# Patient Record
Sex: MALE | Race: WHITE | Employment: OTHER | URBAN - METROPOLITAN AREA
[De-identification: names, ages, dates, MRNs, and addresses within clinical notes are randomized per-mention and may not be internally consistent; named-entity substitution may affect disease eponyms.]

---

## 2017-01-03 ENCOUNTER — OFFICE VISIT (OUTPATIENT)
Dept: PSYCHOLOGY | Age: 67
End: 2017-01-03

## 2017-01-03 DIAGNOSIS — F17.200 NICOTINE USE DISORDER: Primary | ICD-10-CM

## 2017-01-03 PROCEDURE — 96152 PR HEAL & BEHAV INTERVENT,EA 15 MIN,INDIV: CPT | Performed by: PSYCHOLOGIST

## 2017-01-03 ASSESSMENT — PATIENT HEALTH QUESTIONNAIRE - PHQ9
6. FEELING BAD ABOUT YOURSELF - OR THAT YOU ARE A FAILURE OR HAVE LET YOURSELF OR YOUR FAMILY DOWN: 0
SUM OF ALL RESPONSES TO PHQ QUESTIONS 1-9: 4
4. FEELING TIRED OR HAVING LITTLE ENERGY: 1
7. TROUBLE CONCENTRATING ON THINGS, SUCH AS READING THE NEWSPAPER OR WATCHING TELEVISION: 0
3. TROUBLE FALLING OR STAYING ASLEEP: 0
SUM OF ALL RESPONSES TO PHQ9 QUESTIONS 1 & 2: 2
5. POOR APPETITE OR OVEREATING: 1
2. FEELING DOWN, DEPRESSED OR HOPELESS: 1
1. LITTLE INTEREST OR PLEASURE IN DOING THINGS: 1
8. MOVING OR SPEAKING SO SLOWLY THAT OTHER PEOPLE COULD HAVE NOTICED. OR THE OPPOSITE, BEING SO FIGETY OR RESTLESS THAT YOU HAVE BEEN MOVING AROUND A LOT MORE THAN USUAL: 0
9. THOUGHTS THAT YOU WOULD BE BETTER OFF DEAD, OR OF HURTING YOURSELF: 0
10. IF YOU CHECKED OFF ANY PROBLEMS, HOW DIFFICULT HAVE THESE PROBLEMS MADE IT FOR YOU TO DO YOUR WORK, TAKE CARE OF THINGS AT HOME, OR GET ALONG WITH OTHER PEOPLE: 0

## 2017-01-06 DIAGNOSIS — R73.9 HYPERGLYCEMIA: ICD-10-CM

## 2017-01-06 DIAGNOSIS — E78.2 MIXED HYPERLIPIDEMIA: ICD-10-CM

## 2017-01-06 LAB
A/G RATIO: 1.9 (ref 1.1–2.2)
ALBUMIN SERPL-MCNC: 4.6 G/DL (ref 3.4–5)
ALP BLD-CCNC: 58 U/L (ref 40–129)
ALT SERPL-CCNC: 20 U/L (ref 10–40)
ANION GAP SERPL CALCULATED.3IONS-SCNC: 15 MMOL/L (ref 3–16)
AST SERPL-CCNC: 17 U/L (ref 15–37)
BILIRUB SERPL-MCNC: 0.9 MG/DL (ref 0–1)
BUN BLDV-MCNC: 17 MG/DL (ref 7–20)
CALCIUM SERPL-MCNC: 9.4 MG/DL (ref 8.3–10.6)
CHLORIDE BLD-SCNC: 103 MMOL/L (ref 99–110)
CHOLESTEROL, TOTAL: 183 MG/DL (ref 0–199)
CO2: 24 MMOL/L (ref 21–32)
CREAT SERPL-MCNC: 0.9 MG/DL (ref 0.8–1.3)
GFR AFRICAN AMERICAN: >60
GFR NON-AFRICAN AMERICAN: >60
GLOBULIN: 2.4 G/DL
GLUCOSE BLD-MCNC: 110 MG/DL (ref 70–99)
HDLC SERPL-MCNC: 40 MG/DL (ref 40–60)
LDL CHOLESTEROL CALCULATED: 120 MG/DL
POTASSIUM SERPL-SCNC: 4.6 MMOL/L (ref 3.5–5.1)
SODIUM BLD-SCNC: 142 MMOL/L (ref 136–145)
T4 FREE: 1.3 NG/DL (ref 0.9–1.8)
TOTAL PROTEIN: 7 G/DL (ref 6.4–8.2)
TRIGL SERPL-MCNC: 116 MG/DL (ref 0–150)
TSH SERPL DL<=0.05 MIU/L-ACNC: 1.53 UIU/ML (ref 0.27–4.2)
VLDLC SERPL CALC-MCNC: 23 MG/DL

## 2017-01-07 LAB
ESTIMATED AVERAGE GLUCOSE: 114 MG/DL
HBA1C MFR BLD: 5.6 %

## 2017-01-17 ENCOUNTER — OFFICE VISIT (OUTPATIENT)
Dept: FAMILY MEDICINE CLINIC | Age: 67
End: 2017-01-17

## 2017-01-17 ENCOUNTER — OFFICE VISIT (OUTPATIENT)
Dept: PSYCHOLOGY | Age: 67
End: 2017-01-17

## 2017-01-17 ENCOUNTER — HOSPITAL ENCOUNTER (OUTPATIENT)
Dept: OTHER | Age: 67
Discharge: HOME | End: 2017-01-17
Attending: NURSE PRACTITIONER | Admitting: NURSE PRACTITIONER

## 2017-01-17 VITALS
WEIGHT: 191.2 LBS | HEIGHT: 67 IN | SYSTOLIC BLOOD PRESSURE: 120 MMHG | TEMPERATURE: 97.8 F | BODY MASS INDEX: 30.01 KG/M2 | HEART RATE: 89 BPM | DIASTOLIC BLOOD PRESSURE: 78 MMHG | OXYGEN SATURATION: 96 %

## 2017-01-17 DIAGNOSIS — L57.0 ACTINIC KERATOSIS: ICD-10-CM

## 2017-01-17 DIAGNOSIS — J44.9 COPD, MILD (HCC): ICD-10-CM

## 2017-01-17 DIAGNOSIS — Z11.59 NEED FOR HEPATITIS C SCREENING TEST: ICD-10-CM

## 2017-01-17 DIAGNOSIS — R05.9 COUGH: Primary | ICD-10-CM

## 2017-01-17 DIAGNOSIS — E78.2 MIXED HYPERLIPIDEMIA: ICD-10-CM

## 2017-01-17 DIAGNOSIS — R20.8 OTHER DISTURBANCES OF SKIN SENSATION: ICD-10-CM

## 2017-01-17 DIAGNOSIS — F17.200 NICOTINE USE DISORDER: Primary | ICD-10-CM

## 2017-01-17 DIAGNOSIS — Z23 NEED FOR 23-POLYVALENT PNEUMOCOCCAL POLYSACCHARIDE VACCINE: ICD-10-CM

## 2017-01-17 DIAGNOSIS — R05.9 COUGH: ICD-10-CM

## 2017-01-17 PROCEDURE — 96152 PR HEAL & BEHAV INTERVENT,EA 15 MIN,INDIV: CPT | Performed by: PSYCHOLOGIST

## 2017-01-17 PROCEDURE — 1123F ACP DISCUSS/DSCN MKR DOCD: CPT | Performed by: NURSE PRACTITIONER

## 2017-01-17 PROCEDURE — 94150 VITAL CAPACITY TEST: CPT | Performed by: NURSE PRACTITIONER

## 2017-01-17 PROCEDURE — G8420 CALC BMI NORM PARAMETERS: HCPCS | Performed by: NURSE PRACTITIONER

## 2017-01-17 PROCEDURE — 4040F PNEUMOC VAC/ADMIN/RCVD: CPT | Performed by: NURSE PRACTITIONER

## 2017-01-17 PROCEDURE — 3017F COLORECTAL CA SCREEN DOC REV: CPT | Performed by: NURSE PRACTITIONER

## 2017-01-17 PROCEDURE — 99214 OFFICE O/P EST MOD 30 MIN: CPT | Performed by: NURSE PRACTITIONER

## 2017-01-17 PROCEDURE — 3023F SPIROM DOC REV: CPT | Performed by: NURSE PRACTITIONER

## 2017-01-17 PROCEDURE — 90732 PPSV23 VACC 2 YRS+ SUBQ/IM: CPT | Performed by: NURSE PRACTITIONER

## 2017-01-17 PROCEDURE — G8926 SPIRO NO PERF OR DOC: HCPCS | Performed by: NURSE PRACTITIONER

## 2017-01-17 PROCEDURE — G8484 FLU IMMUNIZE NO ADMIN: HCPCS | Performed by: NURSE PRACTITIONER

## 2017-01-17 PROCEDURE — 17110 DESTRUCTION B9 LES UP TO 14: CPT | Performed by: NURSE PRACTITIONER

## 2017-01-17 PROCEDURE — 4004F PT TOBACCO SCREEN RCVD TLK: CPT | Performed by: NURSE PRACTITIONER

## 2017-01-17 PROCEDURE — 94640 AIRWAY INHALATION TREATMENT: CPT | Performed by: NURSE PRACTITIONER

## 2017-01-17 PROCEDURE — G8427 DOCREV CUR MEDS BY ELIG CLIN: HCPCS | Performed by: NURSE PRACTITIONER

## 2017-01-17 PROCEDURE — 4004F PT TOBACCO SCREEN RCVD TLK: CPT | Performed by: PSYCHOLOGIST

## 2017-01-17 PROCEDURE — G0009 ADMIN PNEUMOCOCCAL VACCINE: HCPCS | Performed by: NURSE PRACTITIONER

## 2017-01-17 RX ORDER — MONTELUKAST SODIUM 10 MG/1
10 TABLET ORAL DAILY
Qty: 30 TABLET | Refills: 3 | Status: SHIPPED | OUTPATIENT
Start: 2017-01-17 | End: 2017-05-21 | Stop reason: SDUPTHER

## 2017-01-17 RX ORDER — METHYLPREDNISOLONE 4 MG/1
TABLET ORAL
Qty: 1 KIT | Refills: 0 | Status: SHIPPED | OUTPATIENT
Start: 2017-01-17 | End: 2017-01-23

## 2017-01-17 RX ORDER — ALBUTEROL SULFATE 2.5 MG/3ML
2.5 SOLUTION RESPIRATORY (INHALATION) ONCE
Status: COMPLETED | OUTPATIENT
Start: 2017-01-17 | End: 2017-01-17

## 2017-01-17 RX ADMIN — ALBUTEROL SULFATE 2.5 MG: 2.5 SOLUTION RESPIRATORY (INHALATION) at 15:19

## 2017-01-17 ASSESSMENT — PATIENT HEALTH QUESTIONNAIRE - PHQ9
4. FEELING TIRED OR HAVING LITTLE ENERGY: 1
2. FEELING DOWN, DEPRESSED OR HOPELESS: 0
9. THOUGHTS THAT YOU WOULD BE BETTER OFF DEAD, OR OF HURTING YOURSELF: 0
7. TROUBLE CONCENTRATING ON THINGS, SUCH AS READING THE NEWSPAPER OR WATCHING TELEVISION: 0
1. LITTLE INTEREST OR PLEASURE IN DOING THINGS: 0
6. FEELING BAD ABOUT YOURSELF - OR THAT YOU ARE A FAILURE OR HAVE LET YOURSELF OR YOUR FAMILY DOWN: 0
5. POOR APPETITE OR OVEREATING: 0
SUM OF ALL RESPONSES TO PHQ9 QUESTIONS 1 & 2: 0
3. TROUBLE FALLING OR STAYING ASLEEP: 1
8. MOVING OR SPEAKING SO SLOWLY THAT OTHER PEOPLE COULD HAVE NOTICED. OR THE OPPOSITE, BEING SO FIGETY OR RESTLESS THAT YOU HAVE BEEN MOVING AROUND A LOT MORE THAN USUAL: 0
SUM OF ALL RESPONSES TO PHQ QUESTIONS 1-9: 2

## 2017-01-17 ASSESSMENT — ENCOUNTER SYMPTOMS
GASTROINTESTINAL NEGATIVE: 1
SORE THROAT: 0
HEARTBURN: 0
HEMOPTYSIS: 0
SHORTNESS OF BREATH: 1
EYES NEGATIVE: 1
ALLERGIC/IMMUNOLOGIC NEGATIVE: 1
RHINORRHEA: 0
COUGH: 1
WHEEZING: 1

## 2017-02-03 ENCOUNTER — OFFICE VISIT (OUTPATIENT)
Dept: PSYCHOLOGY | Age: 67
End: 2017-02-03

## 2017-02-03 ENCOUNTER — TELEPHONE (OUTPATIENT)
Dept: PSYCHOLOGY | Age: 67
End: 2017-02-03

## 2017-02-03 DIAGNOSIS — F17.200 NICOTINE USE DISORDER: Primary | ICD-10-CM

## 2017-02-03 DIAGNOSIS — F34.1 DYSTHYMIA: Primary | ICD-10-CM

## 2017-02-03 RX ORDER — BUPROPION HYDROCHLORIDE 300 MG/1
300 TABLET ORAL EVERY MORNING
Qty: 90 TABLET | Refills: 1 | Status: SHIPPED | OUTPATIENT
Start: 2017-02-03 | End: 2017-10-05 | Stop reason: SDUPTHER

## 2017-02-03 RX ORDER — BUPROPION HYDROCHLORIDE 300 MG/1
300 TABLET ORAL EVERY MORNING
Qty: 90 TABLET | Refills: 1 | Status: SHIPPED | OUTPATIENT
Start: 2017-02-03 | End: 2017-02-03 | Stop reason: SDUPTHER

## 2017-02-03 ASSESSMENT — PATIENT HEALTH QUESTIONNAIRE - PHQ9
5. POOR APPETITE OR OVEREATING: 0
9. THOUGHTS THAT YOU WOULD BE BETTER OFF DEAD, OR OF HURTING YOURSELF: 0
7. TROUBLE CONCENTRATING ON THINGS, SUCH AS READING THE NEWSPAPER OR WATCHING TELEVISION: 0
2. FEELING DOWN, DEPRESSED OR HOPELESS: 1
3. TROUBLE FALLING OR STAYING ASLEEP: 1
SUM OF ALL RESPONSES TO PHQ QUESTIONS 1-9: 2
1. LITTLE INTEREST OR PLEASURE IN DOING THINGS: 0
SUM OF ALL RESPONSES TO PHQ9 QUESTIONS 1 & 2: 1
6. FEELING BAD ABOUT YOURSELF - OR THAT YOU ARE A FAILURE OR HAVE LET YOURSELF OR YOUR FAMILY DOWN: 0
8. MOVING OR SPEAKING SO SLOWLY THAT OTHER PEOPLE COULD HAVE NOTICED. OR THE OPPOSITE, BEING SO FIGETY OR RESTLESS THAT YOU HAVE BEEN MOVING AROUND A LOT MORE THAN USUAL: 0
4. FEELING TIRED OR HAVING LITTLE ENERGY: 0

## 2017-04-18 ENCOUNTER — OFFICE VISIT (OUTPATIENT)
Dept: FAMILY MEDICINE CLINIC | Age: 67
End: 2017-04-18

## 2017-04-18 VITALS
TEMPERATURE: 98 F | DIASTOLIC BLOOD PRESSURE: 72 MMHG | BODY MASS INDEX: 29.79 KG/M2 | WEIGHT: 190.2 LBS | SYSTOLIC BLOOD PRESSURE: 114 MMHG

## 2017-04-18 DIAGNOSIS — E78.2 MIXED HYPERLIPIDEMIA: ICD-10-CM

## 2017-04-18 DIAGNOSIS — R73.9 HYPERGLYCEMIA: ICD-10-CM

## 2017-04-18 DIAGNOSIS — J44.9 COPD, MILD (HCC): Primary | ICD-10-CM

## 2017-04-18 DIAGNOSIS — F17.200 SMOKER'S RESPIRATORY SYNDROME: ICD-10-CM

## 2017-04-18 PROCEDURE — 4040F PNEUMOC VAC/ADMIN/RCVD: CPT | Performed by: FAMILY MEDICINE

## 2017-04-18 PROCEDURE — G8420 CALC BMI NORM PARAMETERS: HCPCS | Performed by: FAMILY MEDICINE

## 2017-04-18 PROCEDURE — 4004F PT TOBACCO SCREEN RCVD TLK: CPT | Performed by: FAMILY MEDICINE

## 2017-04-18 PROCEDURE — G8926 SPIRO NO PERF OR DOC: HCPCS | Performed by: FAMILY MEDICINE

## 2017-04-18 PROCEDURE — 3023F SPIROM DOC REV: CPT | Performed by: FAMILY MEDICINE

## 2017-04-18 PROCEDURE — 99214 OFFICE O/P EST MOD 30 MIN: CPT | Performed by: FAMILY MEDICINE

## 2017-04-18 PROCEDURE — G8427 DOCREV CUR MEDS BY ELIG CLIN: HCPCS | Performed by: FAMILY MEDICINE

## 2017-04-18 PROCEDURE — 3017F COLORECTAL CA SCREEN DOC REV: CPT | Performed by: FAMILY MEDICINE

## 2017-04-18 PROCEDURE — 1123F ACP DISCUSS/DSCN MKR DOCD: CPT | Performed by: FAMILY MEDICINE

## 2017-04-18 ASSESSMENT — ENCOUNTER SYMPTOMS
EYES NEGATIVE: 1
RESPIRATORY NEGATIVE: 1
GASTROINTESTINAL NEGATIVE: 1

## 2017-04-19 DIAGNOSIS — E78.2 MIXED HYPERLIPIDEMIA: ICD-10-CM

## 2017-04-19 DIAGNOSIS — Z11.59 NEED FOR HEPATITIS C SCREENING TEST: ICD-10-CM

## 2017-04-19 LAB
ALBUMIN SERPL-MCNC: 4.2 G/DL (ref 3.4–5)
ANION GAP SERPL CALCULATED.3IONS-SCNC: 16 MMOL/L (ref 3–16)
BUN BLDV-MCNC: 15 MG/DL (ref 7–20)
CALCIUM SERPL-MCNC: 8.9 MG/DL (ref 8.3–10.6)
CHLORIDE BLD-SCNC: 105 MMOL/L (ref 99–110)
CHOLESTEROL, TOTAL: 186 MG/DL (ref 0–199)
CO2: 23 MMOL/L (ref 21–32)
CREAT SERPL-MCNC: 0.9 MG/DL (ref 0.8–1.3)
GFR AFRICAN AMERICAN: >60
GFR NON-AFRICAN AMERICAN: >60
GLUCOSE BLD-MCNC: 98 MG/DL (ref 70–99)
HDLC SERPL-MCNC: 32 MG/DL (ref 40–60)
HEPATITIS C ANTIBODY INTERPRETATION: NORMAL
LDL CHOLESTEROL CALCULATED: 125 MG/DL
PHOSPHORUS: 3.1 MG/DL (ref 2.5–4.9)
POTASSIUM SERPL-SCNC: 4.5 MMOL/L (ref 3.5–5.1)
SODIUM BLD-SCNC: 144 MMOL/L (ref 136–145)
TRIGL SERPL-MCNC: 145 MG/DL (ref 0–150)
VLDLC SERPL CALC-MCNC: 29 MG/DL

## 2017-05-21 DIAGNOSIS — J44.9 COPD, MILD (HCC): ICD-10-CM

## 2017-05-21 DIAGNOSIS — R05.9 COUGH: ICD-10-CM

## 2017-05-21 RX ORDER — MONTELUKAST SODIUM 10 MG/1
10 TABLET ORAL NIGHTLY
Qty: 90 TABLET | Refills: 1 | Status: SHIPPED | OUTPATIENT
Start: 2017-05-21 | End: 2017-06-17 | Stop reason: SDUPTHER

## 2017-06-17 DIAGNOSIS — J44.9 COPD, MILD (HCC): ICD-10-CM

## 2017-06-17 DIAGNOSIS — R05.9 COUGH: ICD-10-CM

## 2017-06-17 RX ORDER — MONTELUKAST SODIUM 10 MG/1
10 TABLET ORAL NIGHTLY
Qty: 90 TABLET | Refills: 1 | Status: SHIPPED | OUTPATIENT
Start: 2017-06-17 | End: 2018-02-02

## 2017-08-20 DIAGNOSIS — J44.9 COPD, MILD (HCC): ICD-10-CM

## 2017-08-21 RX ORDER — ALBUTEROL SULFATE 90 UG/1
2 AEROSOL, METERED RESPIRATORY (INHALATION) EVERY 6 HOURS PRN
Qty: 3 INHALER | Refills: 1 | Status: ON HOLD | OUTPATIENT
Start: 2017-08-21 | End: 2022-01-09 | Stop reason: HOSPADM

## 2017-10-05 DIAGNOSIS — F34.1 DYSTHYMIA: ICD-10-CM

## 2017-10-05 RX ORDER — BUPROPION HYDROCHLORIDE 300 MG/1
300 TABLET ORAL EVERY MORNING
Qty: 90 TABLET | Refills: 1 | Status: SHIPPED | OUTPATIENT
Start: 2017-10-05 | End: 2018-05-08 | Stop reason: SDUPTHER

## 2017-12-12 ENCOUNTER — OFFICE VISIT (OUTPATIENT)
Dept: FAMILY MEDICINE CLINIC | Age: 67
End: 2017-12-12

## 2017-12-12 VITALS
TEMPERATURE: 97.8 F | BODY MASS INDEX: 30.17 KG/M2 | SYSTOLIC BLOOD PRESSURE: 126 MMHG | DIASTOLIC BLOOD PRESSURE: 82 MMHG | WEIGHT: 192.6 LBS

## 2017-12-12 DIAGNOSIS — J44.9 COPD, MILD (HCC): ICD-10-CM

## 2017-12-12 DIAGNOSIS — E78.2 MIXED HYPERLIPIDEMIA: ICD-10-CM

## 2017-12-12 DIAGNOSIS — R20.8 BURNING SENSATION: ICD-10-CM

## 2017-12-12 DIAGNOSIS — S46.911A SHOULDER STRAIN, RIGHT, INITIAL ENCOUNTER: ICD-10-CM

## 2017-12-12 DIAGNOSIS — L57.0 ACTINIC KERATOSIS: Primary | ICD-10-CM

## 2017-12-12 PROCEDURE — 17000 DESTRUCT PREMALG LESION: CPT | Performed by: FAMILY MEDICINE

## 2017-12-12 PROCEDURE — G8484 FLU IMMUNIZE NO ADMIN: HCPCS | Performed by: FAMILY MEDICINE

## 2017-12-12 PROCEDURE — G8427 DOCREV CUR MEDS BY ELIG CLIN: HCPCS | Performed by: FAMILY MEDICINE

## 2017-12-12 PROCEDURE — G8417 CALC BMI ABV UP PARAM F/U: HCPCS | Performed by: FAMILY MEDICINE

## 2017-12-12 PROCEDURE — 4040F PNEUMOC VAC/ADMIN/RCVD: CPT | Performed by: FAMILY MEDICINE

## 2017-12-12 PROCEDURE — 99214 OFFICE O/P EST MOD 30 MIN: CPT | Performed by: FAMILY MEDICINE

## 2017-12-12 PROCEDURE — 3023F SPIROM DOC REV: CPT | Performed by: FAMILY MEDICINE

## 2017-12-12 PROCEDURE — G8926 SPIRO NO PERF OR DOC: HCPCS | Performed by: FAMILY MEDICINE

## 2017-12-12 PROCEDURE — 1123F ACP DISCUSS/DSCN MKR DOCD: CPT | Performed by: FAMILY MEDICINE

## 2017-12-12 PROCEDURE — 17003 DESTRUCT PREMALG LES 2-14: CPT | Performed by: FAMILY MEDICINE

## 2017-12-12 PROCEDURE — 3017F COLORECTAL CA SCREEN DOC REV: CPT | Performed by: FAMILY MEDICINE

## 2017-12-12 PROCEDURE — 4004F PT TOBACCO SCREEN RCVD TLK: CPT | Performed by: FAMILY MEDICINE

## 2017-12-12 RX ORDER — FLUOROURACIL 50 MG/G
CREAM TOPICAL
Qty: 40 G | Refills: 0 | Status: SHIPPED | OUTPATIENT
Start: 2017-12-12 | End: 2017-12-19

## 2017-12-12 ASSESSMENT — ENCOUNTER SYMPTOMS
GASTROINTESTINAL NEGATIVE: 1
RESPIRATORY NEGATIVE: 1
EYES NEGATIVE: 1

## 2017-12-13 NOTE — PROGRESS NOTES
Administered    Pneumococcal Polysaccharide ([id number]) 01/17/2017    Tdap (Boostrix, Adacel) 08/03/2012    Zoster 01/28/2015       No Known Allergies  Outpatient Prescriptions Marked as Taking for the 12/12/17 encounter (Office Visit) with nEzo Hill MD   Medication Sig Dispense Refill    fluorouracil (EFUDEX) 5 % cream Apply topically 2 times daily to right side of forehead. 40 g 0    buPROPion (WELLBUTRIN XL) 300 MG extended release tablet Take 1 tablet by mouth every morning 90 tablet 1    albuterol sulfate HFA (VENTOLIN HFA) 108 (90 Base) MCG/ACT inhaler Inhale 2 puffs into the lungs every 6 hours as needed for Wheezing or Shortness of Breath 3 Inhaler 1    montelukast (SINGULAIR) 10 MG tablet Take 1 tablet by mouth nightly 90 tablet 1    fluticasone-salmeterol (ADVAIR) 250-50 MCG/DOSE AEPB Inhale 1 puff into the lungs every 12 hours 60 each 3    Flaxseed, Linseed, (FLAX SEED OIL) 1000 MG CAPS Take 2,000 mg by mouth 2 times daily.  Cinnamon 500 MG CAPS Take 1 capsule by mouth daily.  vitamin E 400 UNIT capsule Take 400 Units by mouth daily.  aspirin 325 MG tablet Take 325 mg by mouth daily.  Cyanocobalamin (VITAMIN B 12) 500 MCG LOZG Take 1 tablet by mouth.  fish oil-omega-3 fatty acids 1000 MG capsule Take 1 capsule by mouth daily.  Ascorbic Acid (VITAMIN C) 500 MG tablet Take 500 mg by mouth daily.  vitamin D (CHOLECALCIFEROL) 400 UNIT TABS tablet Take 400 Units by mouth daily.          Past Medical History:   Diagnosis Date    BPH with obstruction/lower urinary tract symptoms 8/3/2012    Chicken pox     Colon polyp     Hyperglycemia 12/15/2010    Hyperlipemia 12/15/2010    Post herpetic neuralgia     Shingles     Squamous cell carcinoma of skin of upper limb, including shoulder 9/8/2015     Past Surgical History:   Procedure Laterality Date    HEMICOLECTOMY      right due to mass benign     Family History   Problem Relation Age of Onset   

## 2017-12-14 DIAGNOSIS — E78.2 MIXED HYPERLIPIDEMIA: ICD-10-CM

## 2017-12-14 LAB
A/G RATIO: 2 (ref 1.1–2.2)
ALBUMIN SERPL-MCNC: 4.3 G/DL (ref 3.4–5)
ALP BLD-CCNC: 53 U/L (ref 40–129)
ALT SERPL-CCNC: 24 U/L (ref 10–40)
ANION GAP SERPL CALCULATED.3IONS-SCNC: 14 MMOL/L (ref 3–16)
AST SERPL-CCNC: 16 U/L (ref 15–37)
BILIRUB SERPL-MCNC: 0.7 MG/DL (ref 0–1)
BUN BLDV-MCNC: 22 MG/DL (ref 7–20)
CALCIUM SERPL-MCNC: 9 MG/DL (ref 8.3–10.6)
CHLORIDE BLD-SCNC: 102 MMOL/L (ref 99–110)
CHOLESTEROL, TOTAL: 189 MG/DL (ref 0–199)
CO2: 24 MMOL/L (ref 21–32)
CREAT SERPL-MCNC: 1 MG/DL (ref 0.8–1.3)
GFR AFRICAN AMERICAN: >60
GFR NON-AFRICAN AMERICAN: >60
GLOBULIN: 2.1 G/DL
GLUCOSE BLD-MCNC: 105 MG/DL (ref 70–99)
HDLC SERPL-MCNC: 35 MG/DL (ref 40–60)
LDL CHOLESTEROL CALCULATED: 107 MG/DL
POTASSIUM SERPL-SCNC: 4.4 MMOL/L (ref 3.5–5.1)
SODIUM BLD-SCNC: 140 MMOL/L (ref 136–145)
TOTAL PROTEIN: 6.4 G/DL (ref 6.4–8.2)
TRIGL SERPL-MCNC: 234 MG/DL (ref 0–150)
VLDLC SERPL CALC-MCNC: 47 MG/DL

## 2017-12-19 ENCOUNTER — TELEPHONE (OUTPATIENT)
Dept: FAMILY MEDICINE CLINIC | Age: 67
End: 2017-12-19

## 2017-12-19 DIAGNOSIS — L57.0 ACTINIC KERATOSIS: Primary | ICD-10-CM

## 2017-12-19 RX ORDER — FLUOROURACIL 50 MG/ML
SOLUTION TOPICAL
Qty: 1 BOTTLE | Refills: 2 | Status: SHIPPED | OUTPATIENT
Start: 2017-12-19 | End: 2018-04-04

## 2017-12-19 NOTE — TELEPHONE ENCOUNTER
Fluorouracil 5% cream is too expensive  Please switch to the fluorouracil 5% solution as it is cheaper

## 2018-02-01 ENCOUNTER — OFFICE VISIT (OUTPATIENT)
Dept: FAMILY MEDICINE CLINIC | Age: 68
End: 2018-02-01

## 2018-02-01 VITALS
TEMPERATURE: 98.1 F | DIASTOLIC BLOOD PRESSURE: 80 MMHG | WEIGHT: 190 LBS | BODY MASS INDEX: 29.76 KG/M2 | SYSTOLIC BLOOD PRESSURE: 125 MMHG

## 2018-02-01 DIAGNOSIS — J44.9 COPD, MILD (HCC): Primary | ICD-10-CM

## 2018-02-01 DIAGNOSIS — F17.219 NICOTINE DEPENDENCE, CIGARETTES, WITH UNSPECIFIED NICOTINE-INDUCED DISORDERS: ICD-10-CM

## 2018-02-01 DIAGNOSIS — L57.0 ACTINIC KERATOSIS: ICD-10-CM

## 2018-02-01 PROCEDURE — G8926 SPIRO NO PERF OR DOC: HCPCS | Performed by: FAMILY MEDICINE

## 2018-02-01 PROCEDURE — G8484 FLU IMMUNIZE NO ADMIN: HCPCS | Performed by: FAMILY MEDICINE

## 2018-02-01 PROCEDURE — 1123F ACP DISCUSS/DSCN MKR DOCD: CPT | Performed by: FAMILY MEDICINE

## 2018-02-01 PROCEDURE — 99213 OFFICE O/P EST LOW 20 MIN: CPT | Performed by: FAMILY MEDICINE

## 2018-02-01 PROCEDURE — 3023F SPIROM DOC REV: CPT | Performed by: FAMILY MEDICINE

## 2018-02-01 PROCEDURE — 4040F PNEUMOC VAC/ADMIN/RCVD: CPT | Performed by: FAMILY MEDICINE

## 2018-02-01 PROCEDURE — G8417 CALC BMI ABV UP PARAM F/U: HCPCS | Performed by: FAMILY MEDICINE

## 2018-02-01 PROCEDURE — 4004F PT TOBACCO SCREEN RCVD TLK: CPT | Performed by: FAMILY MEDICINE

## 2018-02-01 PROCEDURE — G8428 CUR MEDS NOT DOCUMENT: HCPCS | Performed by: FAMILY MEDICINE

## 2018-02-01 PROCEDURE — 3017F COLORECTAL CA SCREEN DOC REV: CPT | Performed by: FAMILY MEDICINE

## 2018-02-01 PROCEDURE — G0296 VISIT TO DETERM LDCT ELIG: HCPCS | Performed by: FAMILY MEDICINE

## 2018-02-01 ASSESSMENT — ENCOUNTER SYMPTOMS
RESPIRATORY NEGATIVE: 1
EYES NEGATIVE: 1
GASTROINTESTINAL NEGATIVE: 1

## 2018-02-01 NOTE — PROGRESS NOTES
2/1/18    H [place] Post  1950      Chief Complaint   Patient presents with    Abrasion     COPD: Patient complains of dyspnea and cough. Symptoms began several months ago. Symptoms acute dyspnea and chronic dyspnea does worsen with exertion. Sputum is clear  in small amounts. Fever has been hot and cold spells. Patient uses 1 pillows at night. Patient can walk 500 feet before resting. Patient currently is not on home oxygen therapy. Naty Rayo  Respiratory history: COPD  He continued to smoke     Pt here to follow up on effudex therapy but did not get to start it as medicaion on back order  Vitals:    02/01/18 0831   BP: 125/80   Temp: 98.1 °F (36.7 °C)         Immunization History   Administered Date(s) Administered    Pneumococcal Polysaccharide ([id number]) 01/17/2017    Tdap (Boostrix, Adacel) 08/03/2012    Zoster 01/28/2015       No Known Allergies  Outpatient Prescriptions Marked as Taking for the 2/1/18 encounter (Office Visit) with Janice Collins MD   Medication Sig Dispense Refill    umeclidinium-vilanterol (ANORO ELLIPTA) 62.5-25 MCG/INH AEPB inhaler Inhale 1 puff into the lungs daily 1 each 3       Past Medical History:   Diagnosis Date    BPH with obstruction/lower urinary tract symptoms 8/3/2012    Chicken pox     Colon polyp     Hyperglycemia 12/15/2010    Hyperlipemia 12/15/2010    Post herpetic neuralgia     Shingles     Squamous cell carcinoma of skin of upper limb, including shoulder 9/8/2015     Past Surgical History:   Procedure Laterality Date    HEMICOLECTOMY      right due to mass benign     Family History   Problem Relation Age of Onset    Depression Mother     Arthritis Mother     High Blood Pressure Mother     High Cholesterol Mother     Heart Disease Mother     Arthritis Father     Heart Disease Father     High Blood Pressure Father     High Cholesterol Father     Stroke Father     Cancer Sister      ovarian    High Cholesterol Sister     High Blood Pressure Sister     Arthritis Sister      Social History     Social History    Marital status:      Spouse name: N/A    Number of children: N/A    Years of education: N/A     Occupational History    Not on file. Social History Main Topics    Smoking status: Current Every Day Smoker     Packs/day: 1.00     Types: Cigarettes    Smokeless tobacco: Never Used      Comment: smokes less than 1 pack a day     Alcohol use 3.6 oz/week     2 Glasses of wine, 2 Cans of beer, 2 Shots of liquor per week      Comment: drinks 1-2 a day    Drug use: No    Sexual activity: Not on file     Other Topics Concern    Not on file     Social History Narrative    No narrative on file         Any recent diagnostic tests, hospital reports, office notes or consultation letters were reviewed prior to and during the visit. Review of Systems   Constitutional: Negative. HENT: Negative. Eyes: Negative. Respiratory: Negative. Cardiovascular: Negative. Gastrointestinal: Negative. Genitourinary: Negative. Musculoskeletal: Negative. Psychiatric/Behavioral: Negative. Physical Exam   Constitutional: He appears well-developed and well-nourished. No distress. Neck: Normal range of motion. Neck supple. Normal carotid pulses, no hepatojugular reflux and no JVD present. Carotid bruit is not present. No tracheal deviation present. No thyromegaly present. Cardiovascular: Normal rate, regular rhythm, S2 normal, normal heart sounds and intact distal pulses. PMI is not displaced. Exam reveals no gallop and no friction rub. No murmur heard. Pulses:       Carotid pulses are 2+ on the right side, and 2+ on the left side. Dorsalis pedis pulses are 2+ on the right side, and 2+ on the left side. Posterior tibial pulses are 2+ on the right side, and 2+ on the left side. Pulmonary/Chest: Effort normal and breath sounds normal. No stridor. No respiratory distress. He has no wheezes.  He has no rales. He exhibits no tenderness. Abdominal: Soft. Bowel sounds are normal. He exhibits no distension and no mass. There is no tenderness. There is no rebound and no guarding. Musculoskeletal:        Right ankle: He exhibits no swelling. Left ankle: He exhibits no swelling. Lymphadenopathy:     He has no cervical adenopathy. Skin: He is not diaphoretic. Psychiatric: He has a normal mood and affect. His behavior is normal. Judgment and thought content normal.         1. COPD, mild ([address]    The condition is deteriorating, will change treatment, investigate cause and make further recommendations when data back. Need to recheck PFT and change therapy  PFT-lab    umeclidinium-vilanterol (ANORO ELLIPTA) 62.5-25 MCG/INH AEPB inhaler    CT Lung Screening   2. Actinic keratosis  The condition is deteriorating, will change treatment, investigate cause and make further recommendations when data back. Will look into getting medication    3. Nicotine dependence, cigarettes, with unspecified nicotine-induced disorders   The condition is deteriorating, will change treatment, investigate cause and make further recommendations when data back. Need toscreen he needs to quit reaching done.  SC VISIT TO DISCUSS LUNG CA SCREEN W LDCT    CT Lung Screening    CANCELED: CT Lung Screening             Mumtaz Silva MD

## 2018-02-02 DIAGNOSIS — J44.9 COPD, MILD (HCC): ICD-10-CM

## 2018-02-02 DIAGNOSIS — R05.9 COUGH: ICD-10-CM

## 2018-02-02 RX ORDER — MONTELUKAST SODIUM 10 MG/1
10 TABLET ORAL NIGHTLY
Qty: 90 TABLET | Refills: 1 | Status: ON HOLD | OUTPATIENT
Start: 2018-02-02 | End: 2022-01-09 | Stop reason: HOSPADM

## 2018-02-06 ENCOUNTER — HOSPITAL ENCOUNTER (OUTPATIENT)
Dept: CT IMAGING | Age: 68
Discharge: HOME | End: 2018-02-06
Attending: FAMILY MEDICINE | Admitting: FAMILY MEDICINE

## 2018-02-06 DIAGNOSIS — J44.9 COPD, MILD (HCC): ICD-10-CM

## 2018-02-06 DIAGNOSIS — F17.219 NICOTINE DEPENDENCE, CIGARETTES, WITH UNSPECIFIED NICOTINE-INDUCED DISORDERS: ICD-10-CM

## 2018-02-06 DIAGNOSIS — J44.9 CHRONIC OBSTRUCTIVE PULMONARY DISEASE (HCC): ICD-10-CM

## 2018-02-15 ENCOUNTER — OFFICE VISIT (OUTPATIENT)
Dept: FAMILY MEDICINE CLINIC | Age: 68
End: 2018-02-15

## 2018-02-15 VITALS
BODY MASS INDEX: 30.32 KG/M2 | SYSTOLIC BLOOD PRESSURE: 132 MMHG | DIASTOLIC BLOOD PRESSURE: 68 MMHG | WEIGHT: 193.6 LBS | TEMPERATURE: 98.4 F

## 2018-02-15 DIAGNOSIS — L57.0 ACTINIC KERATOSIS: Primary | ICD-10-CM

## 2018-02-15 PROCEDURE — 99213 OFFICE O/P EST LOW 20 MIN: CPT | Performed by: FAMILY MEDICINE

## 2018-02-15 PROCEDURE — G8427 DOCREV CUR MEDS BY ELIG CLIN: HCPCS | Performed by: FAMILY MEDICINE

## 2018-02-15 PROCEDURE — 4004F PT TOBACCO SCREEN RCVD TLK: CPT | Performed by: FAMILY MEDICINE

## 2018-02-15 PROCEDURE — 1123F ACP DISCUSS/DSCN MKR DOCD: CPT | Performed by: FAMILY MEDICINE

## 2018-02-15 PROCEDURE — G8484 FLU IMMUNIZE NO ADMIN: HCPCS | Performed by: FAMILY MEDICINE

## 2018-02-15 PROCEDURE — 3017F COLORECTAL CA SCREEN DOC REV: CPT | Performed by: FAMILY MEDICINE

## 2018-02-15 PROCEDURE — G8417 CALC BMI ABV UP PARAM F/U: HCPCS | Performed by: FAMILY MEDICINE

## 2018-02-15 PROCEDURE — 4040F PNEUMOC VAC/ADMIN/RCVD: CPT | Performed by: FAMILY MEDICINE

## 2018-02-18 ASSESSMENT — ENCOUNTER SYMPTOMS
EYES NEGATIVE: 1
RESPIRATORY NEGATIVE: 1
GASTROINTESTINAL NEGATIVE: 1

## 2018-02-18 NOTE — PROGRESS NOTES
2/18/18    Marianna Toney  1950      Chief Complaint   Patient presents with    Rash     scabbing on face and hand, getting worse, med very expensive   pt here to follow up. He has been using the lotion and notes the skin is red. He is frustrated with the cost of the meds and response     /68   Temp 98.4 °F (36.9 °C) (Oral)   Wt 193 lb 9.6 oz (87.8 kg)   BMI 30.32 kg/m²       Immunization History   Administered Date(s) Administered    Pneumococcal Polysaccharide ([id number]) 01/17/2017    Tdap (Boostrix, Adacel) 08/03/2012    Zoster 01/28/2015       No Known Allergies  Outpatient Prescriptions Marked as Taking for the 2/15/18 encounter (Office Visit) with Gustabo Flaherty MD   Medication Sig Dispense Refill    montelukast (SINGULAIR) 10 MG tablet Take 1 tablet by mouth nightly 90 tablet 1    umeclidinium-vilanterol (ANORO ELLIPTA) 62.5-25 MCG/INH AEPB inhaler Inhale 1 puff into the lungs daily 1 each 3    fluorouracil (EFUDEX) 5 % SOLN external solution Apply topically 1 Bottle 2    buPROPion (WELLBUTRIN XL) 300 MG extended release tablet Take 1 tablet by mouth every morning 90 tablet 1    albuterol sulfate HFA (VENTOLIN HFA) 108 (90 Base) MCG/ACT inhaler Inhale 2 puffs into the lungs every 6 hours as needed for Wheezing or Shortness of Breath 3 Inhaler 1    fluticasone-salmeterol (ADVAIR) 250-50 MCG/DOSE AEPB Inhale 1 puff into the lungs every 12 hours 60 each 3    ketoconazole (NIZORAL) 2 % cream Apply topically daily. 30 g 1    hydrocortisone 2.5 % cream Apply topically 2 times daily. 60 g 5    Flaxseed, Linseed, (FLAX SEED OIL) 1000 MG CAPS Take 2,000 mg by mouth 2 times daily.  Cinnamon 500 MG CAPS Take 1 capsule by mouth daily.  vitamin E 400 UNIT capsule Take 400 Units by mouth daily.  aspirin 325 MG tablet Take 325 mg by mouth daily.  Cyanocobalamin (VITAMIN B 12) 500 MCG LOZG Take 1 tablet by mouth.       fish oil-omega-3 fatty acids 1000 MG capsule Take Cardiovascular: Negative. Gastrointestinal: Negative. Genitourinary: Negative. Musculoskeletal: Negative. Psychiatric/Behavioral: Negative. Physical Exam   HENT:   Head:             1. Actinic keratosis   The condition is deteriorating, will change treatment, investigate cause and make further recommendations when data back. Need ot see derm note written         . francia Ellis MD

## 2018-03-20 ENCOUNTER — OFFICE VISIT (OUTPATIENT)
Dept: FAMILY MEDICINE CLINIC | Age: 68
End: 2018-03-20

## 2018-03-20 VITALS
SYSTOLIC BLOOD PRESSURE: 116 MMHG | DIASTOLIC BLOOD PRESSURE: 78 MMHG | TEMPERATURE: 98.7 F | BODY MASS INDEX: 29.44 KG/M2 | WEIGHT: 188 LBS

## 2018-03-20 DIAGNOSIS — E78.2 MIXED HYPERLIPIDEMIA: ICD-10-CM

## 2018-03-20 DIAGNOSIS — R73.9 HYPERGLYCEMIA: ICD-10-CM

## 2018-03-20 DIAGNOSIS — L20.89 OTHER ATOPIC DERMATITIS: Primary | ICD-10-CM

## 2018-03-20 DIAGNOSIS — Z23 NEED FOR PNEUMOCOCCAL VACCINATION: ICD-10-CM

## 2018-03-20 PROCEDURE — 99214 OFFICE O/P EST MOD 30 MIN: CPT | Performed by: FAMILY MEDICINE

## 2018-03-20 PROCEDURE — 90670 PCV13 VACCINE IM: CPT | Performed by: FAMILY MEDICINE

## 2018-03-20 PROCEDURE — 4040F PNEUMOC VAC/ADMIN/RCVD: CPT | Performed by: FAMILY MEDICINE

## 2018-03-20 PROCEDURE — G8427 DOCREV CUR MEDS BY ELIG CLIN: HCPCS | Performed by: FAMILY MEDICINE

## 2018-03-20 PROCEDURE — G0009 ADMIN PNEUMOCOCCAL VACCINE: HCPCS | Performed by: FAMILY MEDICINE

## 2018-03-20 PROCEDURE — 4004F PT TOBACCO SCREEN RCVD TLK: CPT | Performed by: FAMILY MEDICINE

## 2018-03-20 PROCEDURE — G8484 FLU IMMUNIZE NO ADMIN: HCPCS | Performed by: FAMILY MEDICINE

## 2018-03-20 PROCEDURE — 1123F ACP DISCUSS/DSCN MKR DOCD: CPT | Performed by: FAMILY MEDICINE

## 2018-03-20 PROCEDURE — 3017F COLORECTAL CA SCREEN DOC REV: CPT | Performed by: FAMILY MEDICINE

## 2018-03-20 PROCEDURE — G8417 CALC BMI ABV UP PARAM F/U: HCPCS | Performed by: FAMILY MEDICINE

## 2018-03-21 NOTE — PROGRESS NOTES
Mother     High Cholesterol Mother     Heart Disease Mother     Arthritis Father     Heart Disease Father     High Blood Pressure Father     High Cholesterol Father     Stroke Father     Cancer Sister      ovarian    High Cholesterol Sister     High Blood Pressure Sister     Arthritis Sister      Social History     Social History    Marital status:      Spouse name: N/A    Number of children: N/A    Years of education: N/A     Occupational History    Not on file. Social History Main Topics    Smoking status: Current Every Day Smoker     Packs/day: 1.00     Types: Cigarettes    Smokeless tobacco: Never Used      Comment: smokes less than 1 pack a day     Alcohol use 3.6 oz/week     2 Glasses of wine, 2 Cans of beer, 2 Shots of liquor per week      Comment: drinks 1-2 a day    Drug use: No    Sexual activity: Not on file     Other Topics Concern    Not on file     Social History Narrative    No narrative on file         Any recent diagnostic tests, hospital reports, office notes or consultation letters were reviewed prior to and during the visit. Review of Systems      Physical Exam   Constitutional: He appears well-developed and well-nourished. No distress. HENT:   Head:       Neck: Normal range of motion. Neck supple. Normal carotid pulses, no hepatojugular reflux and no JVD present. Carotid bruit is not present. No tracheal deviation present. No thyromegaly present. Cardiovascular: Normal rate, regular rhythm, S2 normal, normal heart sounds and intact distal pulses. PMI is not displaced. Exam reveals no gallop and no friction rub. No murmur heard. Pulses:       Carotid pulses are 2+ on the right side, and 2+ on the left side. Dorsalis pedis pulses are 2+ on the right side, and 2+ on the left side. Posterior tibial pulses are 2+ on the right side, and 2+ on the left side. Pulmonary/Chest: Effort normal and breath sounds normal. No stridor.  No respiratory

## 2018-03-26 ENCOUNTER — TELEPHONE (OUTPATIENT)
Dept: DERMATOLOGY | Age: 68
End: 2018-03-26

## 2018-03-26 DIAGNOSIS — R73.9 HYPERGLYCEMIA: ICD-10-CM

## 2018-03-26 DIAGNOSIS — E78.2 MIXED HYPERLIPIDEMIA: ICD-10-CM

## 2018-03-26 LAB
A/G RATIO: 2.2 (ref 1.1–2.2)
ALBUMIN SERPL-MCNC: 4.4 G/DL (ref 3.4–5)
ALP BLD-CCNC: 62 U/L (ref 40–129)
ALT SERPL-CCNC: 15 U/L (ref 10–40)
ANION GAP SERPL CALCULATED.3IONS-SCNC: 11 MMOL/L (ref 3–16)
AST SERPL-CCNC: 12 U/L (ref 15–37)
BILIRUB SERPL-MCNC: 0.4 MG/DL (ref 0–1)
BUN BLDV-MCNC: 16 MG/DL (ref 7–20)
CALCIUM SERPL-MCNC: 8.9 MG/DL (ref 8.3–10.6)
CHLORIDE BLD-SCNC: 102 MMOL/L (ref 99–110)
CHOLESTEROL, TOTAL: 173 MG/DL (ref 0–199)
CO2: 26 MMOL/L (ref 21–32)
CREAT SERPL-MCNC: 0.9 MG/DL (ref 0.8–1.3)
GFR AFRICAN AMERICAN: >60
GFR NON-AFRICAN AMERICAN: >60
GLOBULIN: 2 G/DL
GLUCOSE BLD-MCNC: 100 MG/DL (ref 70–99)
HDLC SERPL-MCNC: 33 MG/DL (ref 40–60)
LDL CHOLESTEROL CALCULATED: 100 MG/DL
POTASSIUM SERPL-SCNC: 4.9 MMOL/L (ref 3.5–5.1)
SODIUM BLD-SCNC: 139 MMOL/L (ref 136–145)
TOTAL PROTEIN: 6.4 G/DL (ref 6.4–8.2)
TRIGL SERPL-MCNC: 199 MG/DL (ref 0–150)
VLDLC SERPL CALC-MCNC: 40 MG/DL

## 2018-03-29 ENCOUNTER — TELEPHONE (OUTPATIENT)
Dept: CASE MANAGEMENT | Age: 68
End: 2018-03-29

## 2018-04-04 ENCOUNTER — OFFICE VISIT (OUTPATIENT)
Dept: DERMATOLOGY | Age: 68
End: 2018-04-04

## 2018-04-04 DIAGNOSIS — L57.0 AK (ACTINIC KERATOSIS): Primary | ICD-10-CM

## 2018-04-04 DIAGNOSIS — D48.5 NEOPLASM OF UNCERTAIN BEHAVIOR OF SKIN: ICD-10-CM

## 2018-04-04 DIAGNOSIS — L21.9 SEBORRHEIC DERMATITIS: ICD-10-CM

## 2018-04-04 PROCEDURE — G8417 CALC BMI ABV UP PARAM F/U: HCPCS | Performed by: DERMATOLOGY

## 2018-04-04 PROCEDURE — G8427 DOCREV CUR MEDS BY ELIG CLIN: HCPCS | Performed by: DERMATOLOGY

## 2018-04-04 PROCEDURE — 4040F PNEUMOC VAC/ADMIN/RCVD: CPT | Performed by: DERMATOLOGY

## 2018-04-04 PROCEDURE — 11100 PR BIOPSY OF SKIN LESION: CPT | Performed by: DERMATOLOGY

## 2018-04-04 PROCEDURE — 99213 OFFICE O/P EST LOW 20 MIN: CPT | Performed by: DERMATOLOGY

## 2018-04-04 PROCEDURE — 1123F ACP DISCUSS/DSCN MKR DOCD: CPT | Performed by: DERMATOLOGY

## 2018-04-04 PROCEDURE — 3017F COLORECTAL CA SCREEN DOC REV: CPT | Performed by: DERMATOLOGY

## 2018-04-04 PROCEDURE — 11101 PR BIOPSY, EACH ADDED LESION: CPT | Performed by: DERMATOLOGY

## 2018-04-04 PROCEDURE — 4004F PT TOBACCO SCREEN RCVD TLK: CPT | Performed by: DERMATOLOGY

## 2018-04-04 RX ORDER — FLUOROURACIL 50 MG/G
CREAM TOPICAL
Qty: 40 G | Refills: 0 | Status: ON HOLD | OUTPATIENT
Start: 2018-04-04 | End: 2022-01-09 | Stop reason: HOSPADM

## 2018-04-09 ENCOUNTER — TELEPHONE (OUTPATIENT)
Dept: DERMATOLOGY | Age: 68
End: 2018-04-09

## 2018-04-09 DIAGNOSIS — C44.311 BASAL CELL CARCINOMA OF NOSE: ICD-10-CM

## 2018-04-09 DIAGNOSIS — C44.629 SQUAMOUS CELL CARCINOMA OF DORSUM OF LEFT HAND: Primary | ICD-10-CM

## 2018-04-24 ENCOUNTER — PROCEDURE VISIT (OUTPATIENT)
Dept: SURGERY | Age: 68
End: 2018-04-24

## 2018-04-24 VITALS
BODY MASS INDEX: 29.7 KG/M2 | OXYGEN SATURATION: 95 % | DIASTOLIC BLOOD PRESSURE: 81 MMHG | TEMPERATURE: 98.1 F | WEIGHT: 189.6 LBS | SYSTOLIC BLOOD PRESSURE: 145 MMHG | HEART RATE: 81 BPM

## 2018-04-24 DIAGNOSIS — C44.629 SCC (SQUAMOUS CELL CARCINOMA), HAND, LEFT: Primary | ICD-10-CM

## 2018-04-24 PROCEDURE — 17311 MOHS 1 STAGE H/N/HF/G: CPT | Performed by: DERMATOLOGY

## 2018-04-24 PROCEDURE — 12042 INTMD RPR N-HF/GENIT2.6-7.5: CPT | Performed by: DERMATOLOGY

## 2018-04-25 ENCOUNTER — TELEPHONE (OUTPATIENT)
Dept: SURGERY | Age: 68
End: 2018-04-25

## 2018-04-27 ENCOUNTER — TELEPHONE (OUTPATIENT)
Dept: SURGERY | Age: 68
End: 2018-04-27

## 2018-04-27 ENCOUNTER — PROCEDURE VISIT (OUTPATIENT)
Dept: DERMATOLOGY | Age: 68
End: 2018-04-27

## 2018-04-27 VITALS — SYSTOLIC BLOOD PRESSURE: 116 MMHG | DIASTOLIC BLOOD PRESSURE: 69 MMHG | HEART RATE: 79 BPM

## 2018-04-27 DIAGNOSIS — C44.619 BCC (BASAL CELL CARCINOMA), ARM, LEFT: Primary | ICD-10-CM

## 2018-04-27 PROCEDURE — 17261 DSTRJ MAL LES T/A/L .6-1.0CM: CPT | Performed by: DERMATOLOGY

## 2018-04-27 PROCEDURE — 12032 INTMD RPR S/A/T/EXT 2.6-7.5: CPT | Performed by: DERMATOLOGY

## 2018-04-27 PROCEDURE — 11602 EXC TR-EXT MAL+MARG 1.1-2 CM: CPT | Performed by: DERMATOLOGY

## 2018-05-02 ENCOUNTER — TELEPHONE (OUTPATIENT)
Dept: DERMATOLOGY | Age: 68
End: 2018-05-02

## 2018-05-08 ENCOUNTER — PROCEDURE VISIT (OUTPATIENT)
Dept: SURGERY | Age: 68
End: 2018-05-08

## 2018-05-08 VITALS
SYSTOLIC BLOOD PRESSURE: 109 MMHG | OXYGEN SATURATION: 95 % | DIASTOLIC BLOOD PRESSURE: 73 MMHG | BODY MASS INDEX: 29.6 KG/M2 | WEIGHT: 189 LBS | TEMPERATURE: 98.1 F | HEART RATE: 72 BPM

## 2018-05-08 DIAGNOSIS — F34.1 DYSTHYMIA: ICD-10-CM

## 2018-05-08 DIAGNOSIS — Z48.02 VISIT FOR SUTURE REMOVAL: ICD-10-CM

## 2018-05-08 DIAGNOSIS — C44.311 BASAL CELL CARCINOMA OF RIGHT SIDE OF NOSE: Primary | ICD-10-CM

## 2018-05-08 PROCEDURE — 17311 MOHS 1 STAGE H/N/HF/G: CPT | Performed by: DERMATOLOGY

## 2018-05-08 PROCEDURE — 17312 MOHS ADDL STAGE: CPT | Performed by: DERMATOLOGY

## 2018-05-08 PROCEDURE — 14060 TIS TRNFR E/N/E/L 10 SQ CM/<: CPT | Performed by: DERMATOLOGY

## 2018-05-08 RX ORDER — BUPROPION HYDROCHLORIDE 300 MG/1
300 TABLET ORAL EVERY MORNING
Qty: 90 TABLET | Refills: 1 | Status: ON HOLD | OUTPATIENT
Start: 2018-05-08 | End: 2022-02-07 | Stop reason: HOSPADM

## 2018-05-09 ENCOUNTER — TELEPHONE (OUTPATIENT)
Dept: SURGERY | Age: 68
End: 2018-05-09

## 2018-05-11 ENCOUNTER — PROCEDURE VISIT (OUTPATIENT)
Dept: DERMATOLOGY | Age: 68
End: 2018-05-11

## 2018-05-11 DIAGNOSIS — C44.519 BCC (BASAL CELL CARCINOMA), TRUNK: Primary | ICD-10-CM

## 2018-05-11 PROCEDURE — 11603 EXC TR-EXT MAL+MARG 2.1-3 CM: CPT | Performed by: DERMATOLOGY

## 2018-05-11 PROCEDURE — 12032 INTMD RPR S/A/T/EXT 2.6-7.5: CPT | Performed by: DERMATOLOGY

## 2018-05-15 ENCOUNTER — OFFICE VISIT (OUTPATIENT)
Dept: SURGERY | Age: 68
End: 2018-05-15

## 2018-05-15 DIAGNOSIS — Z48.02 VISIT FOR SUTURE REMOVAL: Primary | ICD-10-CM

## 2018-05-15 PROCEDURE — 99024 POSTOP FOLLOW-UP VISIT: CPT | Performed by: DERMATOLOGY

## 2018-05-16 ENCOUNTER — TELEPHONE (OUTPATIENT)
Dept: DERMATOLOGY | Age: 68
End: 2018-05-16

## 2018-05-31 ENCOUNTER — OFFICE VISIT (OUTPATIENT)
Dept: FAMILY MEDICINE CLINIC | Age: 68
End: 2018-05-31

## 2018-05-31 VITALS
BODY MASS INDEX: 28.66 KG/M2 | TEMPERATURE: 98.5 F | SYSTOLIC BLOOD PRESSURE: 110 MMHG | HEART RATE: 78 BPM | WEIGHT: 183 LBS | DIASTOLIC BLOOD PRESSURE: 66 MMHG | OXYGEN SATURATION: 96 %

## 2018-05-31 DIAGNOSIS — J44.9 COPD, MILD (HCC): Primary | ICD-10-CM

## 2018-05-31 DIAGNOSIS — E78.2 MIXED HYPERLIPIDEMIA: ICD-10-CM

## 2018-05-31 PROCEDURE — 99213 OFFICE O/P EST LOW 20 MIN: CPT | Performed by: FAMILY MEDICINE

## 2018-05-31 PROCEDURE — G8417 CALC BMI ABV UP PARAM F/U: HCPCS | Performed by: FAMILY MEDICINE

## 2018-05-31 PROCEDURE — G8926 SPIRO NO PERF OR DOC: HCPCS | Performed by: FAMILY MEDICINE

## 2018-05-31 PROCEDURE — 4004F PT TOBACCO SCREEN RCVD TLK: CPT | Performed by: FAMILY MEDICINE

## 2018-05-31 PROCEDURE — G8427 DOCREV CUR MEDS BY ELIG CLIN: HCPCS | Performed by: FAMILY MEDICINE

## 2018-05-31 PROCEDURE — 3017F COLORECTAL CA SCREEN DOC REV: CPT | Performed by: FAMILY MEDICINE

## 2018-05-31 PROCEDURE — 3023F SPIROM DOC REV: CPT | Performed by: FAMILY MEDICINE

## 2018-05-31 PROCEDURE — 1123F ACP DISCUSS/DSCN MKR DOCD: CPT | Performed by: FAMILY MEDICINE

## 2018-05-31 PROCEDURE — 4040F PNEUMOC VAC/ADMIN/RCVD: CPT | Performed by: FAMILY MEDICINE

## 2018-06-03 ASSESSMENT — PATIENT HEALTH QUESTIONNAIRE - PHQ9
1. LITTLE INTEREST OR PLEASURE IN DOING THINGS: 0
SUM OF ALL RESPONSES TO PHQ QUESTIONS 1-9: 0
2. FEELING DOWN, DEPRESSED OR HOPELESS: 0
SUM OF ALL RESPONSES TO PHQ9 QUESTIONS 1 & 2: 0

## 2018-06-03 ASSESSMENT — ENCOUNTER SYMPTOMS
EYES NEGATIVE: 1
RESPIRATORY NEGATIVE: 1
GASTROINTESTINAL NEGATIVE: 1

## 2018-06-06 ENCOUNTER — OFFICE VISIT (OUTPATIENT)
Dept: SURGERY | Age: 68
End: 2018-06-06

## 2018-06-06 DIAGNOSIS — Z51.89 VISIT FOR WOUND CHECK: Primary | ICD-10-CM

## 2018-06-06 DIAGNOSIS — L02.91 PURULENT ABSCESS: ICD-10-CM

## 2018-06-06 PROCEDURE — G8417 CALC BMI ABV UP PARAM F/U: HCPCS | Performed by: DERMATOLOGY

## 2018-06-06 PROCEDURE — 4004F PT TOBACCO SCREEN RCVD TLK: CPT | Performed by: DERMATOLOGY

## 2018-06-06 PROCEDURE — 1123F ACP DISCUSS/DSCN MKR DOCD: CPT | Performed by: DERMATOLOGY

## 2018-06-06 PROCEDURE — 4040F PNEUMOC VAC/ADMIN/RCVD: CPT | Performed by: DERMATOLOGY

## 2018-06-06 PROCEDURE — 99212 OFFICE O/P EST SF 10 MIN: CPT | Performed by: DERMATOLOGY

## 2018-06-06 PROCEDURE — 3017F COLORECTAL CA SCREEN DOC REV: CPT | Performed by: DERMATOLOGY

## 2018-06-06 PROCEDURE — G8427 DOCREV CUR MEDS BY ELIG CLIN: HCPCS | Performed by: DERMATOLOGY

## 2018-06-09 LAB
GRAM STAIN RESULT: ABNORMAL
ORGANISM: ABNORMAL
WOUND/ABSCESS: ABNORMAL
WOUND/ABSCESS: ABNORMAL

## 2018-06-11 ENCOUNTER — TELEPHONE (OUTPATIENT)
Dept: SURGERY | Age: 68
End: 2018-06-11

## 2018-06-20 ENCOUNTER — OFFICE VISIT (OUTPATIENT)
Dept: SURGERY | Age: 68
End: 2018-06-20

## 2018-06-20 DIAGNOSIS — Z51.89 VISIT FOR WOUND CHECK: Primary | ICD-10-CM

## 2018-06-20 PROCEDURE — 99024 POSTOP FOLLOW-UP VISIT: CPT | Performed by: DERMATOLOGY

## 2018-06-20 PROCEDURE — G8428 CUR MEDS NOT DOCUMENT: HCPCS | Performed by: DERMATOLOGY

## 2018-06-20 PROCEDURE — 1123F ACP DISCUSS/DSCN MKR DOCD: CPT | Performed by: DERMATOLOGY

## 2018-06-20 PROCEDURE — G8417 CALC BMI ABV UP PARAM F/U: HCPCS | Performed by: DERMATOLOGY

## 2018-06-20 PROCEDURE — 4004F PT TOBACCO SCREEN RCVD TLK: CPT | Performed by: DERMATOLOGY

## 2018-06-20 PROCEDURE — 4040F PNEUMOC VAC/ADMIN/RCVD: CPT | Performed by: DERMATOLOGY

## 2018-06-20 PROCEDURE — 3017F COLORECTAL CA SCREEN DOC REV: CPT | Performed by: DERMATOLOGY

## 2018-07-05 ENCOUNTER — OFFICE VISIT (OUTPATIENT)
Dept: DERMATOLOGY | Age: 68
End: 2018-07-05

## 2018-07-05 DIAGNOSIS — L21.9 SEBORRHEIC DERMATITIS: ICD-10-CM

## 2018-07-05 DIAGNOSIS — Z85.828 HISTORY OF BASAL CELL CARCINOMA: ICD-10-CM

## 2018-07-05 DIAGNOSIS — D48.5 NEOPLASM OF UNCERTAIN BEHAVIOR OF SKIN: ICD-10-CM

## 2018-07-05 DIAGNOSIS — L57.0 AK (ACTINIC KERATOSIS): Primary | ICD-10-CM

## 2018-07-05 PROCEDURE — 17003 DESTRUCT PREMALG LES 2-14: CPT | Performed by: DERMATOLOGY

## 2018-07-05 PROCEDURE — 17000 DESTRUCT PREMALG LESION: CPT | Performed by: DERMATOLOGY

## 2018-07-05 PROCEDURE — 11100 PR BIOPSY OF SKIN LESION: CPT | Performed by: DERMATOLOGY

## 2018-07-05 PROCEDURE — G8417 CALC BMI ABV UP PARAM F/U: HCPCS | Performed by: DERMATOLOGY

## 2018-07-05 PROCEDURE — G8427 DOCREV CUR MEDS BY ELIG CLIN: HCPCS | Performed by: DERMATOLOGY

## 2018-07-05 PROCEDURE — 3017F COLORECTAL CA SCREEN DOC REV: CPT | Performed by: DERMATOLOGY

## 2018-07-05 PROCEDURE — 99213 OFFICE O/P EST LOW 20 MIN: CPT | Performed by: DERMATOLOGY

## 2018-07-05 PROCEDURE — 1123F ACP DISCUSS/DSCN MKR DOCD: CPT | Performed by: DERMATOLOGY

## 2018-07-05 PROCEDURE — 4004F PT TOBACCO SCREEN RCVD TLK: CPT | Performed by: DERMATOLOGY

## 2018-07-05 PROCEDURE — 4040F PNEUMOC VAC/ADMIN/RCVD: CPT | Performed by: DERMATOLOGY

## 2018-07-05 NOTE — PROGRESS NOTES
2. 5 MCG/ACT AERS inhaler Inhale 2 puffs into the lungs daily 1 Inhaler 3    buPROPion (WELLBUTRIN XL) 300 MG extended release tablet Take 1 tablet by mouth every morning 90 tablet 1    albuterol sulfate HFA (VENTOLIN HFA) 108 (90 Base) MCG/ACT inhaler Inhale 2 puffs into the lungs every 6 hours as needed for Wheezing or Shortness of Breath 3 Inhaler 1    fluticasone-salmeterol (ADVAIR) 250-50 MCG/DOSE AEPB Inhale 1 puff into the lungs every 12 hours 60 each 3    Flaxseed, Linseed, (FLAX SEED OIL) 1000 MG CAPS Take 2,000 mg by mouth 2 times daily.  Cinnamon 500 MG CAPS Take 1 capsule by mouth daily.  vitamin E 400 UNIT capsule Take 400 Units by mouth daily.  aspirin 325 MG tablet Take 325 mg by mouth daily.  Cyanocobalamin (VITAMIN B 12) 500 MCG LOZG Take 1 tablet by mouth.  fish oil-omega-3 fatty acids 1000 MG capsule Take 1 capsule by mouth daily.  Ascorbic Acid (VITAMIN C) 500 MG tablet Take 500 mg by mouth daily.  vitamin D (CHOLECALCIFEROL) 400 UNIT TABS tablet Take 400 Units by mouth daily. Physical Examination       The following were examined and determined to be normal: Psych/Neuro, Scalp/hair, Conjunctivae/eyelids, Gums/teeth/lips, Neck, Breast/axilla/chest, Abdomen, RUE and LUE. The following were examined and determined to be abnormal: Head/face and Back. Well-appearing. 1.  Upper aspect of the forehead with 10 keratotic erythematous macules and patches. 2.  Left upper back with a 1.8 cm pink patch. 3.  Focally on the nasolabial folds and also on the inferior aspect of the chin are moderately well-defined scaly erythematous patches. 4.  Left side of the abdomen, left forearm, left hand, R nasal ala with surgical scars. Assessment and Plan     1. AK (actinic keratosis)     2 cycles of liquid nitrogen applied to 10 AKs on the forehead.  Patient was educated regarding the potential risks of blister formation, discomfort, hypopigmentation, and scar. Wound care was discussed. 2. Neoplasm of uncertain behavior of skin, left upper back - ? BCC vs SCC in situ    Discussed possible diagnosis; patient agreeable to biopsy (verbal consent obtained). The area(s) to be biopsied were marked with a surgical pen. Alcohol was used to cleanse the site. Local anesthesia was acheived with 1% lidocaine with epinephrine. Shave biopsy was performed using a razor blade. Hemostasis was achieved with aluminum chloride. The wound(s) were dressed with petrolatum and covered with a bandage. Wound care instructions were reviewed. 1 Specimen (s) sent to pathology. The specimen bottles were appropriately labeled. We also reviewed the risks of bleeding, scar, and infection. 3. Seborrheic dermatitis of the face - improved    Continue intermittent use of hydrocortisone 2.5% cream.      4. History of basal cell carcinomas - clear    Sun protective behaviors and self skin examinations were encouraged. Call for any new or concerning lesions. Return in about 6 months (around 1/5/2019).

## 2018-07-09 ENCOUNTER — TELEPHONE (OUTPATIENT)
Dept: DERMATOLOGY | Age: 68
End: 2018-07-09

## 2018-07-12 ENCOUNTER — OFFICE VISIT (OUTPATIENT)
Dept: FAMILY MEDICINE CLINIC | Age: 68
End: 2018-07-12

## 2018-07-12 VITALS
SYSTOLIC BLOOD PRESSURE: 104 MMHG | DIASTOLIC BLOOD PRESSURE: 76 MMHG | BODY MASS INDEX: 28.04 KG/M2 | WEIGHT: 179 LBS | TEMPERATURE: 97.7 F

## 2018-07-12 DIAGNOSIS — L30.9 DERMATITIS: Primary | ICD-10-CM

## 2018-07-12 PROCEDURE — 3017F COLORECTAL CA SCREEN DOC REV: CPT | Performed by: FAMILY MEDICINE

## 2018-07-12 PROCEDURE — G8427 DOCREV CUR MEDS BY ELIG CLIN: HCPCS | Performed by: FAMILY MEDICINE

## 2018-07-12 PROCEDURE — 4040F PNEUMOC VAC/ADMIN/RCVD: CPT | Performed by: FAMILY MEDICINE

## 2018-07-12 PROCEDURE — 4004F PT TOBACCO SCREEN RCVD TLK: CPT | Performed by: FAMILY MEDICINE

## 2018-07-12 PROCEDURE — 1101F PT FALLS ASSESS-DOCD LE1/YR: CPT | Performed by: FAMILY MEDICINE

## 2018-07-12 PROCEDURE — 1123F ACP DISCUSS/DSCN MKR DOCD: CPT | Performed by: FAMILY MEDICINE

## 2018-07-12 PROCEDURE — G8417 CALC BMI ABV UP PARAM F/U: HCPCS | Performed by: FAMILY MEDICINE

## 2018-07-12 PROCEDURE — 99213 OFFICE O/P EST LOW 20 MIN: CPT | Performed by: FAMILY MEDICINE

## 2018-07-15 ASSESSMENT — ENCOUNTER SYMPTOMS
GASTROINTESTINAL NEGATIVE: 1
RESPIRATORY NEGATIVE: 1
EYES NEGATIVE: 1

## 2018-07-15 NOTE — PROGRESS NOTES
AEPB Inhale 1 puff into the lungs every 12 hours 60 each 3    ketoconazole (NIZORAL) 2 % cream Apply topically daily. 30 g 1    Flaxseed, Linseed, (FLAX SEED OIL) 1000 MG CAPS Take 2,000 mg by mouth 2 times daily.  Cinnamon 500 MG CAPS Take 1 capsule by mouth daily.  vitamin E 400 UNIT capsule Take 400 Units by mouth daily.  aspirin 325 MG tablet Take 325 mg by mouth daily.  Cyanocobalamin (VITAMIN B 12) 500 MCG LOZG Take 1 tablet by mouth.  fish oil-omega-3 fatty acids 1000 MG capsule Take 1 capsule by mouth daily.  Ascorbic Acid (VITAMIN C) 500 MG tablet Take 500 mg by mouth daily.  vitamin D (CHOLECALCIFEROL) 400 UNIT TABS tablet Take 400 Units by mouth daily. Past Medical History:   Diagnosis Date    BPH with obstruction/lower urinary tract symptoms 8/3/2012    Chicken pox     Colon polyp     Hyperglycemia 12/15/2010    Hyperlipemia 12/15/2010    Post herpetic neuralgia     Shingles     Squamous cell carcinoma of skin of upper limb, including shoulder 9/8/2015     Past Surgical History:   Procedure Laterality Date    HEMICOLECTOMY      right due to mass benign     Family History   Problem Relation Age of Onset    Depression Mother     Arthritis Mother     High Blood Pressure Mother     High Cholesterol Mother     Heart Disease Mother     Arthritis Father     Heart Disease Father     High Blood Pressure Father     High Cholesterol Father     Stroke Father     Cancer Sister         ovarian    High Cholesterol Sister     High Blood Pressure Sister     Arthritis Sister      Social History     Social History    Marital status:      Spouse name: N/A    Number of children: N/A    Years of education: N/A     Occupational History    Not on file.      Social History Main Topics    Smoking status: Current Every Day Smoker     Packs/day: 1.00     Types: Cigarettes    Smokeless tobacco: Never Used      Comment: encouraged patient on smoking

## 2018-07-20 ENCOUNTER — OFFICE VISIT (OUTPATIENT)
Dept: DERMATOLOGY | Age: 68
End: 2018-07-20

## 2018-07-20 DIAGNOSIS — C44.519 BCC (BASAL CELL CARCINOMA), TRUNK: Primary | ICD-10-CM

## 2018-07-20 PROCEDURE — 17262 DSTRJ MAL LES T/A/L 1.1-2.0: CPT | Performed by: DERMATOLOGY

## 2018-07-20 NOTE — PROGRESS NOTES
[facility]  Brandon Sterling Enter  1950    76 y.o. male     Date of Visit: 7/20/2018    Chief Complaint: [address]    History of Present Illness:    He returns today for treatment of a superficial BCC on the left upper back. Review of Systems:  None. Past Medical History, Family History, Surgical History, Medications and Allergies reviewed. Past Medical History:   Diagnosis Date    BPH with obstruction/lower urinary tract symptoms 8/3/2012    Chicken pox     Colon polyp     Hyperglycemia 12/15/2010    Hyperlipemia 12/15/2010    Post herpetic neuralgia     Shingles     Squamous cell carcinoma of skin of upper limb, including shoulder 9/8/2015     Past Surgical History:   Procedure Laterality Date    HEMICOLECTOMY      right due to mass benign       No Known Allergies  Outpatient Prescriptions Marked as Taking for the 7/20/18 encounter (Office Visit) with Alaina Avalos MD   Medication Sig Dispense Refill    mupirocin (BACTROBAN) 2 % ointment Apply to affected area once daily 22 g 1    tiotropium (SPIRIVA RESPIMAT) 2.5 MCG/ACT AERS inhaler Inhale 2 puffs into the lungs daily 1 Inhaler 3    zoster recombinant adjuvanted vaccine (SHINGRIX) 50 MCG SUSR injection Give vaccine as directed 1 each 0    buPROPion (WELLBUTRIN XL) 300 MG extended release tablet Take 1 tablet by mouth every morning 90 tablet 1    hydrocortisone 2.5 % cream Apply to red scaly areas on the chin twice daily for several days or until improved.  30 g 1    zoster recombinant adjuvanted vaccine (SHINGRIX) 50 MCG SUSR injection Give vaccine as directed 1 each 0    montelukast (SINGULAIR) 10 MG tablet Take 1 tablet by mouth nightly 90 tablet 1    umeclidinium-vilanterol (ANORO ELLIPTA) 62.5-25 MCG/INH AEPB inhaler Inhale 1 puff into the lungs daily 1 each 3    albuterol sulfate HFA (VENTOLIN HFA) 108 (90 Base) MCG/ACT inhaler Inhale 2 puffs into the lungs every 6 hours

## 2018-09-21 ENCOUNTER — TELEPHONE (OUTPATIENT)
Dept: CASE MANAGEMENT | Age: 68
End: 2018-09-21

## 2019-02-06 ENCOUNTER — TELEPHONE (OUTPATIENT)
Dept: CASE MANAGEMENT | Age: 69
End: 2019-02-06

## 2021-12-24 ENCOUNTER — APPOINTMENT (OUTPATIENT)
Dept: GENERAL RADIOLOGY | Age: 71
DRG: 233 | End: 2021-12-24
Payer: MEDICARE

## 2021-12-24 ENCOUNTER — HOSPITAL ENCOUNTER (INPATIENT)
Age: 71
LOS: 17 days | Discharge: NURSING, REHAB OR LONG-TERM CARE | DRG: 233 | End: 2022-01-10
Attending: EMERGENCY MEDICINE | Admitting: INTERNAL MEDICINE
Payer: MEDICARE

## 2021-12-24 DIAGNOSIS — D64.9 ANEMIA, UNSPECIFIED TYPE: Primary | ICD-10-CM

## 2021-12-24 DIAGNOSIS — I21.4 NSTEMI (NON-ST ELEVATED MYOCARDIAL INFARCTION) (HCC): ICD-10-CM

## 2021-12-24 DIAGNOSIS — R55 SYNCOPE, UNSPECIFIED SYNCOPE TYPE: ICD-10-CM

## 2021-12-24 LAB
ABO/RH: NORMAL
ALBUMIN SERPL-MCNC: 3.6 G/DL (ref 3.4–5)
ALP BLD-CCNC: 67 U/L (ref 40–129)
ALT SERPL-CCNC: 20 U/L (ref 10–40)
ANION GAP SERPL CALCULATED.3IONS-SCNC: 15 MMOL/L (ref 3–16)
ANTIBODY SCREEN: NORMAL
APTT: 24.3 SEC (ref 26.2–38.6)
AST SERPL-CCNC: 24 U/L (ref 15–37)
BACTERIA: ABNORMAL /HPF
BASE EXCESS VENOUS: -4.1 MMOL/L (ref -2–3)
BASOPHILS ABSOLUTE: 0 K/UL (ref 0–0.2)
BASOPHILS ABSOLUTE: 0 K/UL (ref 0–0.2)
BASOPHILS RELATIVE PERCENT: 0.6 %
BASOPHILS RELATIVE PERCENT: 0.6 %
BILIRUB SERPL-MCNC: 0.5 MG/DL (ref 0–1)
BILIRUBIN DIRECT: <0.2 MG/DL (ref 0–0.3)
BILIRUBIN URINE: NEGATIVE
BILIRUBIN, INDIRECT: ABNORMAL MG/DL (ref 0–1)
BLOOD BANK DISPENSE STATUS: NORMAL
BLOOD BANK DISPENSE STATUS: NORMAL
BLOOD BANK PRODUCT CODE: NORMAL
BLOOD BANK PRODUCT CODE: NORMAL
BLOOD, URINE: ABNORMAL
BPU ID: NORMAL
BPU ID: NORMAL
BUN BLDV-MCNC: 23 MG/DL (ref 7–20)
CALCIUM SERPL-MCNC: 8.1 MG/DL (ref 8.3–10.6)
CARBOXYHEMOGLOBIN: 3.2 % (ref 0–1.5)
CHLORIDE BLD-SCNC: 103 MMOL/L (ref 99–110)
CLARITY: ABNORMAL
CO2: 18 MMOL/L (ref 21–32)
COLOR: YELLOW
CREAT SERPL-MCNC: 0.9 MG/DL (ref 0.8–1.3)
DESCRIPTION BLOOD BANK: NORMAL
DESCRIPTION BLOOD BANK: NORMAL
EOSINOPHILS ABSOLUTE: 0.1 K/UL (ref 0–0.6)
EOSINOPHILS ABSOLUTE: 0.1 K/UL (ref 0–0.6)
EOSINOPHILS RELATIVE PERCENT: 0.7 %
EOSINOPHILS RELATIVE PERCENT: 1.2 %
EPITHELIAL CELLS, UA: ABNORMAL /HPF (ref 0–5)
FERRITIN: 7.7 NG/ML (ref 30–400)
GFR AFRICAN AMERICAN: >60
GFR NON-AFRICAN AMERICAN: >60
GLUCOSE BLD-MCNC: 158 MG/DL (ref 70–99)
GLUCOSE URINE: NEGATIVE MG/DL
HCO3 VENOUS: 21.1 MMOL/L (ref 24–28)
HCT VFR BLD CALC: 17.3 % (ref 40.5–52.5)
HCT VFR BLD CALC: 17.7 % (ref 40.5–52.5)
HEMOGLOBIN, VEN, REDUCED: 36.3 %
HEMOGLOBIN: 5.4 G/DL (ref 13.5–17.5)
HEMOGLOBIN: 5.6 G/DL (ref 13.5–17.5)
INR BLD: 1.29 (ref 0.88–1.12)
KETONES, URINE: NEGATIVE MG/DL
LACTATE DEHYDROGENASE: 214 U/L (ref 100–190)
LEUKOCYTE ESTERASE, URINE: ABNORMAL
LYMPHOCYTES ABSOLUTE: 1 K/UL (ref 1–5.1)
LYMPHOCYTES ABSOLUTE: 1.3 K/UL (ref 1–5.1)
LYMPHOCYTES RELATIVE PERCENT: 12.9 %
LYMPHOCYTES RELATIVE PERCENT: 20.6 %
MAGNESIUM: 2 MG/DL (ref 1.8–2.4)
MCH RBC QN AUTO: 25.1 PG (ref 26–34)
MCH RBC QN AUTO: 25.1 PG (ref 26–34)
MCHC RBC AUTO-ENTMCNC: 31.3 G/DL (ref 31–36)
MCHC RBC AUTO-ENTMCNC: 31.6 G/DL (ref 31–36)
MCV RBC AUTO: 79.6 FL (ref 80–100)
MCV RBC AUTO: 80 FL (ref 80–100)
METHEMOGLOBIN VENOUS: 1.1 % (ref 0–1.5)
MICROSCOPIC EXAMINATION: YES
MONOCYTES ABSOLUTE: 0.6 K/UL (ref 0–1.3)
MONOCYTES ABSOLUTE: 0.6 K/UL (ref 0–1.3)
MONOCYTES RELATIVE PERCENT: 7.9 %
MONOCYTES RELATIVE PERCENT: 8.7 %
NEUTROPHILS ABSOLUTE: 4.5 K/UL (ref 1.7–7.7)
NEUTROPHILS ABSOLUTE: 5.8 K/UL (ref 1.7–7.7)
NEUTROPHILS RELATIVE PERCENT: 68.9 %
NEUTROPHILS RELATIVE PERCENT: 77.9 %
NITRITE, URINE: POSITIVE
O2 SAT, VEN: 62 %
PCO2, VEN: 37.5 MMHG (ref 41–51)
PDW BLD-RTO: 17.9 % (ref 12.4–15.4)
PDW BLD-RTO: 18.1 % (ref 12.4–15.4)
PH UA: 6.5 (ref 5–8)
PH VENOUS: 7.36 (ref 7.35–7.45)
PLATELET # BLD: 273 K/UL (ref 135–450)
PLATELET # BLD: 288 K/UL (ref 135–450)
PMV BLD AUTO: 8 FL (ref 5–10.5)
PMV BLD AUTO: 8.2 FL (ref 5–10.5)
PO2, VEN: 35.7 MMHG (ref 25–40)
POTASSIUM REFLEX MAGNESIUM: 3.5 MMOL/L (ref 3.5–5.1)
PRO-BNP: 2890 PG/ML (ref 0–124)
PROTEIN UA: 100 MG/DL
PROTHROMBIN TIME: 14.7 SEC (ref 9.9–12.7)
RBC # BLD: 2.17 M/UL (ref 4.2–5.9)
RBC # BLD: 2.22 M/UL (ref 4.2–5.9)
RBC UA: ABNORMAL /HPF (ref 0–4)
SODIUM BLD-SCNC: 136 MMOL/L (ref 136–145)
SPECIFIC GRAVITY UA: 1.02 (ref 1–1.03)
TCO2 CALC VENOUS: 22 MMOL/L
TOTAL PROTEIN: 6.2 G/DL (ref 6.4–8.2)
TROPONIN: 0.25 NG/ML
TROPONIN: 0.34 NG/ML
URINE REFLEX TO CULTURE: YES
URINE TYPE: ABNORMAL
UROBILINOGEN, URINE: 0.2 E.U./DL
WBC # BLD: 6.5 K/UL (ref 4–11)
WBC # BLD: 7.4 K/UL (ref 4–11)
WBC UA: >100 /HPF (ref 0–5)

## 2021-12-24 PROCEDURE — 86900 BLOOD TYPING SEROLOGIC ABO: CPT

## 2021-12-24 PROCEDURE — 86923 COMPATIBILITY TEST ELECTRIC: CPT

## 2021-12-24 PROCEDURE — 83880 ASSAY OF NATRIURETIC PEPTIDE: CPT

## 2021-12-24 PROCEDURE — 83540 ASSAY OF IRON: CPT

## 2021-12-24 PROCEDURE — 71045 X-RAY EXAM CHEST 1 VIEW: CPT

## 2021-12-24 PROCEDURE — 80048 BASIC METABOLIC PNL TOTAL CA: CPT

## 2021-12-24 PROCEDURE — 93005 ELECTROCARDIOGRAM TRACING: CPT | Performed by: EMERGENCY MEDICINE

## 2021-12-24 PROCEDURE — 85610 PROTHROMBIN TIME: CPT

## 2021-12-24 PROCEDURE — C9113 INJ PANTOPRAZOLE SODIUM, VIA: HCPCS | Performed by: INTERNAL MEDICINE

## 2021-12-24 PROCEDURE — 87077 CULTURE AEROBIC IDENTIFY: CPT

## 2021-12-24 PROCEDURE — 36415 COLL VENOUS BLD VENIPUNCTURE: CPT

## 2021-12-24 PROCEDURE — 81001 URINALYSIS AUTO W/SCOPE: CPT

## 2021-12-24 PROCEDURE — C9113 INJ PANTOPRAZOLE SODIUM, VIA: HCPCS | Performed by: EMERGENCY MEDICINE

## 2021-12-24 PROCEDURE — 82803 BLOOD GASES ANY COMBINATION: CPT

## 2021-12-24 PROCEDURE — 86850 RBC ANTIBODY SCREEN: CPT

## 2021-12-24 PROCEDURE — 6360000002 HC RX W HCPCS: Performed by: EMERGENCY MEDICINE

## 2021-12-24 PROCEDURE — 83735 ASSAY OF MAGNESIUM: CPT

## 2021-12-24 PROCEDURE — 87086 URINE CULTURE/COLONY COUNT: CPT

## 2021-12-24 PROCEDURE — 85025 COMPLETE CBC W/AUTO DIFF WBC: CPT

## 2021-12-24 PROCEDURE — 80076 HEPATIC FUNCTION PANEL: CPT

## 2021-12-24 PROCEDURE — 6360000002 HC RX W HCPCS: Performed by: INTERNAL MEDICINE

## 2021-12-24 PROCEDURE — 87186 SC STD MICRODIL/AGAR DIL: CPT

## 2021-12-24 PROCEDURE — 86901 BLOOD TYPING SEROLOGIC RH(D): CPT

## 2021-12-24 PROCEDURE — 1200000000 HC SEMI PRIVATE

## 2021-12-24 PROCEDURE — 36430 TRANSFUSION BLD/BLD COMPNT: CPT

## 2021-12-24 PROCEDURE — 84484 ASSAY OF TROPONIN QUANT: CPT

## 2021-12-24 PROCEDURE — 83010 ASSAY OF HAPTOGLOBIN QUANT: CPT

## 2021-12-24 PROCEDURE — 83550 IRON BINDING TEST: CPT

## 2021-12-24 PROCEDURE — 99282 EMERGENCY DEPT VISIT SF MDM: CPT

## 2021-12-24 PROCEDURE — 2580000003 HC RX 258: Performed by: INTERNAL MEDICINE

## 2021-12-24 PROCEDURE — 96374 THER/PROPH/DIAG INJ IV PUSH: CPT

## 2021-12-24 PROCEDURE — 83615 LACTATE (LD) (LDH) ENZYME: CPT

## 2021-12-24 PROCEDURE — P9016 RBC LEUKOCYTES REDUCED: HCPCS

## 2021-12-24 PROCEDURE — 85730 THROMBOPLASTIN TIME PARTIAL: CPT

## 2021-12-24 PROCEDURE — 82728 ASSAY OF FERRITIN: CPT

## 2021-12-24 RX ORDER — ACETAMINOPHEN 325 MG/1
650 TABLET ORAL EVERY 6 HOURS PRN
Status: DISCONTINUED | OUTPATIENT
Start: 2021-12-24 | End: 2022-01-05

## 2021-12-24 RX ORDER — OMEGA-3/DHA/EPA/FISH OIL 300-1000MG
1 CAPSULE ORAL DAILY
Status: DISCONTINUED | OUTPATIENT
Start: 2021-12-25 | End: 2021-12-24 | Stop reason: RX

## 2021-12-24 RX ORDER — ASPIRIN 325 MG
325 TABLET ORAL DAILY
Status: DISCONTINUED | OUTPATIENT
Start: 2021-12-25 | End: 2021-12-25

## 2021-12-24 RX ORDER — VITAMIN E 268 MG
400 CAPSULE ORAL DAILY
Status: DISCONTINUED | OUTPATIENT
Start: 2021-12-25 | End: 2021-12-28

## 2021-12-24 RX ORDER — ONDANSETRON 4 MG/1
4 TABLET, ORALLY DISINTEGRATING ORAL EVERY 8 HOURS PRN
Status: DISCONTINUED | OUTPATIENT
Start: 2021-12-24 | End: 2022-01-05

## 2021-12-24 RX ORDER — ARFORMOTEROL TARTRATE 15 UG/2ML
15 SOLUTION RESPIRATORY (INHALATION) 2 TIMES DAILY
Status: DISCONTINUED | OUTPATIENT
Start: 2021-12-24 | End: 2022-01-10 | Stop reason: HOSPADM

## 2021-12-24 RX ORDER — DIMENHYDRINATE 50 MG
2000 TABLET ORAL 2 TIMES DAILY
Status: DISCONTINUED | OUTPATIENT
Start: 2021-12-24 | End: 2021-12-24 | Stop reason: RX

## 2021-12-24 RX ORDER — FENTANYL CITRATE 50 UG/ML
50 INJECTION, SOLUTION INTRAMUSCULAR; INTRAVENOUS ONCE
Status: DISCONTINUED | OUTPATIENT
Start: 2021-12-24 | End: 2022-01-06

## 2021-12-24 RX ORDER — ASPIRIN 81 MG/1
324 TABLET, CHEWABLE ORAL ONCE
Status: DISCONTINUED | OUTPATIENT
Start: 2021-12-24 | End: 2021-12-25

## 2021-12-24 RX ORDER — BUPROPION HYDROCHLORIDE 150 MG/1
300 TABLET ORAL EVERY MORNING
Status: DISCONTINUED | OUTPATIENT
Start: 2021-12-25 | End: 2021-12-24

## 2021-12-24 RX ORDER — MONTELUKAST SODIUM 10 MG/1
10 TABLET ORAL NIGHTLY
Status: DISCONTINUED | OUTPATIENT
Start: 2021-12-24 | End: 2021-12-24

## 2021-12-24 RX ORDER — PANTOPRAZOLE SODIUM 40 MG/10ML
80 INJECTION, POWDER, LYOPHILIZED, FOR SOLUTION INTRAVENOUS ONCE
Status: COMPLETED | OUTPATIENT
Start: 2021-12-24 | End: 2021-12-24

## 2021-12-24 RX ORDER — BUDESONIDE AND FORMOTEROL FUMARATE DIHYDRATE 160; 4.5 UG/1; UG/1
2 AEROSOL RESPIRATORY (INHALATION) 2 TIMES DAILY
Status: DISCONTINUED | OUTPATIENT
Start: 2021-12-24 | End: 2021-12-24 | Stop reason: SDUPTHER

## 2021-12-24 RX ORDER — POLYETHYLENE GLYCOL 3350 17 G/17G
17 POWDER, FOR SOLUTION ORAL DAILY PRN
Status: DISCONTINUED | OUTPATIENT
Start: 2021-12-24 | End: 2022-01-10 | Stop reason: HOSPADM

## 2021-12-24 RX ORDER — ONDANSETRON 2 MG/ML
4 INJECTION INTRAMUSCULAR; INTRAVENOUS EVERY 6 HOURS PRN
Status: DISCONTINUED | OUTPATIENT
Start: 2021-12-24 | End: 2022-01-03

## 2021-12-24 RX ORDER — SODIUM CHLORIDE 9 MG/ML
25 INJECTION, SOLUTION INTRAVENOUS PRN
Status: DISCONTINUED | OUTPATIENT
Start: 2021-12-24 | End: 2021-12-27

## 2021-12-24 RX ORDER — SODIUM CHLORIDE 9 MG/ML
INJECTION, SOLUTION INTRAVENOUS PRN
Status: DISCONTINUED | OUTPATIENT
Start: 2021-12-24 | End: 2021-12-25

## 2021-12-24 RX ORDER — AMPICILLIN TRIHYDRATE 250 MG
1 CAPSULE ORAL DAILY
Status: DISCONTINUED | OUTPATIENT
Start: 2021-12-25 | End: 2021-12-24 | Stop reason: RX

## 2021-12-24 RX ORDER — PANTOPRAZOLE SODIUM 40 MG/10ML
40 INJECTION, POWDER, LYOPHILIZED, FOR SOLUTION INTRAVENOUS 2 TIMES DAILY
Status: DISCONTINUED | OUTPATIENT
Start: 2021-12-24 | End: 2021-12-25

## 2021-12-24 RX ORDER — SODIUM CHLORIDE 0.9 % (FLUSH) 0.9 %
5-40 SYRINGE (ML) INJECTION EVERY 12 HOURS SCHEDULED
Status: DISCONTINUED | OUTPATIENT
Start: 2021-12-24 | End: 2021-12-30 | Stop reason: SDUPTHER

## 2021-12-24 RX ORDER — OMEGA-3S/DHA/EPA/FISH OIL/D3 300MG-1000
400 CAPSULE ORAL DAILY
Status: DISCONTINUED | OUTPATIENT
Start: 2021-12-25 | End: 2021-12-28

## 2021-12-24 RX ORDER — ALBUTEROL SULFATE 2.5 MG/3ML
2.5 SOLUTION RESPIRATORY (INHALATION) EVERY 6 HOURS PRN
Status: DISCONTINUED | OUTPATIENT
Start: 2021-12-24 | End: 2022-01-10 | Stop reason: HOSPADM

## 2021-12-24 RX ORDER — SODIUM CHLORIDE 0.9 % (FLUSH) 0.9 %
5-40 SYRINGE (ML) INJECTION PRN
Status: DISCONTINUED | OUTPATIENT
Start: 2021-12-24 | End: 2021-12-25

## 2021-12-24 RX ORDER — ASCORBIC ACID 500 MG
500 TABLET ORAL DAILY
Status: DISCONTINUED | OUTPATIENT
Start: 2021-12-25 | End: 2021-12-26

## 2021-12-24 RX ORDER — BUDESONIDE 0.5 MG/2ML
0.5 INHALANT ORAL 2 TIMES DAILY
Status: DISCONTINUED | OUTPATIENT
Start: 2021-12-24 | End: 2022-01-07

## 2021-12-24 RX ORDER — ACETAMINOPHEN 325 MG/1
650 TABLET ORAL DAILY
Status: ON HOLD | COMMUNITY
End: 2022-01-09 | Stop reason: HOSPADM

## 2021-12-24 RX ORDER — ACETAMINOPHEN 650 MG/1
650 SUPPOSITORY RECTAL EVERY 6 HOURS PRN
Status: DISCONTINUED | OUTPATIENT
Start: 2021-12-24 | End: 2022-01-05

## 2021-12-24 RX ADMIN — PANTOPRAZOLE SODIUM 40 MG: 40 INJECTION, POWDER, FOR SOLUTION INTRAVENOUS at 22:34

## 2021-12-24 RX ADMIN — SODIUM CHLORIDE, PRESERVATIVE FREE 10 ML: 5 INJECTION INTRAVENOUS at 23:04

## 2021-12-24 RX ADMIN — PANTOPRAZOLE SODIUM 80 MG: 40 INJECTION, POWDER, FOR SOLUTION INTRAVENOUS at 17:33

## 2021-12-24 NOTE — ED PROVIDER NOTES
[address] [facility]          ATTENDING PHYSICIAN NOTE       Date of evaluation: 12/24/2021    Chief Complaint     Syncope    History of Present Illness     Yahir Vazquez is a 70 y.o. male with history of squamous cell carcinoma, hyperlipidemia, COPD presenting to the emergency department today for syncope and chest pressure. Patient states that he was doing work around the house today including fumigation for bedbugs and went to get more supplies. While ambulating into the store, he became lightheaded and had a syncopal episode. EMS was called and he was transported to the ED for further evaluation. He notes some ongoing pressure in his chest, no difficulty breathing. No recent fevers, cough, or other infectious symptoms. Symptoms did not start during or shortly after fumigating. He denies contacts with similar symptoms. Review of Systems     Pertinent positive and negative findings as documented in the HPI. Otherwise all other systems were reviewed and were negative. Physical Exam     INITIAL VITALS: BP: 113/74,  , Pulse: 106, Resp: 21, SpO2: 99 %     Nursing note and vitals reviewed. General:  Adult male, alert and appropriately interactive. In no distress. Appears pale and mildly diaphoretic. HENT: Normocephalic and atraumatic. External ears normal. Nose appears normal externally. Eyes: Conjunctivae normal. No scleral icterus. PERRL. Neck: Neck supple. No tracheal deviation present. CV: Tachycardic rate. Regular rhythm. S1/S2 auscultated. No murmurs, gallops or rubs. Pulm: Effort normal. Breath sounds clear to auscultation bilaterally. No wheezes. No rales or rhonchi. GI: Soft. No distension. No tenderness. No rebound or guarding. No masses. No peritoneal signs. Rectal: Normal externally, dark brown stool, no gross blood on digital exam.  Musculoskeletal: No edema. No gross deformities. Neurological: Alert and appropriately interactive.  Face symmetric, speech without dysarthria or obvious aphasia. Moving all extremities spontaneously. Skin: Multiple excoriations/bites consistent with bedbugs. Small ulceration to left superior buttock without bleeding, erythema, or induration. Warm, mildly diaphoretic. No rash. No diaphoresis or erythema. Psychiatric: Calm and cooperative with appropriate mood and affect. Procedures   Procedures    MEDICAL DECISION MAKING     MDM: Dony Delgado is a 70 y.o. male with history as above presenting to the emergency department today with syncope and chest pressure. On arrival, patient is tachycardic with otherwise reassuring vitals. He is in no distress but does appear quite pale and slightly diaphoretic. His exam is without other acute concerning findings. His EKG is notable for significant lateral ST depressions without reciprocal changes and does not meet criteria for STEMI. His hemoglobin is noted to be 5.6 with no history of anemia and he denies any sources of bleeding. His rectal exam has dark brown stool. He is not on blood thinning medications. He was given Protonix and transfused 2 units PRBCs. His troponin is elevated, likely in the setting of demand ischemia from his anemia. Repeat EKG is somewhat improved with less significant depressions. Hemolysis labs are pending. Repeat troponin is pending. Repeat CBC is pending. He has remained hemodynamically stable in the ED with reassuring blood pressure and heart rate. His mental status remains excellent. Transfusion is in process. I suspect his symptoms are all likely due to his anemia, which based on the microcytic nature and lack of discernible acute symptoms, may suggest that this is a chronic ailment that has reached a tipping point of being symptomatic today. Discussed with the hospitalist who accepted for further care and management.     Critical Care:  Due to the immediate potential for life-threatening deterioration due to severe (VITAMIN B 12) 500 MCG LOZG    Take 1 tablet by mouth. FISH OIL-OMEGA-3 FATTY ACIDS 1000 MG CAPSULE    Take 1 capsule by mouth daily. FLAXSEED, LINSEED, (FLAX SEED OIL) 1000 MG CAPS    Take 2,000 mg by mouth 2 times daily. FLUOROURACIL (EFUDEX) 5 % CREAM    Apply twice daily to the upper forehead for 14 days. FLUTICASONE-SALMETEROL (ADVAIR) 250-50 MCG/DOSE AEPB    Inhale 1 puff into the lungs every 12 hours    HYDROCORTISONE 2.5 % CREAM    Apply to red scaly areas on the chin twice daily for several days or until improved. KETOCONAZOLE (NIZORAL) 2 % CREAM    Apply topically daily. MONTELUKAST (SINGULAIR) 10 MG TABLET    Take 1 tablet by mouth nightly    MUPIROCIN (BACTROBAN) 2 % OINTMENT    Apply to affected area once daily    TIOTROPIUM (SPIRIVA RESPIMAT) 2.5 MCG/ACT AERS INHALER    Inhale 2 puffs into the lungs daily    UMECLIDINIUM-VILANTEROL (ANORO ELLIPTA) 62.5-25 MCG/INH AEPB INHALER    Inhale 1 puff into the lungs daily    VITAMIN D (CHOLECALCIFEROL) 400 UNIT TABS TABLET    Take 400 Units by mouth daily. VITAMIN E 400 UNIT CAPSULE    Take 400 Units by mouth daily. ZOSTER RECOMBINANT ADJUVANTED VACCINE (SHINGRIX) 50 MCG SUSR INJECTION    Give vaccine as directed    ZOSTER RECOMBINANT ADJUVANTED VACCINE (SHINGRIX) 50 MCG SUSR INJECTION    Give vaccine as directed       Allergies     He has No Known Allergies. ED Course     Nursing Notes, Past Medical Hx, Past Surgical Hx, Social Hx,Allergies, and Family Hx were reviewed. Patient was given the following medications:  Orders Placed This Encounter   Medications    aspirin chewable tablet 324 mg    fentaNYL (SUBLIMAZE) injection 50 mcg    0.9 % sodium chloride infusion    pantoprazole (PROTONIX) injection 80 mg       Diagnostic Results     EKG   #1: Sinus tachycardia, ventricular rate 108. Intervals normal, axis normal.  Significant lateral ST depressions in V4-V6 without reciprocal changes.   Slight ST depression in leads I, lead II, and aVL. Compared to previous from 2013, these ST changes are new    #2: Normal sinus rhythm, ventricular rate 98. Intervals normal, axis normal.  Lateral ST depressions are improved compared to previous. There is slight downsloping ST depressions in lead I, lead aVL. Overall improved compared to previous. RECENT VITALS:  BP: 123/71, , Pulse: 87, Resp: 16, SpO2: 99 %     RADIOLOGY:  XR CHEST PORTABLE   Final Result      Mild bibasilar atelectasis versus airspace disease.              LABS:   Results for orders placed or performed during the hospital encounter of 12/24/21   CBC Auto Differential   Result Value Ref Range    WBC 6.5 4.0 - 11.0 K/uL    RBC 2.22 (L) 4.20 - 5.90 M/uL    Hemoglobin 5.6 (LL) 13.5 - 17.5 g/dL    Hematocrit 17.7 (LL) 40.5 - 52.5 %    MCV 79.6 (L) 80.0 - 100.0 fL    MCH 25.1 (L) 26.0 - 34.0 pg    MCHC 31.6 31.0 - 36.0 g/dL    RDW 17.9 (H) 12.4 - 15.4 %    Platelets 313 240 - 396 K/uL    MPV 8.0 5.0 - 10.5 fL    Neutrophils % 68.9 %    Lymphocytes % 20.6 %    Monocytes % 8.7 %    Eosinophils % 1.2 %    Basophils % 0.6 %    Neutrophils Absolute 4.5 1.7 - 7.7 K/uL    Lymphocytes Absolute 1.3 1.0 - 5.1 K/uL    Monocytes Absolute 0.6 0.0 - 1.3 K/uL    Eosinophils Absolute 0.1 0.0 - 0.6 K/uL    Basophils Absolute 0.0 0.0 - 0.2 K/uL   Basic Metabolic Panel w/ Reflex to MG   Result Value Ref Range    Sodium 136 136 - 145 mmol/L    Potassium reflex Magnesium 3.5 3.5 - 5.1 mmol/L    Chloride 103 99 - 110 mmol/L    CO2 18 (L) 21 - 32 mmol/L    Anion Gap 15 3 - 16    Glucose 158 (H) 70 - 99 mg/dL    BUN 23 (H) 7 - 20 mg/dL    CREATININE 0.9 0.8 - 1.3 mg/dL    GFR Non-African American >60 >60    GFR African American >60 >60    Calcium 8.1 (L) 8.3 - 10.6 mg/dL   Troponin   Result Value Ref Range    Troponin 0.34 (H) <0.01 ng/mL   Brain Natriuretic Peptide   Result Value Ref Range    Pro-BNP 2,890 (H) 0 - 124 pg/mL   Protime-INR   Result Value Ref Range    Protime 14.7 (H) 9.9 - 12.7 sec INR 1.29 (H) 0.88 - 1.12   APTT   Result Value Ref Range    aPTT 24.3 (L) 26.2 - 38.6 sec   Hepatic Function Panel   Result Value Ref Range    Total Protein 6.2 (L) 6.4 - 8.2 g/dL    Albumin 3.6 3.4 - 5.0 g/dL    Alkaline Phosphatase 67 40 - 129 U/L    ALT 20 10 - 40 U/L    AST 24 15 - 37 U/L    Total Bilirubin 0.5 0.0 - 1.0 mg/dL    Bilirubin, Direct <0.2 0.0 - 0.3 mg/dL    Bilirubin, Indirect see below 0.0 - 1.0 mg/dL   Blood Gas, Venous   Result Value Ref Range    pH, Kee 7.358 7.350 - 7.450    pCO2, Kee 37.5 (L) 41.0 - 51.0 mmHg    pO2, Kee 35.7 25.0 - 40.0 mmHg    HCO3, Venous 21.1 (L) 24.0 - 28.0 mmol/L    Base Excess, Kee -4.1 (L) -2.0 - 3.0 mmol/L    O2 Sat, Kee 62 Not established %    Carboxyhemoglobin 3.2 (H) 0.0 - 1.5 %    MetHgb, Kee 1.1 0.0 - 1.5 %    TC02 (Calc), Kee 22 mmol/L    Hemoglobin, Kee, Reduced 36.30 %   Lactate dehydrogenase   Result Value Ref Range     (H) 100 - 190 U/L   Magnesium   Result Value Ref Range    Magnesium 2.00 1.80 - 2.40 mg/dL   CBC Auto Differential   Result Value Ref Range    WBC 7.4 4.0 - 11.0 K/uL    RBC 2.17 (L) 4.20 - 5.90 M/uL    Hemoglobin 5.4 (LL) 13.5 - 17.5 g/dL    Hematocrit 17.3 (LL) 40.5 - 52.5 %    MCV 80.0 80.0 - 100.0 fL    MCH 25.1 (L) 26.0 - 34.0 pg    MCHC 31.3 31.0 - 36.0 g/dL    RDW 18.1 (H) 12.4 - 15.4 %    Platelets 001 653 - 197 K/uL    MPV 8.2 5.0 - 10.5 fL    Neutrophils % 77.9 %    Lymphocytes % 12.9 %    Monocytes % 7.9 %    Eosinophils % 0.7 %    Basophils % 0.6 %    Neutrophils Absolute 5.8 1.7 - 7.7 K/uL    Lymphocytes Absolute 1.0 1.0 - 5.1 K/uL    Monocytes Absolute 0.6 0.0 - 1.3 K/uL    Eosinophils Absolute 0.1 0.0 - 0.6 K/uL    Basophils Absolute 0.0 0.0 - 0.2 K/uL   TYPE AND SCREEN   Result Value Ref Range    ABO/Rh O POS     Antibody Screen NEG    PREPARE RBC (CROSSMATCH), 2 Units   Result Value Ref Range    Product Code Blood Bank [id number]     Description Blood Bank Red Blood Cells, Leuko-reduced     Unit Number [id number] Dispense Status Blood Bank issued        CONSULTS:  IP CONSULT TO HOSPITALIST  IP CONSULT TO CARDIOLOGY  IP CONSULT TO GI  IP CONSULT TO GI    PATIENT REFERRED TO:  No follow-up provider specified.     DISCHARGE MEDICATIONS:  New Prescriptions    No medications on file          Katherine Fraga MD  12/24/21 [id number]

## 2021-12-25 LAB
ANION GAP SERPL CALCULATED.3IONS-SCNC: 11 MMOL/L (ref 3–16)
BASE EXCESS ARTERIAL: -1.5 MMOL/L (ref -3–3)
BASOPHILS ABSOLUTE: 0 K/UL (ref 0–0.2)
BASOPHILS RELATIVE PERCENT: 0.4 %
BUN BLDV-MCNC: 23 MG/DL (ref 7–20)
CALCIUM SERPL-MCNC: 8.1 MG/DL (ref 8.3–10.6)
CARBOXYHEMOGLOBIN ARTERIAL: 1.4 % (ref 0–1.5)
CHLORIDE BLD-SCNC: 108 MMOL/L (ref 99–110)
CO2: 21 MMOL/L (ref 21–32)
CREAT SERPL-MCNC: 0.8 MG/DL (ref 0.8–1.3)
EKG ATRIAL RATE: 98 BPM
EKG DIAGNOSIS: NORMAL
EKG P AXIS: 30 DEGREES
EKG P-R INTERVAL: 106 MS
EKG Q-T INTERVAL: 354 MS
EKG QRS DURATION: 96 MS
EKG QTC CALCULATION (BAZETT): 451 MS
EKG R AXIS: 29 DEGREES
EKG T AXIS: 107 DEGREES
EKG VENTRICULAR RATE: 98 BPM
EOSINOPHILS ABSOLUTE: 0 K/UL (ref 0–0.6)
EOSINOPHILS RELATIVE PERCENT: 0.6 %
GFR AFRICAN AMERICAN: >60
GFR NON-AFRICAN AMERICAN: >60
GLUCOSE BLD-MCNC: 109 MG/DL (ref 70–99)
HCO3 ARTERIAL: 22 MMOL/L (ref 21–29)
HCT VFR BLD CALC: 22.1 % (ref 40.5–52.5)
HCT VFR BLD CALC: 22.3 % (ref 40.5–52.5)
HCT VFR BLD CALC: 22.5 % (ref 40.5–52.5)
HCT VFR BLD CALC: 22.7 % (ref 40.5–52.5)
HEMOGLOBIN, ART, EXTENDED: 7.2 G/DL
HEMOGLOBIN: 7.3 G/DL (ref 13.5–17.5)
HEMOGLOBIN: 7.4 G/DL (ref 13.5–17.5)
HEMOGLOBIN: 7.4 G/DL (ref 13.5–17.5)
HEMOGLOBIN: 7.5 G/DL (ref 13.5–17.5)
IRON SATURATION: 1 % (ref 20–50)
IRON: <5 UG/DL (ref 59–158)
LYMPHOCYTES ABSOLUTE: 1.2 K/UL (ref 1–5.1)
LYMPHOCYTES RELATIVE PERCENT: 17.4 %
MCH RBC QN AUTO: 26.2 PG (ref 26–34)
MCHC RBC AUTO-ENTMCNC: 32.5 G/DL (ref 31–36)
MCV RBC AUTO: 80.7 FL (ref 80–100)
METHEMOGLOBIN ARTERIAL: 0.5 % (ref 0–1.4)
MONOCYTES ABSOLUTE: 0.8 K/UL (ref 0–1.3)
MONOCYTES RELATIVE PERCENT: 12.1 %
NEUTROPHILS ABSOLUTE: 4.8 K/UL (ref 1.7–7.7)
NEUTROPHILS RELATIVE PERCENT: 69.5 %
O2 SAT, ARTERIAL: 97 % (ref 93–100)
OCCULT BLOOD DIAGNOSTIC: NORMAL
PCO2 ARTERIAL: 31.5 MMHG (ref 35–45)
PDW BLD-RTO: 18.2 % (ref 12.4–15.4)
PH ARTERIAL: 7.46 (ref 7.35–7.45)
PLATELET # BLD: 215 K/UL (ref 135–450)
PMV BLD AUTO: 8.1 FL (ref 5–10.5)
PO2 ARTERIAL: 78.7 MMHG (ref 75–108)
POTASSIUM REFLEX MAGNESIUM: 4.2 MMOL/L (ref 3.5–5.1)
RBC # BLD: 2.77 M/UL (ref 4.2–5.9)
SODIUM BLD-SCNC: 140 MMOL/L (ref 136–145)
TCO2 ARTERIAL: 23 MMOL/L
TOTAL IRON BINDING CAPACITY: 354 UG/DL (ref 260–445)
TROPONIN: 0.59 NG/ML
TROPONIN: 0.63 NG/ML
WBC # BLD: 6.9 K/UL (ref 4–11)

## 2021-12-25 PROCEDURE — C9113 INJ PANTOPRAZOLE SODIUM, VIA: HCPCS | Performed by: INTERNAL MEDICINE

## 2021-12-25 PROCEDURE — 99223 1ST HOSP IP/OBS HIGH 75: CPT | Performed by: INTERNAL MEDICINE

## 2021-12-25 PROCEDURE — 1200000000 HC SEMI PRIVATE

## 2021-12-25 PROCEDURE — 82270 OCCULT BLOOD FECES: CPT

## 2021-12-25 PROCEDURE — 85025 COMPLETE CBC W/AUTO DIFF WBC: CPT

## 2021-12-25 PROCEDURE — 94664 DEMO&/EVAL PT USE INHALER: CPT

## 2021-12-25 PROCEDURE — 6370000000 HC RX 637 (ALT 250 FOR IP): Performed by: INTERNAL MEDICINE

## 2021-12-25 PROCEDURE — 6360000002 HC RX W HCPCS: Performed by: INTERNAL MEDICINE

## 2021-12-25 PROCEDURE — 83036 HEMOGLOBIN GLYCOSYLATED A1C: CPT

## 2021-12-25 PROCEDURE — 85014 HEMATOCRIT: CPT

## 2021-12-25 PROCEDURE — 84484 ASSAY OF TROPONIN QUANT: CPT

## 2021-12-25 PROCEDURE — 36415 COLL VENOUS BLD VENIPUNCTURE: CPT

## 2021-12-25 PROCEDURE — 80048 BASIC METABOLIC PNL TOTAL CA: CPT

## 2021-12-25 PROCEDURE — 2580000003 HC RX 258: Performed by: INTERNAL MEDICINE

## 2021-12-25 PROCEDURE — 80061 LIPID PANEL: CPT

## 2021-12-25 PROCEDURE — 94640 AIRWAY INHALATION TREATMENT: CPT

## 2021-12-25 PROCEDURE — 82803 BLOOD GASES ANY COMBINATION: CPT

## 2021-12-25 PROCEDURE — 93005 ELECTROCARDIOGRAM TRACING: CPT | Performed by: INTERNAL MEDICINE

## 2021-12-25 PROCEDURE — 85018 HEMOGLOBIN: CPT

## 2021-12-25 RX ORDER — ATORVASTATIN CALCIUM 80 MG/1
80 TABLET, FILM COATED ORAL NIGHTLY
Status: DISCONTINUED | OUTPATIENT
Start: 2021-12-25 | End: 2022-01-03

## 2021-12-25 RX ORDER — SODIUM CHLORIDE 9 MG/ML
INJECTION, SOLUTION INTRAVENOUS CONTINUOUS
Status: ACTIVE | OUTPATIENT
Start: 2021-12-25 | End: 2021-12-26

## 2021-12-25 RX ADMIN — ARFORMOTEROL TARTRATE 15 MCG: 15 SOLUTION RESPIRATORY (INHALATION) at 09:14

## 2021-12-25 RX ADMIN — BUDESONIDE 500 MCG: 0.5 SUSPENSION RESPIRATORY (INHALATION) at 09:14

## 2021-12-25 RX ADMIN — SODIUM CHLORIDE, PRESERVATIVE FREE 10 ML: 5 INJECTION INTRAVENOUS at 08:37

## 2021-12-25 RX ADMIN — ATORVASTATIN CALCIUM 80 MG: 80 TABLET, FILM COATED ORAL at 20:43

## 2021-12-25 RX ADMIN — SODIUM CHLORIDE 8 MG/HR: 9 INJECTION, SOLUTION INTRAVENOUS at 15:07

## 2021-12-25 RX ADMIN — Medication 400 UNITS: at 08:45

## 2021-12-25 RX ADMIN — DEXTROSE MONOHYDRATE 1000 MG: 5 INJECTION INTRAVENOUS at 06:37

## 2021-12-25 RX ADMIN — OXYCODONE HYDROCHLORIDE AND ACETAMINOPHEN 500 MG: 500 TABLET ORAL at 08:37

## 2021-12-25 RX ADMIN — ASPIRIN 325 MG: 325 TABLET, COATED ORAL at 16:25

## 2021-12-25 RX ADMIN — TIOTROPIUM BROMIDE INHALATION SPRAY 2 PUFF: 3.12 SPRAY, METERED RESPIRATORY (INHALATION) at 09:15

## 2021-12-25 RX ADMIN — SODIUM CHLORIDE, PRESERVATIVE FREE 5 ML: 5 INJECTION INTRAVENOUS at 22:13

## 2021-12-25 RX ADMIN — PANTOPRAZOLE SODIUM 40 MG: 40 INJECTION, POWDER, FOR SOLUTION INTRAVENOUS at 08:37

## 2021-12-25 RX ADMIN — SODIUM CHLORIDE: 9 INJECTION, SOLUTION INTRAVENOUS at 16:36

## 2021-12-25 RX ADMIN — SODIUM CHLORIDE 25 ML: 9 INJECTION, SOLUTION INTRAVENOUS at 06:31

## 2021-12-25 NOTE — CONSULTS
[address] [place] [address]  GI Consultation                                                                 Patient: Yahir Vazquez  : 1950       Date:  2021    Subjective:       History of Present Illness  Patient is a 70 y.o.  male admitted with Anemia [D64.9]  NSTEM[facility] (non-ST elevated myocardial infarction) [facility] [I21.4]  Syncope, unspecified syncope type [R55]  Anemia, unspecified type [D64.9] who is seen in consult for severe anemia. He has past medical history of COPD, hyperlipidemia presented to the hospital with episode of syncope and chest pressure. Patient says that he was doing work around the house including fumigation for bedbugs and went to get more supplies. He said that while ambulating to the store he became lightheaded had a syncopal episode. Patient was brought to the hospital.  He denied any shortness of breath but did have some chest pressure. Denies any fevers, chills or any other symptoms recently. On arrival to the hospital he was noted to be hemodynamically stable. Lab work showed hemoglobin of 5.6. Patient had a chest x-ray performed which was unremarkable. Due to concern for symptomatic anemia patient was admitted to the hospital for the work-up and management    His last Hb was in 2016 and was 13.5. He has not seen a doctor since then. No hematemesis, melena or hematochezia. Past Medical History:   Diagnosis Date    BPH with obstruction/lower urinary tract symptoms 8/3/2012    Chicken pox     Colon polyp     Hyperglycemia 12/15/2010    Hyperlipemia 12/15/2010    Post herpetic neuralgia     Shingles     Squamous cell carcinoma of skin of upper limb, including shoulder 2015      Past Surgical History:   Procedure Laterality Date    HEMICOLECTOMY      right due to mass benign      Past Endoscopic History Colonoscopy with polyp 8 years ago.  Never ahd an EGD    Admission Meds  No current facility-administered medications on file prior to encounter. Current Outpatient Medications on File Prior to Encounter   Medication Sig Dispense Refill    acetaminophen (TYLENOL) 325 MG tablet Take 650 mg by mouth daily Pt states he takes to \"clean his sinuses\"      Flaxseed, Linseed, (FLAX SEED OIL) 1000 MG CAPS Take 2,000 mg by mouth 2 times daily.  Cyanocobalamin (VITAMIN B 12) 500 MCG LOZG Take 1 tablet by mouth.  fish oil-omega-3 fatty acids 1000 MG capsule Take 1 capsule by mouth daily.  mupirocin (BACTROBAN) 2 % ointment Apply to affected area once daily 22 g 1    tiotropium (SPIRIVA RESPIMAT) 2.5 MCG/ACT AERS inhaler Inhale 2 puffs into the lungs daily 1 Inhaler 3    zoster recombinant adjuvanted vaccine (SHINGRIX) 50 MCG SUSR injection Give vaccine as directed 1 each 0    buPROPion (WELLBUTRIN XL) 300 MG extended release tablet Take 1 tablet by mouth every morning 90 tablet 1    fluorouracil (EFUDEX) 5 % cream Apply twice daily to the upper forehead for 14 days. 40 g 0    hydrocortisone 2.5 % cream Apply to red scaly areas on the chin twice daily for several days or until improved. 30 g 1    zoster recombinant adjuvanted vaccine (SHINGRIX) 50 MCG SUSR injection Give vaccine as directed 1 each 0    montelukast (SINGULAIR) 10 MG tablet Take 1 tablet by mouth nightly 90 tablet 1    umeclidinium-vilanterol (ANORO ELLIPTA) 62.5-25 MCG/INH AEPB inhaler Inhale 1 puff into the lungs daily 1 each 3    albuterol sulfate HFA (VENTOLIN HFA) 108 (90 Base) MCG/ACT inhaler Inhale 2 puffs into the lungs every 6 hours as needed for Wheezing or Shortness of Breath 3 Inhaler 1    fluticasone-salmeterol (ADVAIR) 250-50 MCG/DOSE AEPB Inhale 1 puff into the lungs every 12 hours 60 each 3    ketoconazole (NIZORAL) 2 % cream Apply topically daily. 30 g 1    Cinnamon 500 MG CAPS Take 1 capsule by mouth daily.  vitamin E 400 UNIT capsule Take 400 Units by mouth daily.  aspirin 325 MG tablet Take 325 mg by mouth daily.       Ascorbic Acid (VITAMIN C) 500 MG tablet Take 500 mg by mouth daily.  vitamin D (CHOLECALCIFEROL) 400 UNIT TABS tablet Take 400 Units by mouth daily. Patient denies ASA, NSAID use. Allergies  No Known Allergies   Social   Social History     Tobacco Use    Smoking status: Current Every Day Smoker     Packs/day: 1.00     Types: Cigarettes    Smokeless tobacco: Never Used    Tobacco comment: encouraged patient on smoking cessation   Substance Use Topics    Alcohol use: Yes     Alcohol/week: 6.0 standard drinks     Types: 2 Glasses of wine, 2 Cans of beer, 2 Shots of liquor per week     Comment: drinks 1-2 a day        Family History   Problem Relation Age of Onset    Depression Mother     Arthritis Mother     High Blood Pressure Mother     High Cholesterol Mother     Heart Disease Mother     Arthritis Father     Heart Disease Father     High Blood Pressure Father     High Cholesterol Father     Stroke Father     Cancer Sister         ovarian    High Cholesterol Sister     High Blood Pressure Sister     Arthritis Sister       No family history of colon cancer, Crohn's disease, or ulcerative colitis. Review of Systems  Pertinent items are noted in HPI.        Physical Exam    /74   Pulse 85   Temp 97.1 °F (36.2 °C) (Oral)   Resp 16   Ht 5' 7\" (1.702 m)   Wt 168 lb 10.4 oz (76.5 kg)   SpO2 97%   BMI 26.41 kg/m²   General appearance: alert, cooperative, no distress, appears stated age  Anicteric, No Jaundice  Head: Normocephalic, without obvious abnormality  Lungs: clear to auscultation bilaterally, Normal Effort  Heart: regular rate and rhythm, normal S1 and S2, no murmurs or rubs  Abdomen: soft, non-tender; bowel sounds normal; no masses,  no organomegaly  Extremities: atraumatic, no cyanosis or edema  Skin: warm and dry  Neuro: intact  AAOX3      Data Review:    Recent Labs     12/24/21  1644 12/24/21  1644 12/24/21  1720 12/25/21  0255 12/25/21  0646   WBC 6.5  --  7.4  --  6.9 HGB 5.6*   < > 5.4* 7.4* 7.3*   HCT 17.7*   < > 17.3* 22.7* 22.3*   MCV 79.6*  --  80.0  --  80.7     --  273  --  215    < > = values in this interval not displayed. Recent Labs     12/24/21  1644 12/25/21  0646    140   K 3.5 4.2    108   CO2 18* 21   BUN 23* 23*   CREATININE 0.9 0.8     Recent Labs     12/24/21  1715   AST 24   ALT 20   BILIDIR <0.2   BILITOT 0.5   ALKPHOS 67     No results for input(s): LIPASE, AMYLASE in the last 72 hours. Recent Labs     12/24/21  1715   PROTIME 14.7*   INR 1.29*     No results for input(s): PTT in the last 72 hours. No results for input(s): OCCULTBLD in the last 72 hours. Imaging Studies:                            Ultrasound:  n/a              CT-scan of abdomen and pelvis: n/a                 Assessment:     Active Problems:    Anemia  Resolved Problems:    * No resolved hospital problems. *      Severe anemia, no recent GI evaluation. Recommendations:     Needs GI work up. He wishes to have the evaluation in January. This is reasonable and I will arrange EGD/Colon as outpatient. From GI standpoint, he can be discharged on iron and empiric PPI. Thank you for the opportunity to participate in [facility].     Mary Sanchez MD

## 2021-12-25 NOTE — PROGRESS NOTES
Admission: Patient received to room 6301 from ED. Patient admitted with Dx of Anemia. Patient A&Ox4 upon arrival. Patient denies c/o upon arrival. Admission assessment as charted. VSS. Patient oriented to room, staff, and call system. Educated on fall protocol and hourly rounding. Patient informed to utilize call light with any needs. Pt verbalized understanding, very weak at this time and on bedrest. Placed in Environmental isolation for sighted bed bugs in ED. Will continue to monitor.

## 2021-12-25 NOTE — RT PROTOCOL NOTE
RT Nebulizer Bronchodilator Protocol Note    There is a bronchodilator order in the chart from a provider indicating to follow the RT Bronchodilator Protocol and there is an Initiate RT Bronchodilator Protocol order as well (see protocol at bottom of note). CXR Findings:  XR CHEST PORTABLE    Result Date: 12/24/2021  Mild bibasilar atelectasis versus airspace disease. The findings from the last RT Protocol Assessment were as follows:  Smoking: Smoker 15 pack years or more  Respiratory Pattern: Regular pattern and RR 12-20 bpm  Breath Sounds: Slightly diminished and/or crackles  Cough: Strong, spontaneous, non-productive  Indication for Bronchodilator Therapy: Decreased or absent breath sounds  Bronchodilator Assessment Score: 3    Aerosolized bronchodilator medication orders have been revised according to the RT Nebulizer Bronchodilator Protocol below. Respiratory Therapist to perform RT Therapy Protocol Assessment initially then follow the protocol. Repeat RT Therapy Protocol Assessment PRN for score 0-3 or on second treatment, BID, and PRN for scores above 3. No Indications - adjust the frequency to every 6 hours PRN wheezing or bronchospasm, if no treatments needed after 48 hours then discontinue using Per Protocol order mode. If indication present, adjust the RT bronchodilator orders based on the Bronchodilator Assessment Score as indicated below. If a patient is on this medication at home then do not decrease Frequency below that used at home. 0-3 - enter or revise RT bronchodilator order(s) to equivalent RT Bronchodilator order with Frequency of every 4 hours PRN for wheezing or increased work of breathing using Per Protocol order mode. 4-6 - enter or revise RT Bronchodilator order(s) to two equivalent RT bronchodilator orders with one order with BID Frequency and one order with Frequency of every 4 hours PRN wheezing or increased work of breathing using Per Protocol order mode. 7-10 - enter or revise RT Bronchodilator order(s) to two equivalent RT bronchodilator orders with one order with TID Frequency and one order with Frequency of every 4 hours PRN wheezing or increased work of breathing using Per Protocol order mode. 11-13 - enter or revise RT Bronchodilator order(s) to one equivalent RT bronchodilator order with QID Frequency and an Albuterol order with Frequency of every 4 hours PRN wheezing or increased work of breathing using Per Protocol order mode. Greater than 13 - enter or revise RT Bronchodilator order(s) to one equivalent RT bronchodilator order with every 4 hours Frequency and an Albuterol order with Frequency of every 2 hours PRN wheezing or increased work of breathing using Per Protocol order mode. RT to enter RT Home Evaluation for COPD & MDI Assessment order using Per Protocol order mode.     Electronically signed by Wali Cisneros RCP on 12/25/2021 at 12:33 AM

## 2021-12-25 NOTE — PLAN OF CARE
Problem: Falls - Risk of:  Goal: Will remain free from falls  Description: Will remain free from falls  Outcome: Ongoing  Note: Discussed with pt importance to keep bed low and locked with alarm activated, nonskid socks on when out of bed, call light and belongings within reach- will monitor. Problem: Falls - Risk of:  Goal: Absence of physical injury  Description: Absence of physical injury  Outcome: Ongoing  Note: No physical injury noted.

## 2021-12-25 NOTE — H&P
Hospital Medicine History & Physical      PCP: No primary care provider on file. Date of Admission: 12/24/2021    Date of Service: Pt seen/examined on 12/24/2021    Chief Complaint:    No chief complaint on file. History Of Present Illness:     42-year-old male with past medical history of COPD, hyperlipidemia presented to the hospital with episode of syncope and chest pressure. Patient says that he was doing work around the house including fumigation for bedbugs and went to get more supplies. He said that while ambulating to the store he became lightheaded had a syncopal episode. Patient was brought to the hospital.  He denied any shortness of breath but did have some chest pressure. Denies any fevers, chills or any other symptoms recently. On arrival to the hospital he was noted to be hemodynamically stable. Lab work showed hemoglobin of 5.6. Patient had a chest x-ray performed which was unremarkable. Due to concern for symptomatic anemia patient was admitted to the hospital for the work-up and management    Past Medical History:        Diagnosis Date    BPH with obstruction/lower urinary tract symptoms 8/3/2012    Chicken pox     Colon polyp     Hyperglycemia 12/15/2010    Hyperlipemia 12/15/2010    Post herpetic neuralgia     Shingles     Squamous cell carcinoma of skin of upper limb, including shoulder 9/8/2015       Past Surgical History:        Procedure Laterality Date    HEMICOLECTOMY      right due to mass benign       Medications Prior to Admission:    Prior to Admission medications    Medication Sig Start Date End Date Taking?  Authorizing Provider   mupirocin [facility]) 2 % ointment Apply to affected area once daily 6/6/18   Gatha Sever, MD   tiotropium [facility] RESPIMAT) 2.5 MCG/ACT AERS inhaler Inhale 2 puffs into the lungs daily 5/31/18   Padmaja Vuong MD   zoster recombinant adjuvanted vaccine [facility]) 50 MCG SUSR injection Give vaccine as directed 5/31/18 Loretta Sarabia MD   buPROPion (WELLBUTRIN XL) 300 MG extended release tablet Take 1 tablet by mouth every morning 5/8/18   Loretta Sarabia MD   fluorouracil (EFUDEX) 5 % cream Apply twice daily to the upper forehead for 14 days. 4/4/18   Bandar Chin MD   hydrocortisone 2.5 % cream Apply to red scaly areas on the chin twice daily for several days or until improved. 4/4/18   Bandar Chin MD   zoster recombinant adjuvanted vaccine [facility]) 50 MCG SUSR injection Give vaccine as directed 3/20/18   Loretta Sarabia MD   montelukast (SINGULAIR) 10 MG tablet Take 1 tablet by mouth nightly 2/2/18   Loretta Sarabia MD   umeclidinium-vilanterol [facility] ELLIP) 62.5-25 MCG/INH AEPB inhaler Inhale 1 puff into the lungs daily 2/1/18   Loretta Sarabia MD   albuterol sulfate HFA (VENTOLIN HFA) 108 (90 Base) MCG/ACT inhaler Inhale 2 puffs into the lungs every 6 hours as needed for Wheezing or Shortness of Breath 8/21/17   Loretta Sarabia MD   fluticasone-salmeterol (ADVAIR) 250-50 MCG/DOSE AEPB Inhale 1 puff into the lungs every 12 hours 1/17/17   CAITLYN Qiu - CNP   ketoconazole (NIZORAL) 2 % cream Apply topically daily. 11/4/16   Loretta Sarabia MD   Flaxseed, Linseed, (FLAX SEED OIL) 1000 MG CAPS Take 2,000 mg by mouth 2 times daily. Historical Provider, MD   Cinnamon 500 MG CAPS Take 1 capsule by mouth daily. Historical Provider, MD   vitamin E 400 UNIT capsule Take 400 Units by mouth daily. Historical Provider, MD   aspirin 325 MG tablet Take 325 mg by mouth daily. Historical Provider, MD   Cyanocobalamin (VITAMIN B 12) 500 MCG LOZG Take 1 tablet by mouth. Historical Provider, MD   fish oil-omega-3 fatty acids 1000 MG capsule Take 1 capsule by mouth daily. Historical Provider, MD   Ascorbic Acid (VITAMIN C) 500 MG tablet Take 500 mg by mouth daily. Historical Provider, MD   vitamin D (CHOLECALCIFEROL) 400 UNIT TABS tablet Take 400 Units by mouth daily.     Historical Provider, MD       Allergies: Patient has no known allergies. Social History:       reports that he has been smoking cigarettes. He has been smoking about 1.00 pack per day. He has never used smokeless tobacco. He reports current alcohol use of about 6.0 standard drinks of alcohol per week. He reports that he does not use drugs. Family History:  Reviewed in detail and positive as follows        Problem Relation Age of Onset    Depression Mother     Arthritis Mother     High Blood Pressure Mother     High Cholesterol Mother     Heart Disease Mother     Arthritis Father     Heart Disease Father     High Blood Pressure Father     High Cholesterol Father     Stroke Father     Cancer Sister         ovarian    High Cholesterol Sister     High Blood Pressure Sister     Arthritis Sister        REVIEW OF SYSTEMS:   Positive review  noted in the HPI. All other systems reviewed and negative.     PHYSICAL EXAM:    /71   Pulse 87   Resp 16   SpO2 99%   General Appearance: alert and oriented to person, place and time, well developed and well- nourished, in no acute distress  Skin: warm and dry, no rash or erythema  Head: normocephalic and atraumatic  Eyes: pupils equal, round, and reactive to light, extraocular eye movements intact, conjunctivae normal  ENT: tympanic membrane, external ear and ear canal normal bilaterally, nose without deformity, nasal mucosa and turbinates normal without polyps  Neck: supple and non-tender without mass, no thyromegaly or thyroid nodules, no cervical lymphadenopathy  Pulmonary/Chest: clear to auscultation bilaterally- no wheezes, rales or rhonchi, normal air movement, no respiratory distress  Cardiovascular: normal rate, regular rhythm, normal S1 and S2, no murmurs, rubs, clicks, or gallops, Peripheral pulses good, Cap refill <3 sec, no carotid bruits  Abdomen: soft, non-tender, non-distended, normal bowel sounds, no masses or organomegaly  Extremities: no cyanosis, clubbing or edema  Musculoskeletal: normal range of motion, no joint swelling, deformity or tenderness  Neurologic: reflexes normal and symmetric, no cranial nerve deficit, gait, coordination and speech normal      LABS:        CBC   Recent Labs     12/24/21  1644   WBC 6.5   HGB 5.6*   HCT 17.7*         RENAL  Recent Labs     12/24/21  1644      K 3.5      CO2 18*   BUN 23*   CREATININE 0.9     LFT'S  Recent Labs     12/24/21  1715   AST 24   ALT 20   BILIDIR <0.2   BILITOT 0.5   ALKPHOS 67     COAG  Recent Labs     12/24/21  1715   INR 1.29*     CARDIAC ENZYMES  Recent Labs     12/24/21  1644   TROPONINI 0.34*       U/A:    Lab Results   Component Value Date    NITRITE neg 03/10/2016    COLORU yellow 03/10/2016    CLARITYU clear 03/10/2016    SPECGRAV 1.010 03/10/2016    LEUKOCYTESUR neg 03/10/2016    BLOODU small 03/10/2016    GLUCOSEU 250 03/10/2016       ABG  No results found for: GSV2NDX, BEART, L1AJDDXT, PHART, THGBART, KQH3ITH, PO2ART, FQJ7ZUL    UA:No results for input(s): NITRITE, COLORU, PHUR, LABCAST, WBCUA, RBCUA, MUCUS, TRICHOMONAS, YEAST, BACTERIA, CLARITYU, SPECGRAV, LEUKOCYTESUR, UROBILINOGEN, BILIRUBINUR, BLOODU, GLUCOSEU, KETUA, AMORPHOUS in the last 72 hours. Microbiology:  No results for input(s): LABGRAM, LABANAE, ORG, CXSURG in the last 72 hours. Nasal Culture: No results for input(s): ORG, MRSAPCR in the last 72 hours. Blood Culture: No results for input(s): BC, BLOODCULT2, ORG in the last 72 hours. Fungal Culture:   No results for input(s): FUNGSM in the last 72 hours. No results for input(s): FUNCXBLD in the last 72 hours. CSF Culture:  No results for input(s): COLORCSF, APPEARCSF, CFTUBE, CLOTCSF, WBCCSF, RBCCSF, NEUTCSF, NUMCELLSCSF, LYMPHSCSF, MONOCSF, GLUCCSF, VOLCSF in the last 72 hours. Respiratory Culture:  No results for input(s): Liz Papa in the last 72 hours. AFB:No results for input(s): AFBSMEAR in the last 72 hours.   Urine Culture  No results for input(s): LABURIN in the last 72 hours. RADIOLOGY:    XR CHEST PORTABLE   Final Result      Mild bibasilar atelectasis versus airspace disease. Previous medical records personally reviewed and analyzed         PHYSICIAN CERTIFICATION    I certify that Tere Ayoub is expected to be hospitalized for >2 midnights based on the following assessment and plan:    ASSESSMENT/PLAN:  Active Hospital Problems    Diagnosis Date Noted    Anemia [D64.9] 12/24/2021     Syncopal episode likely due to symptomatic anemia  GI bleed  -Hemoglobin 5.6 on admission  -GI has been consulted  -N.p.o. after midnight  -Continue Protonix 40 mg IV twice daily  -Check iron panel  -Trend Hb/HCT  -Transfuse to keep hb>7. 2 units PRBC ordered by ED    Elevated troponin  -likely demand ischemia  -Cardiology consulted    COPD  -Continue home inhalers      DVT Prophylaxis: SCD  Diet: CLD, NPO at midnight  Code Status: full  PT/OT Eval Status:consulted    Dispo - pending GI eval       Zuleima Cisneros MD  The note was completed using EMR. Every effort was made to ensure accuracy; however, inadvertent computerized transcription errors may be present. Thank you No primary care provider on file. for the opportunity to be involved in this patient's care. If you have any questions or concerns please feel free to contact me at [phone].

## 2021-12-25 NOTE — PROGRESS NOTES
Hospitalist Progress Note      Date of Admission: 12/24/2021    Subjective:   Pt reports mild chest pain still but reports that it comes and goes. After seeing the pt I received a message from the nurse that pt had a large black bm. Pt reports weakness. No dizziness or lightheadedness right now but is lying flat in bed. No n/v.     Medications:  Reviewed    Infusion Medications    sodium chloride      sodium chloride 25 mL (12/25/21 0631)     Scheduled Medications    cefTRIAXone (ROCEPHIN) IV  1,000 mg IntraVENous Q24H    aspirin  324 mg Oral Once    fentanNYL  50 mcg IntraVENous Once    vitamin C  500 mg Oral Daily    vitamin D3  400 Units Oral Daily    vitamin E  400 Units Oral Daily    tiotropium  2 puff Inhalation Daily    [Held by provider] aspirin  325 mg Oral Daily    sodium chloride flush  5-40 mL IntraVENous 2 times per day    pantoprazole  40 mg IntraVENous BID    budesonide  0.5 mg Nebulization BID    And    Arformoterol Tartrate  15 mcg Nebulization BID     PRN Meds: sodium chloride, albuterol, sodium chloride flush, sodium chloride, ondansetron **OR** ondansetron, polyethylene glycol, acetaminophen **OR** acetaminophen      Intake/Output Summary (Last 24 hours) at 12/25/2021 1202  Last data filed at 12/25/2021 0825  Gross per 24 hour   Intake 700 ml   Output 700 ml   Net 0 ml       Physical Exam Performed:    /74   Pulse 85   Temp 97.1 °F (36.2 °C) (Oral)   Resp 16   Ht 5' 7\" (1.702 m)   Wt 168 lb 10.4 oz (76.5 kg)   SpO2 97%   BMI 26.41 kg/m²     General appearance:ill appearing/ pale  HEENT: Conjunctivae/corneas clear. Neck: Supple, with full range of motion. Respiratory:  Normal respiratory effort. Clear to auscultation, bilaterally without Rales/Wheezes/Rhonchi. Cardiovascular: Regular rate and rhythm   Abdomen: Soft, non-tender  Musculoskeletal: No clubbing, cyanosis or edema bilaterally.    Skin: palor  Neurologic:  Neurovascularly intact without any focal sensory/motor deficits. Cranial nerves: II-XII intact, grossly non-focal.  Psychiatric: Alert and oriented, thought content appropriate, normal insight    Labs:   Recent Labs     12/24/21  1644 12/24/21  1644 12/24/21  1720 12/25/21  0255 12/25/21  0646   WBC 6.5  --  7.4  --  6.9   HGB 5.6*   < > 5.4* 7.4* 7.3*   HCT 17.7*   < > 17.3* 22.7* 22.3*     --  273  --  215    < > = values in this interval not displayed. Recent Labs     12/24/21  1644 12/25/21  0646    140   K 3.5 4.2    108   CO2 18* 21   BUN 23* 23*   CREATININE 0.9 0.8   CALCIUM 8.1* 8.1*     Recent Labs     12/24/21  1715   AST 24   ALT 20   BILIDIR <0.2   BILITOT 0.5   ALKPHOS 67     Recent Labs     12/24/21  1715   INR 1.29*     Recent Labs     12/24/21  1644 12/24/21  1715   TROPONINI 0.34* 0.25*       Urinalysis:      Lab Results   Component Value Date    NITRU POSITIVE 12/24/2021    WBCUA >100 12/24/2021    BACTERIA 3+ 12/24/2021    RBCUA 11-20 12/24/2021    BLOODU SMALL 12/24/2021    SPECGRAV 1.025 12/24/2021    GLUCOSEU Negative 12/24/2021       Radiology:  XR CHEST PORTABLE   Final Result      Mild bibasilar atelectasis versus airspace disease. Assessment/Plan:    Acute nstemi:  Presented w/ chest pain and trop positive. Cardiology consulted and feel Landry Burkitt 2/2 demand from profound anemia- cannot exclude cad/ ischemia. Trend trops. Hold off on heparin and asa given acute gi bleed. Obtain lipids and a1c. Start high dose statin/ order echo. Ischemia w/u once stable from gi/ anemia standpoint. Acute blood loss anemia:  Likely 2/2 ugib- melena. Ongoing- had food today will change to npo for now in the event he needs egd urgently. 2 units prbs transfused- follow H/H- transfuse if hb< 7 or pt still orthostatic- check orthostats.     Acute GI bleed:  Given protonix 8 mg iv on admission- change from bid to gtt at this time given ongoing melena/ follow H/H/ make npo/ gi consulted and following. Syncope:  Likely 2/2 above- check orthostats/ follow H/H.    COPD  Stable w/o exacerbation- cont pta meds.       DVT Prophylaxis: On hold for gi bleed  Diet: ADULT DIET;  Regular  Code Status: Full Code    PT/OT Eval Status: Once clinicaally stable    Vitor Mccoy MD

## 2021-12-26 ENCOUNTER — APPOINTMENT (OUTPATIENT)
Dept: CT IMAGING | Age: 71
DRG: 233 | End: 2021-12-26
Payer: MEDICARE

## 2021-12-26 LAB
ANION GAP SERPL CALCULATED.3IONS-SCNC: 8 MMOL/L (ref 3–16)
BUN BLDV-MCNC: 24 MG/DL (ref 7–20)
CALCIUM SERPL-MCNC: 8 MG/DL (ref 8.3–10.6)
CHLORIDE BLD-SCNC: 107 MMOL/L (ref 99–110)
CHOLESTEROL, TOTAL: 97 MG/DL (ref 0–199)
CO2: 21 MMOL/L (ref 21–32)
CREAT SERPL-MCNC: 0.8 MG/DL (ref 0.8–1.3)
EKG ATRIAL RATE: 92 BPM
EKG DIAGNOSIS: NORMAL
EKG P AXIS: 25 DEGREES
EKG P-R INTERVAL: 114 MS
EKG Q-T INTERVAL: 354 MS
EKG QRS DURATION: 86 MS
EKG QTC CALCULATION (BAZETT): 437 MS
EKG R AXIS: 22 DEGREES
EKG T AXIS: 149 DEGREES
EKG VENTRICULAR RATE: 92 BPM
ESTIMATED AVERAGE GLUCOSE: 111.2 MG/DL
GFR AFRICAN AMERICAN: >60
GFR NON-AFRICAN AMERICAN: >60
GLUCOSE BLD-MCNC: 114 MG/DL (ref 70–99)
HAPTOGLOBIN: 176 MG/DL (ref 30–200)
HBA1C MFR BLD: 5.5 %
HCT VFR BLD CALC: 23.9 % (ref 40.5–52.5)
HDLC SERPL-MCNC: 28 MG/DL (ref 40–60)
HEMOGLOBIN: 7.8 G/DL (ref 13.5–17.5)
LDL CHOLESTEROL CALCULATED: 54 MG/DL
MCH RBC QN AUTO: 26.6 PG (ref 26–34)
MCHC RBC AUTO-ENTMCNC: 32.8 G/DL (ref 31–36)
MCV RBC AUTO: 81 FL (ref 80–100)
PDW BLD-RTO: 18.6 % (ref 12.4–15.4)
PLATELET # BLD: 206 K/UL (ref 135–450)
PMV BLD AUTO: 8.2 FL (ref 5–10.5)
POTASSIUM SERPL-SCNC: 4 MMOL/L (ref 3.5–5.1)
RBC # BLD: 2.95 M/UL (ref 4.2–5.9)
SARS-COV-2, NAAT: NOT DETECTED
SODIUM BLD-SCNC: 136 MMOL/L (ref 136–145)
TRIGL SERPL-MCNC: 76 MG/DL (ref 0–150)
TSH REFLEX: 1.43 UIU/ML (ref 0.27–4.2)
VLDLC SERPL CALC-MCNC: 15 MG/DL
WBC # BLD: 8.3 K/UL (ref 4–11)

## 2021-12-26 PROCEDURE — 36415 COLL VENOUS BLD VENIPUNCTURE: CPT

## 2021-12-26 PROCEDURE — 2580000003 HC RX 258: Performed by: INTERNAL MEDICINE

## 2021-12-26 PROCEDURE — 97166 OT EVAL MOD COMPLEX 45 MIN: CPT

## 2021-12-26 PROCEDURE — C9113 INJ PANTOPRAZOLE SODIUM, VIA: HCPCS | Performed by: INTERNAL MEDICINE

## 2021-12-26 PROCEDURE — 99233 SBSQ HOSP IP/OBS HIGH 50: CPT | Performed by: INTERNAL MEDICINE

## 2021-12-26 PROCEDURE — 85027 COMPLETE CBC AUTOMATED: CPT

## 2021-12-26 PROCEDURE — 6360000002 HC RX W HCPCS: Performed by: INTERNAL MEDICINE

## 2021-12-26 PROCEDURE — 70450 CT HEAD/BRAIN W/O DYE: CPT

## 2021-12-26 PROCEDURE — 87635 SARS-COV-2 COVID-19 AMP PRB: CPT

## 2021-12-26 PROCEDURE — 93010 ELECTROCARDIOGRAM REPORT: CPT | Performed by: INTERNAL MEDICINE

## 2021-12-26 PROCEDURE — 97116 GAIT TRAINING THERAPY: CPT

## 2021-12-26 PROCEDURE — 97530 THERAPEUTIC ACTIVITIES: CPT

## 2021-12-26 PROCEDURE — 6370000000 HC RX 637 (ALT 250 FOR IP): Performed by: INTERNAL MEDICINE

## 2021-12-26 PROCEDURE — 97162 PT EVAL MOD COMPLEX 30 MIN: CPT

## 2021-12-26 PROCEDURE — 80048 BASIC METABOLIC PNL TOTAL CA: CPT

## 2021-12-26 PROCEDURE — 1200000000 HC SEMI PRIVATE

## 2021-12-26 PROCEDURE — 84443 ASSAY THYROID STIM HORMONE: CPT

## 2021-12-26 PROCEDURE — 94640 AIRWAY INHALATION TREATMENT: CPT

## 2021-12-26 RX ADMIN — DEXTROSE MONOHYDRATE 1000 MG: 5 INJECTION INTRAVENOUS at 05:44

## 2021-12-26 RX ADMIN — Medication 400 UNITS: at 09:05

## 2021-12-26 RX ADMIN — SODIUM CHLORIDE, PRESERVATIVE FREE 10 ML: 5 INJECTION INTRAVENOUS at 09:05

## 2021-12-26 RX ADMIN — VANCOMYCIN HYDROCHLORIDE 750 MG: 10 INJECTION, POWDER, LYOPHILIZED, FOR SOLUTION INTRAVENOUS at 22:10

## 2021-12-26 RX ADMIN — SODIUM CHLORIDE, PRESERVATIVE FREE 5 ML: 5 INJECTION INTRAVENOUS at 22:11

## 2021-12-26 RX ADMIN — OXYCODONE HYDROCHLORIDE AND ACETAMINOPHEN 500 MG: 500 TABLET ORAL at 09:04

## 2021-12-26 RX ADMIN — IRON SUCROSE 200 MG: 20 INJECTION, SOLUTION INTRAVENOUS at 14:08

## 2021-12-26 RX ADMIN — CHOLECALCIFEROL (VITAMIN D3) 10 MCG (400 UNIT) TABLET 400 UNITS: at 09:04

## 2021-12-26 RX ADMIN — SODIUM CHLORIDE 8 MG/HR: 9 INJECTION, SOLUTION INTRAVENOUS at 01:12

## 2021-12-26 RX ADMIN — ATORVASTATIN CALCIUM 80 MG: 80 TABLET, FILM COATED ORAL at 22:11

## 2021-12-26 RX ADMIN — TIOTROPIUM BROMIDE INHALATION SPRAY 2 PUFF: 3.12 SPRAY, METERED RESPIRATORY (INHALATION) at 11:51

## 2021-12-26 RX ADMIN — SODIUM CHLORIDE 8 MG/HR: 9 INJECTION, SOLUTION INTRAVENOUS at 14:20

## 2021-12-26 ASSESSMENT — PAIN SCALES - GENERAL
PAINLEVEL_OUTOF10: 0

## 2021-12-26 NOTE — PROGRESS NOTES
NUTRITION NOTE   Admission Date: 12/24/2021     Type and Reason for Visit: Initial,Positive Nutrition Screen    NUTRITION RECOMMENDATIONS:   1. PO Diet: Continue NPO; RD to monitor for diet advancement/tolreance  2. ONS: Once diet advanced, Start Jitendra BID     NUTRITION ASSESSMENT:  Pt with positive nutrition screen for wound. Pt with stage 2 sacrum wound. Pt currently NPO. RD to monitor for diet advancement and will order Jitendra BID to promote wound healing. Patient admitted d/t No chief complaint on file. PMH significant for: COPD, hyperlipidemia     MALNUTRITION ASSESSMENT  Context of Malnutrition: Acute Illness   Malnutrition Status: At risk for malnutrition (Comment)    NUTRITION DIAGNOSIS   · Increased nutrient needs related to increase demand for energy/nutrients as evidenced by wounds    [address] and/or Nutrient Delivery:  Continue NPO  Nutrition Education/Counseling:  No recommendation at this time      The patient will still be monitored per nutrition standards of care. Consult dietitian if nutrition interventions essential to patient care is needed.      Sherri Manrique [address], [address] [place] [address]  [place]:  [phone]  Office:  [phone]

## 2021-12-26 NOTE — PROGRESS NOTES
Hospitalist Progress Note      Date of Admission: 12/24/2021    Subjective:     Patient is in no acute distress. He met with cardiology and GI. He is in agreement with EGD and cardiac catheterization. BM was dark but FOBT negative. Sisters who was at bedside, plan of care was discussed with her. Sister confirms that patient did not have any medical follow-ups in few years ever since his PCP retired(he is to see Dr. Farrha Aguilar). She is interested in  checking on him tomorrow. Medications:  Reviewed    Infusion Medications    pantoprozole (PROTONIX) infusion 8 mg/hr (12/26/21 1420)    sodium chloride 25 mL (12/25/21 0631)     Scheduled Medications    iron sucrose  200 mg IntraVENous Q24H    cefTRIAXone (ROCEPHIN) IV  1,000 mg IntraVENous Q24H    atorvastatin  80 mg Oral Nightly    influenza virus vaccine  0.5 mL IntraMUSCular Prior to discharge    fentanNYL  50 mcg IntraVENous Once    vitamin D3  400 Units Oral Daily    vitamin E  400 Units Oral Daily    tiotropium  2 puff Inhalation Daily    sodium chloride flush  5-40 mL IntraVENous 2 times per day    budesonide  0.5 mg Nebulization BID    And    Arformoterol Tartrate  15 mcg Nebulization BID     PRN Meds: perflutren lipid microspheres, albuterol, sodium chloride, ondansetron **OR** ondansetron, polyethylene glycol, acetaminophen **OR** acetaminophen      Intake/Output Summary (Last 24 hours) at 12/26/2021 1524  Last data filed at 12/26/2021 1503  Gross per 24 hour   Intake 781.32 ml   Output 775 ml   Net 6.32 ml       Physical Exam Performed:    /66   Pulse 81   Temp 97.8 °F (36.6 °C) (Oral)   Resp 18   Ht 5' 7\" (1.702 m)   Wt 168 lb 10.4 oz (76.5 kg)   SpO2 100%   BMI 26.41 kg/m²     General appearance:ill appearing/ pale  HEENT: Conjunctivae/corneas clear. Neck: Supple, with full range of motion. Respiratory:  Normal respiratory effort.  Clear to auscultation, bilaterally without Rales/Wheezes/Rhonchi. Cardiovascular: Regular rate and rhythm   Abdomen: Soft, non-tender  Musculoskeletal: No clubbing, cyanosis or edema bilaterally. Skin: palor  Neurologic:  Neurovascularly intact without any focal sensory/motor deficits. Cranial nerves: II-XII intact, grossly non-focal.  Psychiatric: Alert and oriented, thought content appropriate, normal insight    Labs:   Recent Labs     12/24/21  1720 12/25/21  0255 12/25/21  0646 12/25/21  0646 12/25/21  1453 12/25/21  2111 12/26/21  0906   WBC 7.4  --  6.9  --   --   --  8.3   HGB 5.4*   < > 7.3*   < > 7.5* 7.4* 7.8*   HCT 17.3*   < > 22.3*   < > 22.5* 22.1* 23.9*     --  215  --   --   --  206    < > = values in this interval not displayed. Recent Labs     12/24/21  1644 12/25/21  0646 12/26/21  0906    140 136   K 3.5 4.2 4.0    108 107   CO2 18* 21 21   BUN 23* 23* 24*   CREATININE 0.9 0.8 0.8   CALCIUM 8.1* 8.1* 8.0*     Recent Labs     12/24/21  1715   AST 24   ALT 20   BILIDIR <0.2   BILITOT 0.5   ALKPHOS 67     Recent Labs     12/24/21  1715   INR 1.29*     Recent Labs     12/24/21  1715 12/25/21  1453 12/25/21 2111   TROPONINI 0.25* 0.59* 0.63*       Urinalysis:      Lab Results   Component Value Date    NITRU POSITIVE 12/24/2021    WBCUA >100 12/24/2021    BACTERIA 3+ 12/24/2021    RBCUA 11-20 12/24/2021    BLOODU SMALL 12/24/2021    SPECGRAV 1.025 12/24/2021    GLUCOSEU Negative 12/24/2021       Radiology:  CT HEAD WO CONTRAST   Final Result      No evidence of acute intracranial abnormality. XR CHEST PORTABLE   Final Result      Mild bibasilar atelectasis versus airspace disease. Assessment/Plan:    Acute nstemi:  Presented w/ chest pain and trop positive. Cardiology consulted and feel Luiza Juan Jose 2/2 demand from profound anemia- cannot exclude cad/ ischemia. Troponins are uptrending  Hold off on heparin and asa given concern for acute gi bleed. Obtain lipids and a1c.   Start high dose statin/ order echo.  Ischemia w/u once stable from gi/ anemia standpoint. Cardiology following-plan for angiogram after EGD established safety of anticoagulation    Acute blood loss anemia:  Likely 2/2 ugib  Status post 2 units PRBC  Iron deficient-we will start Venofer  Also check folate, B12, TSH    Acute GI bleed:  Continue Protonix drip  GI following, plan for EGD tomorrow    Syncope:  Likely 2/2 above- check orthostats/ follow H/H. Head CT nonacute    COPD  Stable w/o exacerbation- cont pta meds. UTI:  Continue ceftriaxone and follow-up on culture results       DVT Prophylaxis: On hold for gi bleed  Diet: Diet NPO  ADULT DIET;  Full Liquid  Code Status: Full Code    PT/OT Eval Status: Once clinicaally stable    Supa Rizo MD

## 2021-12-26 NOTE — PROGRESS NOTES
[facility]  Cardiology Daily Progress Note  12/26/2021,12:28 PM      oCreen Fisher MD ,[facility]  No primary care provider on file. Primary cardiologist:    Admit Date:  12/24/2021  Hospital Day: Hospital Day: 3  Subjective:  Mr. Ijeoma Diaz is awake and alert. Had some chest discomfort last night. Difficult to tell how much of this is cardiac ischemia or due to his GI issues. Apparently had dark black stools last night and GI is planning for an EGD on Monday. I think he has a non-STEMI and may be he has stress-induced ischemia but he needs a heart cath. I was planning to do heart cath tomorrow morning however I think with his severe anemia and apparent GI bleed we need to see what is GI anatomy looks like first and will plan to do a heart cath that will potentially allow us to determine how much intervention is needed. We will do heart cath probably Tuesday after GI does the EGD tomorrow. Objective:   /66   Pulse 81   Temp 97.8 °F (36.6 °C) (Oral)   Resp 18   Ht 5' 7\" (1.702 m)   Wt 168 lb 10.4 oz (76.5 kg)   SpO2 100%   BMI 26.41 kg/m²     Intake/Output Summary (Last 24 hours) at 12/26/2021 1228  Last data filed at 12/26/2021 0544  Gross per 24 hour   Intake 781.32 ml   Output 750 ml   Net 31.32 ml       TELEMETRY: Sinus 75  Physical Examination:    Vitals:    12/25/21 2228 12/26/21 0344 12/26/21 0910 12/26/21 1158   BP: 127/68 116/69 129/75 121/66   Pulse: 88 81 82 81   Resp: 18 18 16 18   Temp: 98.6 °F (37 °C) 97.5 °F (36.4 °C) 97.9 °F (36.6 °C) 97.8 °F (36.6 °C)   TempSrc: Oral Oral Oral Oral   SpO2: 98% 97% 95% 100%   Weight:       Height:            Constitutional and General Appearance:  Healthy. And alert . HEENT: eyes and ears intact.  No nasal masses  THYROID: not enlarged  LUNGS:  · Clear to auscultation and percussion  HEART and VASCULAR:  · The apical impulses not displaced  · Heart tones are crisp and normal  · Cervical veins are not 0.9 0.8 0.8     Troponin:Troponin:   Lab Results   Component Value Date    TROPONINI 0.63 12/25/2021     LIVER PROFILE:   Recent Labs     12/24/21  1715   AST 24   ALT 20   BILIDIR <0.2   BILITOT 0.5   ALKPHOS 67     PT/INR:   Recent Labs     12/24/21  1715   PROTIME 14.7*   INR 1.29*     APTT:   Recent Labs     12/24/21  1715   APTT 24.3*     BNP:  No results for input(s): BNP in the last 72 hours. IMAGING: as noted    Assessment:  Patient Active Problem List    Diagnosis Date Noted    Anemia 12/24/2021    Visit for wound check 06/06/2018    Basal cell carcinoma of right side of nose 05/08/2018    Visit for suture removal 05/08/2018    SCCS, mod diff  (squamous cell carcinoma), hand, left 04/24/2018    Mixed hyperlipidemia 04/18/2017    Nicotine use disorder 02/03/2017    COPD, mild ([address] 06/23/2016    Colon polyp     Basal cell carcinoma of shoulder 09/08/2015    Squamous cell carcinoma of skin of upper limb, including shoulder 09/08/2015    Actinic keratosis 09/08/2015    BPH with obstruction/lower urinary tract symptoms 08/03/2012    Hyperglycemia 12/15/2010    Smoker's respiratory syndrome 12/15/2010       Plan:   Continue current medications. Core Measures:  · Discharge instructions:   · LVEF documented:   · ACEI for LV dysfunction:   ·    Patient has apparently non-STEMI but apparently still having black tarry stools. Is going to have EGD tomorrow which I think is appropriate. We will plan a heart cath on Tuesday after we know what the GI evaluate anatomy is and can determine how much anticoagulant we can use if he needs an intervention. Thanks for allowing me  the opportunity to participate in the evaluation and care of your patient.  If there are questions please call me [phone]    This note was likely completed using voice recognition technology and may contain unintended grammatical, phraseology and/or punctuation  errors      Isaias Agee MD ,[facility]  12/26/2021 12:28 PM

## 2021-12-26 NOTE — PLAN OF CARE
Problem: Falls - Risk of:  Goal: Will remain free from falls  Note: Bed locked in lowest position. 3/4 Bed rails up. PT/OT consulted. Up to chair with therapy and walker during this shift. Call light within reach. Pt belongings within reach. Bedside table within reach. Pt instructed to use call light prior to getting up. Will continue to monitor. Problem: Nutrition  Goal: Optimal nutrition therapy  12/26/2021 1554 by My Huerta RN  Note: Full liquid diet. Tolerating well. NPO after midnight for procedures.

## 2021-12-26 NOTE — CONSULTS
Clinical Pharmacy Progress Note    Vancomycin - Management by Pharmacy    Consult Date(s): 12/26/21  Consulting Provider(s): Liv    Assessment / Plan    MRSA UTI - Vancomycin   Concurrent Antimicrobials: none   Day of Vanc Therapy: 1   Current Dosing Method: Bayesian-Guided AUC Dosing   Therapeutic Goal: 400-600 mg/L*hr   Current Dose / Frequency: 750 mg every 12 hours   Plan / Rationale:   o Dose predicts an AUC of 400 mg/L*hr and steady state trough of 12.7 mcg/mL. o Random level ordered with AM labs tomorrow to assess kinetics.  Will continue to monitor clinical condition and make adjustments to regimen as appropriate. Please call with any questions. Yolanda Shirley, PharmD, BCPS  Main pharmacy: [id number]  12/26/2021 4:41 PM        Interval update:     Subjective/Objective: Mr. Tere Ayoub is a 70 y.o. male with a PMHx significant for COPD, HLD with CC of syncope and chest pressure. Patient found to have GIB. Patient noted to have UTI. Cultures drawn and pt found to be growing MRSA in his UTI. Pharmacy has been consulted to dose vancomycin. Height:   Ht Readings from Last 1 Encounters:   12/24/21 5' 7\" (1.702 m)     Weight:   Wt Readings from Last 1 Encounters:   12/24/21 168 lb 10.4 oz (76.5 kg)       Current & Prior Antimicrobial Regimen(s):    (12/25-12/26)   Vancomycin 750 mg IV q12h (12/26-current)    Level(s) / Doses:    Date Time Dose Level / Type of Level Interpretation   12/26 0600 750 mg IV q12h Random = ordered           Note: Serum levels collected for AUC-based dosing may be high if collected in close proximity to the dose administered. This is not necessarily an indicator of toxicity. Cultures & Sensitivities:    Date Site Micro Susceptibility / Result   12/26 Urine MRSA Sensitivities pending           Labs / Ancillary Data:    Estimated Creatinine Clearance: 79 mL/min (based on SCr of 0.8 mg/dL).     Recent Labs     12/24/21  1644 12/24/21  1644 12/24/21  1720 12/25/21  0646 12/26/21  0906   CREATININE 0.9  --   --  0.8 0.8   BUN 23*  --   --  23* 24*   WBC 6.5   < > 7.4 6.9 8.3    < > = values in this interval not displayed. Additional Lab Values / Findings of Note:    Procalcitonin: No results for input(s): PROCAL in the last 72 hours.

## 2021-12-26 NOTE — PROGRESS NOTES
Lab called with urine culture results positive for MRSA. Notified Dr. Isis Ware. See orders. Noted, thank you.

## 2021-12-26 NOTE — PLAN OF CARE
RN messaged patient wants excedrin; 'per patient always takes excedrin for headaches after HD. .  Admitted with concern for UGIB, on protonix gtt  Use tylenol for HA

## 2021-12-26 NOTE — PROGRESS NOTES
Per Dr. Clemente Aguilar,  Will continue to monitor. Currently pt is asymptomic, denies any chest pain or SOB. Vital signs stable, afebrile.

## 2021-12-26 NOTE — PLAN OF CARE
Problem: Nutrition  Goal: Optimal nutrition therapy  Outcome: Ongoing     Nutrition Problem #1: Increased nutrient needs  Intervention: Food and/or Nutrient Delivery: Continue NPO  Nutritional Goals: Pt will tolerate diet advancement once medically able and consume >50% of all meals and ONS throughout adm.

## 2021-12-26 NOTE — PROGRESS NOTES
Reportedly had a black BM last night. Hb remains stable. Since he's likely to be inpatient on Monday, will plan for an EGD.     Christi Rivers MD

## 2021-12-26 NOTE — PROGRESS NOTES
Physical Therapy    Facility/Department: [address] [facility] [address]  Initial Assessment/Treatment    NAME: Broderick Booth  : 1950  MRN: [id number]    Date of Service: 2021    Discharge Recommendations: Broderick Booth scored a 13/24 on the AM-PAC short mobility form. Current research shows that an AM-PAC score of 17 or less is typically not associated with a discharge to the patient's home setting. Based on the patient's AM-PAC score and their current functional mobility deficits, it is recommended that the patient have 3-5 sessions per week of Physical Therapy at d/c to increase the patient's independence. Please see assessment section for further patient specific details. If patient discharges prior to next session this note will serve as a discharge summary. Please see below for the latest assessment towards goals. PT Equipment Recommendations  Equipment Needed:  (defer)    Assessment   Body structures, Functions, Activity limitations: Decreased functional mobility ; Decreased balance;Decreased cognition  Assessment: 71 yo admitted after falling/passing out while walking into a store  to get supplies to fumigate for bed bugs. Pt somewhat confused today & keeping eyes closed for majority of session even when walking stating he had sand in his eyes. Balance was significantly impaired with losses of balance posterior. Pt appears well below his stated baseline of independence and appears at high risk for further falls. Will continue to follow & progress as tolerated. Treatment Diagnosis: impaired mobilty/balance  Prognosis: Good  Decision Making: Medium Complexity  PT Education: Goals;PT Role;Plan of Care  Patient Education: needs reinforcement  REQUIRES PT FOLLOW UP: Yes  Activity Tolerance  Activity Tolerance: Patient limited by cognitive status       Patient Diagnosis(es): The primary encounter diagnosis was Anemia, unspecified type.  Diagnoses of Syncope, unspecified syncope type and NSTEMI (non-ST elevated myocardial infarction) [facility]) were also pertinent to this visit. 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 has a past surgical history that includes hemicolectomy. Restrictions  Position Activity Restriction  Other position/activity restrictions: up as tolerated        Subjective  General  Chart Reviewed: Yes  Additional Pertinent Hx: 70 y.o. male with history of squamous cell carcinoma, hyperlipidemia, COPD presenting to the emergency department today for syncope and chest pressure. Hgb was 5.6 upon arrival.  Family / Caregiver Present: No  Referring Practitioner: Ayana Kaminski MD  Diagnosis: anemia  Follows Commands: Within Functional Limits  Subjective  Subjective: Found in bed, agreeable to PT. Appears very sleepy- keeping eyes closed for conversation. Upon standing, pt still keeping eyes closed. Encouraged to open eyes. \"I have sand in them. \"  \"I can't. \"  Asking if we see bugs on him.   Pain Screening  Patient Currently in Pain: Denies          Orientation  Orientation  Overall Orientation Status: Impaired  Orientation Level: Disoriented to place;Oriented to person (slightly off on date \"December 29\")  Social/Functional History  Social/Functional History  Lives With: Alone  Type of Home: House  Home Layout: Able to Live on Main level with bedroom/bathroom,Two level  Home Access: Stairs to enter with rails  Entrance Stairs - Number of Steps: 4  Entrance Stairs - Rails: Both  Bathroom Shower/Tub: Tub/Shower unit,Walk-in shower  Bathroom Equipment: Shower chair,Grab bars in shower  Home Equipment: [place] walker  ADL Assistance: [address]: Independent  Ambulation Assistance: Independent  Transfer Assistance: Independent  Active : Yes  Type of occupation: part time delivery  Additional Comments: reporting fall with admission, fell 2/2 trip and fall vs syncope, reporting sister can assist at dc if needed       Objective          AROM RLE (degrees)  RLE AROM: WFL  AROM LLE (degrees)  LLE AROM : WFL  Strength RLE  Strength RLE: WFL  Strength LLE  Strength LLE: WFL        Bed mobility  Supine to Sit: Minimal assistance (with rail; effortful)  Transfers  Sit to Stand: 2 Person Assistance (MIN + MOD A from bed; Min A of 1 from chair with arms)  Stand to sit: Minimal Assistance  Ambulation  Ambulation?: Yes  More Ambulation?: Yes  Ambulation 1  Surface: level tile  Device: Hand-Held Assist;No Device  Assistance: 2 Person assistance (MOD A of 2 with hand held A)  Quality of Gait: unsteady gait, shuffles feet & demo post lean; very tentative & fearful  Gait Deviations: Decreased step length;Decreased step height;Shuffles  Distance: 5 small steps bed>chair  Ambulation 2  Surface - 2: level tile  Device 2: Rolling Walker  Assistance 2: Minimal assistance;2 Person assistance;Contact guard assistance (CG/min A initially but 1 LOB requiring nearly mod A of 2 to correct self & turn properly to sit in chair)  Quality of Gait 2: pt tending to  walker rather than roll it but states he might have to do that on his carpet at home; 1 major LOB posterior when starting to turn to sit in chair  Distance: 20'  Comments: Eyes closed with ambulation initially- reporting sand in eyes. Constant cues to open eyes & watch where he is going. Balance  Sitting - Static:  (SBA on side of bed)  Standing - Static:  (post lean with standing requiring min-mod A at times; cues needed for placing weight over toes/ant weight shift)    Treatment included gait/transfer training, pt education.       Plan   Plan  Times per week: 2-5  Current Treatment Recommendations: Balance Training,Functional Mobility Training,Transfer Training,Gait Training,Stair training,Patient/Caregiver Education & Training,Safety Education & Training,Strengthening  Safety Devices  Type of devices: Call light within JE Rodrigues in place,Nurse notified,Left in chair                                                      AM-PAC Score  AM-PAC Inpatient Mobility Raw Score : 13 (12/26/21 1258)  AM-PAC Inpatient T-Scale Score : 36.74 (12/26/21 1258)  Mobility Inpatient CMS 0-100% Score: 64.91 (12/26/21 1258)  Mobility Inpatient CMS G-Code Modifier : CL (12/26/21 1258)          Goals  Short term goals  Time Frame for Short term goals: dc  Short term goal 1: Bed Mobility SBA  Short term goal 2: Sit<>stand SBA without LOB  Short term goal 3: Ambulate [de-identified]' with RW SBA without LOB  Short term goal 4: up/down 4 steps (using shower step in room due to isolation) with rail CGA  Patient Goals   Patient goals : home at dc       Therapy Time   Individual Concurrent Group Co-treatment   Time In 0828         Time Out 0908         Minutes 40           Timed Code Treatment Minutes:   25    Total Treatment Minutes:  [address], [address] [facility]

## 2021-12-26 NOTE — PROGRESS NOTES
Occupational Therapy   Occupational Therapy Initial Assessment and Tx  Date: 2021   Patient Name: Samantha Rodrigues  MRN: [id number]     : 1950    Date of Service: 2021    Discharge Recommendations: Samantha Rodrigues scored a 15/24 on the AM-PAC ADL Inpatient form. Current research shows that an AM-PAC score of 17 or less is typically not associated with a discharge to the patient's home setting. Based on the patient's AM-PAC score and their current ADL deficits, it is recommended that the patient have 3-5 sessions per week of Occupational Therapy at d/c to increase the patient's independence. Please see assessment section for further patient specific details. If patient discharges prior to next session this note will serve as a discharge summary. Please see below for the latest assessment towards goals. OT Equipment Recommendations  Equipment Needed: No  Other: defer    Assessment   Performance deficits / Impairments: Decreased functional mobility ; Decreased ADL status; Decreased endurance  Assessment: From home alone, admit with fall/syncope, reporting was fumigating for bed bugs then passed out at the store with going to get more supplies. Pt requires Min + ModA to stand from bed, posterior lean, unsteady, ModA x2 bed to chair HHA, Min to [address] ambulating in room with RW, LOB for transfer to chair ModAx2. Would benefit from cont OT while in acute care and skilled OT at [place]. Not safe to ambulate alone, fall risk. Treatment Diagnosis: impaired mobility, transfers, ADL  Decision Making: Medium Complexity  OT Education: OT Role;Plan of Care;ADL Adaptive Strategies;Transfer Training  Patient Education: cont to reinforce  Barriers to Learning: cognition  REQUIRES OT FOLLOW UP: Yes  Activity Tolerance  Activity Tolerance: Patient limited by fatigue;Treatment limited secondary to decreased cognition  Safety Devices  Safety Devices in place: Yes  Type of devices:  All fall risk precautions in place;Call light within reach; Chair alarm in place;Gait belt;Patient at risk for falls; Left in chair;Nurse notified           Patient Diagnosis(es): The primary encounter diagnosis was Anemia, unspecified type. Diagnoses of Syncope, unspecified syncope type and NSTEMI (non-ST elevated myocardial infarction) [facility]) were also pertinent to this visit. 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 has a past surgical history that includes hemicolectomy. Treatment Diagnosis: impaired mobility, transfers, ADL      Restrictions  Position Activity Restriction  Other position/activity restrictions: up as tolerated    Subjective   General  Chart Reviewed: Yes  Additional Pertinent Hx: 70 y.o. male admitted on 12/24/2021 through the emergency department with lightheadedness and a syncopal episode. Also had chest pressure. Was found to be severely anemic. States that he had been fumigating his work space for bedbugs then while ambulating to the store for more supplies became lightheaded and had a syncopal episode. Hemoglobin was 5.6 and with a hematocrit of 17.3. Referring Practitioner: Chuy De Jesus MD  Diagnosis: anemia  Subjective  Subjective:  In bed upon arrival, agreeable to session, reporting no pain     Social/Functional History  Social/Functional History  Lives With: Alone  Type of Home: House  Home Layout: Able to Live on Main level with bedroom/bathroom,Two level  Home Access: Stairs to enter with rails  Entrance Stairs - Number of Steps: 4  Entrance Stairs - Rails: Both  Bathroom Shower/Tub: Tub/Shower unit,Walk-in shower  Bathroom Equipment: Shower chair,Grab bars in shower  Home Equipment: [place] walker  ADL Assistance: [facility]: Independent  Ambulation Assistance: Independent  Transfer Assistance: Independent  Active : Yes  Type of occupation: part time delivery  Additional Comments: reporting fall with admission, fell 2/2 trip and fall vs syncope, reporting sister can assist at dc if needed       Objective   Vision: Impaired  Vision Exceptions: Wears glasses at all times  Hearing: Exceptions to [facility]  Hearing Exceptions: Hard of hearing/hearing concerns    Orientation  Overall Orientation Status: Impaired  Orientation Level: Oriented to time;Oriented to person;Disoriented to place     Balance  Sitting Balance: Stand by assistance  Standing Balance: Minimal assistance  Standing Balance  Activity: mobility in room to door and back ~ 20' + bed to chair  Functional Mobility  Functional - Mobility Device: Rolling Walker  Activity: Other  Assist Level: Minimal assistance  Functional Mobility Comments: HHA x2 bed to chair, Min to [address] [place] with ambulation RW, 2 person assist for line management, tansfers, and LOB (modA x2)  ADL  Feeding: NPO  Grooming: Setup;Stand by assistance (wipe face seated in chair)  LE Dressing: Moderate assistance  Toileting:  (declined need, cath donned)        Bed mobility  Supine to Sit: Minimal assistance  Transfers  Sit to stand: Minimal assistance; Moderate assistance  Stand to sit: Minimal assistance; Moderate assistance  Transfer Comments: min + mod from bed, Consuelo from chair     Cognition  Overall Cognitive Status: Exceptions  Arousal/Alertness: Delayed responses to stimuli  Following Commands: Follows one step commands with increased time; Follows one step commands with repetition  Attention Span: Difficulty attending to directions; Difficulty dividing attention  Memory: Decreased recall of recent events;Decreased short term memory  Safety Judgement: Decreased awareness of need for assistance;Decreased awareness of need for safety  Problem Solving: Decreased awareness of errors  Insights: Decreased awareness of deficits  Initiation: Requires cues for some  Sequencing: Requires cues for some                 LUE AROM (degrees)  LUE General AROM: BUE ~90 degrees ROM shoulder, WFL elbow wrist and hand  LUE Strength  Gross LUE Strength: WFL  RUE Strength  Gross RUE Strength: WFL                   Plan   Plan  Times per week: 2-5  Times per day: Daily      AM-PAC Score        AM-PAC Inpatient Daily Activity Raw Score: 15 (12/26/21 1405)  AM-PAC Inpatient ADL T-Scale Score : 34.69 (12/26/21 1405)  ADL Inpatient CMS 0-100% Score: 56.46 (12/26/21 1405)  ADL Inpatient CMS G-Code Modifier : CK (12/26/21 1405)    Goals  Short term goals  Time Frame for Short term goals: dc  Short term goal 1: supine to sit SBA  Short term goal 2: sit<>stand SBA  Short term goal 3: Fx mobility SBA with LRAD  Short term goal 4: LB dressing SBA  Short term goal 5: tolerate toileting assessment       Therapy Time   Individual Concurrent Group Co-treatment   Time In 0820         Time Out 0905         Minutes 45              Timed Code Treatment Minutes:   30    Total Treatment Minutes:  [address], OT

## 2021-12-27 ENCOUNTER — ANESTHESIA (OUTPATIENT)
Dept: ENDOSCOPY | Age: 71
DRG: 233 | End: 2021-12-27
Payer: MEDICARE

## 2021-12-27 ENCOUNTER — ANESTHESIA EVENT (OUTPATIENT)
Dept: ENDOSCOPY | Age: 71
DRG: 233 | End: 2021-12-27
Payer: MEDICARE

## 2021-12-27 VITALS
OXYGEN SATURATION: 100 % | DIASTOLIC BLOOD PRESSURE: 57 MMHG | RESPIRATION RATE: 21 BRPM | SYSTOLIC BLOOD PRESSURE: 113 MMHG

## 2021-12-27 LAB
ANION GAP SERPL CALCULATED.3IONS-SCNC: 9 MMOL/L (ref 3–16)
BUN BLDV-MCNC: 21 MG/DL (ref 7–20)
CALCIUM SERPL-MCNC: 7.8 MG/DL (ref 8.3–10.6)
CHLORIDE BLD-SCNC: 106 MMOL/L (ref 99–110)
CO2: 22 MMOL/L (ref 21–32)
CREAT SERPL-MCNC: 0.8 MG/DL (ref 0.8–1.3)
FOLATE: 7.52 NG/ML (ref 4.78–24.2)
GFR AFRICAN AMERICAN: >60
GFR NON-AFRICAN AMERICAN: >60
GLUCOSE BLD-MCNC: 120 MG/DL (ref 70–99)
HCT VFR BLD CALC: 21.9 % (ref 40.5–52.5)
HEMOGLOBIN: 7.1 G/DL (ref 13.5–17.5)
MCH RBC QN AUTO: 26 PG (ref 26–34)
MCHC RBC AUTO-ENTMCNC: 32.4 G/DL (ref 31–36)
MCV RBC AUTO: 80.3 FL (ref 80–100)
ORGANISM: ABNORMAL
PDW BLD-RTO: 19 % (ref 12.4–15.4)
PLATELET # BLD: 191 K/UL (ref 135–450)
PMV BLD AUTO: 8.6 FL (ref 5–10.5)
POTASSIUM SERPL-SCNC: 3.7 MMOL/L (ref 3.5–5.1)
RBC # BLD: 2.73 M/UL (ref 4.2–5.9)
SODIUM BLD-SCNC: 137 MMOL/L (ref 136–145)
URINE CULTURE, ROUTINE: ABNORMAL
VANCOMYCIN RANDOM: 14.3 UG/ML
VITAMIN B-12: 1697 PG/ML (ref 211–911)
WBC # BLD: 6.3 K/UL (ref 4–11)

## 2021-12-27 PROCEDURE — 99233 SBSQ HOSP IP/OBS HIGH 50: CPT | Performed by: NURSE PRACTITIONER

## 2021-12-27 PROCEDURE — 85027 COMPLETE CBC AUTOMATED: CPT

## 2021-12-27 PROCEDURE — 2580000003 HC RX 258: Performed by: INTERNAL MEDICINE

## 2021-12-27 PROCEDURE — 3609012400 HC EGD TRANSORAL BIOPSY SINGLE/MULTIPLE: Performed by: INTERNAL MEDICINE

## 2021-12-27 PROCEDURE — 3700000000 HC ANESTHESIA ATTENDED CARE: Performed by: INTERNAL MEDICINE

## 2021-12-27 PROCEDURE — 2500000003 HC RX 250 WO HCPCS: Performed by: INTERNAL MEDICINE

## 2021-12-27 PROCEDURE — 88305 TISSUE EXAM BY PATHOLOGIST: CPT

## 2021-12-27 PROCEDURE — 2500000003 HC RX 250 WO HCPCS: Performed by: NURSE ANESTHETIST, CERTIFIED REGISTERED

## 2021-12-27 PROCEDURE — 2709999900 HC NON-CHARGEABLE SUPPLY: Performed by: INTERNAL MEDICINE

## 2021-12-27 PROCEDURE — 82607 VITAMIN B-12: CPT

## 2021-12-27 PROCEDURE — 6360000002 HC RX W HCPCS: Performed by: INTERNAL MEDICINE

## 2021-12-27 PROCEDURE — 80048 BASIC METABOLIC PNL TOTAL CA: CPT

## 2021-12-27 PROCEDURE — 94761 N-INVAS EAR/PLS OXIMETRY MLT: CPT

## 2021-12-27 PROCEDURE — 99233 SBSQ HOSP IP/OBS HIGH 50: CPT | Performed by: INTERNAL MEDICINE

## 2021-12-27 PROCEDURE — 7100000010 HC PHASE II RECOVERY - FIRST 15 MIN: Performed by: INTERNAL MEDICINE

## 2021-12-27 PROCEDURE — 82746 ASSAY OF FOLIC ACID SERUM: CPT

## 2021-12-27 PROCEDURE — 0DB98ZX EXCISION OF DUODENUM, VIA NATURAL OR ARTIFICIAL OPENING ENDOSCOPIC, DIAGNOSTIC: ICD-10-PCS | Performed by: INTERNAL MEDICINE

## 2021-12-27 PROCEDURE — 80202 ASSAY OF VANCOMYCIN: CPT

## 2021-12-27 PROCEDURE — 0DB68ZX EXCISION OF STOMACH, VIA NATURAL OR ARTIFICIAL OPENING ENDOSCOPIC, DIAGNOSTIC: ICD-10-PCS | Performed by: INTERNAL MEDICINE

## 2021-12-27 PROCEDURE — 2580000003 HC RX 258: Performed by: NURSE ANESTHETIST, CERTIFIED REGISTERED

## 2021-12-27 PROCEDURE — 94640 AIRWAY INHALATION TREATMENT: CPT

## 2021-12-27 PROCEDURE — 7100000011 HC PHASE II RECOVERY - ADDTL 15 MIN: Performed by: INTERNAL MEDICINE

## 2021-12-27 PROCEDURE — 1200000000 HC SEMI PRIVATE

## 2021-12-27 PROCEDURE — 6360000002 HC RX W HCPCS: Performed by: NURSE ANESTHETIST, CERTIFIED REGISTERED

## 2021-12-27 PROCEDURE — 36415 COLL VENOUS BLD VENIPUNCTURE: CPT

## 2021-12-27 PROCEDURE — 6370000000 HC RX 637 (ALT 250 FOR IP): Performed by: INTERNAL MEDICINE

## 2021-12-27 PROCEDURE — 3700000001 HC ADD 15 MINUTES (ANESTHESIA): Performed by: INTERNAL MEDICINE

## 2021-12-27 PROCEDURE — C9113 INJ PANTOPRAZOLE SODIUM, VIA: HCPCS | Performed by: INTERNAL MEDICINE

## 2021-12-27 RX ORDER — OLANZAPINE 10 MG/1
2.5 INJECTION, POWDER, LYOPHILIZED, FOR SOLUTION INTRAMUSCULAR ONCE
Status: COMPLETED | OUTPATIENT
Start: 2021-12-27 | End: 2021-12-27

## 2021-12-27 RX ORDER — SODIUM CHLORIDE 9 MG/ML
INJECTION, SOLUTION INTRAVENOUS CONTINUOUS PRN
Status: DISCONTINUED | OUTPATIENT
Start: 2021-12-27 | End: 2021-12-27 | Stop reason: SDUPTHER

## 2021-12-27 RX ORDER — SODIUM CHLORIDE 9 MG/ML
INJECTION, SOLUTION INTRAVENOUS ONCE
Status: COMPLETED | OUTPATIENT
Start: 2021-12-27 | End: 2021-12-27

## 2021-12-27 RX ORDER — SODIUM CHLORIDE 0.9 % (FLUSH) 0.9 %
5-40 SYRINGE (ML) INJECTION PRN
Status: CANCELLED | OUTPATIENT
Start: 2021-12-27

## 2021-12-27 RX ORDER — PROPOFOL 10 MG/ML
INJECTION, EMULSION INTRAVENOUS PRN
Status: DISCONTINUED | OUTPATIENT
Start: 2021-12-27 | End: 2021-12-27 | Stop reason: SDUPTHER

## 2021-12-27 RX ORDER — LIDOCAINE HYDROCHLORIDE 20 MG/ML
INJECTION, SOLUTION INFILTRATION; PERINEURAL PRN
Status: DISCONTINUED | OUTPATIENT
Start: 2021-12-27 | End: 2021-12-27 | Stop reason: SDUPTHER

## 2021-12-27 RX ORDER — SODIUM CHLORIDE 9 MG/ML
INJECTION, SOLUTION INTRAVENOUS CONTINUOUS
Status: CANCELLED | OUTPATIENT
Start: 2021-12-27

## 2021-12-27 RX ADMIN — SODIUM CHLORIDE: 9 INJECTION, SOLUTION INTRAVENOUS at 14:27

## 2021-12-27 RX ADMIN — BUDESONIDE 500 MCG: 0.5 SUSPENSION RESPIRATORY (INHALATION) at 22:22

## 2021-12-27 RX ADMIN — ATORVASTATIN CALCIUM 80 MG: 80 TABLET, FILM COATED ORAL at 21:46

## 2021-12-27 RX ADMIN — PROPOFOL 20 MG: 10 INJECTION, EMULSION INTRAVENOUS at 14:59

## 2021-12-27 RX ADMIN — CHOLECALCIFEROL (VITAMIN D3) 10 MCG (400 UNIT) TABLET 400 UNITS: at 09:38

## 2021-12-27 RX ADMIN — SODIUM CHLORIDE 25 ML: 9 INJECTION, SOLUTION INTRAVENOUS at 09:41

## 2021-12-27 RX ADMIN — SODIUM CHLORIDE, PRESERVATIVE FREE 10 ML: 5 INJECTION INTRAVENOUS at 09:38

## 2021-12-27 RX ADMIN — OLANZAPINE 2.5 MG: 10 INJECTION, POWDER, FOR SOLUTION INTRAMUSCULAR at 23:56

## 2021-12-27 RX ADMIN — PROPOFOL 40 MG: 10 INJECTION, EMULSION INTRAVENOUS at 14:55

## 2021-12-27 RX ADMIN — VANCOMYCIN HYDROCHLORIDE 750 MG: 10 INJECTION, POWDER, LYOPHILIZED, FOR SOLUTION INTRAVENOUS at 21:51

## 2021-12-27 RX ADMIN — Medication 400 UNITS: at 09:38

## 2021-12-27 RX ADMIN — TIOTROPIUM BROMIDE INHALATION SPRAY 2 PUFF: 3.12 SPRAY, METERED RESPIRATORY (INHALATION) at 07:53

## 2021-12-27 RX ADMIN — SODIUM CHLORIDE: 9 INJECTION, SOLUTION INTRAVENOUS at 14:35

## 2021-12-27 RX ADMIN — SODIUM CHLORIDE 8 MG/HR: 9 INJECTION, SOLUTION INTRAVENOUS at 10:56

## 2021-12-27 RX ADMIN — SODIUM CHLORIDE 25 ML: 9 INJECTION, SOLUTION INTRAVENOUS at 12:41

## 2021-12-27 RX ADMIN — IRON SUCROSE 200 MG: 20 INJECTION, SOLUTION INTRAVENOUS at 12:42

## 2021-12-27 RX ADMIN — ARFORMOTEROL TARTRATE 15 MCG: 15 SOLUTION RESPIRATORY (INHALATION) at 07:53

## 2021-12-27 RX ADMIN — BUDESONIDE 500 MCG: 0.5 SUSPENSION RESPIRATORY (INHALATION) at 07:53

## 2021-12-27 RX ADMIN — ARFORMOTEROL TARTRATE 15 MCG: 15 SOLUTION RESPIRATORY (INHALATION) at 22:22

## 2021-12-27 RX ADMIN — PROPOFOL 50 MG: 10 INJECTION, EMULSION INTRAVENOUS at 14:54

## 2021-12-27 RX ADMIN — VANCOMYCIN HYDROCHLORIDE 750 MG: 10 INJECTION, POWDER, LYOPHILIZED, FOR SOLUTION INTRAVENOUS at 09:42

## 2021-12-27 RX ADMIN — SODIUM CHLORIDE, PRESERVATIVE FREE 10 ML: 5 INJECTION INTRAVENOUS at 21:46

## 2021-12-27 RX ADMIN — LIDOCAINE HYDROCHLORIDE 100 MG: 20 INJECTION, SOLUTION INFILTRATION; PERINEURAL at 14:54

## 2021-12-27 ASSESSMENT — ENCOUNTER SYMPTOMS
ABDOMINAL PAIN: 0
ABDOMINAL DISTENTION: 0
DIARRHEA: 0
SHORTNESS OF BREATH: 0
COUGH: 0
WHEEZING: 0
VOMITING: 0
NAUSEA: 0

## 2021-12-27 ASSESSMENT — PULMONARY FUNCTION TESTS
PIF_VALUE: 0
PIF_VALUE: 0
PIF_VALUE: 1
PIF_VALUE: 0
PIF_VALUE: 0
PIF_VALUE: 1
PIF_VALUE: 1
PIF_VALUE: 0
PIF_VALUE: 1
PIF_VALUE: 0
PIF_VALUE: 1

## 2021-12-27 ASSESSMENT — PAIN SCALES - GENERAL
PAINLEVEL_OUTOF10: 0

## 2021-12-27 ASSESSMENT — COPD QUESTIONNAIRES: CAT_SEVERITY: MILD

## 2021-12-27 NOTE — PROCEDURES
EGD REPORT    Patient:  Angeles Overton                  1950    Endoscopist: FELIX Coronado     Indication:  Anemia     Medications:  MAC    Pre-Anesthesia Assessment:  I have reviewed and am in agreement with patient history and medication, including previous response to sedation. Prior to the procedure, a History and Physical was performed, and patient medications and allergies were reviewed. The patient is competent. The risks and benefits of the procedure and the sedation options and risks were discussed with the patient and sister. Risks discussed included but were not limited to infection, bleeding, perforation, death, and missed lesions. All questions were answered and informed consent was obtained. Patient identification and proposed procedure were verified by the physician and the nurse in the pre-procedure area in the procedure room. [place]: 3  ASA Grade Assessment: 3     After reviewing the risks and benefits, the patient was deemed in satisfactory condition to undergo the procedure. The anesthesia plan was to use MAC anesthesia. Immediately prior to administration of medications, the patient was re-assessed for adequacy to receive sedatives and a time out was performed. Patient and healthcare providers were in agreement it was the correct patient and procedure. The heart rate, respiratory rate, oxygen saturations, blood pressure, adequacy of pulmonary ventilation, and response to care were monitored throughout the procedure. The physical status of the patient was re-assessed after the procedure. After obtaining informed consent, the endoscope was passed under direct vision. The endoscope was introduced through the mouth, and advanced to the second part of duodenum. The EGD was accomplished without difficulty. The patient tolerated the procedure well.        Duodenum: Normal, biopsies for Celiac done  Stomach: Mild antritis, biopsied  Retroflexion did not show a hiatal

## 2021-12-27 NOTE — ANESTHESIA POSTPROCEDURE EVALUATION
Department of Anesthesiology  Postprocedure Note    Patient: Yesi Jones  MRN: [id number]  YOB: 1950  Date of evaluation: 12/27/2021  Time:  4:26 PM     Procedure Summary     Date: 12/27/21 Room / Location: [facility]3]    Anesthesia Start: 1440 Anesthesia Stop: 1107    Procedure: EGD ESOPHAGOGASTRODUODENOSCOPY (N/A ) Diagnosis: (ANEMIA)    Surgeons: Anjelica Fabian MD Responsible Provider: Gabe Alvarez MD    Anesthesia Type: MAC ASA Status: 3          Anesthesia Type: MAC    Geoff Phase I: Geoff Score: 10    Geoff Phase II: Geoff Score: 9    Last vitals: Reviewed and per EMR flowsheets.        Anesthesia Post Evaluation    Patient location during evaluation: PACU  Patient participation: complete - patient participated  Level of consciousness: awake and alert  Pain score: 0  Airway patency: patent  Nausea & Vomiting: no nausea and no vomiting  Complications: no  Cardiovascular status: blood pressure returned to baseline  Respiratory status: acceptable  Hydration status: stable

## 2021-12-27 NOTE — ANESTHESIA PRE PROCEDURE
daily 2/1/18   Ivone Whatley MD   albuterol sulfate HFA (VENTOLIN HFA) 108 (90 Base) MCG/ACT inhaler Inhale 2 puffs into the lungs every 6 hours as needed for Wheezing or Shortness of Breath 8/21/17   Ivone Whatley MD   fluticasone-salmeterol (ADVAIR) 250-50 MCG/DOSE AEPB Inhale 1 puff into the lungs every 12 hours 1/17/17   CAITLYN Liz - CNP   ketoconazole (NIZORAL) 2 % cream Apply topically daily. 11/4/16   Ivone Whatley MD   Cinnamon 500 MG CAPS Take 1 capsule by mouth daily. Historical Provider, MD   vitamin E 400 UNIT capsule Take 400 Units by mouth daily. Historical Provider, MD   aspirin 325 MG tablet Take 325 mg by mouth daily. Historical Provider, MD   Ascorbic Acid (VITAMIN C) 500 MG tablet Take 500 mg by mouth daily. Historical Provider, MD   vitamin D (CHOLECALCIFEROL) 400 UNIT TABS tablet Take 400 Units by mouth daily.     Historical Provider, MD       Current medications:    Current Facility-Administered Medications   Medication Dose Route Frequency Provider Last Rate Last Admin    0.9 % sodium chloride infusion   IntraVENous Once Conner Chambers MD        iron sucrose (VENOFER) 200 mg in sodium chloride 0.9 % 100 mL IVPB  200 mg IntraVENous Q24H Naseem Summers  mL/hr at 12/27/21 1242 200 mg at 12/27/21 1242    vancomycin (VANCOCIN) 750 mg in dextrose 5 % 250 mL IVPB  750 mg IntraVENous Q12H Naseem Summers MD   Stopped at 12/27/21 1113    pantoprazole (PROTONIX) 80 mg in sodium chloride 0.9 % 100 mL infusion  8 mg/hr IntraVENous Continuous Pamela Jordan MD 10 mL/hr at 12/27/21 1056 8 mg/hr at 12/27/21 1056    perflutren lipid microspheres (DEFINITY) injection 1.65 mg  1.5 mL IntraVENous ONCE PRN Pamela Jordan MD        atorvastatin (LIPITOR) tablet 80 mg  80 mg Oral Nightly Pamela Jordan MD   80 mg at 12/26/21 2211    influenza quadrivalent split vaccine (FLUZONE;FLUARIX;FLULAVAL;AFLURIA) injection 0.5 mL  0.5 mL IntraMUSCular Prior to discharge [address] Campbell Barajas MD        fentaNYL (SUBLIMAZE) injection 50 mcg  50 mcg IntraVENous Once Reina Elise MD        vitamin D3 (CHOLECALCIFEROL) tablet 400 Units  400 Units Oral Daily Dayan Zee MD   400 Units at 12/27/21 4275    vitamin E capsule 400 Units  400 Units Oral Daily Dayan Zee MD   400 Units at 12/27/21 0616    albuterol (PROVENTIL) nebulizer solution 2.5 mg  2.5 mg Nebulization Q6H PRN Dayan Zee MD        tiotropium (SPIRIVA RESPIMAT) 2.5 MCG/ACT inhaler 2 puff  2 puff Inhalation Daily Dayan Zee MD   2 puff at 12/27/21 0753    sodium chloride flush 0.9 % injection 5-40 mL  5-40 mL IntraVENous 2 times per day Dayan Zee MD   10 mL at 12/27/21 0938    ondansetron (ZOFRAN-ODT) disintegrating tablet 4 mg  4 mg Oral Q8H PRN Dayan Zee MD        Or    ondansetron [facility]) injection 4 mg  4 mg IntraVENous Q6H PRN Dayan Zee MD        polyethylene glycol [facility]) packet 17 g  17 g Oral Daily PRN Dayan Zee MD        acetaminophen (TYLENOL) tablet 650 mg  650 mg Oral Q6H PRN Dayan Zee MD        Or    acetaminophen (TYLENOL) suppository 650 mg  650 mg Rectal Q6H PRN Dayan Zee MD        budesonide (PULMICORT) nebulizer suspension 500 mcg  0.5 mg Nebulization BID Dayan Zee MD   500 mcg at 12/27/21 7236    And    Arformoterol Tartrate (BROVANA) nebulizer solution 15 mcg  15 mcg Nebulization BID Dayan Zee MD   15 mcg at 12/27/21 2899       Allergies:  No Known Allergies    Problem List:    Patient Active Problem List   Diagnosis Code    Hyperglycemia R73.9    Smoker's respiratory syndrome F17.200    BPH with obstruction/lower urinary tract symptoms N40.1, N13.8    Basal cell carcinoma of shoulder C44.611    Squamous cell carcinoma of skin of upper limb, including shoulder C44.621    Actinic keratosis L57.0    Colon polyp K63.5    COPD, mild (HCC) J44.9    Nicotine use disorder F17.200    Mixed hyperlipidemia E78.2    SCCS, mod diff  (squamous cell carcinoma), hand, left C44.629    Basal cell carcinoma of right side of nose C44.311    Visit for suture removal Z48.02    Visit for wound check Z51.89    Anemia D64.9    Syncope and collapse R55       Past Medical History:        Diagnosis Date    BPH with obstruction/lower urinary tract symptoms 8/3/2012    Chicken pox     Colon polyp     Hyperglycemia 12/15/2010    Hyperlipemia 12/15/2010    Post herpetic neuralgia     Shingles     Squamous cell carcinoma of skin of upper limb, including shoulder 9/8/2015       Past Surgical History:        Procedure Laterality Date    HEMICOLECTOMY      right due to mass benign       Social History:    Social History     Tobacco Use    Smoking status: Current Every Day Smoker     Packs/day: 1.00     Types: Cigarettes    Smokeless tobacco: Never Used    Tobacco comment: encouraged patient on smoking cessation   Substance Use Topics    Alcohol use:  Yes     Alcohol/week: 6.0 standard drinks     Types: 2 Glasses of wine, 2 Cans of beer, 2 Shots of liquor per week     Comment: drinks 1-2 a day                                Ready to quit: Not Answered  Counseling given: Not Answered  Comment: encouraged patient on smoking cessation      Vital Signs (Current):   Vitals:    12/26/21 2357 12/27/21 0345 12/27/21 0840 12/27/21 1153   BP: (!) 143/80 114/64 121/70 100/62   Pulse: 76 77 73 78   Resp: 17 17 16 16   Temp: 97.7 °F (36.5 °C) 96.3 °F (35.7 °C) 96.7 °F (35.9 °C) 98.5 °F (36.9 °C)   TempSrc: Oral Oral Oral Oral   SpO2: 94% 98% 94% 96%   Weight:       Height:                                                  BP Readings from Last 3 Encounters:   12/27/21 100/62   07/12/18 104/76   05/31/18 110/66       NPO Status: Time of last liquid consumption: 0000                        Time of last solid consumption: 1800                        Date of last liquid consumption: 12/27/21                        Date of last solid food consumption: 12/26/21    BMI:   Wt Readings from Last 3 Encounters:   12/24/21 168 lb 10.4 oz (76.5 kg)   07/12/18 179 lb (81.2 kg)   05/31/18 183 lb (83 kg)     Body mass index is 26.41 kg/m². CBC:   Lab Results   Component Value Date    WBC 6.3 12/27/2021    RBC 2.73 12/27/2021    HGB 7.1 12/27/2021    HCT 21.9 12/27/2021    MCV 80.3 12/27/2021    RDW 19.0 12/27/2021     12/27/2021       CMP:   Lab Results   Component Value Date     12/27/2021    K 3.7 12/27/2021    K 4.2 12/25/2021     12/27/2021    CO2 22 12/27/2021    BUN 21 12/27/2021    CREATININE 0.8 12/27/2021    GFRAA >60 12/27/2021    GFRAA >60 01/03/2013    AGRATIO 2.2 03/26/2018    LABGLOM >60 12/27/2021    GLUCOSE 120 12/27/2021    GLUCOSE 97 11/12/2011    PROT 6.2 12/24/2021    PROT 7.0 01/03/2013    CALCIUM 7.8 12/27/2021    BILITOT 0.5 12/24/2021    ALKPHOS 67 12/24/2021    AST 24 12/24/2021    ALT 20 12/24/2021       POC Tests: No results for input(s): POCGLU, POCNA, POCK, POCCL, POCBUN, POCHEMO, POCHCT in the last 72 hours.     Coags:   Lab Results   Component Value Date    PROTIME 14.7 12/24/2021    INR 1.29 12/24/2021    APTT 24.3 12/24/2021       HCG (If Applicable): No results found for: PREGTESTUR, PREGSERUM, HCG, HCGQUANT     ABGs:   Lab Results   Component Value Date    PHART 7.455 12/25/2021    PO2ART 78.7 12/25/2021    WER7EDA 31.5 12/25/2021    CWD1DDZ 22 12/25/2021    BEART -1.5 12/25/2021    H9FFKEBF 97 12/25/2021        Type & Screen (If Applicable):  No results found for: LABABO, LABRH    Drug/Infectious Status (If Applicable):  No results found for: HIV, HEPCAB    COVID-19 Screening (If Applicable):   Lab Results   Component Value Date    COVID19 Not Detected 12/26/2021           Anesthesia Evaluation  Patient summary reviewed and Nursing notes reviewed  Airway: Mallampati: II  TM distance: >3 FB   Neck ROM: full  Mouth opening: > = 3 FB Dental: normal exam         Pulmonary:normal exam  breath sounds clear to auscultation  (+) COPD: mild, Cardiovascular:    (+) hyperlipidemia        Rhythm: regular  Rate: normal                    Neuro/Psych:   Negative Neuro/Psych ROS              GI/Hepatic/Renal:            ROS comment: BPH. Endo/Other: Negative Endo/Other ROS                    Abdominal:             Vascular: negative vascular ROS. Other Findings:             Anesthesia Plan      MAC     ASA 3       Induction: intravenous. Anesthetic plan and risks discussed with patient. Plan discussed with CRNA.                   Jamie Sanches MD   12/27/2021

## 2021-12-27 NOTE — PROGRESS NOTES
Pt on RA resp easy. Vital signs stable, afebrile. Protonix drip @ 8 mg/hr continue. NPO after midnight. Sister called with update. Call light in reach, bed alarm in place. Remains in environment isolation.

## 2021-12-27 NOTE — PROGRESS NOTES
Progress Note    Admit Date: 12/24/2021  Day: 3  Diet: Diet NPO    CC: Syncope, anemia    Interval history: NAEON. Hgb stable. Trop elevated. Patient has some chest discomfort. Denies any shortness of breath. No dark stool. No dysuria. Denies any other complaints. HPI: 68-year-old male with past medical history of COPD, hyperlipidemia presented to the hospital with episode of syncope and chest pressure. Patient says that he was doing work around the house including fumigation for bedbugs and went to get more supplies. He said that while ambulating to the store he became lightheaded had a syncopal episode. Patient was brought to the hospital.  He denied any shortness of breath but did have some chest pressure. Denies any fevers, chills or any other symptoms recently. On arrival to the hospital he was noted to be hemodynamically stable. Lab work showed hemoglobin of 5.6. Patient had a chest x-ray performed which was unremarkable.   Due to concern for symptomatic anemia patient was admitted to the hospital for the work-up and management    Medications:     Scheduled Meds:   iron sucrose  200 mg IntraVENous Q24H    vancomycin  750 mg IntraVENous Q12H    atorvastatin  80 mg Oral Nightly    influenza virus vaccine  0.5 mL IntraMUSCular Prior to discharge    fentanNYL  50 mcg IntraVENous Once    vitamin D3  400 Units Oral Daily    vitamin E  400 Units Oral Daily    tiotropium  2 puff Inhalation Daily    sodium chloride flush  5-40 mL IntraVENous 2 times per day    budesonide  0.5 mg Nebulization BID    And    Arformoterol Tartrate  15 mcg Nebulization BID     Continuous Infusions:   pantoprozole (PROTONIX) infusion 8 mg/hr (12/27/21 1056)     PRN Meds:perflutren lipid microspheres, albuterol, ondansetron **OR** ondansetron, polyethylene glycol, acetaminophen **OR** acetaminophen    Objective:   Vitals:   T-max:  Patient Vitals for the past 8 hrs:   BP Temp Temp src Pulse Resp SpO2   12/27/21 1153 100/62 98.5 °F (36.9 °C) Oral 78 16 96 %   12/27/21 0840 121/70 96.7 °F (35.9 °C) Oral 73 16 94 %       Intake/Output Summary (Last 24 hours) at 12/27/2021 1340  Last data filed at 12/27/2021 0600  Gross per 24 hour   Intake 147.93 ml   Output 875 ml   Net -727.07 ml       Review of Systems   Constitutional: Negative for chills, fatigue and fever. HENT: Negative for congestion. Respiratory: Negative for cough, shortness of breath and wheezing. Cardiovascular: Positive for chest pain. Negative for palpitations. Gastrointestinal: Negative for abdominal distention, abdominal pain, diarrhea, nausea and vomiting. Genitourinary: Negative for dysuria. Neurological: Negative for dizziness, syncope, weakness, light-headedness and numbness. Physical Exam  Constitutional:       General: He is not in acute distress. Appearance: He is well-developed. He is not diaphoretic. Comments: Pale appearing   HENT:      Head: Normocephalic and atraumatic. Mouth/Throat:      Mouth: Mucous membranes are dry. Cardiovascular:      Rate and Rhythm: Normal rate and regular rhythm. Heart sounds: Normal heart sounds. No murmur heard. Pulmonary:      Effort: Pulmonary effort is normal. No respiratory distress. Breath sounds: Normal breath sounds. No wheezing. Abdominal:      General: Bowel sounds are normal. There is no distension. Palpations: Abdomen is soft. Tenderness: There is no abdominal tenderness. Skin:     General: Skin is warm and dry. Capillary Refill: Capillary refill takes less than 2 seconds. Findings: No erythema. Neurological:      Mental Status: He is alert and oriented to person, place, and time.    Psychiatric:         Behavior: Behavior normal.         LABS:    CBC:   Recent Labs     12/25/21  0646 12/25/21  1453 12/25/21  2111 12/26/21  0906 12/27/21  1139   WBC 6.9  --   --  8.3 6.3   HGB 7.3*   < > 7.4* 7.8* 7.1*   HCT 22.3*   < > 22.1* 23.9* 21.9*   --   --  206 191   MCV 80.7  --   --  81.0 80.3    < > = values in this interval not displayed. Renal:    Recent Labs     12/24/21  1644 12/24/21  1644 12/25/21  0646 12/26/21  0906 12/27/21  1139      < > 140 136 137   K 3.5   < > 4.2 4.0 3.7      < > 108 107 106   CO2 18*   < > 21 21 22   BUN 23*   < > 23* 24* 21*   CREATININE 0.9   < > 0.8 0.8 0.8   GLUCOSE 158*   < > 109* 114* 120*   CALCIUM 8.1*   < > 8.1* 8.0* 7.8*   MG 2.00  --   --   --   --    ANIONGAP 15   < > 11 8 9    < > = values in this interval not displayed. Hepatic:   Recent Labs     12/24/21  1715   AST 24   ALT 20   BILITOT 0.5   BILIDIR <0.2   PROT 6.2*   LABALBU 3.6   ALKPHOS 67     Troponin:   Recent Labs     12/24/21  1715 12/25/21  1453 12/25/21  2111   TROPONINI 0.25* 0.59* 0.63*     BNP: No results for input(s): BNP in the last 72 hours. Lipids:   Recent Labs     12/25/21  1453   CHOL 97   HDL 28*     ABGs:    Recent Labs     12/25/21  1642   PHART 7.455*   NXV2WMP 31.5*   PO2ART 78.7   USI7ITU 22   BEART -1.5   D4MCJWRA 97   GIB6DRC 23       INR:   Recent Labs     12/24/21  1715   INR 1.29*     Lactate: No results for input(s): LACTATE in the last 72 hours. Cultures:  -----------------------------------------------------------------  RAD:   CT HEAD WO CONTRAST   Final Result      No evidence of acute intracranial abnormality. XR CHEST PORTABLE   Final Result      Mild bibasilar atelectasis versus airspace disease. Assessment/Plan:   NSTEMI  Presented w/ chest pain and trop positive. Cardiology consulted and feel Taisha Serna 2/2 demand from profound anemia- cannot exclude cad/ ischemia.  - Trops increase to 0.63  - Holding heparin and asa given concern for gi bleed  - High dose statin  - Pending echo  - Will have LHC tomorrow     Acute blood loss anemia, liekly UGIB  Presented with Hgb of 5.4, syncope, elevated trops. Hemodynamically stable.   - Status post 2 units PRBC, Hgb improved appropriately  - Venofer  - Pending folate, B12  - Protonix gtt  - EGD today  - GI following     COPD  Stable w/o exacerbation  - Continue current medications     MRSA UTI  - Started on Vanc, will transition to PO once able to take oral    Code Status: Full Code  FEN: Diet NPO  PPX: SCDs, protonix  DISPO: GMF, pending EGD 12/27, [facility] on 12/28    Arline Weaver MD, PGY-3  12/27/21  1:40 PM    This patient has been staffed and discussed with Ary Victoria MD.

## 2021-12-27 NOTE — PROGRESS NOTES
Clinical Pharmacy Progress Note    Vancomycin - Management by Pharmacy    Consult Date(s): 12/26/21  Consulting Provider(s): Liv    Assessment / Plan    MRSA UTI - Vancomycin   Concurrent Antimicrobials: none   Day of Vanc Therapy: day #2   Current Dosing Method: Bayesian-Guided AUC Dosing   Therapeutic Goal: < 500 mg/L*hr (non-invasive MRSA infection)   Current Dose / Frequency: 750 mg every 12 hours   Plan / Rationale:  o Random level this AM collected late (drawn 1 hour 57 min after prior dose). Level back at 14.3 mg/L.  o Using the above level, current regimen is predicted to achieve a steady state AUC of approx. [id number] mg/L*hr with an associated trough of ~ 13 mg/L.  o Continue current regimen.  Will continue to monitor clinical condition and make adjustments to regimen as appropriate. Thank you for allowing us to participate in the care of this patient. Please reach out with any questions. Jaja Barragan, VadimD, BCPS  12/27/2021  Wireless: [phone]      Interval update: VSS, afebrile this morning. No events overnight. Subjective/Objective: Mr. Avani Law is a 70 y.o. male with a PMHx significant for COPD, HLD with CC of syncope and chest pressure. Patient found to have GIB and a UTI. Cultures drawn, with urine culture notable for MRSA. Pharmacy has been consulted to dose vancomycin. Height:   Ht Readings from Last 1 Encounters:   12/24/21 5' 7\" (1.702 m)     Weight:   Wt Readings from Last 1 Encounters:   12/24/21 168 lb 10.4 oz (76.5 kg)       Current & Prior Antimicrobial Regimen(s):    (12/25-12/26)   Vancomycin 750 mg IV q12h (12/26 - present)    Level(s) / Doses:    Date Time Dose Level / Type of Level Interpretation   12/27 11:39 750 mg IV q12h Random = 14.3 mg/L    Note: Serum levels collected for AUC-based dosing may be high if collected in close proximity to the dose administered. This is not necessarily an indicator of toxicity.     Cultures & Sensitivities:    Date Site Micro Susceptibility / Result   12/26 Urine Staph aureus (MRSA) Sensitivities pending     Labs / Ancillary Data:    Estimated Creatinine Clearance: 79 mL/min (based on SCr of 0.8 mg/dL). Recent Labs     12/24/21  1644 12/24/21  1644 12/24/21  1720 12/25/21  0646 12/26/21  0906   CREATININE 0.9  --   --  0.8 0.8   BUN 23*  --   --  23* 24*   WBC 6.5   < > 7.4 6.9 8.3    < > = values in this interval not displayed. Additional Lab Values / Findings of Note:    Procalcitonin: No results for input(s): PROCAL in the last 72 hours.

## 2021-12-27 NOTE — PROGRESS NOTES
Occupational / Physical Therapy  Off Floor   Attempt to see for therapy treatment this date. Pt off floor for EGD ESOPHAGOGASTRODUODENOSCOPY. Will follow up as treatment schedule allows.      [id number], [place], OTR/L  [place], [address]

## 2021-12-27 NOTE — PROGRESS NOTES
[address]   Cardiology  Note   Dr Radha Fuentes MD, Cabrera Hodge RN, FNP APRN CVNP  Date: 12/27/2021  Admit Date: 12/24/2021       CC:/ NSTEMI / GI bleed     PMH: Syncope  Severe anemia COPD    Interval Hx /  Subjective: Today, he is resting quietly / VSS afebrile   Hgb 5.4 > 7.4> 7.8   Planning EGD today   LHC tomorrow  AM   No new complaints today. No major events overnight. Denies having chest pain, palpitations, shortness of breath, orthopnea/PND, cough, or dizziness at the time of this visit. Patient seen and examined. Clinical notes reviewed.  Telemetry reviewed / testing reviewed  30 minutes   Pertinent labs, diagnostic, device, and imaging results reviewed as a part of this visit  EKG TTE stress test: any LHC/RHC reviewed     Past Medical History:  Past Medical History:   Diagnosis Date    BPH with obstruction/lower urinary tract symptoms 8/3/2012    Chicken pox     Colon polyp     Hyperglycemia 12/15/2010    Hyperlipemia 12/15/2010    Post herpetic neuralgia     Shingles     Squamous cell carcinoma of skin of upper limb, including shoulder 9/8/2015          Scheduled Meds:   iron sucrose  200 mg IntraVENous Q24H    vancomycin  750 mg IntraVENous Q12H    atorvastatin  80 mg Oral Nightly    influenza virus vaccine  0.5 mL IntraMUSCular Prior to discharge    fentanNYL  50 mcg IntraVENous Once    vitamin D3  400 Units Oral Daily    vitamin E  400 Units Oral Daily    tiotropium  2 puff Inhalation Daily    sodium chloride flush  5-40 mL IntraVENous 2 times per day    budesonide  0.5 mg Nebulization BID    And    Arformoterol Tartrate  15 mcg Nebulization BID       Vitals:    12/27/21 0840   BP: 121/70   Pulse: 73   Resp: 16   Temp: 96.7 °F (35.9 °C)   SpO2: 94%      In: 147.9 [I.V.:147.9]  Out: 875    Wt Readings from Last 3 Encounters:   12/24/21 168 lb 10.4 oz (76.5 kg)   07/12/18 179 lb (81.2 kg)   05/31/18 183 lb (83 kg)       Intake/Output Summary (Last 24 hours) at 1830 [facility] cardiology note  Patient still anemic but improved. EGD to be by done today. I think he needs a heart cath because of his elevated troponin and his chest discomfort and we will plan to do so tomorrow on this specific other studies need to be done per GI.   Ofelia Franks MD, [facility]

## 2021-12-28 ENCOUNTER — HOSPITAL ENCOUNTER (INPATIENT)
Dept: CARDIAC CATH/INVASIVE PROCEDURES | Age: 71
Discharge: HOME | DRG: 233 | End: 2021-12-30
Payer: MEDICARE

## 2021-12-28 LAB
ABO/RH: NORMAL
ANION GAP SERPL CALCULATED.3IONS-SCNC: 8 MMOL/L (ref 3–16)
ANTIBODY SCREEN: NORMAL
BUN BLDV-MCNC: 19 MG/DL (ref 7–20)
CALCIUM SERPL-MCNC: 7.8 MG/DL (ref 8.3–10.6)
CHLORIDE BLD-SCNC: 107 MMOL/L (ref 99–110)
CO2: 24 MMOL/L (ref 21–32)
CREAT SERPL-MCNC: 0.7 MG/DL (ref 0.8–1.3)
EKG ATRIAL RATE: 108 BPM
EKG DIAGNOSIS: NORMAL
EKG P AXIS: 66 DEGREES
EKG P-R INTERVAL: 160 MS
EKG Q-T INTERVAL: 332 MS
EKG QRS DURATION: 100 MS
EKG QTC CALCULATION (BAZETT): 444 MS
EKG R AXIS: 54 DEGREES
EKG T AXIS: 141 DEGREES
EKG VENTRICULAR RATE: 108 BPM
GFR AFRICAN AMERICAN: >60
GFR NON-AFRICAN AMERICAN: >60
GLUCOSE BLD-MCNC: 106 MG/DL (ref 70–99)
HCT VFR BLD CALC: 21.1 % (ref 40.5–52.5)
HEMOGLOBIN: 6.9 G/DL (ref 13.5–17.5)
LEFT VENTRICULAR EJECTION FRACTION HIGH VALUE: 35 %
LEFT VENTRICULAR EJECTION FRACTION MODE: NORMAL
LV EF: 28 %
LV EF: 30 %
LVEF MODALITY: NORMAL
MCH RBC QN AUTO: 26.4 PG (ref 26–34)
MCHC RBC AUTO-ENTMCNC: 32.7 G/DL (ref 31–36)
MCV RBC AUTO: 80.7 FL (ref 80–100)
PDW BLD-RTO: 18.9 % (ref 12.4–15.4)
PLATELET # BLD: 189 K/UL (ref 135–450)
PMV BLD AUTO: 8.4 FL (ref 5–10.5)
POTASSIUM SERPL-SCNC: 3.5 MMOL/L (ref 3.5–5.1)
RBC # BLD: 2.61 M/UL (ref 4.2–5.9)
SODIUM BLD-SCNC: 139 MMOL/L (ref 136–145)
WBC # BLD: 7.1 K/UL (ref 4–11)

## 2021-12-28 PROCEDURE — 2580000003 HC RX 258: Performed by: INTERNAL MEDICINE

## 2021-12-28 PROCEDURE — 4A023N7 MEASUREMENT OF CARDIAC SAMPLING AND PRESSURE, LEFT HEART, PERCUTANEOUS APPROACH: ICD-10-PCS | Performed by: INTERNAL MEDICINE

## 2021-12-28 PROCEDURE — 86900 BLOOD TYPING SEROLOGIC ABO: CPT

## 2021-12-28 PROCEDURE — 6360000002 HC RX W HCPCS

## 2021-12-28 PROCEDURE — P9016 RBC LEUKOCYTES REDUCED: HCPCS

## 2021-12-28 PROCEDURE — B2151ZZ FLUOROSCOPY OF LEFT HEART USING LOW OSMOLAR CONTRAST: ICD-10-PCS | Performed by: INTERNAL MEDICINE

## 2021-12-28 PROCEDURE — 86850 RBC ANTIBODY SCREEN: CPT

## 2021-12-28 PROCEDURE — 86901 BLOOD TYPING SEROLOGIC RH(D): CPT

## 2021-12-28 PROCEDURE — 99024 POSTOP FOLLOW-UP VISIT: CPT | Performed by: INTERNAL MEDICINE

## 2021-12-28 PROCEDURE — C1894 INTRO/SHEATH, NON-LASER: HCPCS

## 2021-12-28 PROCEDURE — 36415 COLL VENOUS BLD VENIPUNCTURE: CPT

## 2021-12-28 PROCEDURE — 80048 BASIC METABOLIC PNL TOTAL CA: CPT

## 2021-12-28 PROCEDURE — 2500000003 HC RX 250 WO HCPCS

## 2021-12-28 PROCEDURE — 1200000000 HC SEMI PRIVATE

## 2021-12-28 PROCEDURE — 93306 TTE W/DOPPLER COMPLETE: CPT

## 2021-12-28 PROCEDURE — 6370000000 HC RX 637 (ALT 250 FOR IP): Performed by: THORACIC SURGERY (CARDIOTHORACIC VASCULAR SURGERY)

## 2021-12-28 PROCEDURE — C1769 GUIDE WIRE: HCPCS

## 2021-12-28 PROCEDURE — 86923 COMPATIBILITY TEST ELECTRIC: CPT

## 2021-12-28 PROCEDURE — 6360000002 HC RX W HCPCS: Performed by: INTERNAL MEDICINE

## 2021-12-28 PROCEDURE — 93458 L HRT ARTERY/VENTRICLE ANGIO: CPT | Performed by: INTERNAL MEDICINE

## 2021-12-28 PROCEDURE — C9113 INJ PANTOPRAZOLE SODIUM, VIA: HCPCS | Performed by: INTERNAL MEDICINE

## 2021-12-28 PROCEDURE — 6360000004 HC RX CONTRAST MEDICATION: Performed by: INTERNAL MEDICINE

## 2021-12-28 PROCEDURE — 85027 COMPLETE CBC AUTOMATED: CPT

## 2021-12-28 PROCEDURE — B2111ZZ FLUOROSCOPY OF MULTIPLE CORONARY ARTERIES USING LOW OSMOLAR CONTRAST: ICD-10-PCS | Performed by: INTERNAL MEDICINE

## 2021-12-28 PROCEDURE — 6370000000 HC RX 637 (ALT 250 FOR IP): Performed by: INTERNAL MEDICINE

## 2021-12-28 PROCEDURE — 93458 L HRT ARTERY/VENTRICLE ANGIO: CPT

## 2021-12-28 PROCEDURE — 36430 TRANSFUSION BLD/BLD COMPNT: CPT

## 2021-12-28 PROCEDURE — 2709999900 HC NON-CHARGEABLE SUPPLY

## 2021-12-28 RX ORDER — PANTOPRAZOLE SODIUM 40 MG/1
40 TABLET, DELAYED RELEASE ORAL
Status: DISCONTINUED | OUTPATIENT
Start: 2021-12-28 | End: 2022-01-03

## 2021-12-28 RX ORDER — ACETAMINOPHEN 325 MG/1
650 TABLET ORAL EVERY 4 HOURS PRN
Status: CANCELLED | OUTPATIENT
Start: 2021-12-28

## 2021-12-28 RX ORDER — MULTIVITAMIN WITH IRON
1 TABLET ORAL DAILY
Status: DISCONTINUED | OUTPATIENT
Start: 2021-12-28 | End: 2022-01-10 | Stop reason: HOSPADM

## 2021-12-28 RX ORDER — SODIUM CHLORIDE 9 MG/ML
INJECTION, SOLUTION INTRAVENOUS CONTINUOUS
Status: DISCONTINUED | OUTPATIENT
Start: 2021-12-28 | End: 2021-12-28 | Stop reason: SDUPTHER

## 2021-12-28 RX ORDER — SODIUM CHLORIDE 0.9 % (FLUSH) 0.9 %
5-40 SYRINGE (ML) INJECTION PRN
Status: DISCONTINUED | OUTPATIENT
Start: 2021-12-28 | End: 2021-12-30 | Stop reason: SDUPTHER

## 2021-12-28 RX ORDER — SODIUM CHLORIDE 9 MG/ML
INJECTION, SOLUTION INTRAVENOUS CONTINUOUS
Status: ACTIVE | OUTPATIENT
Start: 2021-12-28 | End: 2021-12-28

## 2021-12-28 RX ORDER — SODIUM CHLORIDE 9 MG/ML
INJECTION, SOLUTION INTRAVENOUS PRN
Status: COMPLETED | OUTPATIENT
Start: 2021-12-28 | End: 2021-12-29

## 2021-12-28 RX ORDER — ACETAMINOPHEN 325 MG/1
650 TABLET ORAL EVERY 4 HOURS PRN
Status: DISCONTINUED | OUTPATIENT
Start: 2021-12-28 | End: 2021-12-28 | Stop reason: SDUPTHER

## 2021-12-28 RX ORDER — SODIUM CHLORIDE 9 MG/ML
INJECTION, SOLUTION INTRAVENOUS CONTINUOUS
Status: CANCELLED | OUTPATIENT
Start: 2021-12-28 | End: 2021-12-28

## 2021-12-28 RX ADMIN — PANTOPRAZOLE SODIUM 40 MG: 40 TABLET, DELAYED RELEASE ORAL at 14:13

## 2021-12-28 RX ADMIN — SODIUM CHLORIDE 8 MG/HR: 9 INJECTION, SOLUTION INTRAVENOUS at 01:17

## 2021-12-28 RX ADMIN — IOHEXOL 200 ML: 350 INJECTION, SOLUTION INTRAVENOUS at 10:33

## 2021-12-28 RX ADMIN — SODIUM CHLORIDE, PRESERVATIVE FREE 10 ML: 5 INJECTION INTRAVENOUS at 14:15

## 2021-12-28 RX ADMIN — ATORVASTATIN CALCIUM 80 MG: 80 TABLET, FILM COATED ORAL at 22:58

## 2021-12-28 RX ADMIN — IRON SUCROSE 200 MG: 20 INJECTION, SOLUTION INTRAVENOUS at 16:01

## 2021-12-28 RX ADMIN — THERA TABS 1 TABLET: TAB at 14:13

## 2021-12-28 RX ADMIN — VANCOMYCIN HYDROCHLORIDE 750 MG: 10 INJECTION, POWDER, LYOPHILIZED, FOR SOLUTION INTRAVENOUS at 14:15

## 2021-12-28 RX ADMIN — SODIUM CHLORIDE, PRESERVATIVE FREE 10 ML: 5 INJECTION INTRAVENOUS at 22:58

## 2021-12-28 RX ADMIN — VANCOMYCIN HYDROCHLORIDE 750 MG: 10 INJECTION, POWDER, LYOPHILIZED, FOR SOLUTION INTRAVENOUS at 22:49

## 2021-12-28 ASSESSMENT — PAIN SCALES - GENERAL
PAINLEVEL_OUTOF10: 0

## 2021-12-28 NOTE — PLAN OF CARE
Problem: Falls - Risk of:  Goal: Will remain free from falls  Description: Will remain free from falls  12/28/2021 0206 by Verónica Flood RN  Outcome: Ongoing  12/27/2021 1429 by Connie Pierce RN  Note: He is a fall risk. Bed alarm on, and call light in reach. Will monitor. Goal: Absence of physical injury  Description: Absence of physical injury  Outcome: Ongoing     Problem: Nutrition  Goal: Optimal nutrition therapy  12/28/2021 0206 by Verónica Flood RN  Outcome: Ongoing  12/27/2021 1429 by Connie Pierce RN  Note: NPO for EGD. Patient is hungry and asking for food. Problem: Skin Integrity:  Goal: Will show no infection signs and symptoms  Description: Will show no infection signs and symptoms  Outcome: Ongoing  Goal: Absence of new skin breakdown  Description: Absence of new skin breakdown  12/28/2021 0206 by Verónica Flood RN  Outcome: Ongoing  12/27/2021 1429 by Connie Pierce RN  Note: Buttocks reddened with an open area on the left buttocks. Mepilex dressing in place over open area. Assisted to reposition every 2 hours. Will monitor.

## 2021-12-28 NOTE — PROGRESS NOTES
Physical Therapy Attempt     Attempted PT treatment session. Pt declining treatment until after he receives his lunch and gets a chance to eat, requesting therapy to return later. Will revisit as treatment schedule allows.      Justice Ace, PT, DPT

## 2021-12-28 NOTE — PROGRESS NOTES
Hospitalist Progress Note      PCP: No primary care provider on file. Date of Admission: 12/24/2021    Hospital Course:  70-year-old male with past medical history of COPD, hyperlipidemia presented to the hospital with episode of syncope and chest pressure. Patient says that he was doing work around the house including fumigation for bedbugs and went to get more supplies. He said that while ambulating to the store he became lightheaded had a syncopal episode. Patient was brought to the hospital.  He denied any shortness of breath but did have some chest pressure. Denies any fevers, chills or any other symptoms recently. On arrival to the hospital he was noted to be hemodynamically stable. Lab work showed hemoglobin of 5.6.       Subjective:  Seen and examined post [facility] today with severe CAD        Medications:  Reviewed    Infusion Medications   Scheduled Medications    pantoprazole  40 mg Oral QAM AC    iron sucrose  200 mg IntraVENous Q24H    vancomycin  750 mg IntraVENous Q12H    atorvastatin  80 mg Oral Nightly    influenza virus vaccine  0.5 mL IntraMUSCular Prior to discharge    fentanNYL  50 mcg IntraVENous Once    vitamin D3  400 Units Oral Daily    vitamin E  400 Units Oral Daily    tiotropium  2 puff Inhalation Daily    sodium chloride flush  5-40 mL IntraVENous 2 times per day    budesonide  0.5 mg Nebulization BID    And    Arformoterol Tartrate  15 mcg Nebulization BID     PRN Meds: perflutren lipid microspheres, albuterol, ondansetron **OR** ondansetron, polyethylene glycol, acetaminophen **OR** acetaminophen      Intake/Output Summary (Last 24 hours) at 12/28/2021 1132  Last data filed at 12/27/2021 2248  Gross per 24 hour   Intake 497.72 ml   Output 600 ml   Net -102.28 ml       Physical Exam Performed:    BP (!) 114/58   Pulse 80   Temp 97.4 °F (36.3 °C) (Axillary)   Resp 19   Ht 5' 7\" (1.702 m)   Wt 168 lb 10.4 oz (76.5 kg)   SpO2 96%   BMI 26.41 kg/m² atelectasis versus airspace disease. Assessment/Plan:    Active Hospital Problems    Diagnosis     Syncope and collapse [R55]     Anemia [D64.9]      NSTEMI  Presented w/ chest pain and trop positive. Cardiology consulted   [facility] with severe CAD, low Ef of 35 %  - Holding heparin and asa given concern for gi bleed  - High dose statin  ctvs consulted,GI on board     Acute blood loss anemia, liekly UGIB  Presented with Hgb of 5.4, syncope, elevated trops. Hemodynamically stable.   - Status post 2 units PRBC, Hgb improved appropriately  - Venofer  - Pending folate, B12  - Protonix po  - EGD on 12/27, need colonoscopy once cardiac stable  - GI following     COPD  Stable w/o exacerbation  - Continue current medications     MRSA UTI  - Started on Vanc, will transition to PO once able to take oral    AMS   likely due to above        DVT Prophylaxis:   Diet: No diet orders on file  Code Status: Full Code    PT/OT Eval Status: n/a     Dispo - inpatient , penidng cardiac and GI workup    Estefany Tao MD

## 2021-12-28 NOTE — PLAN OF CARE
Problem: Falls - Risk of:  Goal: Will remain free from falls  Description: Will remain free from falls  Note: He is a fall risk. Armband in place, door open, and bed alarm on. Call light in reach. Problem: Skin Integrity:  Goal: Absence of new skin breakdown  Description: Absence of new skin breakdown  Note: Open area to left buttocks. Mepilex dressing over area. Buttocks reddened, repositioned every 2 hours. He has scattered abrasions. No new breakdown. Will monitor. Problem: Cardiac:  Goal: Ability to achieve and maintain adequate cardiopulmonary perfusion will improve  Description: Ability to achieve and maintain adequate cardiopulmonary perfusion will improve  Note: H/H 6.9/21.1. 2 units or PRBC's ordered. Awaiting blood to be prepared.

## 2021-12-28 NOTE — ANESTHESIA PRE-OP
Brief Pre-Op Note/Sedation Assessment      Tere Ayoub  1950  [id number]  10:35 AM    Planned Procedure: Cardiac Catheterization Procedure  Post Procedure Plan: Return to same level of care  Consent: I have discussed with the patient and/or the patient representative the indication, alternatives, and the possible risks and/or complications of the planned procedure and the anesthesia methods. The patient and/or patient representative appear to understand and agree to proceed. Chief Complaint:   Chest Pain/Pressure  Anginal Equivalent  NSTEMI     This patient also was found to have severe anemia. Did have a scope yesterday and etiology or source of bleeding was not identified. Indications for Cath Procedure:  1. Presentation:  Worsening Angina  2. Anginal Classification within 2 weeks:  CCS III - Symptoms with everyday living activities, i.e., moderate limitation  3. Angina Symptoms Assessment:  Typical Chest Pain  4. Heart Failure Class within last 2 weeks:  No symptoms  5. Cardiovascular Instability:  No    Prior Ischemic Workup/Eval:  1. Pre-Procedural Medications: Yes: Antiarrhythmic Agent Other and Beta Blockers  2. Stress Test Completed? No    Does Patient need surgery?   Cath Valve Surgery:  No    Pre-Procedure Medical History:  Vital Signs:  BP (!) 114/58   Pulse 80   Temp 97.4 °F (36.3 °C) (Axillary)   Resp 19   Ht 5' 7\" (1.702 m)   Wt 168 lb 10.4 oz (76.5 kg)   SpO2 96%   BMI 26.41 kg/m²     Allergies:  No Known Allergies  Medications:    Current Facility-Administered Medications   Medication Dose Route Frequency Provider Last Rate Last Admin    pantoprazole (PROTONIX) tablet 40 mg  40 mg Oral QAM AC Rehan Alvarez MD        iron sucrose (VENOFER) 200 mg in sodium chloride 0.9 % 100 mL IVPB  200 mg IntraVENous Q24H Sapna Garcia MD   Stopped at 12/27/21 1631    vancomycin (VANCOCIN) 750 mg in dextrose 5 % 250 mL IVPB  750 mg IntraVENous Q12H Sapna Garcia MD Stopped at 12/27/21 2248    perflutren lipid microspheres (DEFINITY) injection 1.65 mg  1.5 mL IntraVENous ONCE PRN Radha Owens MD        atorvastatin (LIPITOR) tablet 80 mg  80 mg Oral Nightly Radha Owens MD   80 mg at 12/27/21 2146    influenza quadrivalent split vaccine (FLUZONE;FLUARIX;FLULAVAL;AFLURIA) injection 0.5 mL  0.5 mL IntraMUSCular Prior to discharge Radha Owens MD        fentaNYL (SUBLIMAZE) injection 50 mcg  50 mcg IntraVENous Once Courtney You MD        vitamin D3 (CHOLECALCIFEROL) tablet 400 Units  400 Units Oral Daily Chuy De Jesus MD   400 Units at 12/27/21 5628    vitamin E capsule 400 Units  400 Units Oral Daily Chuy De Jesus MD   400 Units at 12/27/21 0938    albuterol (PROVENTIL) nebulizer solution 2.5 mg  2.5 mg Nebulization Q6H PRN Chuy De Jesus MD        tiotropium (SPIRIVA RESPIMAT) 2.5 MCG/ACT inhaler 2 puff  2 puff Inhalation Daily Chuy De Jesus MD   2 puff at 12/27/21 0753    sodium chloride flush 0.9 % injection 5-40 mL  5-40 mL IntraVENous 2 times per day Chuy De Jesus MD   10 mL at 12/27/21 2146    ondansetron (ZOFRAN-ODT) disintegrating tablet 4 mg  4 mg Oral Q8H PRN Chuy De Jesus MD        Or    ondansetron [facility]) injection 4 mg  4 mg IntraVENous Q6H PRN Chuy De Jesus MD        polyethylene glycol [facility]) packet 17 g  17 g Oral Daily PRN Chuy De Jesus MD        acetaminophen (TYLENOL) tablet 650 mg  650 mg Oral Q6H PRN Chuy De Jesus MD        Or    acetaminophen (TYLENOL) suppository 650 mg  650 mg Rectal Q6H PRN Chuy De Jesus MD        budesonide (PULMICORT) nebulizer suspension 500 mcg  0.5 mg Nebulization BID Chuy De Jesus MD   500 mcg at 12/27/21 2222    And    Arformoterol Tartrate (BROVANA) nebulizer solution 15 mcg  15 mcg Nebulization BID Chuy De Jesus MD   15 mcg at 12/27/21 2222     Facility-Administered Medications Ordered in Other Encounters   Medication Dose Route Frequency Provider Last Rate Last Admin    0.9 % sodium chloride infusion   IntraVENous Continuous Jo Homans, APRN - CNP        sodium chloride flush 0.9 % injection 5-40 mL  5-40 mL IntraVENous PRN Jo Homans, APRN - CNP           Past Medical History:    Past Medical History:   Diagnosis Date    BPH with obstruction/lower urinary tract symptoms 8/3/2012    Chicken pox     Colon polyp     Hyperglycemia 12/15/2010    Hyperlipemia 12/15/2010    Post herpetic neuralgia     Shingles     Squamous cell carcinoma of skin of upper limb, including shoulder 9/8/2015       Surgical History:    Past Surgical History:   Procedure Laterality Date    HEMICOLECTOMY      right due to mass benign    UPPER GASTROINTESTINAL ENDOSCOPY N/A 12/27/2021    EGD BIOPSY performed by Domingo Olguin MD at [facility]:  Pre-Sedation Documentation and Exam:  I have personally completed a history, physical exam & review of systems for this patient (see notes). Prior History of Anesthesia Complications:   none    Modified Mallampati:  I (soft palate, uvula, fauces, tonsillar pillars visible)    ASA Classification:  Class 3 - A patient with severe systemic disease that limits activity but is not incapacitating    Geoff Scale: Activity:  2 - Able to move 4 extremities voluntarily on command  Respiration:  2 - Able to breathe deeply and cough freely  Circulation:  2 - BP+/- 20mmHg of normal  Consciousness:  2 - Fully awake  Oxygen Saturation (color):  2 - Able to maintain oxygen saturation >92% on room air    Sedation/Anesthesia Plan:  Guard the patient's safety and welfare. Minimize physical discomfort and pain. Minimize negative psychological responses to treatment by providing sedation and analgesia and maximize the potential amnesia. Patient to meet pre-procedure discharge plan.     Medication Planned:  Versed and fentanyl    Patient is an appropriate candidate for plan of sedation:   yes      Electronically signed by Shaina King MD on 12/28/2021 at 10:35 AM

## 2021-12-28 NOTE — PROGRESS NOTES
[address] [place] [address] [facility]  GI Progress Note          Anand Gardner is a 70 y.o. male patient. 1. Anemia, unspecified type    2. Syncope, unspecified syncope type    3. NSTEMI (non-ST elevated myocardial infarction) ([facility] [address]        Admit Date: 2021    Subjective:       Confused no GI bleeding per nursing    Scheduled Meds:   iron sucrose  200 mg IntraVENous Q24H    vancomycin  750 mg IntraVENous Q12H    atorvastatin  80 mg Oral Nightly    influenza virus vaccine  0.5 mL IntraMUSCular Prior to discharge    fentanNYL  50 mcg IntraVENous Once    vitamin D3  400 Units Oral Daily    vitamin E  400 Units Oral Daily    tiotropium  2 puff Inhalation Daily    sodium chloride flush  5-40 mL IntraVENous 2 times per day    budesonide  0.5 mg Nebulization BID    And    Arformoterol Tartrate  15 mcg Nebulization BID       Objective:       Patient Vitals for the past 24 hrs:   BP Temp Temp src Pulse Resp SpO2   21 0445 (!) 114/58 97.4 °F (36.3 °C) Axillary 80 19 96 %   21 0000 (!) 145/77 97.1 °F (36.2 °C) Axillary 81 17 95 %   21 2222 -- -- -- -- 20 100 %   21 2130 98/63 99.5 °F (37.5 °C) Axillary 89 17 95 %   21 1628 119/61 99.6 °F (37.6 °C) Axillary 82 18 92 %   21 1530 (!) 118/59 -- -- 79 18 98 %   21 1514 (!) 107/57 97.6 °F (36.4 °C) Temporal 76 18 98 %   21 1153 100/62 98.5 °F (36.9 °C) Oral 78 16 96 %   21 0840 121/70 96.7 °F (35.9 °C) Oral 73 16 94 %       Exam:  VITALS:  BP (!) 114/58   Pulse 80   Temp 97.4 °F (36.3 °C) (Axillary)   Resp 19   Ht 5' 7\" (1.702 m)   Wt 168 lb 10.4 oz (76.5 kg)   SpO2 96%   BMI 26.41 kg/m²   TEMPERATURE:  Current - Temp: 97.4 °F (36.3 °C);  Max - Temp  Av.1 °F (36.7 °C)  Min: 96.7 °F (35.9 °C)  Max: 99.6 °F (37.6 °C)    Recent Labs     21  2111 21  0906 21  1139   WBC  --  8.3 6.3   HGB 7.4* 7.8* 7.1*   HCT 22.1* 23.9* 21.9*   MCV  --  81.0 80.3   PLT  --  206 191     Recent Labs 12/26/21  0906 12/27/21  1139    137   K 4.0 3.7    106   CO2 21 22   BUN 24* 21*   CREATININE 0.8 0.8       Assessment:       Active Problems:    Anemia    Syncope and collapse  Resolved Problems:    * No resolved hospital problems.  *  Anemia  No GI bleeding  Confusion  Cardiac work up in progress    Recommendations:       Colonoscopy later after other medical issues improved  DC PPI drip change to QD    Mally Perkins MD  12/28/2021  8:24 AM

## 2021-12-28 NOTE — PLAN OF CARE
Patient returned from cath lab. TR band is off. Splint on right wrist. Clear dressing in place. No bleeding redness or swelling.

## 2021-12-28 NOTE — CONSULTS
CONSULT    Date of Admission:  2021  4:36 PM  Date of Consultation:  2021    Name: Curt Cunningham  : 1950  MR#: [id number]  SEX:  M    Referring Physician:   Isaias Agee MD ,[facility]    Attending Physician:    Nimo Fish MD    Cards:     Chief Complaint:   Chest Pain    History of Present Illness:   Mr. Elton Cunningham is a pleasant 69 yo  male with a PMH of COPD and FH of CAD who was admitted to the ED via EMS on  with extertional chest pressure and syncope. He admits to associated SOB but denies radiation of pain to neck or shoulders. EKG was abnormal and peak troponins have been 63. He denies DM or Hyperlipidemia. He does not take blood pressure medication. LHC performed today by Dr. Judi Mcadams revealed severe 3V CAD with depressed EF of 25-30%. (See report below). Based on above findings, pt evaluated for CABG.      Past Medical History:  Past Medical History:   Diagnosis Date    BPH with obstruction/lower urinary tract symptoms 8/3/2012    Chicken pox     Colon polyp     Hyperglycemia 12/15/2010    Hyperlipemia 12/15/2010    Post herpetic neuralgia     Shingles     Squamous cell carcinoma of skin of upper limb, including shoulder 2015       Past Surgical History:  Past Surgical History:   Procedure Laterality Date    HEMICOLECTOMY      right due to mass benign    UPPER GASTROINTESTINAL ENDOSCOPY N/A 2021    EGD BIOPSY performed by Sarahy Mejia MD at [address] History:    Working:   Caffeine:   Lifestyle:    Social History     Socioeconomic History    Marital status:      Spouse name: Not on file    Number of children: Not on file    Years of education: Not on file    Highest education level: Not on file   Occupational History    Not on file   Tobacco Use    Smoking status: Current Every Day Smoker     Packs/day: 1.00     Types: Cigarettes    Smokeless tobacco: Never Used    Tobacco comment: encouraged patient on smoking cessation   Vaping Use    Vaping Use: Never used   Substance and Sexual Activity    Alcohol use: Yes     Alcohol/week: 6.0 standard drinks     Types: 2 Glasses of wine, 2 Cans of beer, 2 Shots of liquor per week     Comment: drinks 1-2 a day    Drug use: No    Sexual activity: Not on file   Other Topics Concern    Not on file   Social History Narrative    Not on file     Social Determinants of Health     Financial Resource Strain:     Difficulty of Paying Living Expenses: Not on file   Food Insecurity:     Worried About Running Out of Food in the Last Year: Not on file    Michelet of Food in the Last Year: Not on file   Transportation Needs:     Lack of Transportation (Medical): Not on file    Lack of Transportation (Non-Medical):  Not on file   Physical Activity:     Days of Exercise per Week: Not on file    Minutes of Exercise per Session: Not on file   Stress:     Feeling of Stress : Not on file   Social Connections:     Frequency of Communication with Friends and Family: Not on file    Frequency of Social Gatherings with Friends and Family: Not on file    Attends Yazidi Services: Not on file    Active Member of [address] [place] [address] [employer] or Organizations: Not on file    Attends Club or Organization Meetings: Not on file    Marital Status: Not on file   Intimate Partner Violence:     Fear of Current or Ex-Partner: Not on file    Emotionally Abused: Not on file    Physically Abused: Not on file    Sexually Abused: Not on file   Housing Stability:     Unable to Pay for Housing in the Last Year: Not on file    Number of [place] in the Last Year: Not on file    Unstable Housing in the Last Year: Not on file       Family History:      Problem Relation Age of Onset    Depression Mother     Arthritis Mother     High Blood Pressure Mother     High Cholesterol Mother     Heart Disease Mother     Arthritis Father     Heart Disease Father     High Blood Pressure Father     High Cholesterol Father     Stroke Father     Cancer Sister         ovarian    High Cholesterol Sister     High Blood Pressure Sister     Arthritis Sister        Home Medications:   Prior to Admission medications    Medication Sig Start Date End Date Taking? Authorizing Provider   acetaminophen (TYLENOL) 325 MG tablet Take 650 mg by mouth daily Pt states he takes to \"clean his sinuses\"   Yes Historical Provider, MD   Flaxseed, Linseed, (FLAX SEED OIL) 1000 MG CAPS Take 2,000 mg by mouth 2 times daily. Yes Historical Provider, MD   Cyanocobalamin (VITAMIN B 12) 500 MCG LOZG Take 1 tablet by mouth. Yes Historical Provider, MD   fish oil-omega-3 fatty acids 1000 MG capsule Take 1 capsule by mouth daily. Yes Historical Provider, MD   mupirocin Julia Nephew) 2 % ointment Apply to affected area once daily 6/6/18   Kimberley Ignacio MD   tiotropium (SPIRIVA RESPIMAT) 2.5 MCG/ACT AERS inhaler Inhale 2 puffs into the lungs daily 5/31/18   Lucero Koehler MD   zoster recombinant adjuvanted vaccine [facility]) 50 MCG SUSR injection Give vaccine as directed 5/31/18   Lucero Koehler MD   buPROPion (WELLBUTRIN XL) 300 MG extended release tablet Take 1 tablet by mouth every morning 5/8/18   Lucero Koehler MD   fluorouracil (EFUDEX) 5 % cream Apply twice daily to the upper forehead for 14 days. 4/4/18   Feliciana Hashimoto, MD   hydrocortisone 2.5 % cream Apply to red scaly areas on the chin twice daily for several days or until improved.  4/4/18   Feliciana Hashimoto, MD   zoster recombinant adjuvanted vaccine [facility]) 50 MCG SUSR injection Give vaccine as directed 3/20/18   Lucero Koehler MD   montelukast (SINGULAIR) 10 MG tablet Take 1 tablet by mouth nightly 2/2/18   Lucero Koehler MD   umeclidinium-vilanterol [facility] ELLIPTA) 62.5-25 MCG/INH AEPB inhaler Inhale 1 puff into the lungs daily 2/1/18   Lucero Koehler MD   albuterol sulfate HFA (VENTOLIN HFA) 108 (90 Base) MCG/ACT inhaler Inhale 2 puffs into the lungs every 6 hours as needed for Wheezing or Shortness of Breath 8/21/17   Dylan Velasquez MD   fluticasone-salmeterol (ADVAIR) 250-50 MCG/DOSE AEPB Inhale 1 puff into the lungs every 12 hours 1/17/17   CAITLYN Chaudhry CNP   ketoconazole (NIZORAL) 2 % cream Apply topically daily. 11/4/16   Dylan Velasquez MD   Cinnamon 500 MG CAPS Take 1 capsule by mouth daily. Historical Provider, MD   vitamin E 400 UNIT capsule Take 400 Units by mouth daily. Historical Provider, MD   aspirin 325 MG tablet Take 325 mg by mouth daily. Historical Provider, MD   Ascorbic Acid (VITAMIN C) 500 MG tablet Take 500 mg by mouth daily. Historical Provider, MD   vitamin D (CHOLECALCIFEROL) 400 UNIT TABS tablet Take 400 Units by mouth daily. Historical Provider, MD        Facility Administered Medications:    pantoprazole  40 mg Oral QAM AC    iron sucrose  200 mg IntraVENous Q24H    vancomycin  750 mg IntraVENous Q12H    atorvastatin  80 mg Oral Nightly    influenza virus vaccine  0.5 mL IntraMUSCular Prior to discharge    fentanNYL  50 mcg IntraVENous Once    vitamin D3  400 Units Oral Daily    vitamin E  400 Units Oral Daily    tiotropium  2 puff Inhalation Daily    sodium chloride flush  5-40 mL IntraVENous 2 times per day    budesonide  0.5 mg Nebulization BID    And    Arformoterol Tartrate  15 mcg Nebulization BID       Allergies:  No Known Allergies       Review of Systems:  Reviewed, negative unless noted  Constitutional: weight change, weakness, impairment of ADLs  Eyes: eyestrain, redness, discharges  ENMT: post nasal drip, sinus pain, discharge   Cardiovascular: Chest Pain as per HPI.  Denies faintness, vertigo, color changes in fingers/toes  Respiratory: cough, sputum, hemoptysis  GI: excessive thirst, changes in bowel habits, abdominal swelling  : painful urination, pyuria, incontinence  Musculoskeletal: cramps, swelling, limitation of motor activity  Integumentary: cyanosis, changes in skin, dryness  Neurological: paralysis, tingling, tremors  Psychiatric: restlessness, irritability, mood swings  Endocrine: heat/cold intolerance, excessive sweating, hair loss  Hematologic/lymphatic: swollen glands, anemia, easy bruising/bleeding      Physical Examination:    BP (!) 114/58   Pulse 80   Temp 97.4 °F (36.3 °C) (Axillary)   Resp 19   Ht 5' 7\" (1.702 m)   Wt 168 lb 10.4 oz (76.5 kg)   SpO2 96%   BMI 26.41 kg/m²      BP RUE:  BP LUE:   Admission Weight: 168 lb 10.4 oz (76.5 kg)   Hand dominance:    General appearance: NAD, well nourished  Eyes: anicteric, PERRLA  ENMT: no scars or lesions, no nasal deformity, normal dentition, no cyanosis of oral mucosa  Neck: no masses, no thyroid enlargement, no JVD. Respiratory: effort is unlabored, symmetric, no crackles, wheezes or rubs. No palpable/percussable abnormalities. Cardiovascular: regular, no murmur. PMI normal, no thrill. No carotid bruits. No edema or varicosities. Abdominal aorta cannot be appreciated given body habitus. GI: abdomen soft, nondistended, no organomegaly. No masses. Lymphatic: no cervical/supraclavicular adenopathy  Musculoskeletal: strength and tone normal. Full ROM. No scoliosis. Extremities: warm and pink. No clubbing or petechiae. Skin: no dermatitis or ulceration. No nodularity or induration. Neuro: CN grossly intact. Sensation and motor function grossly intact. Psychiatric: oriented, appropriate mood/affect.       Labs:   CBC:   Recent Labs     12/26/21  0906 12/27/21  1139 12/28/21  0725   WBC 8.3 6.3 7.1   HGB 7.8* 7.1* 6.9*   HCT 23.9* 21.9* 21.1*   MCV 81.0 80.3 80.7    191 189     BMP:   Recent Labs     12/26/21  0906 12/27/21  1139 12/28/21  0725    137 139   K 4.0 3.7 3.5    106 107   CO2 21 22 24   BUN 24* 21* 19   CREATININE 0.8 0.8 0.7*   CALCIUM 8.0* 7.8* 7.8*     Cardiac Enzymes:   Recent Labs     12/25/21  1453 12/25/21  2111   TROPONINI 0.59* 0.63*     PT/INR: No results for input(s): PROTIME, INR in the last 72 hours.  APTT: No results for input(s): APTT in the last 72 hours. Liver Profile:  Lab Results   Component Value Date    AST 24 12/24/2021    ALT 20 12/24/2021    BILIDIR <0.2 12/24/2021    BILITOT 0.5 12/24/2021    ALKPHOS 67 12/24/2021    LABALBU 3.6 12/24/2021     Lab Results   Component Value Date    CHOL 97 12/25/2021    HDL 28 12/25/2021    HDL 39 06/26/2010    TRIG 76 12/25/2021     HgbA1c:  Lab Results   Component Value Date    LABA1C 5.5 12/25/2021     UA:   Lab Results   Component Value Date    NITRITE neg 03/10/2016    COLORU Yellow 12/24/2021    PHUR 6.5 12/24/2021    WBCUA >100 12/24/2021    RBCUA 11-20 12/24/2021    BACTERIA 3+ 12/24/2021    CLARITYU SL CLOUDY 12/24/2021    SPECGRAV 1.025 12/24/2021    LEUKOCYTESUR MODERATE 12/24/2021    UROBILINOGEN 0.2 12/24/2021    BILIRUBINUR Negative 12/24/2021    BILIRUBINUR neg 03/10/2016    BLOODU SMALL 12/24/2021    GLUCOSEU Negative 12/24/2021         EKG:  Vent. rate 92 BPM  RI interval 114 ms  QRS duration 86 ms  QT/QTc 354/437 ms  P-R-T axes 25 22 149  Normal sinus rhythm  ST & T wave abnormality, consider lateral ischemia  Abnormal ECG    TTE: 12/24/21    Summary   Normal left ventricle size and wall thickness. Overall left ventricular systolic function appears reduced with an ejection   fraction of 25-30%. There is hypokinesis of the basal and mid inferior walls. There is hypokinesis of the mid and basal inferolateral walls. There is hypokinesis of the mid and basal inferoseptal walls. Diastolic filling parameters suggest grade I diastolic dysfunction. Mild mitral and tricuspid regurgitation   Estimated pulmonary artery systolic pressure is 31 mmHg assuming a right   atrial pressure of 3 mmHg. LHC:  Coronary anatomy:   The left main coronary artery is heavily calcified. Has severe ostial narrowing at least 90 to 95%. Our catheter did ventricularized upon engagement.   Left anterior descending artery is mildly stenotic proximally.   There is good mid and distal anatomy with good landing zones.   Circumflex artery has proximal calcification and moderate lesions.   The right coronary artery is 100% occluded after its takeoff. I do not identify any left to right or right to right collateralization.   Left ventriculogram shows ejection fraction of 25 to 30% %. Moderate global hypokinesia.      Impression:  Severe CAD with left main ostial calcification and stenosis severe. Right coronary artery is completely occluded. There is cardiomyopathy with reduced ejection fraction. Assessment/Impression:  NSTEMI  Severe 3V CAD with Unstable Angina   Ischemic Cardiomyopathy - EF 25% by LV gram.  HTN  COPD  Anemia  FH of CAD - Both parents S/P CABG. Plan:  CABG with Dr. Ibis Moss on Monday 1/3/21. Surgical workup in progress.   Futher recommendations Jose Eduardo Talamantes  12/28/2021  12:51 PM

## 2021-12-28 NOTE — CARE COORDINATION
Case Management Assessment           Initial Evaluation                Date / Time of Evaluation: 12/28/2021 4:31 PM                 Assessment Completed by: Makeda Hunter RN     Patient is from home and lives alone in a single level home with 3 steps to enter. He is independent at baseline and is not sure what he will need at d/c. We briefly discussed home care vs rehab and I did explain that we have a rehab unit here at [facility]     Patient Name: Ericka Hammonds     YOB: 1950  Diagnosis: Anemia [D64.9]  NSTEMI (non-ST elevated myocardial infarction) ([facility] [address] [I21.4]  Syncope, unspecified syncope type [R55]  Anemia, unspecified type [D64.9]     Date / Time: 12/24/2021  4:36 PM    Patient Admission Status: Inpatient    If patient is discharged prior to next notation, then this note serves as note for discharge by case management. Current PCP: No primary care provider on file.   Clinic Patient: No    Chart Reviewed: Yes  Patient/ Family Interviewed: Yes    Initial assessment completed at bedside with: patient    Hospitalization in the last 30 days: No    Emergency Contacts:  Extended Emergency Contact Information  Primary Emergency Contact: ESTELLA Pelaez [address] [place] [address] Phone: [phone]  Mobile Phone: [phone]  Relation: Brother/Sister  Secondary Emergency Contact: Romi Manjarrez   [facility] Phone: [phone]  Relation: Brother/Sister    Advance Directives:   Code Status: Full [address]: No  Agent: NA  Contact Number: NA    Financial  Payor: MEDICARE / Plan: MEDICARE PART A AND B / Product Type: *No Product type* /     Pre-cert required for SNF: No    Pharmacy    [facility], [address] [place] [address] Avenue  [phone] Israel Valadez [phone]  [address] [place] [address] [address] [facility] [place] [address]  Phone: [phone] Fax: [phone]      Potential assistance Purchasing Medications: Potential Assistance Purchasing Medications: No  Does Patient want to participate in local refill/ meds to beds program?:      Meds To Beds General Rules:  1. Can ONLY be done Monday- Friday between 8:30am-5pm  2. Prescription(s) must be in pharmacy by 3pm to be filled same day  3. Copy of patient's insurance/ prescription drug card and patient face sheet must be sent along with the prescription(s)  4. Cost of Rx cannot be added to hospital bill. If financial assistance is needed, please contact unit  or ;  or  CANNOT provide pharmacy voucher for patients co-pays  5.  Patients can then  the prescription on their way out of the hospital at discharge, or pharmacy can deliver to the bedside if staff is available. (payment due at time of pick-up or delivery - cash, check, or card accepted)     Able to afford home medications/ co-pay costs: Yes    ADLS  Support Systems: Friends/Neighbors    PT AM-PAC: 13 /24  OT AM-PAC: 15 /24    [place]: single level home  Steps: 3 steps    Plans to RETURN to current housing: Yes  Barriers to RETURNING to current housing: may need rehab first      DISCHARGE PLAN:  Disposition: TBD    Transportation PLAN for discharge: TBD     Factors facilitating achievement of predicted outcomes: Family support, Cooperative and Pleasant    Barriers to discharge: going to OR for CABG on 01/03    Additional Case Management Notes: NA    The Plan for Transition of Care is related to the following treatment goals of Anemia [D64.9]  NSTEMI (non-ST elevated myocardial infarction) ([facility] [address] [I21.4]  Syncope, unspecified syncope type [R55]  Anemia, unspecified type [D64.9]    The Patient and/or patient representative Mert Michaels and his family were provided with a choice of provider and agrees with the discharge plan Not Indicated    Freedom of choice list was provided with basic dialogue that supports the patient's individualized plan of care/goals and shares the quality data associated with the providers.  Not Indicated    Care Transition patient: Jamia Clark RN  [facility]  Case Management Department  Ph: [phone]   Fax: [phone]

## 2021-12-29 ENCOUNTER — APPOINTMENT (OUTPATIENT)
Dept: VASCULAR LAB | Age: 71
DRG: 233 | End: 2021-12-29
Payer: MEDICARE

## 2021-12-29 LAB
ANION GAP SERPL CALCULATED.3IONS-SCNC: 13 MMOL/L (ref 3–16)
BLOOD BANK DISPENSE STATUS: NORMAL
BLOOD BANK PRODUCT CODE: NORMAL
BPU ID: NORMAL
BUN BLDV-MCNC: 20 MG/DL (ref 7–20)
CALCIUM SERPL-MCNC: 7.7 MG/DL (ref 8.3–10.6)
CHLORIDE BLD-SCNC: 107 MMOL/L (ref 99–110)
CO2: 16 MMOL/L (ref 21–32)
CREAT SERPL-MCNC: <0.5 MG/DL (ref 0.8–1.3)
DESCRIPTION BLOOD BANK: NORMAL
GFR AFRICAN AMERICAN: >60
GFR NON-AFRICAN AMERICAN: >60
GLUCOSE BLD-MCNC: 87 MG/DL (ref 70–99)
HCT VFR BLD CALC: 25.6 % (ref 40.5–52.5)
HCT VFR BLD CALC: 31.4 % (ref 40.5–52.5)
HEMOGLOBIN: 10.3 G/DL (ref 13.5–17.5)
HEMOGLOBIN: 8.3 G/DL (ref 13.5–17.5)
MCH RBC QN AUTO: 26.9 PG (ref 26–34)
MCHC RBC AUTO-ENTMCNC: 32.6 G/DL (ref 31–36)
MCV RBC AUTO: 82.5 FL (ref 80–100)
PDW BLD-RTO: 18.7 % (ref 12.4–15.4)
PLATELET # BLD: 171 K/UL (ref 135–450)
PMV BLD AUTO: 8.3 FL (ref 5–10.5)
POTASSIUM SERPL-SCNC: 4.2 MMOL/L (ref 3.5–5.1)
RBC # BLD: 3.1 M/UL (ref 4.2–5.9)
SODIUM BLD-SCNC: 136 MMOL/L (ref 136–145)
WBC # BLD: 6.6 K/UL (ref 4–11)

## 2021-12-29 PROCEDURE — 2580000003 HC RX 258: Performed by: INTERNAL MEDICINE

## 2021-12-29 PROCEDURE — 93971 EXTREMITY STUDY: CPT

## 2021-12-29 PROCEDURE — 80048 BASIC METABOLIC PNL TOTAL CA: CPT

## 2021-12-29 PROCEDURE — 6370000000 HC RX 637 (ALT 250 FOR IP): Performed by: INTERNAL MEDICINE

## 2021-12-29 PROCEDURE — 36415 COLL VENOUS BLD VENIPUNCTURE: CPT

## 2021-12-29 PROCEDURE — 85014 HEMATOCRIT: CPT

## 2021-12-29 PROCEDURE — 93880 EXTRACRANIAL BILAT STUDY: CPT

## 2021-12-29 PROCEDURE — 6360000002 HC RX W HCPCS: Performed by: INTERNAL MEDICINE

## 2021-12-29 PROCEDURE — 36430 TRANSFUSION BLD/BLD COMPNT: CPT

## 2021-12-29 PROCEDURE — 94640 AIRWAY INHALATION TREATMENT: CPT

## 2021-12-29 PROCEDURE — 85027 COMPLETE CBC AUTOMATED: CPT

## 2021-12-29 PROCEDURE — 85018 HEMOGLOBIN: CPT

## 2021-12-29 PROCEDURE — 99231 SBSQ HOSP IP/OBS SF/LOW 25: CPT | Performed by: THORACIC SURGERY (CARDIOTHORACIC VASCULAR SURGERY)

## 2021-12-29 PROCEDURE — 94761 N-INVAS EAR/PLS OXIMETRY MLT: CPT

## 2021-12-29 PROCEDURE — 99233 SBSQ HOSP IP/OBS HIGH 50: CPT | Performed by: INTERNAL MEDICINE

## 2021-12-29 PROCEDURE — 1200000000 HC SEMI PRIVATE

## 2021-12-29 RX ORDER — SODIUM CHLORIDE 0.9 % (FLUSH) 0.9 %
5-40 SYRINGE (ML) INJECTION PRN
Status: DISCONTINUED | OUTPATIENT
Start: 2021-12-29 | End: 2021-12-30 | Stop reason: SDUPTHER

## 2021-12-29 RX ADMIN — BUDESONIDE 500 MCG: 0.5 SUSPENSION RESPIRATORY (INHALATION) at 07:43

## 2021-12-29 RX ADMIN — SODIUM CHLORIDE, PRESERVATIVE FREE 10 ML: 5 INJECTION INTRAVENOUS at 21:19

## 2021-12-29 RX ADMIN — SODIUM CHLORIDE: 9 INJECTION, SOLUTION INTRAVENOUS at 20:34

## 2021-12-29 RX ADMIN — ATORVASTATIN CALCIUM 80 MG: 80 TABLET, FILM COATED ORAL at 21:09

## 2021-12-29 RX ADMIN — PANTOPRAZOLE SODIUM 40 MG: 40 TABLET, DELAYED RELEASE ORAL at 06:35

## 2021-12-29 RX ADMIN — VANCOMYCIN HYDROCHLORIDE 750 MG: 10 INJECTION, POWDER, LYOPHILIZED, FOR SOLUTION INTRAVENOUS at 10:03

## 2021-12-29 RX ADMIN — SODIUM CHLORIDE, PRESERVATIVE FREE 10 ML: 5 INJECTION INTRAVENOUS at 05:41

## 2021-12-29 RX ADMIN — SODIUM CHLORIDE, PRESERVATIVE FREE 5 ML: 5 INJECTION INTRAVENOUS at 10:03

## 2021-12-29 RX ADMIN — ACETAMINOPHEN 650 MG: 325 TABLET ORAL at 03:05

## 2021-12-29 RX ADMIN — ACETAMINOPHEN 650 MG: 325 TABLET ORAL at 16:27

## 2021-12-29 RX ADMIN — TIOTROPIUM BROMIDE INHALATION SPRAY 2 PUFF: 3.12 SPRAY, METERED RESPIRATORY (INHALATION) at 07:43

## 2021-12-29 RX ADMIN — IRON SUCROSE 200 MG: 20 INJECTION, SOLUTION INTRAVENOUS at 11:47

## 2021-12-29 RX ADMIN — ARFORMOTEROL TARTRATE 15 MCG: 15 SOLUTION RESPIRATORY (INHALATION) at 07:43

## 2021-12-29 RX ADMIN — VANCOMYCIN HYDROCHLORIDE 750 MG: 10 INJECTION, POWDER, LYOPHILIZED, FOR SOLUTION INTRAVENOUS at 20:35

## 2021-12-29 ASSESSMENT — PAIN SCALES - GENERAL
PAINLEVEL_OUTOF10: 0

## 2021-12-29 NOTE — PROGRESS NOTES
Clinical Pharmacy Progress Note    Vancomycin - Management by Pharmacy    Consult Date(s): 12/26/21  Consulting Provider(s): Liv    Assessment / Plan  1) Staph aureus UTI - Vancomycin   Concurrent Antimicrobials: none   Day of Vanc Therapy: day #4   Current Dosing Method: Bayesian-Guided AUC Dosing  o Therapeutic Goal: AUC < 500 mg/L*hr  o Currently on 750 mg every 12 hours. Calculated AUC = 410 mg/L*hr with an associated trough of ~ 13 mg/L based on level drawn 12/27. Renal function stable - continue current regimen.  Will continue to monitor clinical condition and make adjustments to regimen as appropriate. Please call with questions--  Thanks--  Martha Hutchins, PharmD, BCPS, BCGP  [id number] (hospitals)   12/29/2021 3:00 PM      Interval update:  [facility] with severe 3 vessel CAD with reduced EF 25-30%. Planning CABG on 1/3/22. Subjective/Objective:  Eder Camacho is a 70 y.o. male with a PMHx significant for COPD, HLD, BPH, and shingles who presented with syncope and chest pressure. Patient found to have NSTEMI, acute blood loss anemia likely 2/2 GI bleed, and staph aureus UTI. Pharmacy has been consulted to dose vancomycin. Ht Readings from Last 1 Encounters:   12/24/21 5' 7\" (1.702 m)       Wt Readings from Last 1 Encounters:   12/24/21 168 lb 10.4 oz (76.5 kg)       Current & Prior Antimicrobial Regimen(s):    (12/25-12/26)   Vancomycin 750 mg IV q12h (12/26 - present)     Vancomycin Level(s) / Doses  Date Time Dose Level / Type of Level Interpretation   12/27 11:39 750 mg IV q12h Random = 14.3 mg/L Predicted AUC = 410 with trough 13.1. Note: Serum levels collected for AUC-based dosing may be high if collected in close proximity to the dose administered. This is not necessarily an indicator of toxicity.     Cultures & Sensitivities:  Date Site Micro Susceptibility / Result   12/26 Urine Staph aureus  Sensitive to Cefazolin, Oxacillin, Bactrim   12/26 Rapid COVID-19 negative Labs / Ancillary Data:    CrCl cannot be calculated (This lab value cannot be used to calculate CrCl because it is not a number: <0.5).     Recent Labs     12/27/21  1139 12/28/21  0725 12/29/21  0930   CREATININE 0.8 0.7* <0.5*   BUN 21* 19 20   WBC 6.3 7.1 6.6

## 2021-12-29 NOTE — PROGRESS NOTES
[address] [place] [address]  GI Progress Note          Roz Cabrera is a 70 y.o. male patient. 1. Anemia, unspecified type    2. Syncope, unspecified syncope type    3. NSTEMI (non-ST elevated myocardial infarction) ([facility] [address]        Admit Date: 2021    Subjective:       No reported GI bleeding confused    Scheduled Meds:   pantoprazole  40 mg Oral QAM AC    multivitamin  1 tablet Oral Daily    iron sucrose  200 mg IntraVENous Q24H    vancomycin  750 mg IntraVENous Q12H    atorvastatin  80 mg Oral Nightly    influenza virus vaccine  0.5 mL IntraMUSCular Prior to discharge    fentanNYL  50 mcg IntraVENous Once    tiotropium  2 puff Inhalation Daily    sodium chloride flush  5-40 mL IntraVENous 2 times per day    budesonide  0.5 mg Nebulization BID    And    Arformoterol Tartrate  15 mcg Nebulization BID       Objective:       Patient Vitals for the past 24 hrs:   BP Temp Temp src Pulse Resp SpO2   21 0800 134/71 -- -- 75 16 94 %   21 0742 -- -- -- -- -- 94 %   21 0539 114/72 100.5 °F (38.1 °C) Oral 71 18 --   21 0417 123/72 100.9 °F (38.3 °C) Axillary 78 14 94 %   21 0327 130/72 101 °F (38.3 °C) Axillary 82 16 95 %   21 0313 (!) 144/79 101.1 °F (38.4 °C) Axillary 79 18 --   21 0234 135/73 101 °F (38.3 °C) Axillary 75 20 96 %   21 0144 132/65 100 °F (37.8 °C) Axillary 84 18 --   21 2317 127/74 99.9 °F (37.7 °C) Axillary 83 18 96 %   21 2303 123/67 100.5 °F (38.1 °C) Axillary 86 20 98 %   21 2107 139/61 100.7 °F (38.2 °C) Axillary 91 18 94 %   21 1509 122/60 98.3 °F (36.8 °C) Axillary 76 18 95 %   21 1345 101/61 99.1 °F (37.3 °C) Oral 76 18 95 %       Exam:  VITALS:  /71   Pulse 75   Temp 100.5 °F (38.1 °C) (Oral)   Resp 16   Ht 5' 7\" (1.702 m)   Wt 168 lb 10.4 oz (76.5 kg)   SpO2 94%   BMI 26.41 kg/m²   TEMPERATURE:  Current - Temp: 100.5 °F (38.1 °C);  Max - Temp  Av.3 °F (37.9 °C)  Min: 98.3 °F (36.8 °C)  Max: 101.1 °F (38.4 °C)      Recent Labs     12/26/21  0906 12/27/21  1139 12/28/21  0725   WBC 8.3 6.3 7.1   HGB 7.8* 7.1* 6.9*   HCT 23.9* 21.9* 21.1*   MCV 81.0 80.3 80.7    191 189     Recent Labs     12/26/21  0906 12/27/21  1139 12/28/21  0725    137 139   K 4.0 3.7 3.5    106 107   CO2 21 22 24   BUN 24* 21* 19   CREATININE 0.8 0.8 0.7*     EGD path    FINAL DIAGNOSIS:     A: Small bowel biopsy:      - Small intestinal mucosa with intact villous architecture. The        morphologic findings are not diagnostic of celiac disease. B: Gastric biopsy:      - Reactive gastropathy.      - No active inflammation, no dysplasia, and no malignancy.      - H pylori microorganisms are not observed on routine H&E stain. Assessment:       Active Problems:    Anemia    Syncope and collapse  Resolved Problems:    * No resolved hospital problems. *    No gross GI bleeding, H/H low stable    Recommendations:        Will need colonoscopy for anemia eval, can do before or after CABG, but with his current mental status may have difficulty prepping and would be at higher risk with current cardiac status, await cardiology input on timing but if he needs a CABG regardless of colonoscopy findings may be safer to do in 4-6 weeks    Magali Good MD  12/29/2021  8:05 AM

## 2021-12-29 NOTE — PROGRESS NOTES
Pre-op workup in progress. Will transfuse another 2U of PRBC today. I would like his Hgb to be ~10 by the time of surgery. No other clinical events of note.

## 2021-12-29 NOTE — PLAN OF CARE
Problem: Falls - Risk of:  Goal: Will remain free from falls  Description: Will remain free from falls  12/29/2021 0238 by Dillon Frey RN  Outcome: Ongoing  12/28/2021 1657 by Florecita Farfan RN  Note: He is a fall risk. Armband in place, door open, and bed alarm on. Call light in reach. Problem: Falls - Risk of:  Goal: Absence of physical injury  Description: Absence of physical injury  Outcome: Ongoing     Problem: Nutrition  Goal: Optimal nutrition therapy  Outcome: Ongoing     Problem: Skin Integrity:  Goal: Will show no infection signs and symptoms  Description: Will show no infection signs and symptoms  Outcome: Ongoing     Problem: Skin Integrity:  Goal: Absence of new skin breakdown  Description: Absence of new skin breakdown  12/29/2021 0238 by Dillon Frey RN  Outcome: Ongoing  12/28/2021 1657 by Florecita Farfan RN  Note: Open area to left buttocks. Mepilex dressing over area. Buttocks reddened, repositioned every 2 hours. He has scattered abrasions. No new breakdown. Will monitor.       Problem: Cardiac:  Goal: Ability to maintain an adequate cardiac output will improve  Description: Ability to maintain an adequate cardiac output will improve  Outcome: Ongoing

## 2021-12-29 NOTE — PROGRESS NOTES
Second unit PRBC complete per right AC PIV as ordered. Vitals stable as chated. No signs/symptoms of transfusion reaction.     Electronically signed by Juan Manzano RN on 12/29/2021 at 5:42 AM

## 2021-12-29 NOTE — PROGRESS NOTES
Hospitalist Progress Note      PCP: No primary care provider on file. Date of Admission: 12/24/2021    Hospital Course:  70-year-old male with past medical history of COPD, hyperlipidemia presented to the hospital with episode of syncope and chest pressure. Patient says that he was doing work around the house including fumigation for bedbugs and went to get more supplies. He said that while ambulating to the store he became lightheaded had a syncopal episode. Patient was brought to the hospital.  He denied any shortness of breath but did have some chest pressure. Denies any fevers, chills or any other symptoms recently. On arrival to the hospital he was noted to be hemodynamically stable. Lab work showed hemoglobin of 5.6. Subjective:  Pt has no new complaints today.  CABG planned for 01/03/2022        Medications:  Reviewed    Infusion Medications    sodium chloride       Scheduled Medications    pantoprazole  40 mg Oral QAM AC    multivitamin  1 tablet Oral Daily    iron sucrose  200 mg IntraVENous Q24H    vancomycin  750 mg IntraVENous Q12H    atorvastatin  80 mg Oral Nightly    influenza virus vaccine  0.5 mL IntraMUSCular Prior to discharge    fentanNYL  50 mcg IntraVENous Once    tiotropium  2 puff Inhalation Daily    sodium chloride flush  5-40 mL IntraVENous 2 times per day    budesonide  0.5 mg Nebulization BID    And    Arformoterol Tartrate  15 mcg Nebulization BID     PRN Meds: sodium chloride flush, sodium chloride, perflutren lipid microspheres, albuterol, ondansetron **OR** ondansetron, polyethylene glycol, acetaminophen **OR** acetaminophen      Intake/Output Summary (Last 24 hours) at 12/29/2021 1314  Last data filed at 12/29/2021 1247  Gross per 24 hour   Intake 1988.03 ml   Output 900 ml   Net 1088.03 ml       Physical Exam Performed:    /69   Pulse 78   Temp 98.4 °F (36.9 °C) (Oral)   Resp 16   Ht 5' 7\" (1.702 m)   Wt 168 lb 10.4 oz (76.5 kg)   SpO2 93% BMI 26.41 kg/m²   General appearance: No apparent distress, appears stated age and cooperative. HEENT: Pupils equal, round, and reactive to light. Conjunctivae/corneas clear. Neck: Supple, with full range of motion. No jugular venous distention. Trachea midline. Respiratory:  Normal respiratory effort. Clear to auscultation, bilaterally without Rales/Wheezes/Rhonchi. Cardiovascular: Regular rate and rhythm with normal S1/S2 without murmurs, rubs or gallops. Abdomen: Soft, non-tender, non-distended with normal bowel sounds. Musculoskeletal: No clubbing, cyanosis or edema bilaterally. Full range of motion without deformity. Skin: Skin color, texture, turgor normal.  No rashes or lesions. Neurologic:  Neurovascularly intact without any focal sensory/motor deficits. Cranial nerves: II-XII intact, grossly non-focal.  Psychiatric: Alert and oriented,confused intermittently  Capillary Refill: Brisk,3 seconds, normal   Peripheral Pulses: +2 palpable, equal bilaterally       Labs:   Recent Labs     12/27/21  1139 12/28/21  0725 12/29/21  0930   WBC 6.3 7.1 6.6   HGB 7.1* 6.9* 8.3*   HCT 21.9* 21.1* 25.6*    189 171     Recent Labs     12/27/21  1139 12/28/21  0725 12/29/21  0930    139 136   K 3.7 3.5 4.2    107 107   CO2 22 24 16*   BUN 21* 19 20   CREATININE 0.8 0.7* <0.5*   CALCIUM 7.8* 7.8* 7.7*     No results for input(s): AST, ALT, BILIDIR, BILITOT, ALKPHOS in the last 72 hours. No results for input(s): INR in the last 72 hours. No results for input(s): Casper Félix in the last 72 hours.     Urinalysis:      Lab Results   Component Value Date    NITRU POSITIVE 12/24/2021    WBCUA >100 12/24/2021    BACTERIA 3+ 12/24/2021    RBCUA 11-20 12/24/2021    BLOODU SMALL 12/24/2021    SPECGRAV 1.025 12/24/2021    GLUCOSEU Negative 12/24/2021       Radiology:  VL PRE OP VEIN MAPPING         VL DUP CAROTID BILATERAL         CT HEAD WO CONTRAST   Final Result      No evidence of acute intracranial abnormality. XR CHEST PORTABLE   Final Result      Mild bibasilar atelectasis versus airspace disease. Assessment/Plan:    Active Hospital Problems    Diagnosis     Syncope and collapse [R55]     Anemia [D64.9]      NSTEMI  Presented w/ chest pain and trop positive. Cardiology consulted   [facility] with severe CAD, low Ef of 35 %  - Holding heparin and asa given concern for gi bleed  - High dose statin  - CTVS consulted,GI on board  CABG planned for 1/3/2022     Acute blood loss anemia, liekly UGIB  Presented with Hgb of 5.4, syncope, elevated trops. Hemodynamically stable. - Status post 2 units PRBC, Hgb improved appropriately  - Venofer  - Pending folate, B12  - Protonix po  - EGD on 12/27, need colonoscopy once cardiac stable  - GI following, coordinating colonoscopy timing with CTS     COPD  Stable w/o exacerbation  - Continue current medications     MRSA UTI  - Started on Vanc, will transition to PO once able to take oral    AMS   likely due to above        DVT Prophylaxis:   Diet: ADULT DIET; Regular;  Low Sodium (2 gm)  Code Status: Full Code    PT/OT Eval Status: n/a     Dispo - CABG 01/03/2022    Nae Armando MD

## 2021-12-29 NOTE — PROGRESS NOTES
1st unit PRBC infusing per right AC PIV as ordered. No signs/symptoms of transfusion reaction. Vitals stable as charted.     Electronically signed by Marilee Hadley RN on 12/28/2021 at 11:20 PM

## 2021-12-29 NOTE — PROGRESS NOTES
First unit of PBRC started per right AC 20 gauge PIV. Vitals stable as charted. No signs/symptoms of transfusion reaction at this time.     Electronically signed by Guera Mohamud RN on 12/28/2021 at 11:10 PM

## 2021-12-29 NOTE — CARE COORDINATION
Patient still on course to have CABG on 1/3.  Electronically signed by Estefania Quintana RN on 12/29/2021 at 11:36 AM

## 2021-12-29 NOTE — PROGRESS NOTES
[facility]  Cardiology Daily Progress Note  12/29/2021,1:13 PM      Miranda Ramirez MD ,[facility] [place]  No primary care provider on file. Primary cardiologist:    Admit Date:  12/24/2021  Hospital Day: Hospital Day: 6  Subjective:  Mr. Chas Akers is hemodynamically stable but is somnolent. This was the same somnolence that we saw on yesterday before his cath when we thought that was just some residual from his EGD anesthesia. Today he still somnolent and his sister is with him and feels that he is just very sleepy. He does arouse when I call his name and he moves all of his extremities well. I did review his current medication and I find nothing that should cause significant somnolence. May need a neurological evaluation and or CAT scan of the brain. Regarding his cardiac issues he has severe stenosis of the left main ostium. Is being scheduled and planned to have surgery on Monday bypass. In the meantime tuning up his current situation with transfusions etc.    Objective:   /69   Pulse 78   Temp 98.4 °F (36.9 °C) (Oral)   Resp 16   Ht 5' 7\" (1.702 m)   Wt 168 lb 10.4 oz (76.5 kg)   SpO2 93%   BMI 26.41 kg/m²     Intake/Output Summary (Last 24 hours) at 12/29/2021 1313  Last data filed at 12/29/2021 1247  Gross per 24 hour   Intake 1988.03 ml   Output 900 ml   Net 1088.03 ml       TELEMETRY: Sinus 75. Physical Examination:    Vitals:    12/29/21 0539 12/29/21 0742 12/29/21 0800 12/29/21 1234   BP: 114/72  134/71 117/69   Pulse: 71  75 78   Resp: 18  16 16   Temp: 100.5 °F (38.1 °C)   98.4 °F (36.9 °C)   TempSrc: Oral   Oral   SpO2:  94% 94% 93%   Weight:       Height:            Constitutional and General Appearance:  Healthy. Sleepy today. HEENT: eyes and ears intact.  No nasal masses  THYROID: not enlarged  LUNGS:  · Clear to auscultation and percussion  HEART and VASCULAR:  · The apical impulses not displaced  · Heart tones are crisp and normal  · Cervical veins are not engorged  · The carotid upstroke is normal in amplitude and contour without delay or bruit  · Peripheral pulses are symmetrical and full  · There is no clubbing, cyanosis of the extremities. · No peripheral edema  · Femoral Arteries: 2+ and equal  · Pedal Pulses: 2+ and equal   ABDOMEN[de-identified]  · No masses or tenderness  · Liver/Spleen: No Abnormalities Noted  NEUROLOGICAL:  . Moves all extremities to command. Cranial nerves 2-12 are in tact. PSYCHIATRIC: alert and lucid and oriented and appropriate  SKIN: No lesions or rashes  LYMPH NODES: none enlarged      Medications:    pantoprazole  40 mg Oral QAM AC    multivitamin  1 tablet Oral Daily    iron sucrose  200 mg IntraVENous Q24H    vancomycin  750 mg IntraVENous Q12H    atorvastatin  80 mg Oral Nightly    influenza virus vaccine  0.5 mL IntraMUSCular Prior to discharge    fentanNYL  50 mcg IntraVENous Once    tiotropium  2 puff Inhalation Daily    sodium chloride flush  5-40 mL IntraVENous 2 times per day    budesonide  0.5 mg Nebulization BID    And    Arformoterol Tartrate  15 mcg Nebulization BID      sodium chloride       sodium chloride flush, sodium chloride, perflutren lipid microspheres, albuterol, ondansetron **OR** ondansetron, polyethylene glycol, acetaminophen **OR** acetaminophen    Lab Data:  CBC:   Recent Labs     12/27/21  1139 12/28/21  0725 12/29/21  0930   WBC 6.3 7.1 6.6   HGB 7.1* 6.9* 8.3*   HCT 21.9* 21.1* 25.6*   MCV 80.3 80.7 82.5    189 171     BMP:   Recent Labs     12/27/21  1139 12/28/21  0725 12/29/21  0930    139 136   K 3.7 3.5 4.2    107 107   CO2 22 24 16*   BUN 21* 19 20   CREATININE 0.8 0.7* <0.5*     Troponin:Troponin:   Lab Results   Component Value Date    TROPONINI 0.63 12/25/2021     LIVER PROFILE: No results for input(s): AST, ALT, LIPASE, BILIDIR, BILITOT, ALKPHOS in the last 72 hours. Invalid input(s):   AMYLASE,  ALB  PT/INR: No results for input(s): PROTIME, INR in the last 72 hours. APTT: No results for input(s): APTT in the last 72 hours. BNP:  No results for input(s): BNP in the last 72 hours. IMAGING: as noted  See cardiac cath note from yesterday. Assessment:  Patient Active Problem List    Diagnosis Date Noted    Syncope and collapse     Anemia 12/24/2021    Visit for wound check 06/06/2018    Basal cell carcinoma of right side of nose 05/08/2018    Visit for suture removal 05/08/2018    SCCS, mod diff  (squamous cell carcinoma), hand, left 04/24/2018    Mixed hyperlipidemia 04/18/2017    Nicotine use disorder 02/03/2017    COPD, mild ([address] 06/23/2016    Colon polyp     Basal cell carcinoma of shoulder 09/08/2015    Squamous cell carcinoma of skin of upper limb, including shoulder 09/08/2015    Actinic keratosis 09/08/2015    BPH with obstruction/lower urinary tract symptoms 08/03/2012    Hyperglycemia 12/15/2010    Smoker's respiratory syndrome 12/15/2010       Plan:   Continue current medications. Core Measures:  · Discharge instructions:   · LVEF documented:   · ACEI for LV dysfunction:   · Severe three-vessel including left main ostial disease. Surgery is being planned for Monday. Neurological evaluation is being processed. Hemodynamics and rhythm seems stable. Will follow. Thanks for allowing me  the opportunity to participate in the evaluation and care of your patient.  If there are questions please call me [phone]    This note was likely completed using voice recognition technology and may contain unintended grammatical, phraseology and/or punctuation  errors      Raine Arzola MD ,[facility] [place]  12/29/2021 1:13 PM

## 2021-12-30 LAB
ANION GAP SERPL CALCULATED.3IONS-SCNC: 7 MMOL/L (ref 3–16)
BACTERIA: ABNORMAL /HPF
BILIRUBIN URINE: NEGATIVE
BLOOD, URINE: ABNORMAL
BUN BLDV-MCNC: 23 MG/DL (ref 7–20)
CALCIUM SERPL-MCNC: 8 MG/DL (ref 8.3–10.6)
CHLORIDE BLD-SCNC: 109 MMOL/L (ref 99–110)
CLARITY: CLEAR
CO2: 24 MMOL/L (ref 21–32)
COLOR: YELLOW
CREAT SERPL-MCNC: 0.6 MG/DL (ref 0.8–1.3)
GFR AFRICAN AMERICAN: >60
GFR NON-AFRICAN AMERICAN: >60
GLUCOSE BLD-MCNC: 101 MG/DL (ref 70–99)
GLUCOSE URINE: NEGATIVE MG/DL
HCT VFR BLD CALC: 33.6 % (ref 40.5–52.5)
HEMOGLOBIN: 11 G/DL (ref 13.5–17.5)
KETONES, URINE: NEGATIVE MG/DL
LEUKOCYTE ESTERASE, URINE: ABNORMAL
MCH RBC QN AUTO: 27.6 PG (ref 26–34)
MCHC RBC AUTO-ENTMCNC: 32.6 G/DL (ref 31–36)
MCV RBC AUTO: 84.4 FL (ref 80–100)
MICROSCOPIC EXAMINATION: YES
MUCUS: ABNORMAL /LPF
NITRITE, URINE: NEGATIVE
PDW BLD-RTO: 19.4 % (ref 12.4–15.4)
PH UA: 6 (ref 5–8)
PLATELET # BLD: 178 K/UL (ref 135–450)
PMV BLD AUTO: 8.5 FL (ref 5–10.5)
POTASSIUM SERPL-SCNC: 3.6 MMOL/L (ref 3.5–5.1)
PROTEIN UA: 30 MG/DL
RBC # BLD: 3.98 M/UL (ref 4.2–5.9)
RBC UA: ABNORMAL /HPF (ref 0–4)
SODIUM BLD-SCNC: 140 MMOL/L (ref 136–145)
SPECIFIC GRAVITY UA: 1.02 (ref 1–1.03)
URINE TYPE: ABNORMAL
UROBILINOGEN, URINE: 0.2 E.U./DL
WBC # BLD: 8 K/UL (ref 4–11)
WBC UA: ABNORMAL /HPF (ref 0–5)

## 2021-12-30 PROCEDURE — 99231 SBSQ HOSP IP/OBS SF/LOW 25: CPT | Performed by: THORACIC SURGERY (CARDIOTHORACIC VASCULAR SURGERY)

## 2021-12-30 PROCEDURE — 94761 N-INVAS EAR/PLS OXIMETRY MLT: CPT

## 2021-12-30 PROCEDURE — 6370000000 HC RX 637 (ALT 250 FOR IP): Performed by: NURSE PRACTITIONER

## 2021-12-30 PROCEDURE — 81001 URINALYSIS AUTO W/SCOPE: CPT

## 2021-12-30 PROCEDURE — 6370000000 HC RX 637 (ALT 250 FOR IP): Performed by: INTERNAL MEDICINE

## 2021-12-30 PROCEDURE — 6360000002 HC RX W HCPCS: Performed by: INTERNAL MEDICINE

## 2021-12-30 PROCEDURE — 80048 BASIC METABOLIC PNL TOTAL CA: CPT

## 2021-12-30 PROCEDURE — 2580000003 HC RX 258: Performed by: INTERNAL MEDICINE

## 2021-12-30 PROCEDURE — 87086 URINE CULTURE/COLONY COUNT: CPT

## 2021-12-30 PROCEDURE — 85027 COMPLETE CBC AUTOMATED: CPT

## 2021-12-30 PROCEDURE — 94640 AIRWAY INHALATION TREATMENT: CPT

## 2021-12-30 PROCEDURE — 87081 CULTURE SCREEN ONLY: CPT

## 2021-12-30 PROCEDURE — 94150 VITAL CAPACITY TEST: CPT

## 2021-12-30 PROCEDURE — 97530 THERAPEUTIC ACTIVITIES: CPT

## 2021-12-30 PROCEDURE — 97535 SELF CARE MNGMENT TRAINING: CPT

## 2021-12-30 PROCEDURE — 6370000000 HC RX 637 (ALT 250 FOR IP): Performed by: THORACIC SURGERY (CARDIOTHORACIC VASCULAR SURGERY)

## 2021-12-30 PROCEDURE — 36415 COLL VENOUS BLD VENIPUNCTURE: CPT

## 2021-12-30 PROCEDURE — 2580000003 HC RX 258: Performed by: THORACIC SURGERY (CARDIOTHORACIC VASCULAR SURGERY)

## 2021-12-30 PROCEDURE — 1200000000 HC SEMI PRIVATE

## 2021-12-30 PROCEDURE — 99233 SBSQ HOSP IP/OBS HIGH 50: CPT | Performed by: NURSE PRACTITIONER

## 2021-12-30 RX ORDER — SODIUM CHLORIDE 0.9 % (FLUSH) 0.9 %
5-40 SYRINGE (ML) INJECTION EVERY 12 HOURS SCHEDULED
Status: DISCONTINUED | OUTPATIENT
Start: 2021-12-30 | End: 2022-01-10 | Stop reason: HOSPADM

## 2021-12-30 RX ORDER — SODIUM CHLORIDE 0.9 % (FLUSH) 0.9 %
5-40 SYRINGE (ML) INJECTION PRN
Status: DISCONTINUED | OUTPATIENT
Start: 2021-12-30 | End: 2022-01-10 | Stop reason: HOSPADM

## 2021-12-30 RX ORDER — SODIUM CHLORIDE 9 MG/ML
25 INJECTION, SOLUTION INTRAVENOUS PRN
Status: DISCONTINUED | OUTPATIENT
Start: 2021-12-30 | End: 2022-01-05

## 2021-12-30 RX ORDER — CARVEDILOL 3.12 MG/1
3.12 TABLET ORAL 2 TIMES DAILY WITH MEALS
Status: DISCONTINUED | OUTPATIENT
Start: 2021-12-30 | End: 2022-01-03

## 2021-12-30 RX ORDER — SODIUM CHLORIDE, SODIUM LACTATE, POTASSIUM CHLORIDE, CALCIUM CHLORIDE 600; 310; 30; 20 MG/100ML; MG/100ML; MG/100ML; MG/100ML
INJECTION, SOLUTION INTRAVENOUS CONTINUOUS
Status: DISCONTINUED | OUTPATIENT
Start: 2022-01-03 | End: 2022-01-04

## 2021-12-30 RX ORDER — LISINOPRIL 2.5 MG/1
2.5 TABLET ORAL DAILY
Status: DISCONTINUED | OUTPATIENT
Start: 2021-12-30 | End: 2022-01-03

## 2021-12-30 RX ADMIN — CARVEDILOL 3.12 MG: 3.12 TABLET, FILM COATED ORAL at 17:23

## 2021-12-30 RX ADMIN — SODIUM CHLORIDE, PRESERVATIVE FREE 5 ML: 5 INJECTION INTRAVENOUS at 08:25

## 2021-12-30 RX ADMIN — TIOTROPIUM BROMIDE INHALATION SPRAY 2 PUFF: 3.12 SPRAY, METERED RESPIRATORY (INHALATION) at 07:48

## 2021-12-30 RX ADMIN — IRON SUCROSE 200 MG: 20 INJECTION, SOLUTION INTRAVENOUS at 11:38

## 2021-12-30 RX ADMIN — ATORVASTATIN CALCIUM 80 MG: 80 TABLET, FILM COATED ORAL at 20:29

## 2021-12-30 RX ADMIN — THERA TABS 1 TABLET: TAB at 08:20

## 2021-12-30 RX ADMIN — BUDESONIDE 500 MCG: 0.5 SUSPENSION RESPIRATORY (INHALATION) at 07:47

## 2021-12-30 RX ADMIN — SODIUM CHLORIDE 25 ML: 900 INJECTION, SOLUTION INTRAVENOUS at 20:27

## 2021-12-30 RX ADMIN — LISINOPRIL 2.5 MG: 2.5 TABLET ORAL at 17:23

## 2021-12-30 RX ADMIN — VANCOMYCIN HYDROCHLORIDE 750 MG: 10 INJECTION, POWDER, LYOPHILIZED, FOR SOLUTION INTRAVENOUS at 08:24

## 2021-12-30 RX ADMIN — VANCOMYCIN HYDROCHLORIDE 750 MG: 10 INJECTION, POWDER, LYOPHILIZED, FOR SOLUTION INTRAVENOUS at 20:27

## 2021-12-30 RX ADMIN — ARFORMOTEROL TARTRATE 15 MCG: 15 SOLUTION RESPIRATORY (INHALATION) at 07:48

## 2021-12-30 RX ADMIN — PANTOPRAZOLE SODIUM 40 MG: 40 TABLET, DELAYED RELEASE ORAL at 05:00

## 2021-12-30 ASSESSMENT — PAIN SCALES - GENERAL
PAINLEVEL_OUTOF10: 0

## 2021-12-30 NOTE — PROGRESS NOTES
Comprehensive Nutrition Assessment    RECOMMENDATIONS:  1. PO Diet: Continue regular, low sodium diet  2. ONS: Start Jitendra BID to promote wound healing  3. Nutrition Education: provided verbal and written diet educ on post-CABG diet    NUTRITION ASSESSMENT:   Type and Reason for Visit:  Type and Reason for Visit: Reassess   Nutritional summary & status: Pt with good appetite, consuming % PO intake. Pt states he has cut out salt from his diet at home and agreeable to make healthier dietary changes. Noted pt scheduled for CABG on 1/3/21. RD discussed heart healthy post-CABG diet educ with pt and pt family member in room with written materials provided. Will order Jitendra BID to help promote better wound healing. RD will continue to monitor nutritional status throughout adm.      Patient admitted d/t syncope, anemia, chest pressure    PMH significant for: COPD, hyperlipidemia     MALNUTRITION ASSESSMENT  Context of Malnutrition: Acute Illness   Malnutrition Status: Insufficient data  Findings of the 6 clinical characteristics of malnutrition (Minimum of 2 out of 6 clinical characteristics is required to make the diagnosis of moderate or severe Protein Calorie Malnutrition based on AND/ASPEN Guidelines):  Energy Intake: No significant decrease in energy intake    Energy Intake Time: No significant    Weight Loss %: Unable to assess  :no wt hx  Weight loss Time: Unable to assess     NUTRITION DIAGNOSIS   · Increased nutrient needs related to increase demand for energy/nutrients as evidenced by wounds    [address] and/or Nutrient Delivery:  Continue Current Diet,Start Oral Nutrition Supplement  Nutrition Education/Counseling:  Education completed (post-CABG educ on 12/30)   Goals:  pt will consistently consume >76% PO intake on low Na diet       Nutrition Monitoring and Evaluation:   Food/Nutrient Intake Outcomes:  Food and Nutrient Intake,Supplement Intake  Physical Signs/Symptoms Outcomes: Biochemical Data,Weight     OBJECTIVE DATA: Significant to nutrition assessment  · Nutrition-Focused Physical Findings: Nutrition Related Findings: lbm 12/25   · Labs: Reviewed; Ca 8.0  · Meds: Reviewed; MVI  · Wounds:       CURRENT NUTRITION THERAPIES  ADULT DIET; Regular; Low Sodium (2 gm)  Diet NPO Exceptions are: Sips of Water with Meds     PO Intake: Average Meal Intake: %   PO Supplement Intake:Average Supplements Intake: None Ordered  IVF: n/a     ANTHROPOMETRICS  Current Height: 5' 7\" (170.2 cm)  Current Weight: 168 lb 10.4 oz (76.5 kg)    Admission weight: 168 lb 10.4 oz (76.5 kg)  Ideal Body Weight (lbs) (Calculated): 148 lbs (Ideal Body Weight (Kg) (Calculated): 67 kg)  Usual Bodyweight:MARY  Weight Changes: MARY-no recent wt hx       BMI (Calculated): 26.5    Wt Readings from Last 50 Encounters:   12/24/21 168 lb 10.4 oz (76.5 kg)     COMPARATIVE STANDARDS  Energy (kcal):  5810-6227; Weight Used for Energy Requirements:  Current (76.5kg)     Protein (g):  80-93 (1.2-1.4g/kg); Weight Used for Protein Requirements:  Ideal        Fluid (ml/day):  8244-4471; Method Used for Fluid Requirements:  1 ml/kcal      The patient will still be monitored per nutrition standards of care. Consult dietitian if nutrition interventions essential to patient care is needed.      Coleen Espana, [address] [place] [address]  [place]:  [phone]  Office:  [phone]

## 2021-12-30 NOTE — PROGRESS NOTES
Occupational Therapy  Facility/Department: [address]  Daily Treatment Note  NAME: Khadijah Hubbard  : 1950  MRN: [id number]    Date of Service: 2021    Discharge Recommendations:Tee Cunningham scored a 14/24 on the AM-PAC ADL Inpatient form. Current research shows that an AM-PAC score of 17 or less is typically not associated with a discharge to the patient's home setting. Based on the patient's AM-PAC score and their current ADL deficits, it is recommended that the patient have 3-5 sessions per week of Occupational Therapy at d/c to increase the patient's independence. Please see assessment section for further patient specific details. If patient discharges prior to next session this note will serve as a discharge summary. Please see below for the latest assessment towards goals. Assessment   Performance deficits / Impairments: Decreased functional mobility ; Decreased ADL status; Decreased endurance  Assessment: Pt from home alone- presentlly requiring Min/ModA x2 in sit<>stand and fxl transfer w/ RW. Pt demo diff w/ following directives as given, demo ltd safety awareness t/o session. Pt requiting cueing for keeping eyes open t/o session. Pt may benefit from cont'd skilled OT to maxmize safety and IND w/ ADL and fxl mobility during acute stay and after before returning home along. Cont per POC  Treatment Diagnosis: impaired mobility, transfers, ADL  Prognosis: Fair  OT Education: OT Role;Plan of Care;ADL Adaptive Strategies;Transfer Training  REQUIRES OT FOLLOW UP: Yes  Activity Tolerance  Activity Tolerance: Patient limited by fatigue;Treatment limited secondary to decreased cognition  Safety Devices  Safety Devices in place: Yes  Type of devices: Call light within reach; Chair alarm in place; Patient at risk for falls; Left in chair;Nurse notified         Patient Diagnosis(es): The primary encounter diagnosis was Anemia, unspecified type.  Diagnoses of Syncope, unspecified syncope type and NSTEMI (non-ST elevated myocardial infarction) [facility]) were also pertinent to this visit. 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 has a past surgical history that includes hemicolectomy and Upper gastrointestinal endoscopy (N/A, 12/27/2021). Restrictions  Position Activity Restriction  Other position/activity restrictions: up as tolerated  Subjective   General  Chart Reviewed: Yes,Orders,Progress Notes  Additional Pertinent Hx: 70 y.o. male admitted on 12/24/2021 through the emergency department with lightheadedness and a syncopal episode. Also had chest pressure. Was found to be severely anemic. States that he had been fumigating his work space for bedbugs then while ambulating to the store for more supplies became lightheaded and had a syncopal episode. Hemoglobin was 5.6 and with a hematocrit of 17.3. Family / Caregiver Present: Yes (Sister)  Referring Practitioner: Chuy De Jesus MD  Diagnosis: anemia  Subjective  Subjective: Semi-supine on OT approach and agreed to tx. RN in room on approc  General Comment  Comments: Pt keeping eyes closed t/o session; OT requesting to open eyes repeated t/o session, min response. Vital Signs  Patient Currently in Pain: Denies   Orientation  Orientation  Orientation Level: Oriented to place;Oriented to person;Disoriented to situation  Objective    ADL  Grooming: Stand by assistance (wash face at EOB, unsupported short sit. oral care in recliner)        Balance  Sitting Balance: Minimal assistance  Standing Balance: Dependent/Total (ModA x2)  Standing Balance  Time: ~90s  Activity: EOB>recliner appx 8steps shuffling  Functional Mobility  Functional - Mobility Device: Rolling Walker  Assist Level:  Moderate assistance (ModA x2)  Bed mobility  Supine to Sit: 2 Person assistance;Dependent/Total (Consuelo x1 + ModA x1)  Scooting: Moderate assistance (scooting fwd at EOB, SBA in recliner)  Transfers  Sit to stand: 2 Person assistance;Dependent/Total ([facility] x2 sit>stand at EOB w/ R/W)  Stand to sit: 2 Person assistance;Dependent/Total ([facility] x2 stand>sit to recliner w/ R/W)  Transfer Comments: Cueing t/o session for opening eyes to attend to environment during mobility                       Cognition  Overall Cognitive Status: Exceptions  Arousal/Alertness: Delayed responses to stimuli  Following Commands: Follows one step commands with increased time; Follows one step commands with repetition  Attention Span: Difficulty attending to directions; Difficulty dividing attention  Memory: Decreased recall of recent events;Decreased short term memory  Safety Judgement: Decreased awareness of need for assistance;Decreased awareness of need for safety  Problem Solving: Decreased awareness of errors  Insights: Decreased awareness of deficits  Initiation: Requires cues for some  Sequencing: Requires cues for some  Cognition Comment: Had pt write name/address/phone # on paper - written production not aligning to baseline, and difficult to read. Pt sister reporting not at baseline.                                          Plan   Plan  Times per week: 2-5  Times per day: Daily    AM-PAC Score        AM-[facility] Inpatient Daily Activity Raw Score: 14 (12/30/21 1228)  AM-PAC Inpatient ADL T-Scale Score : 33.39 (12/30/21 1228)  ADL Inpatient CMS 0-100% Score: 59.67 (12/30/21 1228)  ADL Inpatient CMS G-Code Modifier : CK (12/30/21 1228)    Goals  Short term goals  Time Frame for Short term goals: dc  Short term goal 1: supine to sit SBA  Short term goal 2: sit<>stand SBA  Short term goal 3: Fx mobility SBA with LRAD  Short term goal 4: LB dressing SBA  Short term goal 5: tolerate toileting assessment       Therapy Time   Individual Concurrent Group Co-treatment   Time In 1132         Time Out [address] [facility]         Timed Code Treatment Minutes: [address] [facility], MOT-OTR/L [id number]

## 2021-12-30 NOTE — PROGRESS NOTES
Hospitalist Progress Note      PCP: No primary care provider on file. Date of Admission: 12/24/2021    Hospital Course:  68-year-old male with past medical history of COPD, hyperlipidemia presented to the hospital with episode of syncope and chest pressure. Patient says that he was doing work around the house including fumigation for bedbugs and went to get more supplies. He said that while ambulating to the store he became lightheaded had a syncopal episode. Patient was brought to the hospital.  He denied any shortness of breath but did have some chest pressure. Denies any fevers, chills or any other symptoms recently. On arrival to the hospital he was noted to be hemodynamically stable. Lab work showed hemoglobin of 5.6. Subjective:  Pt has no new complaints today.  CABG still planned for 01/03/2022        Medications:  Reviewed    Infusion Medications     Scheduled Medications    pantoprazole  40 mg Oral QAM AC    multivitamin  1 tablet Oral Daily    iron sucrose  200 mg IntraVENous Q24H    vancomycin  750 mg IntraVENous Q12H    atorvastatin  80 mg Oral Nightly    influenza virus vaccine  0.5 mL IntraMUSCular Prior to discharge    fentanNYL  50 mcg IntraVENous Once    tiotropium  2 puff Inhalation Daily    sodium chloride flush  5-40 mL IntraVENous 2 times per day    budesonide  0.5 mg Nebulization BID    And    Arformoterol Tartrate  15 mcg Nebulization BID     PRN Meds: sodium chloride flush, perflutren lipid microspheres, albuterol, ondansetron **OR** ondansetron, polyethylene glycol, acetaminophen **OR** acetaminophen      Intake/Output Summary (Last 24 hours) at 12/30/2021 1022  Last data filed at 12/30/2021 1004  Gross per 24 hour   Intake 2988.14 ml   Output 400 ml   Net 2588.14 ml       Physical Exam Performed:    BP (!) 160/83   Pulse 67   Temp 97 °F (36.1 °C) (Oral)   Resp 16   Ht 5' 7\" (1.702 m)   Wt 168 lb 10.4 oz (76.5 kg)   SpO2 95%   BMI 26.41 kg/m²   General appearance: 59-year-old man who is laying in bed in no apparent distress, appears stated age and cooperative. HEENT: Pupils equal, round, and reactive to light. Conjunctivae/corneas clear. Neck: Supple, with full range of motion. No jugular venous distention. Trachea midline. Respiratory:  Normal respiratory effort. Clear to auscultation, bilaterally without Rales/Wheezes/Rhonchi. Cardiovascular: Regular rate and rhythm with normal S1/S2 without murmurs, rubs or gallops. Abdomen: Soft, non-tender, non-distended with normal bowel sounds. Musculoskeletal: No clubbing, cyanosis or edema bilaterally. Full range of motion without deformity. Skin: Skin color, texture, turgor normal.  No rashes or lesions. Neurologic:  Neurovascularly intact without any focal sensory/motor deficits. Cranial nerves: II-XII intact, grossly non-focal.  Psychiatric: Alert and oriented,confused intermittently  Capillary Refill: Brisk,3 seconds, normal   Peripheral Pulses: +2 palpable, equal bilaterally       Labs:   Recent Labs     12/28/21  0725 12/28/21  0725 12/29/21  0930 12/29/21  2104 12/30/21  0708   WBC 7.1  --  6.6  --  8.0   HGB 6.9*   < > 8.3* 10.3* 11.0*   HCT 21.1*   < > 25.6* 31.4* 33.6*     --  171  --  178    < > = values in this interval not displayed. Recent Labs     12/28/21  0725 12/29/21  0930 12/30/21  0708    136 140   K 3.5 4.2 3.6    107 109   CO2 24 16* 24   BUN 19 20 23*   CREATININE 0.7* <0.5* 0.6*   CALCIUM 7.8* 7.7* 8.0*     No results for input(s): AST, ALT, BILIDIR, BILITOT, ALKPHOS in the last 72 hours. No results for input(s): INR in the last 72 hours. No results for input(s): Noble Dimas in the last 72 hours.     Urinalysis:      Lab Results   Component Value Date    NITRU POSITIVE 12/24/2021    WBCUA >100 12/24/2021    BACTERIA 3+ 12/24/2021    RBCUA 11-20 12/24/2021    BLOODU SMALL 12/24/2021    SPECGRAV 1.025 12/24/2021    GLUCOSEU Negative 12/24/2021 Radiology:  VL PRE OP VEIN MAPPING         VL DUP CAROTID BILATERAL         CT HEAD WO CONTRAST   Final Result      No evidence of acute intracranial abnormality. XR CHEST PORTABLE   Final Result      Mild bibasilar atelectasis versus airspace disease. Assessment/Plan:    Active Hospital Problems    Diagnosis     Syncope and collapse [R55]     Anemia [D64.9]            NSTEMI  Presented w/ chest pain and trop positive. Cardiology consulted   [facility] with severe CAD, low Ef of 35 %  - Holding heparin and asa given concern for gi bleed  - High dose statin  - CTVS consulted,GI on board  CABG planned for 1/3/2022     Acute blood loss anemia, liekly UGIB  Presented with Hgb of 5.4, syncope, elevated trops. Hemodynamically stable. - Status post 2 units PRBC, Hgb improved appropriately  - Venofer  - Pending folate, B12  - Protonix po  - EGD on 12/27, need colonoscopy once cardiac stable  - GI following, colonoscopy planned for 3 weeks after CABG   COPD  Stable w/o exacerbation  - Continue current medications     MRSA UTI  - Started on Vanc, will transition to PO once able to take oral    AMS  - Resolved; likely due to above        DVT Prophylaxis:   Diet: ADULT DIET; Regular;  Low Sodium (2 gm)  Code Status: Full Code    PT/OT Eval Status: n/a     Dispo - CABG 01/03/2022    Venecia Eduardo MD

## 2021-12-30 NOTE — PROGRESS NOTES
Clinical Pharmacy Progress Note    Vancomycin - Management by Pharmacy    Consult Date(s): 12/26/21  Consulting Provider(s): Liv    Assessment / Plan  1) Staph aureus UTI - Vancomycin   Concurrent Antimicrobials: none   Day of Vanc Therapy: day #5   Current Dosing Method: Bayesian-Guided AUC Dosing  o Therapeutic Goal: AUC < 500 mg/L*hr  o Currently on 750 mg every 12 hours. Calculated AUC = 410 mg/L*hr with an associated trough of ~ 13 mg/L based on level drawn 12/27. Renal function stable - continue current regimen.  o Random level is ordered for 12/31 AM to further evaluate above regimen.  Will continue to monitor clinical condition and make adjustments to regimen as appropriate. Please call with questions--  Thanks--  Sera Segovia, PharmD, BCPS, BCGP  [id number] (hospitals)   12/30/2021 2:16 PM      Interval update:  [facility] with severe 3 vessel CAD with reduced EF 25-30%. Planning CABG on 1/3/22. Subjective/Objective:  Jose Daniel Patton is a 70 y.o. male with a PMHx significant for COPD, HLD, BPH, and shingles who presented with syncope and chest pressure. Patient found to have NSTEMI, acute blood loss anemia likely 2/2 GI bleed, and staph aureus UTI. Pharmacy has been consulted to dose vancomycin. Ht Readings from Last 1 Encounters:   12/24/21 5' 7\" (1.702 m)       Wt Readings from Last 1 Encounters:   12/24/21 168 lb 10.4 oz (76.5 kg)       Current & Prior Antimicrobial Regimen(s):    (12/25-12/26)   Vancomycin 750 mg IV q12h (12/26 - present)     Vancomycin Level(s) / Doses  Date Time Dose Level / Type of Level Interpretation   12/27 11:39 750 mg IV q12h Random = 14.3 mg/L Predicted AUC = 410 with trough 13.1.   12/31 06:00 750mg IV q12h Random = ordered    Note: Serum levels collected for AUC-based dosing may be high if collected in close proximity to the dose administered. This is not necessarily an indicator of toxicity.     Cultures & Sensitivities:  Date Site Micro Susceptibility / Result   12/26 Urine Staph aureus  Sensitive to Cefazolin, Oxacillin, Bactrim   12/26 Rapid COVID-19 negative      Labs / Ancillary Data:    Estimated Creatinine Clearance: 106 mL/min (A) (based on SCr of 0.6 mg/dL (L)).     Recent Labs     12/28/21  0725 12/29/21  0930 12/30/21  0708   CREATININE 0.7* <0.5* 0.6*   BUN 19 20 23*   WBC 7.1 6.6 8.0

## 2021-12-30 NOTE — PROGRESS NOTES
Progress Note    NSTEMI w/Severe 3V CAD with unstable angina. Comfortable. Denies CP/SOB. Vital Signs:                                                 /73   Pulse 79   Temp 97.2 °F (36.2 °C) (Oral)   Resp 16   Ht 5' 7\" (1.702 m)   Wt 168 lb 10.4 oz (76.5 kg)   SpO2 94%   BMI 26.41 kg/m²           Admission Weight: 168 lb 10.4 oz (76.5 kg)      Vent Settings:  Vent Information  SpO2: 94 %     I/O:    Intake/Output Summary (Last 24 hours) at 12/30/2021 1451  Last data filed at 12/30/2021 1244  Gross per 24 hour   Intake 1838.05 ml   Output 400 ml   Net 1438.05 ml     LUNGS: CTA Bilat  HEART: RRR  ABD: Soft. Non-tender. BS + x 4 quads. EXT: No edema    Lab:  CBC:   Recent Labs     12/28/21  0725 12/28/21  0725 12/29/21  0930 12/29/21  2104 12/30/21  0708   WBC 7.1  --  6.6  --  8.0   HGB 6.9*   < > 8.3* 10.3* 11.0*   HCT 21.1*   < > 25.6* 31.4* 33.6*   MCV 80.7  --  82.5  --  84.4     --  171  --  178    < > = values in this interval not displayed. BMP:   Recent Labs     12/28/21  0725 12/29/21  0930 12/30/21  0708    136 140   K 3.5 4.2 3.6    107 109   CO2 24 16* 24   BUN 19 20 23*   CREATININE 0.7* <0.5* 0.6*   CALCIUM 7.8* 7.7* 8.0*     ABG:   Ionized Calcium (mmol/L)    Cardiac Enzymes: No results for input(s): CKTOTAL, CKMB, CKMBINDEX, TROPONINI in the last 72 hours. PT/INR: No results for input(s): PROTIME, INR in the last 72 hours. APTT: No results for input(s): APTT in the last 72 hours. Assessment:   NSTEMI  Severe 3V CAD with Unstable Angina   Ischemic Cardiomyopathy - EF 25% by LV gram.  HTN  COPD  Anemia  FH of CAD - Both parents S/P CABG    Plan:   CABG with Dr. Jorge Mitchell on Monday 1/3/21. Surgical workup in progress.       ALIE Orona  12/30/2021  2:51 PM

## 2021-12-30 NOTE — PROGRESS NOTES
[facility]   Cardiology  Note   Dr Octavio Payne MD, Jaziel Hodge RN, FNP APRN CVNP  Date: 12/30/2021  Admit Date: 12/24/2021       CC: cp / NSTEMI / anemia     Cardiology consult trop elevation     PMH: syncope anemia COPD    Interval Hx /  Subjective: Today, he is resting in bed   Net + 1.8 liters / Hgb stable 11.0   Right wrist LHC site no complications      19/71/3237 LHC performed today by Dr. Mitzi Ku revealed severe 3V CAD with depressed EF of 25-30%. CABG with Dr. Blanca Turner on Monday 1/3/21. Patient seen and examined. Clinical notes reviewed.  Telemetry reviewed / testing reviewed  30 minutes   Pertinent labs, diagnostic, device, and imaging results reviewed as a part of this visit  EKG TTE stress test: any LHC/RHC reviewed     Past Medical History:  Past Medical History:   Diagnosis Date    BPH with obstruction/lower urinary tract symptoms 8/3/2012    Chicken pox     Colon polyp     Hyperglycemia 12/15/2010    Hyperlipemia 12/15/2010    Post herpetic neuralgia     Shingles     Squamous cell carcinoma of skin of upper limb, including shoulder 9/8/2015          Scheduled Meds:   pantoprazole  40 mg Oral QAM AC    multivitamin  1 tablet Oral Daily    vancomycin  750 mg IntraVENous Q12H    atorvastatin  80 mg Oral Nightly    influenza virus vaccine  0.5 mL IntraMUSCular Prior to discharge    fentanNYL  50 mcg IntraVENous Once    tiotropium  2 puff Inhalation Daily    sodium chloride flush  5-40 mL IntraVENous 2 times per day    budesonide  0.5 mg Nebulization BID    And    Arformoterol Tartrate  15 mcg Nebulization BID     Vitals:    12/30/21 1135   BP: 131/73   Pulse: 79   Resp: 16   Temp: 97.2 °F (36.2 °C)   SpO2: 94%      In: 1001.9 [P.O.:400]  Out: 150    Wt Readings from Last 3 Encounters:   12/24/21 168 lb 10.4 oz (76.5 kg)   07/12/18 179 lb (81.2 kg)   05/31/18 183 lb (83 kg)       Intake/Output Summary (Last 24 hours) at 12/30/2021 6306  Last data filed at will sign off recall GI if needed    Hx COPD   stable     HLD   LDL 54 optimal     Plan   Net + 1.8 liters  CABG with Dr. Zeinab Willoughby on Monday 1/3/21  Cardiac meds lipitor   Start coreg 3.125ma BID with parameters   Start lisinopril 2.5mg daily with parameters    No asa at this time / has been anemic with GI following        Thank you for allowing to us to participate in the care of Jose Daniel Patton. Loreatha Phalen APRN-CNP-CVNP    [address]   Interventional cardiology note  Patient is awake and alert today actually looks quite good. Appears back to his baseline. The rhythm is stable sinus at 80/min. He understands about the surgery and accepts the proposition. We will continue to follow him over the weekend surgery scheduled for Monday.   Omer Berkowitz MD, [facility]

## 2021-12-30 NOTE — PROGRESS NOTES
Pt alert but disoriented throughout the shift. Able to say he's at the hospital but unsure of year and what exactly is going on. Thinks he has brain cancer and randomly says phrases that do not make sense for the conversation. Received 2 units PRBCs today per Dr. Nicole Davis. Post H&H ordered. Voiding via condom cath. To vascular today but pre-op scans. Plan for surgery on Monday. All needs met at this time.

## 2021-12-30 NOTE — PLAN OF CARE
Problem: Falls - Risk of:  Goal: Will remain free from falls  Description: Will remain free from falls  Outcome: Ongoing  Goal: Absence of physical injury  Description: Absence of physical injury  Outcome: Ongoing     Problem: Nutrition  Goal: Optimal nutrition therapy  Outcome: Ongoing     Problem: Skin Integrity:  Goal: Will show no infection signs and symptoms  Description: Will show no infection signs and symptoms  Outcome: Ongoing  Goal: Absence of new skin breakdown  Description: Absence of new skin breakdown  Outcome: Ongoing     Problem: Cardiac:  Goal: Ability to maintain an adequate cardiac output will improve  Description: Ability to maintain an adequate cardiac output will improve  Outcome: Ongoing  Goal: Ability to achieve and maintain adequate cardiopulmonary perfusion will improve  Description: Ability to achieve and maintain adequate cardiopulmonary perfusion will improve  Outcome: Ongoing

## 2021-12-30 NOTE — PLAN OF CARE
Problem: Nutrition  Goal: Optimal nutrition therapy  12/30/2021 1551 by Bina Kaur, MS, RD, LD  Outcome: Ongoing  Note: Nutrition Problem #1: Increased nutrient needs  Intervention: Food and/or Nutrient Delivery: Continue Current Diet,Start Oral Nutrition Supplement  Nutritional Goals: pt will consistently consume >76% PO intake on low Na diet

## 2021-12-30 NOTE — PROGRESS NOTES
[address] [place] [address] [facility] [place]  GI Progress Note          Nicole Rivera is a 70 y.o. male patient. 1. Anemia, unspecified type    2. Syncope, unspecified syncope type    3. NSTEMI (non-ST elevated myocardial infarction) ([facility] [address]        Admit Date: 2021    Subjective:       More alert no acute issues overnight    Scheduled Meds:   pantoprazole  40 mg Oral QAM AC    multivitamin  1 tablet Oral Daily    iron sucrose  200 mg IntraVENous Q24H    vancomycin  750 mg IntraVENous Q12H    atorvastatin  80 mg Oral Nightly    influenza virus vaccine  0.5 mL IntraMUSCular Prior to discharge    fentanNYL  50 mcg IntraVENous Once    tiotropium  2 puff Inhalation Daily    sodium chloride flush  5-40 mL IntraVENous 2 times per day    budesonide  0.5 mg Nebulization BID    And    Arformoterol Tartrate  15 mcg Nebulization BID       Objective:       Patient Vitals for the past 24 hrs:   BP Temp Temp src Pulse Resp SpO2   21 0818 (!) 160/83 97 °F (36.1 °C) Oral 67 16 95 %   21 0749 -- -- -- -- -- 97 %   21 0429 (!) 145/79 96.8 °F (36 °C) Oral 76 (!) 1 95 %   21 0035 134/76 97 °F (36.1 °C) Oral 75 18 95 %   21 139/84 98.2 °F (36.8 °C) Oral 70 18 --   21 1810 (!) 161/84 98.1 °F (36.7 °C) Oral 79 16 94 %   21 1652 (!) 158/87 98.1 °F (36.7 °C) Oral 74 16 93 %   21 1624 (!) 160/91 98.3 °F (36.8 °C) Oral 71 16 96 %   21 1544 (!) 152/90 99.1 °F (37.3 °C) Oral 70 16 97 %   21 1459 (!) 161/82 97.1 °F (36.2 °C) Oral 71 16 92 %   21 1430 130/73 97.4 °F (36.3 °C) Oral 80 16 93 %   21 1234 117/69 98.4 °F (36.9 °C) Oral 78 16 93 %       Exam:  VITALS:  BP (!) 160/83   Pulse 67   Temp 97 °F (36.1 °C) (Oral)   Resp 16   Ht 5' 7\" (1.702 m)   Wt 168 lb 10.4 oz (76.5 kg)   SpO2 95%   BMI 26.41 kg/m²   TEMPERATURE:  Current - Temp: 97 °F (36.1 °C);  Max - Temp  Av.8 °F (36.6 °C)  Min: 96.8 °F (36 °C)  Max: 99.1 °F (37.3 °C)    NAD  General appearance: alert, appears stated age, cooperative and no distress  Abdomen: soft, non-tender; bowel sounds normal; no masses,  no organomegaly  AAOx3, No asterixis or encephalopathy  Extremities: No edema. Recent Labs     12/28/21  0725 12/28/21  0725 12/29/21  0930 12/29/21  2104 12/30/21  0708   WBC 7.1  --  6.6  --  8.0   HGB 6.9*   < > 8.3* 10.3* 11.0*   HCT 21.1*   < > 25.6* 31.4* 33.6*   MCV 80.7  --  82.5  --  84.4     --  171  --  178    < > = values in this interval not displayed. Recent Labs     12/28/21 0725 12/29/21 0930 12/30/21  0708    136 140   K 3.5 4.2 3.6    107 109   CO2 24 16* 24   BUN 19 20 23*   CREATININE 0.7* <0.5* 0.6*     FINAL DIAGNOSIS:     A: Small bowel biopsy:      - Small intestinal mucosa with intact villous architecture. The        morphologic findings are not diagnostic of celiac disease. B: Gastric biopsy:      - Reactive gastropathy.      - No active inflammation, no dysplasia, and no malignancy.      - H pylori microorganisms are not observed on routine H&E stain. Assessment:       Active Problems:    Anemia    Syncope and collapse  Resolved Problems:    * No resolved hospital problems. *    Hg better no reported GI bleeding    Recommendations:        Will need colonoscopy after recovers from surgery to complete anemia work up, will set up office visit in 3 weeks, discussed with Katharine Tim patients sister to help coordinate follow up   Will sign off recall GI if needed    Rocky Marie MD  12/30/2021  8:43 AM

## 2021-12-30 NOTE — PROGRESS NOTES
Pt alert but intermittently disoriented this shift. Appears more oriented compared to yesterday but at times does not respond appropriately to conversation. VSS with exception to slightly elevated BP. Carvedilol and Lisinopril ordered. Pre-op orders started. MRSA swab to be completed. Changed out condom catheter to obtain clean urine sample. Surgery scheduled for Monday. Pt up to chair with OT today. 2 person assist with a walker. Pt had to be encouraged to keep eyes open and follow commands. All needs met at this time.

## 2021-12-31 PROBLEM — I21.4 NON-ST ELEVATED MYOCARDIAL INFARCTION (NON-STEMI) (HCC): Status: ACTIVE | Noted: 2021-12-31

## 2021-12-31 LAB
ANION GAP SERPL CALCULATED.3IONS-SCNC: 8 MMOL/L (ref 3–16)
APTT: 36.9 SEC (ref 26.2–38.6)
BUN BLDV-MCNC: 19 MG/DL (ref 7–20)
CALCIUM SERPL-MCNC: 7.5 MG/DL (ref 8.3–10.6)
CHLORIDE BLD-SCNC: 107 MMOL/L (ref 99–110)
CO2: 21 MMOL/L (ref 21–32)
CREAT SERPL-MCNC: 0.6 MG/DL (ref 0.8–1.3)
FIBRINOGEN: 473 MG/DL (ref 200–397)
GFR AFRICAN AMERICAN: >60
GFR NON-AFRICAN AMERICAN: >60
GLUCOSE BLD-MCNC: 107 MG/DL (ref 70–99)
HCT VFR BLD CALC: 33.1 % (ref 40.5–52.5)
HEMOGLOBIN: 11 G/DL (ref 13.5–17.5)
INR BLD: 1.39 (ref 0.88–1.12)
MCH RBC QN AUTO: 27.8 PG (ref 26–34)
MCHC RBC AUTO-ENTMCNC: 33.2 G/DL (ref 31–36)
MCV RBC AUTO: 84 FL (ref 80–100)
PDW BLD-RTO: 19.8 % (ref 12.4–15.4)
PLATELET # BLD: 191 K/UL (ref 135–450)
PMV BLD AUTO: 8.2 FL (ref 5–10.5)
POTASSIUM SERPL-SCNC: 3.6 MMOL/L (ref 3.5–5.1)
PROTHROMBIN TIME: 15.9 SEC (ref 9.9–12.7)
RBC # BLD: 3.94 M/UL (ref 4.2–5.9)
SODIUM BLD-SCNC: 136 MMOL/L (ref 136–145)
URINE CULTURE, ROUTINE: NORMAL
VANCOMYCIN RANDOM: 7 UG/ML
WBC # BLD: 6.8 K/UL (ref 4–11)

## 2021-12-31 PROCEDURE — 80202 ASSAY OF VANCOMYCIN: CPT

## 2021-12-31 PROCEDURE — 85730 THROMBOPLASTIN TIME PARTIAL: CPT

## 2021-12-31 PROCEDURE — 6370000000 HC RX 637 (ALT 250 FOR IP): Performed by: THORACIC SURGERY (CARDIOTHORACIC VASCULAR SURGERY)

## 2021-12-31 PROCEDURE — 6370000000 HC RX 637 (ALT 250 FOR IP): Performed by: INTERNAL MEDICINE

## 2021-12-31 PROCEDURE — 94640 AIRWAY INHALATION TREATMENT: CPT

## 2021-12-31 PROCEDURE — 85610 PROTHROMBIN TIME: CPT

## 2021-12-31 PROCEDURE — 2580000003 HC RX 258: Performed by: THORACIC SURGERY (CARDIOTHORACIC VASCULAR SURGERY)

## 2021-12-31 PROCEDURE — 6360000002 HC RX W HCPCS: Performed by: INTERNAL MEDICINE

## 2021-12-31 PROCEDURE — 2580000003 HC RX 258: Performed by: INTERNAL MEDICINE

## 2021-12-31 PROCEDURE — 85027 COMPLETE CBC AUTOMATED: CPT

## 2021-12-31 PROCEDURE — 6370000000 HC RX 637 (ALT 250 FOR IP): Performed by: NURSE PRACTITIONER

## 2021-12-31 PROCEDURE — 80048 BASIC METABOLIC PNL TOTAL CA: CPT

## 2021-12-31 PROCEDURE — 94761 N-INVAS EAR/PLS OXIMETRY MLT: CPT

## 2021-12-31 PROCEDURE — 99233 SBSQ HOSP IP/OBS HIGH 50: CPT | Performed by: INTERNAL MEDICINE

## 2021-12-31 PROCEDURE — 36415 COLL VENOUS BLD VENIPUNCTURE: CPT

## 2021-12-31 PROCEDURE — 1200000000 HC SEMI PRIVATE

## 2021-12-31 PROCEDURE — 85384 FIBRINOGEN ACTIVITY: CPT

## 2021-12-31 RX ORDER — CASTOR OIL AND BALSAM, PERU 788; 87 MG/G; MG/G
OINTMENT TOPICAL 2 TIMES DAILY
Status: DISCONTINUED | OUTPATIENT
Start: 2021-12-31 | End: 2022-01-10 | Stop reason: HOSPADM

## 2021-12-31 RX ADMIN — LISINOPRIL 2.5 MG: 2.5 TABLET ORAL at 09:34

## 2021-12-31 RX ADMIN — BUDESONIDE 500 MCG: 0.5 SUSPENSION RESPIRATORY (INHALATION) at 07:38

## 2021-12-31 RX ADMIN — VANCOMYCIN HYDROCHLORIDE 1000 MG: 10 INJECTION, POWDER, LYOPHILIZED, FOR SOLUTION INTRAVENOUS at 13:03

## 2021-12-31 RX ADMIN — Medication: at 23:15

## 2021-12-31 RX ADMIN — TIOTROPIUM BROMIDE INHALATION SPRAY 2 PUFF: 3.12 SPRAY, METERED RESPIRATORY (INHALATION) at 07:39

## 2021-12-31 RX ADMIN — CARVEDILOL 3.12 MG: 3.12 TABLET, FILM COATED ORAL at 18:16

## 2021-12-31 RX ADMIN — SODIUM CHLORIDE, PRESERVATIVE FREE 10 ML: 5 INJECTION INTRAVENOUS at 14:03

## 2021-12-31 RX ADMIN — PANTOPRAZOLE SODIUM 40 MG: 40 TABLET, DELAYED RELEASE ORAL at 05:41

## 2021-12-31 RX ADMIN — CARVEDILOL 3.12 MG: 3.12 TABLET, FILM COATED ORAL at 09:32

## 2021-12-31 RX ADMIN — ATORVASTATIN CALCIUM 80 MG: 80 TABLET, FILM COATED ORAL at 20:58

## 2021-12-31 RX ADMIN — SODIUM CHLORIDE, PRESERVATIVE FREE 10 ML: 5 INJECTION INTRAVENOUS at 20:59

## 2021-12-31 RX ADMIN — BUDESONIDE 500 MCG: 0.5 SUSPENSION RESPIRATORY (INHALATION) at 20:24

## 2021-12-31 RX ADMIN — VANCOMYCIN HYDROCHLORIDE 1000 MG: 10 INJECTION, POWDER, LYOPHILIZED, FOR SOLUTION INTRAVENOUS at 20:59

## 2021-12-31 RX ADMIN — ARFORMOTEROL TARTRATE 15 MCG: 15 SOLUTION RESPIRATORY (INHALATION) at 07:38

## 2021-12-31 RX ADMIN — THERA TABS 1 TABLET: TAB at 09:36

## 2021-12-31 RX ADMIN — ARFORMOTEROL TARTRATE 15 MCG: 15 SOLUTION RESPIRATORY (INHALATION) at 20:24

## 2021-12-31 ASSESSMENT — PAIN SCALES - GENERAL
PAINLEVEL_OUTOF10: 0
PAINLEVEL_OUTOF10: 0

## 2021-12-31 NOTE — PROGRESS NOTES
Clinical Pharmacy Progress Note    Vancomycin - Management by Pharmacy    Consult Date(s): 12/26/21  Consulting Provider(s): Liv    Assessment / Plan  1) Staph aureus UTI - Vancomycin   Concurrent Antimicrobials: none   Day of Vanc Therapy: day #6   Current Dosing Method: Bayesian-Guided AUC Dosing  o Therapeutic Goal: AUC < 500 mg/L*hr  o Currently on 750 mg every 12 hours. Random level this AM = 7 mcg/mL - Calculated AUC = 278 mg/L*hr with trough of 7.4mg/L.    o Will increase dose slightly to 1000mg IV q12h to achieve an AUC closer to 400 with estimated trough ~10mg/L.  o Repeat levels likely won't be needed given anticipated short course of therapy and continued clinical improvement.  Will continue to monitor clinical condition and make adjustments to regimen as appropriate.  Would recommend re-evaluating for continued need for Vancomycin at 7 days of therapy (1/1/22). Please call with questions--  Thanks--  Aye Almeida, VadimD, BCPS, BCGP  [id number] ([facility])   12/31/2021 8:23 AM      Interval update:  [facility] with severe 3 vessel CAD with reduced EF 25-30%. Planning CABG on 1/3/22. Repeat urine culture sent yesterday as part of usual CABG pre-op work-up. Subjective/Objective:  Ana Cunningham is a 70 y.o. male with a PMHx significant for COPD, HLD, BPH, and shingles who presented with syncope and chest pressure. Patient found to have NSTEMI, acute blood loss anemia likely 2/2 GI bleed, and staph aureus UTI. Pharmacy is consulted to dose vancomycin.     Ht Readings from Last 1 Encounters:   12/24/21 5' 7\" (1.702 m)       Wt Readings from Last 1 Encounters:   12/24/21 168 lb 10.4 oz (76.5 kg)       Current & Prior Antimicrobial Regimen(s):    (12/25-12/26)   Vancomycin - ptd  o 750 mg IV q12h (12/26 - 12/31)  o 1000mg IV q12h (12/31-current)     Vancomycin Level(s) / Doses  Date Time Dose Level / Type of Level Interpretation   12/27 11:39 750 mg IV q12h Random = 14.3 mg/L Predicted AUC

## 2021-12-31 NOTE — PROGRESS NOTES
[facility] Daily Progress Note      Admit Date:  12/24/2021    Subjective:  Mr. Terrence Champion was seen and examined. F/U CAD/Non ST. Awakens easily. No complaints. Denies cp/sob    Objective:   /76   Pulse 79   Temp 97.8 °F (36.6 °C) (Oral)   Resp 18   Ht 5' 7\" (1.702 m)   Wt 168 lb 10.4 oz (76.5 kg)   SpO2 92%   BMI 26.41 kg/m²     Intake/Output Summary (Last 24 hours) at 12/31/2021 0726  Last data filed at 12/31/2021 0539  Gross per 24 hour   Intake 1601.72 ml   Output 725 ml   Net 876.72 ml       TELEMETRY: personally reviewed, sinus     Physical Exam:  General:  Awake, alert, NAD  Skin:  Warm and dry  Neck:  JVP difficult  Chest:  Clear to auscultation, respiration normal  Cardiovascular:  RRR S1S2  Abdomen:  Soft nontender  Extremities:  no edema    Medications:    carvedilol  3.125 mg Oral BID WC    lisinopril  2.5 mg Oral Daily    sodium chloride flush  5-40 mL IntraVENous 2 times per day    pantoprazole  40 mg Oral QAM AC    multivitamin  1 tablet Oral Daily    vancomycin  750 mg IntraVENous Q12H    atorvastatin  80 mg Oral Nightly    influenza virus vaccine  0.5 mL IntraMUSCular Prior to discharge    fentanNYL  50 mcg IntraVENous Once    tiotropium  2 puff Inhalation Daily    budesonide  0.5 mg Nebulization BID    And    Arformoterol Tartrate  15 mcg Nebulization BID      [START ON 1/3/2022] lactated ringers      sodium chloride Stopped (12/30/21 9470)     sodium chloride flush, sodium chloride, perflutren lipid microspheres, albuterol, ondansetron **OR** ondansetron, polyethylene glycol, acetaminophen **OR** acetaminophen    Lab Data:  CBC:   Recent Labs     12/29/21  0930 12/29/21  0930 12/29/21  2104 12/30/21  0708 12/31/21  0653   WBC 6.6  --   --  8.0 6.8   HGB 8.3*   < > 10.3* 11.0* 11.0*   HCT 25.6*   < > 31.4* 33.6* 33.1*   MCV 82.5  --   --  84.4 84.0     --   --  178 191    < > = values in this interval not displayed.      BMP:   Recent Labs 12/29/21  0930 12/30/21  0708    140   K 4.2 3.6    109   CO2 16* 24   BUN 20 23*   CREATININE <0.5* 0.6*     LIVER PROFILE: No results for input(s): AST, ALT, LIPASE, BILIDIR, BILITOT, ALKPHOS in the last 72 hours. Invalid input(s): AMYLASE,  ALB  PT/INR: No results for input(s): PROTIME, INR in the last 72 hours. APTT: No results for input(s): APTT in the last 72 hours. BNP:  No results for input(s): BNP in the last 72 hours. IMAGING:     Assessment:  Patient Active Problem List    Diagnosis Date Noted    Syncope and collapse     Anemia 12/24/2021    Visit for wound check 06/06/2018    Basal cell carcinoma of right side of nose 05/08/2018    Visit for suture removal 05/08/2018    SCCS, mod diff  (squamous cell carcinoma), hand, left 04/24/2018    Mixed hyperlipidemia 04/18/2017    Nicotine use disorder 02/03/2017    COPD, mild ([facility] [address] 06/23/2016    Colon polyp     Basal cell carcinoma of shoulder 09/08/2015    Squamous cell carcinoma of skin of upper limb, including shoulder 09/08/2015    Actinic keratosis 09/08/2015    BPH with obstruction/lower urinary tract symptoms 08/03/2012    Hyperglycemia 12/15/2010    Smoker's respiratory syndrome 12/15/2010       Plan:  1. No problems overnight. No cp/sob. Continue coreg/lisinopril/statin. Await CABG Monday.        Core Measures:  · Discharge instructions:   · LVEF documented:   · ACEI for LV dysfunction:   · Smoking Cessation:    Marc Forbes MD, MD 12/31/2021 7:26 AM

## 2021-12-31 NOTE — PROGRESS NOTES
Hospitalist Progress Note      PCP: No primary care provider on file. Date of Admission: 12/24/2021    Hospital Course:  79-year-old male with past medical history of COPD, hyperlipidemia presented to the hospital with episode of syncope and chest pressure. Patient says that he was doing work around the house including fumigation for bedbugs and went to get more supplies. He said that while ambulating to the store he became lightheaded had a syncopal episode. Patient was brought to the hospital.  He denied any shortness of breath but did have some chest pressure. Denies any fevers, chills or any other symptoms recently. On arrival to the hospital he was noted to be hemodynamically stable. Lab work showed hemoglobin of 5.6. Subjective:  Pt has no new complaints.  CABG still planned for Monday, 01/03/2022        Medications:  Reviewed    Infusion Medications    [START ON 1/3/2022] lactated ringers      sodium chloride Stopped (12/30/21 3857)     Scheduled Medications    vancomycin  1,000 mg IntraVENous Q12H    carvedilol  3.125 mg Oral BID WC    lisinopril  2.5 mg Oral Daily    sodium chloride flush  5-40 mL IntraVENous 2 times per day    pantoprazole  40 mg Oral QAM AC    multivitamin  1 tablet Oral Daily    atorvastatin  80 mg Oral Nightly    influenza virus vaccine  0.5 mL IntraMUSCular Prior to discharge    fentanNYL  50 mcg IntraVENous Once    tiotropium  2 puff Inhalation Daily    budesonide  0.5 mg Nebulization BID    And    Arformoterol Tartrate  15 mcg Nebulization BID     PRN Meds: sodium chloride flush, sodium chloride, perflutren lipid microspheres, albuterol, ondansetron **OR** ondansetron, polyethylene glycol, acetaminophen **OR** acetaminophen      Intake/Output Summary (Last 24 hours) at 12/31/2021 1002  Last data filed at 12/31/2021 0539  Gross per 24 hour   Intake 1601.72 ml   Output 725 ml   Net 876.72 ml       Physical Exam Performed:    /69   Pulse 71 Temp 97.3 °F (36.3 °C) (Oral)   Resp 18   Ht 5' 7\" (1.702 m)   Wt 168 lb 10.4 oz (76.5 kg)   SpO2 94%   BMI 26.41 kg/m²   General appearance: 77-year-old man who is again laying in bed in no apparent distress, appears stated age and cooperative. HEENT: Pupils equal, round, and reactive to light. Conjunctivae/corneas clear. Neck: Supple, with full range of motion. No jugular venous distention. Trachea midline. Respiratory:  Normal respiratory effort. Clear to auscultation, bilaterally without Rales/Wheezes/Rhonchi. Cardiovascular: Regular rate and rhythm with normal S1/S2 without murmurs, rubs or gallops. Abdomen: Soft, non-tender, non-distended with normal bowel sounds. Musculoskeletal: No clubbing, cyanosis or edema bilaterally. Full range of motion without deformity. Skin: Skin color, texture, turgor normal.  No rashes or lesions. Neurologic:  Neurovascularly intact without any focal sensory/motor deficits. Cranial nerves: II-XII intact, grossly non-focal.  Psychiatric: Alert and oriented,confused intermittently  Capillary Refill: Brisk,3 seconds, normal   Peripheral Pulses: +2 palpable, equal bilaterally       Labs:   Recent Labs     12/29/21  0930 12/29/21  0930 12/29/21  2104 12/30/21  0708 12/31/21  0653   WBC 6.6  --   --  8.0 6.8   HGB 8.3*   < > 10.3* 11.0* 11.0*   HCT 25.6*   < > 31.4* 33.6* 33.1*     --   --  178 191    < > = values in this interval not displayed. Recent Labs     12/29/21  0930 12/30/21  0708 12/31/21  0653    140 136   K 4.2 3.6 3.6    109 107   CO2 16* 24 21   BUN 20 23* 19   CREATININE <0.5* 0.6* 0.6*   CALCIUM 7.7* 8.0* 7.5*     No results for input(s): AST, ALT, BILIDIR, BILITOT, ALKPHOS in the last 72 hours. Recent Labs     12/31/21  0653   INR 1.39*     No results for input(s): Ariela Peek in the last 72 hours.     Urinalysis:      Lab Results   Component Value Date    NITRU Negative 12/30/2021    WBCUA 10-20 12/30/2021    BACTERIA 2+ 12/30/2021    RBCUA 5-10 12/30/2021    BLOODU TRACE-INTACT 12/30/2021    SPECGRAV 1.025 12/30/2021    GLUCOSEU Negative 12/30/2021       Radiology:  VL PRE OP VEIN MAPPING         VL DUP CAROTID BILATERAL         CT HEAD WO CONTRAST   Final Result      No evidence of acute intracranial abnormality. XR CHEST PORTABLE   Final Result      Mild bibasilar atelectasis versus airspace disease. Assessment/Plan:    Active Hospital Problems    Diagnosis     Non-ST elevated myocardial infarction (non-STEMI) ([facility] [address] [I21.4]     Syncope and collapse [R55]     Anemia [D64.9]            NSTEMI  Presented w/ chest pain and trop positive. Cardiology consulted   [address] [place] [address] with severe CAD, low Ef of 35 %  - Holding heparin and asa given concern for gi bleed  - High dose statin  - CTVS consulted,GI on board  - CABG planned for Monday, 1/3/2022     Acute blood loss anemia, liekly UGIB  Presented with Hgb of 5.4, syncope, elevated trops. Hemodynamically stable. - Status post 2 units PRBC, Hgb improved appropriately  - Venofer  - Pending folate, B12  - Protonix po  - EGD on 12/27, need colonoscopy once cardiac stable  - GI following, colonoscopy planned for 3 weeks after CABG   COPD  Stable w/o exacerbation  - Continue current medications     MRSA UTI  - Started on Vanc, will transition to PO once able to take oral    AMS  - Resolved; likely due to above        DVT Prophylaxis:   Diet: ADULT DIET; Regular;  Low Sodium (2 gm)  Diet NPO Exceptions are: Sips of Water with Meds  Code Status: Prior    PT/OT Eval Status: n/a     Dispo - CABG 01/03/2022    Yemi Raymundo MD

## 2022-01-01 LAB
ANION GAP SERPL CALCULATED.3IONS-SCNC: 7 MMOL/L (ref 3–16)
BLOOD BANK DISPENSE STATUS: NORMAL
BLOOD BANK DISPENSE STATUS: NORMAL
BLOOD BANK PRODUCT CODE: NORMAL
BLOOD BANK PRODUCT CODE: NORMAL
BPU ID: NORMAL
BPU ID: NORMAL
BUN BLDV-MCNC: 16 MG/DL (ref 7–20)
CALCIUM SERPL-MCNC: 7.6 MG/DL (ref 8.3–10.6)
CHLORIDE BLD-SCNC: 105 MMOL/L (ref 99–110)
CO2: 22 MMOL/L (ref 21–32)
CREAT SERPL-MCNC: 0.6 MG/DL (ref 0.8–1.3)
DESCRIPTION BLOOD BANK: NORMAL
DESCRIPTION BLOOD BANK: NORMAL
GFR AFRICAN AMERICAN: >60
GFR NON-AFRICAN AMERICAN: >60
GLUCOSE BLD-MCNC: 96 MG/DL (ref 70–99)
HCT VFR BLD CALC: 32.5 % (ref 40.5–52.5)
HEMOGLOBIN: 10.6 G/DL (ref 13.5–17.5)
MCH RBC QN AUTO: 27.6 PG (ref 26–34)
MCHC RBC AUTO-ENTMCNC: 32.6 G/DL (ref 31–36)
MCV RBC AUTO: 84.7 FL (ref 80–100)
MRSA CULTURE ONLY: ABNORMAL
ORGANISM: ABNORMAL
PDW BLD-RTO: 20.1 % (ref 12.4–15.4)
PLATELET # BLD: 193 K/UL (ref 135–450)
PMV BLD AUTO: 8.3 FL (ref 5–10.5)
POTASSIUM SERPL-SCNC: 3.6 MMOL/L (ref 3.5–5.1)
RBC # BLD: 3.84 M/UL (ref 4.2–5.9)
SODIUM BLD-SCNC: 134 MMOL/L (ref 136–145)
WBC # BLD: 6.3 K/UL (ref 4–11)

## 2022-01-01 PROCEDURE — 80048 BASIC METABOLIC PNL TOTAL CA: CPT

## 2022-01-01 PROCEDURE — 99232 SBSQ HOSP IP/OBS MODERATE 35: CPT | Performed by: NURSE PRACTITIONER

## 2022-01-01 PROCEDURE — 6370000000 HC RX 637 (ALT 250 FOR IP): Performed by: INTERNAL MEDICINE

## 2022-01-01 PROCEDURE — 87040 BLOOD CULTURE FOR BACTERIA: CPT

## 2022-01-01 PROCEDURE — 2580000003 HC RX 258: Performed by: INTERNAL MEDICINE

## 2022-01-01 PROCEDURE — 2580000003 HC RX 258: Performed by: THORACIC SURGERY (CARDIOTHORACIC VASCULAR SURGERY)

## 2022-01-01 PROCEDURE — 6360000002 HC RX W HCPCS: Performed by: INTERNAL MEDICINE

## 2022-01-01 PROCEDURE — 94664 DEMO&/EVAL PT USE INHALER: CPT

## 2022-01-01 PROCEDURE — 99232 SBSQ HOSP IP/OBS MODERATE 35: CPT | Performed by: THORACIC SURGERY (CARDIOTHORACIC VASCULAR SURGERY)

## 2022-01-01 PROCEDURE — 94640 AIRWAY INHALATION TREATMENT: CPT

## 2022-01-01 PROCEDURE — 36415 COLL VENOUS BLD VENIPUNCTURE: CPT

## 2022-01-01 PROCEDURE — 6370000000 HC RX 637 (ALT 250 FOR IP): Performed by: NURSE PRACTITIONER

## 2022-01-01 PROCEDURE — 85027 COMPLETE CBC AUTOMATED: CPT

## 2022-01-01 PROCEDURE — 6370000000 HC RX 637 (ALT 250 FOR IP): Performed by: THORACIC SURGERY (CARDIOTHORACIC VASCULAR SURGERY)

## 2022-01-01 PROCEDURE — 1200000000 HC SEMI PRIVATE

## 2022-01-01 RX ORDER — POLYMYXIN B SULFATE AND TRIMETHOPRIM 1; 10000 MG/ML; [USP'U]/ML
1 SOLUTION OPHTHALMIC
Status: DISCONTINUED | OUTPATIENT
Start: 2022-01-01 | End: 2022-01-10 | Stop reason: HOSPADM

## 2022-01-01 RX ADMIN — SODIUM CHLORIDE, PRESERVATIVE FREE 5 ML: 5 INJECTION INTRAVENOUS at 22:40

## 2022-01-01 RX ADMIN — SODIUM CHLORIDE, PRESERVATIVE FREE 10 ML: 5 INJECTION INTRAVENOUS at 08:35

## 2022-01-01 RX ADMIN — Medication: at 08:36

## 2022-01-01 RX ADMIN — LISINOPRIL 2.5 MG: 2.5 TABLET ORAL at 08:35

## 2022-01-01 RX ADMIN — Medication: at 21:00

## 2022-01-01 RX ADMIN — CARVEDILOL 3.12 MG: 3.12 TABLET, FILM COATED ORAL at 17:51

## 2022-01-01 RX ADMIN — BUDESONIDE 500 MCG: 0.5 SUSPENSION RESPIRATORY (INHALATION) at 10:12

## 2022-01-01 RX ADMIN — THERA TABS 1 TABLET: TAB at 08:35

## 2022-01-01 RX ADMIN — ALBUTEROL SULFATE 2.5 MG: 2.5 SOLUTION RESPIRATORY (INHALATION) at 10:12

## 2022-01-01 RX ADMIN — MUPIROCIN: 20 OINTMENT TOPICAL at 22:40

## 2022-01-01 RX ADMIN — VANCOMYCIN HYDROCHLORIDE 1000 MG: 10 INJECTION, POWDER, LYOPHILIZED, FOR SOLUTION INTRAVENOUS at 08:39

## 2022-01-01 RX ADMIN — POLYMYXIN B SULFATE, TRIMETHOPRIM SULFATE 1 DROP: 10000; 1 SOLUTION/ DROPS OPHTHALMIC at 20:59

## 2022-01-01 RX ADMIN — ARFORMOTEROL TARTRATE 15 MCG: 15 SOLUTION RESPIRATORY (INHALATION) at 10:12

## 2022-01-01 RX ADMIN — TIOTROPIUM BROMIDE INHALATION SPRAY 2 PUFF: 3.12 SPRAY, METERED RESPIRATORY (INHALATION) at 10:13

## 2022-01-01 RX ADMIN — POLYMYXIN B SULFATE, TRIMETHOPRIM SULFATE 1 DROP: 10000; 1 SOLUTION/ DROPS OPHTHALMIC at 12:41

## 2022-01-01 RX ADMIN — ATORVASTATIN CALCIUM 80 MG: 80 TABLET, FILM COATED ORAL at 21:09

## 2022-01-01 RX ADMIN — POLYMYXIN B SULFATE, TRIMETHOPRIM SULFATE 1 DROP: 10000; 1 SOLUTION/ DROPS OPHTHALMIC at 16:32

## 2022-01-01 RX ADMIN — CARVEDILOL 3.12 MG: 3.12 TABLET, FILM COATED ORAL at 08:35

## 2022-01-01 RX ADMIN — VANCOMYCIN HYDROCHLORIDE 1000 MG: 10 INJECTION, POWDER, LYOPHILIZED, FOR SOLUTION INTRAVENOUS at 21:09

## 2022-01-01 RX ADMIN — PANTOPRAZOLE SODIUM 40 MG: 40 TABLET, DELAYED RELEASE ORAL at 06:00

## 2022-01-01 ASSESSMENT — PAIN SCALES - GENERAL
PAINLEVEL_OUTOF10: 0

## 2022-01-01 NOTE — PROGRESS NOTES
CC:  HPI:     S: Denies cp or sob. Tele: Sinus     O:  Physical Exam:  /75   Pulse 73   Temp 97.1 °F (36.2 °C) (Oral)   Resp 18   Ht 5' 7\" (1.702 m)   Wt 168 lb 10.4 oz (76.5 kg)   SpO2 91%   BMI 26.41 kg/m²    General (appearance):  No acute distress  Eyes: anicteric   Neck: soft, No JVD  Ears/Nose/Mouth/Thorat: No cyanosis  CV: RRR   Respiratory:  Normal effort  GI: soft, non-tender, non-distended  Skin: Warm, dry. No rashes  Neuro/Psych: Alert and oriented x3. Appropriate behavior  Ext:  No c/c. Pulses:  2+ radial     I.O's= +1.4 L     Weight  Admission: Weight: 168 lb 10.4 oz (76.5 kg)   Today: Weight: 168 lb 10.4 oz (76.5 kg)    CBC: Recent Labs     21  0708 21  0653 22  0753   WBC 8.0 6.8 6.3   HGB 11.0* 11.0* 10.6*   HCT 33.6* 33.1* 32.5*   MCV 84.4 84.0 84.7    191 193     BMP:   Recent Labs     21  0708 21  0653 22  0753    136 134*   K 3.6 3.6 3.6    107 105   CO2 24 21 22   BUN 23* 19 16   CREATININE 0.6* 0.6* 0.6*     Mag:   Lab Results   Component Value Date    MG 2.00 2021     PT/INR:   Recent Labs     21  0653   PROTIME 15.9*   INR 1.39*     Pro-BNP:   Lab Results   Component Value Date    PROBNP 2,890 2021         Imagin2021 Echo:     Normal left ventricle size and wall thickness. Overall left ventricular systolic function appears reduced with an ejection   fraction of 25-30%. There is hypokinesis of the basal and mid inferior walls. There is hypokinesis of the mid and basal inferolateral walls. There is hypokinesis of the mid and basal inferoseptal walls. Diastolic filling parameters suggest grade I diastolic dysfunction. Mild mitral and tricuspid regurgitation   Estimated pulmonary artery systolic pressure is 31 mmHg assuming a right   atrial pressure of 3 mmHg.       2021 Coronary angiogram  Left ventricular pressure 11 mmHg  Aortic pressure 95/47 mmHg  Coronary anatomy:   The left main coronary artery is heavily calcified. Has severe ostial narrowing at least 90 to 95%. Our catheter did ventricularized upon engagement. Left anterior descending artery is mildly stenotic proximally. There is good mid and distal anatomy with good landing zones. Circumflex artery has proximal calcification and moderate lesions. The right coronary artery is 100% occluded after its takeoff. I do not identify any left to right or right to right collateralization. Left ventriculogram shows ejection fraction of 25 to 30% %. Moderate global hypokinesia. Assessment:  70 y.o. with syncope and chest pain.    Severe CAD  HTN  HLD  GI bleed  COPD  UTI    Plan:  -Keep K>4, Mg>2.  -Coreg  -Lipitor  -Lisinopril  -ASA when safe     CABG planned for Monday

## 2022-01-01 NOTE — PROGRESS NOTES
Chief Complaint:  Exertional chest pain    Subj:   Resting comfortably in bed. Denies chest pain    Obj:    Blood pressure 127/75, pulse 73, temperature 97.1 °F (36.2 °C), temperature source Oral, resp. rate 18, height 5' 7\" (1.702 m), weight 168 lb 10.4 oz (76.5 kg), SpO2 92 %. Lungs clear   Abdomen soft              Cor RRR                 Diagnostics:     Recent Labs     12/30/21  0708 12/31/21  0653 01/01/22  0753   WBC 8.0 6.8 6.3   HGB 11.0* 11.0* 10.6*   HCT 33.6* 33.1* 32.5*    191 193                                                                  Recent Labs     12/30/21  0708 12/31/21  0653 01/01/22  0753    136 134*   K 3.6 3.6 3.6    107 105   CO2 24 21 22   BUN 23* 19 16   CREATININE 0.6* 0.6* 0.6*   GLUCOSE 101* 107* 96          No results for input(s): MG in the last 72 hours. No results for input(s): TROPONINI in the last 72 hours. Recent Labs     12/31/21  0653   INR 1.39*     Assess/Plan:   CAD. No chest pain today. Awaiting surgery Monday am with Dr. Mee Membreno.     Jesús Chino MD, MD FACS  1/1/2022  10:46 AM

## 2022-01-01 NOTE — PROGRESS NOTES
Hospitalist Progress Note      PCP: No primary care provider on file. Date of Admission: 12/24/2021    Hospital Course:  72-year-old male with past medical history of COPD, hyperlipidemia presented to the hospital with episode of syncope and chest pressure. Patient says that he was doing work around the house including fumigation for bedbugs and went to get more supplies. He said that while ambulating to the store he became lightheaded had a syncopal episode. Patient was brought to the hospital.  He denied any shortness of breath but did have some chest pressure. Denies any fevers, chills or any other symptoms recently. On arrival to the hospital he was noted to be hemodynamically stable. Lab work showed hemoglobin of 5.6. Subjective:    No acute events overnight. Eyes bothering him, particularly on left, seem to be weeping/sticky.      Medications:  Reviewed    Infusion Medications    [START ON 1/3/2022] lactated ringers      sodium chloride Stopped (12/30/21 2530)     Scheduled Medications    vancomycin  1,000 mg IntraVENous Q12H    Venelex   Topical BID    carvedilol  3.125 mg Oral BID WC    lisinopril  2.5 mg Oral Daily    sodium chloride flush  5-40 mL IntraVENous 2 times per day    pantoprazole  40 mg Oral QAM AC    multivitamin  1 tablet Oral Daily    atorvastatin  80 mg Oral Nightly    influenza virus vaccine  0.5 mL IntraMUSCular Prior to discharge    fentanNYL  50 mcg IntraVENous Once    tiotropium  2 puff Inhalation Daily    budesonide  0.5 mg Nebulization BID    And    Arformoterol Tartrate  15 mcg Nebulization BID     PRN Meds: sodium chloride flush, sodium chloride, perflutren lipid microspheres, albuterol, ondansetron **OR** ondansetron, polyethylene glycol, acetaminophen **OR** acetaminophen      Intake/Output Summary (Last 24 hours) at 1/1/2022 0914  Last data filed at 1/1/2022 0602  Gross per 24 hour   Intake 240 ml   Output 650 ml   Net -410 ml       Physical Exam Performed:    /75   Pulse 73   Temp 97.1 °F (36.2 °C) (Oral)   Resp 18   Ht 5' 7\" (1.702 m)   Wt 168 lb 10.4 oz (76.5 kg)   SpO2 91%   BMI 26.41 kg/m²   General appearance: 70-year-old man who is again laying in bed in no apparent distress, appears stated age and cooperative. HEENT: Pupils equal, round, and reactive to light. Conjunctivae/corneas mild erythema, no pain with EOM. No swelling. }+Mucopurulent discharge on the left  Neck: Supple, with full range of motion. No jugular venous distention. Trachea midline. Respiratory:  Normal respiratory effort. Clear to auscultation, bilaterally without Rales/Wheezes/Rhonchi. Cardiovascular: Regular rate and rhythm with normal S1/S2 without murmurs, rubs or gallops. Abdomen: Soft, non-tender, non-distended with normal bowel sounds. Musculoskeletal: No clubbing, cyanosis or edema bilaterally. Full range of motion without deformity. Skin: Skin color, texture, turgor normal.  No rashes or lesions. Neurologic:  Neurovascularly intact without any focal sensory/motor deficits. Cranial nerves: II-XII intact, grossly non-focal.  Psychiatric: Alert and oriented,confused intermittently  Capillary Refill: Brisk,3 seconds, normal   Peripheral Pulses: +2 palpable, equal bilaterally       Labs:   Recent Labs     12/30/21  0708 12/31/21  0653 01/01/22  0753   WBC 8.0 6.8 6.3   HGB 11.0* 11.0* 10.6*   HCT 33.6* 33.1* 32.5*    191 193     Recent Labs     12/30/21  0708 12/31/21  0653 01/01/22  0753    136 134*   K 3.6 3.6 3.6    107 105   CO2 24 21 22   BUN 23* 19 16   CREATININE 0.6* 0.6* 0.6*   CALCIUM 8.0* 7.5* 7.6*     No results for input(s): AST, ALT, BILIDIR, BILITOT, ALKPHOS in the last 72 hours. Recent Labs     12/31/21  0653   INR 1.39*     No results for input(s): Andrea Grates in the last 72 hours.     Urinalysis:      Lab Results   Component Value Date    NITRU Negative 12/30/2021    WBCUA 10-20 12/30/2021    BACTERIA 2+ 12/30/2021 RBCUA 5-10 12/30/2021    BLOODU TRACE-INTACT 12/30/2021    SPECGRAV 1.025 12/30/2021    GLUCOSEU Negative 12/30/2021       Radiology:  VL PRE OP VEIN MAPPING         VL DUP CAROTID BILATERAL         CT HEAD WO CONTRAST   Final Result      No evidence of acute intracranial abnormality. XR CHEST PORTABLE   Final Result      Mild bibasilar atelectasis versus airspace disease. Assessment/Plan:    Active Hospital Problems    Diagnosis     Non-ST elevated myocardial infarction (non-STEMI) ([facility] [address] [I21.4]     Syncope and collapse [R55]     Anemia [D64.9]            NSTEMI  Presented w/ chest pain and trop positive. Cardiology consulted   [facility] with severe CAD, low Ef of 35 %  - Holding heparin and asa given concern for gi bleed  - High dose statin  - CTVS consulted,GI on board  - CABG planned for Monday, 1/3/2022     Acute blood loss anemia, liekly UGIB  Presented with Hgb of 5.4, syncope, elevated trops. Hemodynamically stable. - Status post 2 units PRBC, Hgb improved appropriately  - Venofer  - Pending folate, B12  - Protonix po  - EGD on 12/27, need colonoscopy once cardiac stable  - GI following, colonoscopy planned for 3 weeks after CABG     COPD  Stable w/o exacerbation  - Continue current medications     MRSA UTI  - Started on Vanc, will transition to PO once able to take oral  - Order blood cultures    AMS  - Resolved; likely due to above     Conjunctivitis, possibly bacterial with mucopurulent discharge  -TMP-poly drops start 1/1    DVT Prophylaxis:   Diet: ADULT DIET; Regular;  Low Sodium (2 gm)  Diet NPO Exceptions are: Sips of Water with Meds  Code Status: Prior    PT/OT Eval Status: n/a     Dispo - CABG 01/03/2022    Queen Beto DO

## 2022-01-01 NOTE — PLAN OF CARE
Problem: Skin Integrity:  Goal: Will show no infection signs and symptoms  Description: Will show no infection signs and symptoms  Outcome: Ongoing  Venelex ointment to coccyx. Assisting with turn and reposition. No new skin breakdown noted. Problem: Falls - Risk of:  Goal: Will remain free from falls  Description: Will remain free from falls  Outcome: Ongoing     Problem: Cardiac:  Goal: Ability to maintain an adequate cardiac output will improve  Description: Ability to maintain an adequate cardiac output will improve  Outcome: Ongoing   pt on RA resp easy, denies any chest pain or SOB.

## 2022-01-01 NOTE — PROGRESS NOTES
Received call from microbiology lab at [facility]. Patient's MRSA nasal swab positive.  Will inform MD.

## 2022-01-01 NOTE — PROGRESS NOTES
Clinical Pharmacy Progress Note    Vancomycin - Management by Pharmacy    Consult Date(s): 12/26/21  Consulting Provider(s): Liv    Assessment / Plan  1) Staph aureus UTI - Vancomycin   Concurrent Antimicrobials: none   Day of Vanc Therapy: day #7   Current Dosing Method: Bayesian-Guided AUC Dosing  o Therapeutic Goal: AUC < 500 mg/L*hr    o Dose increased to 1000mg IV q12h yesterday to achieve an AUC closer to 400 with estimated trough ~10mg/L.  o Repeat levels likely won't be needed given anticipated short course of therapy and continued clinical improvement.  Will continue to monitor clinical condition and make adjustments to regimen as appropriate.  Would recommend re-evaluating for continued need for Vancomycin at 7 days of therapy (1/1/22)  o Repeat urine culture NGTD. Blood cultures ordered today, will follow up with results tomorrow and recommend de-escalation if no growth    Thanks for consulting pharmacy! Juju Barrios Pharm. D. Pharmacy Resident   Please call with questions [phone]  1/1/2022 1:34 PM      Interval update:  [facility] with severe 3 vessel CAD with reduced EF 25-30%. Planning CABG on 1/3/22. Repeat urine culture with NGTD. Repeat blood cultures sent today. Subjective/Objective:  Sunita Hamilton is a 70 y.o. male with a PMHx significant for COPD, HLD, BPH, and shingles who presented with syncope and chest pressure. Patient found to have NSTEMI, acute blood loss anemia likely 2/2 GI bleed, and staph aureus UTI. Pharmacy is consulted to dose vancomycin.     Ht Readings from Last 1 Encounters:   12/24/21 5' 7\" (1.702 m)       Wt Readings from Last 1 Encounters:   12/24/21 168 lb 10.4 oz (76.5 kg)       Current & Prior Antimicrobial Regimen(s):    (12/25-12/26)   Vancomycin - ptd  o 750 mg IV q12h (12/26 - 12/31)  o 1000mg IV q12h (12/31-current)     Vancomycin Level(s) / Doses  Date Time Dose Level / Type of Level Interpretation   12/27 11:39 750 mg IV q12h Random = 14.3 mg/L Predicted AUC = 410 with trough 13.1.   12/31 06:53 750mg IV q12h Random = 7.0 mg/L Predicted AUC = 278 with trough 7.4   Note: Serum levels collected for AUC-based dosing may be high if collected in close proximity to the dose administered. This is not necessarily an indicator of toxicity. Cultures & Sensitivities:  Date Site Micro Susceptibility / Result   12/26 Urine Staph aureus  Sensitive to Cefazolin, Oxacillin, Bactrim   12/26 Rapid COVID-19 negative    12/30 MRSA Nasal PCR Further report to follow    12/30 Urine NGTD    1/1 Blood  x2 Active                  Labs / Ancillary Data:    Estimated Creatinine Clearance: 106 mL/min (A) (based on SCr of 0.6 mg/dL (L)).     Recent Labs     12/30/21  0708 12/31/21  0653 01/01/22  0753   CREATININE 0.6* 0.6* 0.6*   BUN 23* 19 16   WBC 8.0 6.8 6.3

## 2022-01-01 NOTE — PLAN OF CARE
Problem: Falls - Risk of:  Goal: Will remain free from falls  Description: Will remain free from falls  1/1/2022 1045 by Josemanuel Espinosa  Outcome: Ongoing  Note: Patient remains free of falls this shift. Room free of clutter, bed in low position and call light within reach     Problem: Falls - Risk of:  Goal: Absence of physical injury  Description: Absence of physical injury  1/1/2022 1045 by Josemanuel Espinosa  Outcome: Ongoing  Note: Patient remains free of physical injury at this time. Will conitnue to monitor     Problem: Skin Integrity:  Goal: Will show no infection signs and symptoms  Description: Will show no infection signs and symptoms  1/1/2022 1045 by Josemanuel Espinosa  Outcome: Ongoing  Note: Patient continues on antibiotic therapy with no ill effect noted. Will continue to monitor.

## 2022-01-01 NOTE — PROGRESS NOTES
Patient assessment completed. VSS and patient is afebrile. Resting in bed. Requesting breakfast at this time. Bed alarm in use. Sister at bedside. Will continue to monitor. Bed in low position and call light within reach.

## 2022-01-02 LAB
ABO/RH: NORMAL
ANION GAP SERPL CALCULATED.3IONS-SCNC: 7 MMOL/L (ref 3–16)
ANTIBODY SCREEN: NORMAL
BUN BLDV-MCNC: 14 MG/DL (ref 7–20)
CALCIUM SERPL-MCNC: 7.9 MG/DL (ref 8.3–10.6)
CHLORIDE BLD-SCNC: 100 MMOL/L (ref 99–110)
CO2: 26 MMOL/L (ref 21–32)
CREAT SERPL-MCNC: 0.6 MG/DL (ref 0.8–1.3)
GFR AFRICAN AMERICAN: >60
GFR NON-AFRICAN AMERICAN: >60
GLUCOSE BLD-MCNC: 86 MG/DL (ref 70–99)
HCT VFR BLD CALC: 35.4 % (ref 40.5–52.5)
HEMOGLOBIN: 11.8 G/DL (ref 13.5–17.5)
MCH RBC QN AUTO: 27.8 PG (ref 26–34)
MCHC RBC AUTO-ENTMCNC: 33.2 G/DL (ref 31–36)
MCV RBC AUTO: 83.8 FL (ref 80–100)
PDW BLD-RTO: 20.1 % (ref 12.4–15.4)
PLATELET # BLD: 241 K/UL (ref 135–450)
PMV BLD AUTO: 8.1 FL (ref 5–10.5)
POTASSIUM SERPL-SCNC: 3.9 MMOL/L (ref 3.5–5.1)
RBC # BLD: 4.22 M/UL (ref 4.2–5.9)
SODIUM BLD-SCNC: 133 MMOL/L (ref 136–145)
WBC # BLD: 6.7 K/UL (ref 4–11)

## 2022-01-02 PROCEDURE — 86850 RBC ANTIBODY SCREEN: CPT

## 2022-01-02 PROCEDURE — 86901 BLOOD TYPING SEROLOGIC RH(D): CPT

## 2022-01-02 PROCEDURE — 6370000000 HC RX 637 (ALT 250 FOR IP): Performed by: INTERNAL MEDICINE

## 2022-01-02 PROCEDURE — 1200000000 HC SEMI PRIVATE

## 2022-01-02 PROCEDURE — P9017 PLASMA 1 DONOR FRZ W/IN 8 HR: HCPCS

## 2022-01-02 PROCEDURE — 99232 SBSQ HOSP IP/OBS MODERATE 35: CPT | Performed by: NURSE PRACTITIONER

## 2022-01-02 PROCEDURE — 86900 BLOOD TYPING SEROLOGIC ABO: CPT

## 2022-01-02 PROCEDURE — 6360000002 HC RX W HCPCS: Performed by: INTERNAL MEDICINE

## 2022-01-02 PROCEDURE — 6360000002 HC RX W HCPCS: Performed by: THORACIC SURGERY (CARDIOTHORACIC VASCULAR SURGERY)

## 2022-01-02 PROCEDURE — 85027 COMPLETE CBC AUTOMATED: CPT

## 2022-01-02 PROCEDURE — 6370000000 HC RX 637 (ALT 250 FOR IP): Performed by: NURSE PRACTITIONER

## 2022-01-02 PROCEDURE — 6370000000 HC RX 637 (ALT 250 FOR IP): Performed by: THORACIC SURGERY (CARDIOTHORACIC VASCULAR SURGERY)

## 2022-01-02 PROCEDURE — 36415 COLL VENOUS BLD VENIPUNCTURE: CPT

## 2022-01-02 PROCEDURE — 2580000003 HC RX 258: Performed by: INTERNAL MEDICINE

## 2022-01-02 PROCEDURE — 94640 AIRWAY INHALATION TREATMENT: CPT

## 2022-01-02 PROCEDURE — 80048 BASIC METABOLIC PNL TOTAL CA: CPT

## 2022-01-02 PROCEDURE — 2580000003 HC RX 258: Performed by: THORACIC SURGERY (CARDIOTHORACIC VASCULAR SURGERY)

## 2022-01-02 RX ORDER — CHLORHEXIDINE GLUCONATE 0.12 MG/ML
15 RINSE ORAL ONCE
Status: DISCONTINUED | OUTPATIENT
Start: 2022-01-03 | End: 2022-01-03 | Stop reason: HOSPADM

## 2022-01-02 RX ORDER — CHLORHEXIDINE GLUCONATE 4 G/100ML
SOLUTION TOPICAL SEE ADMIN INSTRUCTIONS
Status: DISCONTINUED | OUTPATIENT
Start: 2022-01-02 | End: 2022-01-03 | Stop reason: HOSPADM

## 2022-01-02 RX ADMIN — POLYMYXIN B SULFATE, TRIMETHOPRIM SULFATE 1 DROP: 10000; 1 SOLUTION/ DROPS OPHTHALMIC at 21:32

## 2022-01-02 RX ADMIN — THERA TABS 1 TABLET: TAB at 09:21

## 2022-01-02 RX ADMIN — POLYMYXIN B SULFATE, TRIMETHOPRIM SULFATE 1 DROP: 10000; 1 SOLUTION/ DROPS OPHTHALMIC at 00:35

## 2022-01-02 RX ADMIN — ATORVASTATIN CALCIUM 80 MG: 80 TABLET, FILM COATED ORAL at 21:40

## 2022-01-02 RX ADMIN — VANCOMYCIN HYDROCHLORIDE 1000 MG: 10 INJECTION, POWDER, LYOPHILIZED, FOR SOLUTION INTRAVENOUS at 09:31

## 2022-01-02 RX ADMIN — ARFORMOTEROL TARTRATE 15 MCG: 15 SOLUTION RESPIRATORY (INHALATION) at 08:24

## 2022-01-02 RX ADMIN — SODIUM CHLORIDE, PRESERVATIVE FREE 10 ML: 5 INJECTION INTRAVENOUS at 09:22

## 2022-01-02 RX ADMIN — LISINOPRIL 2.5 MG: 2.5 TABLET ORAL at 09:21

## 2022-01-02 RX ADMIN — CARVEDILOL 3.12 MG: 3.12 TABLET, FILM COATED ORAL at 18:21

## 2022-01-02 RX ADMIN — POLYMYXIN B SULFATE, TRIMETHOPRIM SULFATE 1 DROP: 10000; 1 SOLUTION/ DROPS OPHTHALMIC at 12:36

## 2022-01-02 RX ADMIN — MUPIROCIN: 20 OINTMENT TOPICAL at 09:22

## 2022-01-02 RX ADMIN — POLYMYXIN B SULFATE, TRIMETHOPRIM SULFATE 1 DROP: 10000; 1 SOLUTION/ DROPS OPHTHALMIC at 16:50

## 2022-01-02 RX ADMIN — BUDESONIDE 500 MCG: 0.5 SUSPENSION RESPIRATORY (INHALATION) at 20:12

## 2022-01-02 RX ADMIN — ARFORMOTEROL TARTRATE 15 MCG: 15 SOLUTION RESPIRATORY (INHALATION) at 20:12

## 2022-01-02 RX ADMIN — Medication: at 21:33

## 2022-01-02 RX ADMIN — TIOTROPIUM BROMIDE INHALATION SPRAY 2 PUFF: 3.12 SPRAY, METERED RESPIRATORY (INHALATION) at 08:25

## 2022-01-02 RX ADMIN — BUDESONIDE 500 MCG: 0.5 SUSPENSION RESPIRATORY (INHALATION) at 08:24

## 2022-01-02 RX ADMIN — POLYMYXIN B SULFATE, TRIMETHOPRIM SULFATE 1 DROP: 10000; 1 SOLUTION/ DROPS OPHTHALMIC at 04:43

## 2022-01-02 RX ADMIN — CARVEDILOL 3.12 MG: 3.12 TABLET, FILM COATED ORAL at 09:21

## 2022-01-02 RX ADMIN — Medication: at 09:26

## 2022-01-02 RX ADMIN — MUPIROCIN: 20 OINTMENT TOPICAL at 21:33

## 2022-01-02 RX ADMIN — SODIUM CHLORIDE, PRESERVATIVE FREE 5 ML: 5 INJECTION INTRAVENOUS at 21:33

## 2022-01-02 RX ADMIN — PANTOPRAZOLE SODIUM 40 MG: 40 TABLET, DELAYED RELEASE ORAL at 09:22

## 2022-01-02 RX ADMIN — POLYMYXIN B SULFATE, TRIMETHOPRIM SULFATE 1 DROP: 10000; 1 SOLUTION/ DROPS OPHTHALMIC at 09:22

## 2022-01-02 NOTE — PROGRESS NOTES
narrowing at least 90 to 95%. Our catheter did ventricularized upon engagement. Left anterior descending artery is mildly stenotic proximally. There is good mid and distal anatomy with good landing zones. Circumflex artery has proximal calcification and moderate lesions. The right coronary artery is 100% occluded after its takeoff. I do not identify any left to right or right to right collateralization. Left ventriculogram shows ejection fraction of 25 to 30% %. Moderate global hypokinesia. Assessment:  70 y.o. with syncope and chest pain.    Severe CAD  HTN  HLD  GI bleed  COPD  UTI  NSVT    Plan:  -Keep K>4, Mg>2.  -Coreg  -Lipitor  -Lisinopril  -ASA when safe     CABG planned for Monday

## 2022-01-02 NOTE — CONSULTS
Clinical Pharmacy Progress Note    Vancomycin has been discontinued. Pharmacy will sign off. Please re-consult pharmacy if Vancomycin dosing is wanted in the future. Please call with questions.   Vadim OnofreD  PGY-1 Pharmacy Resident  [id number]  1/2/2022 2:26 PM

## 2022-01-02 NOTE — PROGRESS NOTES
Pt had 11 beats of V-tach. Pt sleeping with no complaints of chest pain or SOB. Vital signs stable, afebrile. Will continue to monitor.

## 2022-01-02 NOTE — PROGRESS NOTES
Hospitalist Progress Note      PCP: No primary care provider on file. Date of Admission: 12/24/2021    Hospital Course:  42-year-old male with past medical history of COPD, hyperlipidemia presented to the hospital with episode of syncope and chest pressure. Patient says that he was doing work around the house including fumigation for bedbugs and went to get more supplies. He said that while ambulating to the store he became lightheaded had a syncopal episode. Patient was brought to the hospital.  He denied any shortness of breath but did have some chest pressure. Denies any fevers, chills or any other symptoms recently. On arrival to the hospital he was noted to be hemodynamically stable. Lab work showed hemoglobin of 5.6. Subjective:    No acute events overnight. Eyes bothering him, particularly on left, seem to be weeping/sticky.      Medications:  Reviewed    Infusion Medications    [START ON 1/3/2022] lactated ringers      sodium chloride Stopped (12/30/21 8696)     Scheduled Medications    trimethoprim-polymyxin b  1 drop Both Eyes 6 times per day    mupirocin   Nasal BID    vancomycin  1,000 mg IntraVENous Q12H    Venelex   Topical BID    carvedilol  3.125 mg Oral BID WC    lisinopril  2.5 mg Oral Daily    sodium chloride flush  5-40 mL IntraVENous 2 times per day    pantoprazole  40 mg Oral QAM AC    multivitamin  1 tablet Oral Daily    atorvastatin  80 mg Oral Nightly    influenza virus vaccine  0.5 mL IntraMUSCular Prior to discharge    fentanNYL  50 mcg IntraVENous Once    tiotropium  2 puff Inhalation Daily    budesonide  0.5 mg Nebulization BID    And    Arformoterol Tartrate  15 mcg Nebulization BID     PRN Meds: sodium chloride flush, sodium chloride, perflutren lipid microspheres, albuterol, ondansetron **OR** ondansetron, polyethylene glycol, acetaminophen **OR** acetaminophen      Intake/Output Summary (Last 24 hours) at 1/2/2022 1351  Last data filed at 1/2/2022 0932  Gross per 24 hour   Intake 680 ml   Output 900 ml   Net -220 ml       Physical Exam Performed:    /74   Pulse 75   Temp 99.9 °F (37.7 °C) (Axillary)   Resp 18   Ht 5' 7\" (1.702 m)   Wt 168 lb 10.4 oz (76.5 kg)   SpO2 96%   BMI 26.41 kg/m²   General appearance: 77-year-old man who is again laying in bed in no apparent distress, appears stated age and cooperative. HEENT: Pupils equal, round, and reactive to light. Conjunctivae/corneas mild erythema, no pain with EOM. No swelling. }+Mucopurulent discharge on the left  Neck: Supple, with full range of motion. No jugular venous distention. Trachea midline. Respiratory:  Normal respiratory effort. Clear to auscultation, bilaterally without Rales/Wheezes/Rhonchi. Cardiovascular: Regular rate and rhythm with normal S1/S2 without murmurs, rubs or gallops. Abdomen: Soft, non-tender, non-distended with normal bowel sounds. Musculoskeletal: No clubbing, cyanosis or edema bilaterally. Full range of motion without deformity. Skin: Skin color, texture, turgor normal.  No rashes or lesions. Neurologic:  Neurovascularly intact without any focal sensory/motor deficits. Cranial nerves: II-XII intact, grossly non-focal.  Psychiatric: Alert and oriented,confused intermittently  Capillary Refill: Brisk,3 seconds, normal   Peripheral Pulses: +2 palpable, equal bilaterally       Labs:   Recent Labs     12/31/21  0653 01/01/22  0753 01/02/22  1308   WBC 6.8 6.3 6.7   HGB 11.0* 10.6* 11.8*   HCT 33.1* 32.5* 35.4*    193 241     Recent Labs     12/31/21  0653 01/01/22  0753 01/02/22  1308    134* 133*   K 3.6 3.6 3.9    105 100   CO2 21 22 26   BUN 19 16 14   CREATININE 0.6* 0.6* 0.6*   CALCIUM 7.5* 7.6* 7.9*     No results for input(s): AST, ALT, BILIDIR, BILITOT, ALKPHOS in the last 72 hours. Recent Labs     12/31/21  0653   INR 1.39*     No results for input(s): James Min in the last 72 hours.     Urinalysis:      Lab Results Component Value Date    NITRU Negative 12/30/2021    WBCUA 10-20 12/30/2021    BACTERIA 2+ 12/30/2021    RBCUA 5-10 12/30/2021    BLOODU TRACE-INTACT 12/30/2021    SPECGRAV 1.025 12/30/2021    GLUCOSEU Negative 12/30/2021       Radiology:  VL PRE OP VEIN MAPPING   Final Result      VL DUP CAROTID BILATERAL   Final Result      CT HEAD WO CONTRAST   Final Result      No evidence of acute intracranial abnormality. XR CHEST PORTABLE   Final Result      Mild bibasilar atelectasis versus airspace disease. Assessment/Plan:    Active Hospital Problems    Diagnosis     Non-ST elevated myocardial infarction (non-STEMI) ([facility] [address] [I21.4]     Syncope and collapse [R55]     Anemia [D64.9]            NSTEMI  Presented w/ chest pain and trop positive. Cardiology consulted   [address] [place] [address] with severe CAD, low Ef of 35 %  - Holding heparin and asa given concern for gi bleed  - High dose statin  - [facility] consulted,GI on board  - CABG planned for tomorrow, 1/3/2022     Acute blood loss anemia, liekly UGIB  Presented with Hgb of 5.4, syncope, elevated trops. Hemodynamically stable. - Status post 2 units PRBC, Hgb improved appropriately  - Venofer  - Pending folate, B12  - Protonix po  - EGD on 12/27, need colonoscopy once cardiac stable  - GI following, colonoscopy planned for 3 weeks after CABG     COPD  Stable w/o exacerbation  - Continue current medications     MRSA UTI  - Started on Vanc, blood and urine CX Negative, will dc vanc. AMS  - Resolved; likely due to above     Conjunctivitis, possibly bacterial with mucopurulent discharge  -TMP-poly drops started 1/1    DVT Prophylaxis:   Diet: ADULT DIET; Regular;  Low Sodium (2 gm)  Diet NPO Exceptions are: Sips of Water with Meds  Code Status: Prior    PT/OT Eval Status: n/a     Dispo - CABG tomorrow 01/03/2022    Renzo Sidhu MD

## 2022-01-03 ENCOUNTER — APPOINTMENT (OUTPATIENT)
Dept: GENERAL RADIOLOGY | Age: 72
DRG: 233 | End: 2022-01-03
Payer: MEDICARE

## 2022-01-03 ENCOUNTER — ANESTHESIA EVENT (OUTPATIENT)
Dept: OPERATING ROOM | Age: 72
DRG: 233 | End: 2022-01-03
Payer: MEDICARE

## 2022-01-03 ENCOUNTER — ANESTHESIA (OUTPATIENT)
Dept: OPERATING ROOM | Age: 72
DRG: 233 | End: 2022-01-03
Payer: MEDICARE

## 2022-01-03 VITALS
SYSTOLIC BLOOD PRESSURE: 113 MMHG | TEMPERATURE: 99.5 F | RESPIRATION RATE: 12 BRPM | OXYGEN SATURATION: 100 % | DIASTOLIC BLOOD PRESSURE: 62 MMHG

## 2022-01-03 LAB
A/G RATIO: 1.1 (ref 1.1–2.2)
ACTIVATED CLOTTING TIME: 102 SEC (ref 99–130)
ACTIVATED CLOTTING TIME: 116 SEC (ref 99–130)
ACTIVATED CLOTTING TIME: 127 SEC (ref 99–130)
ACTIVATED CLOTTING TIME: 419 SEC (ref 99–130)
ACTIVATED CLOTTING TIME: 445 SEC (ref 99–130)
ACTIVATED CLOTTING TIME: 458 SEC (ref 99–130)
ACTIVATED CLOTTING TIME: 466 SEC (ref 99–130)
ALBUMIN SERPL-MCNC: 2.8 G/DL (ref 3.4–5)
ALP BLD-CCNC: 92 U/L (ref 40–129)
ALT SERPL-CCNC: 25 U/L (ref 10–40)
ANION GAP SERPL CALCULATED.3IONS-SCNC: 6 MMOL/L (ref 3–16)
ANION GAP SERPL CALCULATED.3IONS-SCNC: 7 MMOL/L (ref 3–16)
ANION GAP SERPL CALCULATED.3IONS-SCNC: 8 MMOL/L (ref 3–16)
ANION GAP SERPL CALCULATED.3IONS-SCNC: 8 MMOL/L (ref 3–16)
APTT: 37 SEC (ref 26.2–38.6)
AST SERPL-CCNC: 23 U/L (ref 15–37)
BASE EXCESS ARTERIAL: -1 (ref -3–3)
BASE EXCESS ARTERIAL: -1 (ref -3–3)
BASE EXCESS ARTERIAL: -2 (ref -3–3)
BASE EXCESS ARTERIAL: -3 (ref -3–3)
BASE EXCESS ARTERIAL: -4 (ref -3–3)
BILIRUB SERPL-MCNC: 0.7 MG/DL (ref 0–1)
BILIRUBIN DIRECT: <0.2 MG/DL (ref 0–0.3)
BILIRUBIN, INDIRECT: NORMAL MG/DL (ref 0–1)
BLOOD BANK DISPENSE STATUS: NORMAL
BLOOD BANK DISPENSE STATUS: NORMAL
BLOOD BANK PRODUCT CODE: NORMAL
BLOOD BANK PRODUCT CODE: NORMAL
BPU ID: NORMAL
BPU ID: NORMAL
BUN BLDV-MCNC: 15 MG/DL (ref 7–20)
BUN BLDV-MCNC: 15 MG/DL (ref 7–20)
BUN BLDV-MCNC: 18 MG/DL (ref 7–20)
BUN BLDV-MCNC: 18 MG/DL (ref 7–20)
CALCIUM IONIZED: 1.05 MMOL/L (ref 1.12–1.32)
CALCIUM IONIZED: 1.05 MMOL/L (ref 1.12–1.32)
CALCIUM IONIZED: 1.16 MMOL/L (ref 1.12–1.32)
CALCIUM IONIZED: 1.17 MMOL/L (ref 1.12–1.32)
CALCIUM IONIZED: 1.17 MMOL/L (ref 1.12–1.32)
CALCIUM IONIZED: 1.37 MMOL/L (ref 1.12–1.32)
CALCIUM SERPL-MCNC: 6.7 MG/DL (ref 8.3–10.6)
CALCIUM SERPL-MCNC: 7 MG/DL (ref 8.3–10.6)
CALCIUM SERPL-MCNC: 7.6 MG/DL (ref 8.3–10.6)
CALCIUM SERPL-MCNC: 7.7 MG/DL (ref 8.3–10.6)
CHLORIDE BLD-SCNC: 103 MMOL/L (ref 99–110)
CHLORIDE BLD-SCNC: 103 MMOL/L (ref 99–110)
CHLORIDE BLD-SCNC: 105 MMOL/L (ref 99–110)
CHLORIDE BLD-SCNC: 107 MMOL/L (ref 99–110)
CO2: 23 MMOL/L (ref 21–32)
CO2: 24 MMOL/L (ref 21–32)
CREAT SERPL-MCNC: 0.6 MG/DL (ref 0.8–1.3)
DESCRIPTION BLOOD BANK: NORMAL
DESCRIPTION BLOOD BANK: NORMAL
FIBRINOGEN: 424 MG/DL (ref 200–397)
GFR AFRICAN AMERICAN: >60
GFR NON-AFRICAN AMERICAN: >60
GLUCOSE BLD-MCNC: 100 MG/DL (ref 70–99)
GLUCOSE BLD-MCNC: 100 MG/DL (ref 70–99)
GLUCOSE BLD-MCNC: 102 MG/DL (ref 70–99)
GLUCOSE BLD-MCNC: 102 MG/DL (ref 70–99)
GLUCOSE BLD-MCNC: 105 MG/DL (ref 70–99)
GLUCOSE BLD-MCNC: 106 MG/DL (ref 70–99)
GLUCOSE BLD-MCNC: 107 MG/DL (ref 70–99)
GLUCOSE BLD-MCNC: 111 MG/DL (ref 70–99)
GLUCOSE BLD-MCNC: 113 MG/DL (ref 70–99)
GLUCOSE BLD-MCNC: 117 MG/DL (ref 70–99)
GLUCOSE BLD-MCNC: 120 MG/DL (ref 70–99)
GLUCOSE BLD-MCNC: 124 MG/DL (ref 70–99)
GLUCOSE BLD-MCNC: 126 MG/DL (ref 70–99)
GLUCOSE BLD-MCNC: 133 MG/DL (ref 70–99)
GLUCOSE BLD-MCNC: 139 MG/DL (ref 70–99)
GLUCOSE BLD-MCNC: 143 MG/DL (ref 70–99)
GLUCOSE BLD-MCNC: 144 MG/DL (ref 70–99)
GLUCOSE BLD-MCNC: 148 MG/DL (ref 70–99)
GLUCOSE BLD-MCNC: 149 MG/DL (ref 70–99)
GLUCOSE BLD-MCNC: 157 MG/DL (ref 70–99)
GLUCOSE BLD-MCNC: 94 MG/DL (ref 70–99)
GLUCOSE BLD-MCNC: 97 MG/DL (ref 70–99)
GLUCOSE BLD-MCNC: 99 MG/DL (ref 70–99)
HCO3 ARTERIAL: 20.1 MMOL/L (ref 21–29)
HCO3 ARTERIAL: 20.7 MMOL/L (ref 21–29)
HCO3 ARTERIAL: 21.6 MMOL/L (ref 21–29)
HCO3 ARTERIAL: 22.1 MMOL/L (ref 21–29)
HCO3 ARTERIAL: 22.3 MMOL/L (ref 21–29)
HCO3 ARTERIAL: 22.4 MMOL/L (ref 21–29)
HCO3 ARTERIAL: 22.5 MMOL/L (ref 21–29)
HCO3 ARTERIAL: 22.8 MMOL/L (ref 21–29)
HCO3 ARTERIAL: 23 MMOL/L (ref 21–29)
HCO3 ARTERIAL: 23.2 MMOL/L (ref 21–29)
HCO3 ARTERIAL: 23.2 MMOL/L (ref 21–29)
HCO3 ARTERIAL: 23.3 MMOL/L (ref 21–29)
HCO3 ARTERIAL: 23.5 MMOL/L (ref 21–29)
HCO3 ARTERIAL: 24.6 MMOL/L (ref 21–29)
HCT VFR BLD CALC: 27.3 % (ref 40.5–52.5)
HCT VFR BLD CALC: 28.2 % (ref 40.5–52.5)
HCT VFR BLD CALC: 32.4 % (ref 40.5–52.5)
HEMOGLOBIN: 10.7 G/DL (ref 13.5–17.5)
HEMOGLOBIN: 10.8 G/DL (ref 13.5–17.5)
HEMOGLOBIN: 6.5 G/DL (ref 13.5–17.5)
HEMOGLOBIN: 6.8 G/DL (ref 13.5–17.5)
HEMOGLOBIN: 7 G/DL (ref 13.5–17.5)
HEMOGLOBIN: 7.3 G/DL (ref 13.5–17.5)
HEMOGLOBIN: 8.9 G/DL (ref 13.5–17.5)
HEMOGLOBIN: 9.2 G/DL (ref 13.5–17.5)
HEMOGLOBIN: 9.8 G/DL (ref 13.5–17.5)
INR BLD: 2.01 (ref 0.88–1.12)
LACTATE: 0.92 MMOL/L (ref 0.4–2)
LACTATE: 1.67 MMOL/L (ref 0.4–2)
MAGNESIUM: 1.9 MG/DL (ref 1.8–2.4)
MCH RBC QN AUTO: 27.5 PG (ref 26–34)
MCH RBC QN AUTO: 27.9 PG (ref 26–34)
MCHC RBC AUTO-ENTMCNC: 32.5 G/DL (ref 31–36)
MCHC RBC AUTO-ENTMCNC: 33.2 G/DL (ref 31–36)
MCV RBC AUTO: 83.9 FL (ref 80–100)
MCV RBC AUTO: 84.5 FL (ref 80–100)
O2 SAT, ARTERIAL: 100 % (ref 93–100)
O2 SAT, ARTERIAL: 90 % (ref 93–100)
O2 SAT, ARTERIAL: 96 % (ref 93–100)
O2 SAT, ARTERIAL: 97 % (ref 93–100)
O2 SAT, ARTERIAL: 97 % (ref 93–100)
O2 SAT, ARTERIAL: 98 % (ref 93–100)
O2 SAT, ARTERIAL: 99 % (ref 93–100)
PCO2 ARTERIAL: 31.2 MM HG (ref 35–45)
PCO2 ARTERIAL: 33.2 MM HG (ref 35–45)
PCO2 ARTERIAL: 34.1 MM HG (ref 35–45)
PCO2 ARTERIAL: 35.2 MM HG (ref 35–45)
PCO2 ARTERIAL: 36.3 MM HG (ref 35–45)
PCO2 ARTERIAL: 36.5 MM HG (ref 35–45)
PCO2 ARTERIAL: 37 MM HG (ref 35–45)
PCO2 ARTERIAL: 37.2 MM HG (ref 35–45)
PCO2 ARTERIAL: 38.9 MM HG (ref 35–45)
PCO2 ARTERIAL: 41.2 MM HG (ref 35–45)
PCO2 ARTERIAL: 41.7 MM HG (ref 35–45)
PCO2 ARTERIAL: 43.2 MM HG (ref 35–45)
PCO2 ARTERIAL: 44.2 MM HG (ref 35–45)
PCO2 ARTERIAL: 48.6 MM HG (ref 35–45)
PDW BLD-RTO: 19.8 % (ref 12.4–15.4)
PDW BLD-RTO: 19.9 % (ref 12.4–15.4)
PERFORMED ON: ABNORMAL
PERFORMED ON: NORMAL
PH ARTERIAL: 7.29 (ref 7.35–7.45)
PH ARTERIAL: 7.33 (ref 7.35–7.45)
PH ARTERIAL: 7.34 (ref 7.35–7.45)
PH ARTERIAL: 7.36 (ref 7.35–7.45)
PH ARTERIAL: 7.36 (ref 7.35–7.45)
PH ARTERIAL: 7.37 (ref 7.35–7.45)
PH ARTERIAL: 7.38 (ref 7.35–7.45)
PH ARTERIAL: 7.39 (ref 7.35–7.45)
PH ARTERIAL: 7.39 (ref 7.35–7.45)
PH ARTERIAL: 7.4 (ref 7.35–7.45)
PH ARTERIAL: 7.4 (ref 7.35–7.45)
PH ARTERIAL: 7.41 (ref 7.35–7.45)
PH ARTERIAL: 7.42 (ref 7.35–7.45)
PH ARTERIAL: 7.44 (ref 7.35–7.45)
PLATELET # BLD: 134 K/UL (ref 135–450)
PLATELET # BLD: 218 K/UL (ref 135–450)
PMV BLD AUTO: 8.1 FL (ref 5–10.5)
PMV BLD AUTO: 8.5 FL (ref 5–10.5)
PO2 ARTERIAL: 110.3 MM HG (ref 75–108)
PO2 ARTERIAL: 115.3 MM HG (ref 75–108)
PO2 ARTERIAL: 154.4 MM HG (ref 75–108)
PO2 ARTERIAL: 332.1 MM HG (ref 75–108)
PO2 ARTERIAL: 347.7 MM HG (ref 75–108)
PO2 ARTERIAL: 401 MM HG (ref 75–108)
PO2 ARTERIAL: 409.2 MM HG (ref 75–108)
PO2 ARTERIAL: 418.5 MM HG (ref 75–108)
PO2 ARTERIAL: 462.4 MM HG (ref 75–108)
PO2 ARTERIAL: 62.7 MM HG (ref 75–108)
PO2 ARTERIAL: 77.7 MM HG (ref 75–108)
PO2 ARTERIAL: 85 MM HG (ref 75–108)
PO2 ARTERIAL: 93.6 MM HG (ref 75–108)
PO2 ARTERIAL: 99.5 MM HG (ref 75–108)
POC POTASSIUM: 3.7 MMOL/L (ref 3.5–5.1)
POC POTASSIUM: 3.9 MMOL/L (ref 3.5–5.1)
POC POTASSIUM: 4 MMOL/L (ref 3.5–5.1)
POC POTASSIUM: 4.1 MMOL/L (ref 3.5–5.1)
POC POTASSIUM: 4.2 MMOL/L (ref 3.5–5.1)
POC POTASSIUM: 4.4 MMOL/L (ref 3.5–5.1)
POC POTASSIUM: 4.6 MMOL/L (ref 3.5–5.1)
POC POTASSIUM: 4.8 MMOL/L (ref 3.5–5.1)
POC SAMPLE TYPE: ABNORMAL
POC SODIUM: 134 MMOL/L (ref 136–145)
POC SODIUM: 136 MMOL/L (ref 136–145)
POC SODIUM: 137 MMOL/L (ref 136–145)
POC SODIUM: 138 MMOL/L (ref 136–145)
POTASSIUM REFLEX MAGNESIUM: 3.8 MMOL/L (ref 3.5–5.1)
POTASSIUM SERPL-SCNC: 3.7 MMOL/L (ref 3.5–5.1)
POTASSIUM SERPL-SCNC: 4.2 MMOL/L (ref 3.5–5.1)
POTASSIUM SERPL-SCNC: 4.3 MMOL/L (ref 3.5–5.1)
PROTHROMBIN TIME: 23.4 SEC (ref 9.9–12.7)
RBC # BLD: 3.23 M/UL (ref 4.2–5.9)
RBC # BLD: 3.86 M/UL (ref 4.2–5.9)
SODIUM BLD-SCNC: 134 MMOL/L (ref 136–145)
SODIUM BLD-SCNC: 135 MMOL/L (ref 136–145)
SODIUM BLD-SCNC: 136 MMOL/L (ref 136–145)
SODIUM BLD-SCNC: 137 MMOL/L (ref 136–145)
TCO2 ARTERIAL: 21 MMOL/L
TCO2 ARTERIAL: 22 MMOL/L
TCO2 ARTERIAL: 23 MMOL/L
TCO2 ARTERIAL: 24 MMOL/L
TCO2 ARTERIAL: 25 MMOL/L
TCO2 ARTERIAL: 26 MMOL/L
TOTAL PROTEIN: 5.3 G/DL (ref 6.4–8.2)
WBC # BLD: 13.6 K/UL (ref 4–11)
WBC # BLD: 8.1 K/UL (ref 4–11)

## 2022-01-03 PROCEDURE — C9113 INJ PANTOPRAZOLE SODIUM, VIA: HCPCS | Performed by: THORACIC SURGERY (CARDIOTHORACIC VASCULAR SURGERY)

## 2022-01-03 PROCEDURE — 2500000003 HC RX 250 WO HCPCS: Performed by: ANESTHESIOLOGY

## 2022-01-03 PROCEDURE — 3600000008 HC SURGERY OHS BASE: Performed by: THORACIC SURGERY (CARDIOTHORACIC VASCULAR SURGERY)

## 2022-01-03 PROCEDURE — 85347 COAGULATION TIME ACTIVATED: CPT

## 2022-01-03 PROCEDURE — 99233 SBSQ HOSP IP/OBS HIGH 50: CPT | Performed by: NURSE PRACTITIONER

## 2022-01-03 PROCEDURE — 6370000000 HC RX 637 (ALT 250 FOR IP): Performed by: INTERNAL MEDICINE

## 2022-01-03 PROCEDURE — 33533 CABG ARTERIAL SINGLE: CPT | Performed by: PHYSICIAN ASSISTANT

## 2022-01-03 PROCEDURE — 94761 N-INVAS EAR/PLS OXIMETRY MLT: CPT

## 2022-01-03 PROCEDURE — 5A1221Z PERFORMANCE OF CARDIAC OUTPUT, CONTINUOUS: ICD-10-PCS | Performed by: THORACIC SURGERY (CARDIOTHORACIC VASCULAR SURGERY)

## 2022-01-03 PROCEDURE — 83735 ASSAY OF MAGNESIUM: CPT

## 2022-01-03 PROCEDURE — 6370000000 HC RX 637 (ALT 250 FOR IP): Performed by: THORACIC SURGERY (CARDIOTHORACIC VASCULAR SURGERY)

## 2022-01-03 PROCEDURE — 82947 ASSAY GLUCOSE BLOOD QUANT: CPT

## 2022-01-03 PROCEDURE — 2709999900 HC NON-CHARGEABLE SUPPLY: Performed by: THORACIC SURGERY (CARDIOTHORACIC VASCULAR SURGERY)

## 2022-01-03 PROCEDURE — 82803 BLOOD GASES ANY COMBINATION: CPT

## 2022-01-03 PROCEDURE — P9045 ALBUMIN (HUMAN), 5%, 250 ML: HCPCS | Performed by: THORACIC SURGERY (CARDIOTHORACIC VASCULAR SURGERY)

## 2022-01-03 PROCEDURE — 0BH17EZ INSERTION OF ENDOTRACHEAL AIRWAY INTO TRACHEA, VIA NATURAL OR ARTIFICIAL OPENING: ICD-10-PCS | Performed by: THORACIC SURGERY (CARDIOTHORACIC VASCULAR SURGERY)

## 2022-01-03 PROCEDURE — 2580000003 HC RX 258: Performed by: THORACIC SURGERY (CARDIOTHORACIC VASCULAR SURGERY)

## 2022-01-03 PROCEDURE — 33518 CABG ARTERY-VEIN TWO: CPT | Performed by: THORACIC SURGERY (CARDIOTHORACIC VASCULAR SURGERY)

## 2022-01-03 PROCEDURE — 85610 PROTHROMBIN TIME: CPT

## 2022-01-03 PROCEDURE — C9290 INJ, BUPIVACAINE LIPOSOME: HCPCS | Performed by: ANESTHESIOLOGY

## 2022-01-03 PROCEDURE — 82330 ASSAY OF CALCIUM: CPT

## 2022-01-03 PROCEDURE — 85027 COMPLETE CBC AUTOMATED: CPT

## 2022-01-03 PROCEDURE — 2000000000 HC ICU R&B

## 2022-01-03 PROCEDURE — 02100Z9 BYPASS CORONARY ARTERY, ONE ARTERY FROM LEFT INTERNAL MAMMARY, OPEN APPROACH: ICD-10-PCS | Performed by: THORACIC SURGERY (CARDIOTHORACIC VASCULAR SURGERY)

## 2022-01-03 PROCEDURE — 6360000002 HC RX W HCPCS: Performed by: ANESTHESIOLOGY

## 2022-01-03 PROCEDURE — 02HV33Z INSERTION OF INFUSION DEVICE INTO SUPERIOR VENA CAVA, PERCUTANEOUS APPROACH: ICD-10-PCS | Performed by: ANESTHESIOLOGY

## 2022-01-03 PROCEDURE — 3600000018 HC SURGERY OHS ADDTL 15MIN: Performed by: THORACIC SURGERY (CARDIOTHORACIC VASCULAR SURGERY)

## 2022-01-03 PROCEDURE — 2720000010 HC SURG SUPPLY STERILE: Performed by: THORACIC SURGERY (CARDIOTHORACIC VASCULAR SURGERY)

## 2022-01-03 PROCEDURE — 7100000010 HC PHASE II RECOVERY - FIRST 15 MIN

## 2022-01-03 PROCEDURE — 33518 CABG ARTERY-VEIN TWO: CPT | Performed by: PHYSICIAN ASSISTANT

## 2022-01-03 PROCEDURE — 94002 VENT MGMT INPAT INIT DAY: CPT

## 2022-01-03 PROCEDURE — 6360000002 HC RX W HCPCS: Performed by: THORACIC SURGERY (CARDIOTHORACIC VASCULAR SURGERY)

## 2022-01-03 PROCEDURE — 33533 CABG ARTERIAL SINGLE: CPT | Performed by: THORACIC SURGERY (CARDIOTHORACIC VASCULAR SURGERY)

## 2022-01-03 PROCEDURE — 5A1935Z RESPIRATORY VENTILATION, LESS THAN 24 CONSECUTIVE HOURS: ICD-10-PCS | Performed by: THORACIC SURGERY (CARDIOTHORACIC VASCULAR SURGERY)

## 2022-01-03 PROCEDURE — 84295 ASSAY OF SERUM SODIUM: CPT

## 2022-01-03 PROCEDURE — 85018 HEMOGLOBIN: CPT

## 2022-01-03 PROCEDURE — 76942 ECHO GUIDE FOR BIOPSY: CPT | Performed by: ANESTHESIOLOGY

## 2022-01-03 PROCEDURE — 3E0T3BZ INTRODUCTION OF ANESTHETIC AGENT INTO PERIPHERAL NERVES AND PLEXI, PERCUTANEOUS APPROACH: ICD-10-PCS | Performed by: THORACIC SURGERY (CARDIOTHORACIC VASCULAR SURGERY)

## 2022-01-03 PROCEDURE — 2700000000 HC OXYGEN THERAPY PER DAY

## 2022-01-03 PROCEDURE — 85384 FIBRINOGEN ACTIVITY: CPT

## 2022-01-03 PROCEDURE — 7100000011 HC PHASE II RECOVERY - ADDTL 15 MIN

## 2022-01-03 PROCEDURE — 33508 ENDOSCOPIC VEIN HARVEST: CPT | Performed by: THORACIC SURGERY (CARDIOTHORACIC VASCULAR SURGERY)

## 2022-01-03 PROCEDURE — 33508 ENDOSCOPIC VEIN HARVEST: CPT | Performed by: PHYSICIAN ASSISTANT

## 2022-01-03 PROCEDURE — 99233 SBSQ HOSP IP/OBS HIGH 50: CPT | Performed by: INTERNAL MEDICINE

## 2022-01-03 PROCEDURE — 71045 X-RAY EXAM CHEST 1 VIEW: CPT

## 2022-01-03 PROCEDURE — 85730 THROMBOPLASTIN TIME PARTIAL: CPT

## 2022-01-03 PROCEDURE — 2580000003 HC RX 258: Performed by: ANESTHESIOLOGY

## 2022-01-03 PROCEDURE — 36415 COLL VENOUS BLD VENIPUNCTURE: CPT

## 2022-01-03 PROCEDURE — 84132 ASSAY OF SERUM POTASSIUM: CPT

## 2022-01-03 PROCEDURE — P9045 ALBUMIN (HUMAN), 5%, 250 ML: HCPCS | Performed by: ANESTHESIOLOGY

## 2022-01-03 PROCEDURE — 3700000001 HC ADD 15 MINUTES (ANESTHESIA): Performed by: THORACIC SURGERY (CARDIOTHORACIC VASCULAR SURGERY)

## 2022-01-03 PROCEDURE — 3700000000 HC ANESTHESIA ATTENDED CARE: Performed by: THORACIC SURGERY (CARDIOTHORACIC VASCULAR SURGERY)

## 2022-01-03 PROCEDURE — P9041 ALBUMIN (HUMAN),5%, 50ML: HCPCS | Performed by: THORACIC SURGERY (CARDIOTHORACIC VASCULAR SURGERY)

## 2022-01-03 PROCEDURE — 36430 TRANSFUSION BLD/BLD COMPNT: CPT

## 2022-01-03 PROCEDURE — 83605 ASSAY OF LACTIC ACID: CPT

## 2022-01-03 PROCEDURE — B24BZZ4 ULTRASONOGRAPHY OF HEART WITH AORTA, TRANSESOPHAGEAL: ICD-10-PCS | Performed by: THORACIC SURGERY (CARDIOTHORACIC VASCULAR SURGERY)

## 2022-01-03 PROCEDURE — 021109W BYPASS CORONARY ARTERY, TWO ARTERIES FROM AORTA WITH AUTOLOGOUS VENOUS TISSUE, OPEN APPROACH: ICD-10-PCS | Performed by: THORACIC SURGERY (CARDIOTHORACIC VASCULAR SURGERY)

## 2022-01-03 PROCEDURE — C1889 IMPLANT/INSERT DEVICE, NOC: HCPCS | Performed by: THORACIC SURGERY (CARDIOTHORACIC VASCULAR SURGERY)

## 2022-01-03 PROCEDURE — 85014 HEMATOCRIT: CPT

## 2022-01-03 PROCEDURE — 06BP4ZZ EXCISION OF RIGHT SAPHENOUS VEIN, PERCUTANEOUS ENDOSCOPIC APPROACH: ICD-10-PCS | Performed by: THORACIC SURGERY (CARDIOTHORACIC VASCULAR SURGERY)

## 2022-01-03 PROCEDURE — 80053 COMPREHEN METABOLIC PANEL: CPT

## 2022-01-03 DEVICE — Z INACTIVE USE 2424443 DEVICE OCCL CLP L40MM SM FOOTPRINT 1 HND APPL SUTURELESS W/: Type: IMPLANTABLE DEVICE | Site: CHEST | Status: FUNCTIONAL

## 2022-01-03 RX ORDER — PROTAMINE SULFATE 10 MG/ML
INJECTION, SOLUTION INTRAVENOUS PRN
Status: DISCONTINUED | OUTPATIENT
Start: 2022-01-03 | End: 2022-01-03

## 2022-01-03 RX ORDER — HEPARIN SODIUM 10000 [USP'U]/ML
INJECTION, SOLUTION INTRAVENOUS; SUBCUTANEOUS PRN
Status: DISCONTINUED | OUTPATIENT
Start: 2022-01-03 | End: 2022-01-03 | Stop reason: SDUPTHER

## 2022-01-03 RX ORDER — SODIUM CHLORIDE, SODIUM LACTATE, POTASSIUM CHLORIDE, CALCIUM CHLORIDE 600; 310; 30; 20 MG/100ML; MG/100ML; MG/100ML; MG/100ML
INJECTION, SOLUTION INTRAVENOUS CONTINUOUS
Status: DISCONTINUED | OUTPATIENT
Start: 2022-01-03 | End: 2022-01-05

## 2022-01-03 RX ORDER — CALCIUM CHLORIDE 100 MG/ML
INJECTION INTRAVENOUS; INTRAVENTRICULAR PRN
Status: DISCONTINUED | OUTPATIENT
Start: 2022-01-03 | End: 2022-01-03

## 2022-01-03 RX ORDER — METOCLOPRAMIDE HYDROCHLORIDE 5 MG/ML
5 INJECTION INTRAMUSCULAR; INTRAVENOUS EVERY 6 HOURS PRN
Status: DISCONTINUED | OUTPATIENT
Start: 2022-01-03 | End: 2022-01-10 | Stop reason: HOSPADM

## 2022-01-03 RX ORDER — KETOROLAC TROMETHAMINE 30 MG/ML
30 INJECTION, SOLUTION INTRAMUSCULAR; INTRAVENOUS ONCE
Status: COMPLETED | OUTPATIENT
Start: 2022-01-03 | End: 2022-01-03

## 2022-01-03 RX ORDER — PROTAMINE SULFATE 10 MG/ML
50 INJECTION, SOLUTION INTRAVENOUS
Status: ACTIVE | OUTPATIENT
Start: 2022-01-03 | End: 2022-01-03

## 2022-01-03 RX ORDER — CLOPIDOGREL BISULFATE 75 MG/1
75 TABLET ORAL DAILY
Status: DISCONTINUED | OUTPATIENT
Start: 2022-01-04 | End: 2022-01-10 | Stop reason: HOSPADM

## 2022-01-03 RX ORDER — ATORVASTATIN CALCIUM 40 MG/1
40 TABLET, FILM COATED ORAL NIGHTLY
Status: DISCONTINUED | OUTPATIENT
Start: 2022-01-04 | End: 2022-01-10 | Stop reason: HOSPADM

## 2022-01-03 RX ORDER — MILRINONE LACTATE 0.2 MG/ML
0.38 INJECTION, SOLUTION INTRAVENOUS CONTINUOUS PRN
Status: DISCONTINUED | OUTPATIENT
Start: 2022-01-03 | End: 2022-01-05

## 2022-01-03 RX ORDER — SODIUM CHLORIDE 9 MG/ML
INJECTION, SOLUTION INTRAVENOUS PRN
Status: DISCONTINUED | OUTPATIENT
Start: 2022-01-03 | End: 2022-01-05

## 2022-01-03 RX ORDER — DIPHENHYDRAMINE HCL 25 MG
25 TABLET ORAL NIGHTLY PRN
Status: DISCONTINUED | OUTPATIENT
Start: 2022-01-04 | End: 2022-01-05

## 2022-01-03 RX ORDER — LISINOPRIL 2.5 MG/1
2.5 TABLET ORAL
Status: DISCONTINUED | OUTPATIENT
Start: 2022-01-05 | End: 2022-01-06

## 2022-01-03 RX ORDER — FUROSEMIDE 10 MG/ML
40 INJECTION INTRAMUSCULAR; INTRAVENOUS 2 TIMES DAILY
Status: DISCONTINUED | OUTPATIENT
Start: 2022-01-04 | End: 2022-01-05

## 2022-01-03 RX ORDER — DOPAMINE HYDROCHLORIDE 160 MG/100ML
2 INJECTION, SOLUTION INTRAVENOUS CONTINUOUS PRN
Status: DISCONTINUED | OUTPATIENT
Start: 2022-01-03 | End: 2022-01-10

## 2022-01-03 RX ORDER — AMINOCAPROIC ACID 250 MG/ML
INJECTION, SOLUTION INTRAVENOUS PRN
Status: DISCONTINUED | OUTPATIENT
Start: 2022-01-03 | End: 2022-01-03 | Stop reason: SDUPTHER

## 2022-01-03 RX ORDER — SUCCINYLCHOLINE/SOD CL,ISO/PF 200MG/10ML
SYRINGE (ML) INTRAVENOUS PRN
Status: DISCONTINUED | OUTPATIENT
Start: 2022-01-03 | End: 2022-01-03 | Stop reason: SDUPTHER

## 2022-01-03 RX ORDER — ONDANSETRON 2 MG/ML
4 INJECTION INTRAMUSCULAR; INTRAVENOUS EVERY 8 HOURS PRN
Status: DISCONTINUED | OUTPATIENT
Start: 2022-01-03 | End: 2022-01-10 | Stop reason: HOSPADM

## 2022-01-03 RX ORDER — CHLORHEXIDINE GLUCONATE 0.12 MG/ML
15 RINSE ORAL 2 TIMES DAILY
Status: DISCONTINUED | OUTPATIENT
Start: 2022-01-03 | End: 2022-01-10 | Stop reason: HOSPADM

## 2022-01-03 RX ORDER — SODIUM CHLORIDE 0.9 % (FLUSH) 0.9 %
10 SYRINGE (ML) INJECTION PRN
Status: DISCONTINUED | OUTPATIENT
Start: 2022-01-03 | End: 2022-01-10 | Stop reason: HOSPADM

## 2022-01-03 RX ORDER — NICOTINE POLACRILEX 4 MG
15 LOZENGE BUCCAL PRN
Status: DISCONTINUED | OUTPATIENT
Start: 2022-01-03 | End: 2022-01-10

## 2022-01-03 RX ORDER — FENTANYL CITRATE 50 UG/ML
25 INJECTION, SOLUTION INTRAMUSCULAR; INTRAVENOUS
Status: ACTIVE | OUTPATIENT
Start: 2022-01-03 | End: 2022-01-04

## 2022-01-03 RX ORDER — MIDAZOLAM HYDROCHLORIDE 1 MG/ML
1 INJECTION, SOLUTION INTRAMUSCULAR; INTRAVENOUS
Status: ACTIVE | OUTPATIENT
Start: 2022-01-03 | End: 2022-01-04

## 2022-01-03 RX ORDER — SODIUM CHLORIDE 9 MG/ML
25 INJECTION, SOLUTION INTRAVENOUS PRN
Status: DISCONTINUED | OUTPATIENT
Start: 2022-01-03 | End: 2022-01-05

## 2022-01-03 RX ORDER — ALBUMIN, HUMAN INJ 5% 5 %
SOLUTION INTRAVENOUS PRN
Status: DISCONTINUED | OUTPATIENT
Start: 2022-01-03 | End: 2022-01-03

## 2022-01-03 RX ORDER — DEXTROSE MONOHYDRATE 50 MG/ML
100 INJECTION, SOLUTION INTRAVENOUS PRN
Status: DISCONTINUED | OUTPATIENT
Start: 2022-01-03 | End: 2022-01-10

## 2022-01-03 RX ORDER — MAGNESIUM HYDROXIDE 1200 MG/15ML
LIQUID ORAL CONTINUOUS PRN
Status: COMPLETED | OUTPATIENT
Start: 2022-01-03 | End: 2022-01-03

## 2022-01-03 RX ORDER — 0.9 % SODIUM CHLORIDE 0.9 %
1000 INTRAVENOUS SOLUTION INTRAVENOUS CONTINUOUS PRN
Status: DISCONTINUED | OUTPATIENT
Start: 2022-01-03 | End: 2022-01-05

## 2022-01-03 RX ORDER — SENNA PLUS 8.6 MG/1
1 TABLET ORAL DAILY PRN
Status: DISCONTINUED | OUTPATIENT
Start: 2022-01-03 | End: 2022-01-10 | Stop reason: HOSPADM

## 2022-01-03 RX ORDER — ALBUMIN, HUMAN INJ 5% 5 %
25 SOLUTION INTRAVENOUS PRN
Status: DISCONTINUED | OUTPATIENT
Start: 2022-01-03 | End: 2022-01-10 | Stop reason: HOSPADM

## 2022-01-03 RX ORDER — PANTOPRAZOLE SODIUM 40 MG/10ML
40 INJECTION, POWDER, LYOPHILIZED, FOR SOLUTION INTRAVENOUS 2 TIMES DAILY
Status: COMPLETED | OUTPATIENT
Start: 2022-01-03 | End: 2022-01-04

## 2022-01-03 RX ORDER — MILRINONE LACTATE 0.2 MG/ML
INJECTION, SOLUTION INTRAVENOUS CONTINUOUS PRN
Status: DISCONTINUED | OUTPATIENT
Start: 2022-01-03 | End: 2022-01-03

## 2022-01-03 RX ORDER — SODIUM CHLORIDE 9 MG/ML
INJECTION, SOLUTION INTRAVENOUS CONTINUOUS PRN
Status: DISCONTINUED | OUTPATIENT
Start: 2022-01-03 | End: 2022-01-03 | Stop reason: SDUPTHER

## 2022-01-03 RX ORDER — MEPERIDINE HYDROCHLORIDE 25 MG/ML
25 INJECTION INTRAMUSCULAR; INTRAVENOUS; SUBCUTANEOUS
Status: ACTIVE | OUTPATIENT
Start: 2022-01-03 | End: 2022-01-03

## 2022-01-03 RX ORDER — HYDRALAZINE HYDROCHLORIDE 20 MG/ML
5 INJECTION INTRAMUSCULAR; INTRAVENOUS EVERY 5 MIN PRN
Status: DISCONTINUED | OUTPATIENT
Start: 2022-01-03 | End: 2022-01-10 | Stop reason: HOSPADM

## 2022-01-03 RX ORDER — POLYETHYLENE GLYCOL 3350 17 G/17G
17 POWDER, FOR SOLUTION ORAL DAILY
Status: DISCONTINUED | OUTPATIENT
Start: 2022-01-04 | End: 2022-01-10 | Stop reason: HOSPADM

## 2022-01-03 RX ORDER — PANTOPRAZOLE SODIUM 40 MG/1
40 TABLET, DELAYED RELEASE ORAL
Status: DISCONTINUED | OUTPATIENT
Start: 2022-01-05 | End: 2022-01-10 | Stop reason: HOSPADM

## 2022-01-03 RX ORDER — METOPROLOL TARTRATE 5 MG/5ML
2.5 INJECTION INTRAVENOUS EVERY 10 MIN PRN
Status: DISCONTINUED | OUTPATIENT
Start: 2022-01-03 | End: 2022-01-10 | Stop reason: HOSPADM

## 2022-01-03 RX ORDER — INSULIN LISPRO 100 [IU]/ML
0-12 INJECTION, SOLUTION INTRAVENOUS; SUBCUTANEOUS
Status: DISCONTINUED | OUTPATIENT
Start: 2022-01-04 | End: 2022-01-10

## 2022-01-03 RX ORDER — ACETAMINOPHEN 500 MG
1000 TABLET ORAL EVERY 6 HOURS
Status: DISCONTINUED | OUTPATIENT
Start: 2022-01-03 | End: 2022-01-10 | Stop reason: HOSPADM

## 2022-01-03 RX ORDER — BUPIVACAINE HYDROCHLORIDE 5 MG/ML
INJECTION, SOLUTION EPIDURAL; INTRACAUDAL
Status: COMPLETED | OUTPATIENT
Start: 2022-01-03 | End: 2022-01-03

## 2022-01-03 RX ORDER — NITROGLYCERIN 20 MG/100ML
10 INJECTION INTRAVENOUS CONTINUOUS PRN
Status: DISCONTINUED | OUTPATIENT
Start: 2022-01-03 | End: 2022-01-05

## 2022-01-03 RX ORDER — FENTANYL CITRATE 0.05 MG/ML
INJECTION, SOLUTION INTRAMUSCULAR; INTRAVENOUS PRN
Status: DISCONTINUED | OUTPATIENT
Start: 2022-01-03 | End: 2022-01-03 | Stop reason: SDUPTHER

## 2022-01-03 RX ORDER — ALBUTEROL SULFATE 2.5 MG/3ML
2.5 SOLUTION RESPIRATORY (INHALATION) EVERY 6 HOURS PRN
Status: DISCONTINUED | OUTPATIENT
Start: 2022-01-03 | End: 2022-01-05

## 2022-01-03 RX ORDER — MAGNESIUM SULFATE IN WATER 40 MG/ML
2000 INJECTION, SOLUTION INTRAVENOUS PRN
Status: DISCONTINUED | OUTPATIENT
Start: 2022-01-03 | End: 2022-01-10 | Stop reason: HOSPADM

## 2022-01-03 RX ORDER — SODIUM CHLORIDE 0.9 % (FLUSH) 0.9 %
10 SYRINGE (ML) INJECTION EVERY 12 HOURS SCHEDULED
Status: DISCONTINUED | OUTPATIENT
Start: 2022-01-03 | End: 2022-01-10 | Stop reason: HOSPADM

## 2022-01-03 RX ORDER — DEXTROSE MONOHYDRATE 25 G/50ML
12.5 INJECTION, SOLUTION INTRAVENOUS PRN
Status: DISCONTINUED | OUTPATIENT
Start: 2022-01-03 | End: 2022-01-10

## 2022-01-03 RX ORDER — POTASSIUM CHLORIDE 29.8 MG/ML
20 INJECTION INTRAVENOUS PRN
Status: DISCONTINUED | OUTPATIENT
Start: 2022-01-03 | End: 2022-01-05

## 2022-01-03 RX ORDER — LANOLIN ALCOHOL/MO/W.PET/CERES
400 CREAM (GRAM) TOPICAL 2 TIMES DAILY
Status: DISCONTINUED | OUTPATIENT
Start: 2022-01-04 | End: 2022-01-10 | Stop reason: HOSPADM

## 2022-01-03 RX ORDER — GABAPENTIN 300 MG/1
300 CAPSULE ORAL 3 TIMES DAILY
Status: DISCONTINUED | OUTPATIENT
Start: 2022-01-03 | End: 2022-01-10 | Stop reason: HOSPADM

## 2022-01-03 RX ORDER — FUROSEMIDE 10 MG/ML
40 INJECTION INTRAMUSCULAR; INTRAVENOUS
Status: ACTIVE | OUTPATIENT
Start: 2022-01-03 | End: 2022-01-03

## 2022-01-03 RX ORDER — KETOROLAC TROMETHAMINE 30 MG/ML
15 INJECTION, SOLUTION INTRAMUSCULAR; INTRAVENOUS EVERY 6 HOURS PRN
Status: DISPENSED | OUTPATIENT
Start: 2022-01-03 | End: 2022-01-08

## 2022-01-03 RX ORDER — ROCURONIUM BROMIDE 10 MG/ML
INJECTION, SOLUTION INTRAVENOUS PRN
Status: DISCONTINUED | OUTPATIENT
Start: 2022-01-03 | End: 2022-01-03 | Stop reason: SDUPTHER

## 2022-01-03 RX ORDER — POTASSIUM CHLORIDE 750 MG/1
10 TABLET, EXTENDED RELEASE ORAL
Status: DISCONTINUED | OUTPATIENT
Start: 2022-01-04 | End: 2022-01-06

## 2022-01-03 RX ORDER — PROPOFOL 10 MG/ML
INJECTION, EMULSION INTRAVENOUS PRN
Status: DISCONTINUED | OUTPATIENT
Start: 2022-01-03 | End: 2022-01-03 | Stop reason: SDUPTHER

## 2022-01-03 RX ORDER — SODIUM CHLORIDE 9 MG/ML
10 INJECTION INTRAVENOUS 2 TIMES DAILY
Status: COMPLETED | OUTPATIENT
Start: 2022-01-03 | End: 2022-01-04

## 2022-01-03 RX ADMIN — POTASSIUM CHLORIDE 20 MEQ: 400 INJECTION, SOLUTION INTRAVENOUS at 13:44

## 2022-01-03 RX ADMIN — ALBUMIN (HUMAN) 12.5 G: 12.5 SOLUTION INTRAVENOUS at 09:46

## 2022-01-03 RX ADMIN — CEFAZOLIN 2000 MG: 10 INJECTION, POWDER, FOR SOLUTION INTRAVENOUS at 15:23

## 2022-01-03 RX ADMIN — SODIUM CHLORIDE, PRESERVATIVE FREE 10 ML: 5 INJECTION INTRAVENOUS at 12:15

## 2022-01-03 RX ADMIN — FENTANYL CITRATE 250 MCG: 0.05 INJECTION, SOLUTION INTRAMUSCULAR; INTRAVENOUS at 06:44

## 2022-01-03 RX ADMIN — SODIUM CHLORIDE 500 ML: 9 INJECTION, SOLUTION INTRAVENOUS at 19:40

## 2022-01-03 RX ADMIN — ALBUMIN (HUMAN) 25 G: 12.5 INJECTION, SOLUTION INTRAVENOUS at 22:15

## 2022-01-03 RX ADMIN — MUPIROCIN: 20 OINTMENT TOPICAL at 20:35

## 2022-01-03 RX ADMIN — FENTANYL CITRATE 250 MCG: 0.05 INJECTION, SOLUTION INTRAMUSCULAR; INTRAVENOUS at 09:36

## 2022-01-03 RX ADMIN — CHLORHEXIDINE GLUCONATE 15 ML: 1.2 RINSE ORAL at 21:20

## 2022-01-03 RX ADMIN — SODIUM CHLORIDE, SODIUM LACTATE, POTASSIUM CHLORIDE, AND CALCIUM CHLORIDE: .6; .31; .03; .02 INJECTION, SOLUTION INTRAVENOUS at 00:09

## 2022-01-03 RX ADMIN — DEXTROSE 1 MG/MIN: 5 SOLUTION INTRAVENOUS at 17:40

## 2022-01-03 RX ADMIN — POLYMYXIN B SULFATE, TRIMETHOPRIM SULFATE 1 DROP: 10000; 1 SOLUTION/ DROPS OPHTHALMIC at 04:43

## 2022-01-03 RX ADMIN — MUPIROCIN: 20 OINTMENT TOPICAL at 12:51

## 2022-01-03 RX ADMIN — VANCOMYCIN HYDROCHLORIDE 1000 MG: 10 INJECTION, POWDER, LYOPHILIZED, FOR SOLUTION INTRAVENOUS at 07:05

## 2022-01-03 RX ADMIN — SODIUM CHLORIDE, POTASSIUM CHLORIDE, SODIUM LACTATE AND CALCIUM CHLORIDE: 600; 310; 30; 20 INJECTION, SOLUTION INTRAVENOUS at 12:10

## 2022-01-03 RX ADMIN — FENTANYL CITRATE 250 MCG: 0.05 INJECTION, SOLUTION INTRAMUSCULAR; INTRAVENOUS at 08:39

## 2022-01-03 RX ADMIN — ALBUMIN (HUMAN) 25 G: 12.5 INJECTION, SOLUTION INTRAVENOUS at 11:12

## 2022-01-03 RX ADMIN — PIPERACILLIN AND TAZOBACTAM 4500 MG: 4; .5 INJECTION, POWDER, LYOPHILIZED, FOR SOLUTION INTRAVENOUS; PARENTERAL at 22:00

## 2022-01-03 RX ADMIN — POTASSIUM CHLORIDE 20 MEQ: 400 INJECTION, SOLUTION INTRAVENOUS at 18:34

## 2022-01-03 RX ADMIN — ROCURONIUM BROMIDE 50 MG: 10 INJECTION INTRAVENOUS at 09:36

## 2022-01-03 RX ADMIN — ALBUMIN (HUMAN) 25 G: 12.5 INJECTION, SOLUTION INTRAVENOUS at 13:27

## 2022-01-03 RX ADMIN — Medication: at 12:21

## 2022-01-03 RX ADMIN — HEPARIN SODIUM 25000 UNITS: 10000 INJECTION, SOLUTION INTRAVENOUS; SUBCUTANEOUS at 08:11

## 2022-01-03 RX ADMIN — SODIUM CHLORIDE: 9 INJECTION, SOLUTION INTRAVENOUS at 06:35

## 2022-01-03 RX ADMIN — PANTOPRAZOLE SODIUM 40 MG: 40 INJECTION, POWDER, FOR SOLUTION INTRAVENOUS at 12:52

## 2022-01-03 RX ADMIN — POTASSIUM CHLORIDE 20 MEQ: 400 INJECTION, SOLUTION INTRAVENOUS at 17:29

## 2022-01-03 RX ADMIN — POLYMYXIN B SULFATE, TRIMETHOPRIM SULFATE 1 DROP: 10000; 1 SOLUTION/ DROPS OPHTHALMIC at 16:20

## 2022-01-03 RX ADMIN — VANCOMYCIN HYDROCHLORIDE 1000 MG: 10 INJECTION, POWDER, LYOPHILIZED, FOR SOLUTION INTRAVENOUS at 18:37

## 2022-01-03 RX ADMIN — SODIUM CHLORIDE, PRESERVATIVE FREE 10 ML: 5 INJECTION INTRAVENOUS at 12:52

## 2022-01-03 RX ADMIN — MUPIROCIN: 20 OINTMENT TOPICAL at 00:10

## 2022-01-03 RX ADMIN — BUPIVACAINE 20 ML: 13.3 INJECTION, SUSPENSION, LIPOSOMAL INFILTRATION at 10:40

## 2022-01-03 RX ADMIN — FENTANYL CITRATE 250 MCG: 0.05 INJECTION, SOLUTION INTRAMUSCULAR; INTRAVENOUS at 07:30

## 2022-01-03 RX ADMIN — PIPERACILLIN AND TAZOBACTAM 3375 MG: 3; .375 INJECTION, POWDER, LYOPHILIZED, FOR SOLUTION INTRAVENOUS at 13:25

## 2022-01-03 RX ADMIN — MILRINONE LACTATE IN DEXTROSE 0.17 MCG/KG/MIN: 200 INJECTION, SOLUTION INTRAVENOUS at 13:50

## 2022-01-03 RX ADMIN — AMINOCAPROIC ACID 5000 MG: 250 INJECTION, SOLUTION INTRAVENOUS at 07:30

## 2022-01-03 RX ADMIN — ROCURONIUM BROMIDE 50 MG: 10 INJECTION INTRAVENOUS at 08:39

## 2022-01-03 RX ADMIN — BUPIVACAINE HYDROCHLORIDE 20 ML: 5 INJECTION, SOLUTION EPIDURAL; INTRACAUDAL; PERINEURAL at 10:40

## 2022-01-03 RX ADMIN — KETOROLAC TROMETHAMINE 30 MG: 30 INJECTION, SOLUTION INTRAMUSCULAR at 19:03

## 2022-01-03 RX ADMIN — PROTAMINE SULFATE 350 MG: 10 INJECTION, SOLUTION INTRAVENOUS at 09:50

## 2022-01-03 RX ADMIN — CALCIUM CHLORIDE 0.5 G: 100 INJECTION, SOLUTION INTRAVENOUS at 10:10

## 2022-01-03 RX ADMIN — MILRINONE LACTATE 0.25 MCG/KG/MIN: 200 INJECTION, SOLUTION INTRAVENOUS at 09:41

## 2022-01-03 RX ADMIN — PROPOFOL 80 MG: 10 INJECTION, EMULSION INTRAVENOUS at 06:44

## 2022-01-03 RX ADMIN — Medication: at 20:35

## 2022-01-03 RX ADMIN — POLYMYXIN B SULFATE, TRIMETHOPRIM SULFATE 1 DROP: 10000; 1 SOLUTION/ DROPS OPHTHALMIC at 22:01

## 2022-01-03 RX ADMIN — ROCURONIUM BROMIDE 50 MG: 10 INJECTION INTRAVENOUS at 06:55

## 2022-01-03 RX ADMIN — CEFAZOLIN 2000 MG: 10 INJECTION, POWDER, FOR SOLUTION INTRAVENOUS at 07:25

## 2022-01-03 RX ADMIN — POTASSIUM CHLORIDE 20 MEQ: 400 INJECTION, SOLUTION INTRAVENOUS at 20:50

## 2022-01-03 RX ADMIN — PROTAMINE SULFATE 50 MG: 10 INJECTION, SOLUTION INTRAVENOUS at 10:19

## 2022-01-03 RX ADMIN — Medication 120 MG: at 06:45

## 2022-01-03 RX ADMIN — POLYMYXIN B SULFATE, TRIMETHOPRIM SULFATE 1 DROP: 10000; 1 SOLUTION/ DROPS OPHTHALMIC at 12:21

## 2022-01-03 RX ADMIN — NOREPINEPHRINE BITARTRATE 0.05 MCG/KG/MIN: 1 INJECTION, SOLUTION, CONCENTRATE INTRAVENOUS at 09:42

## 2022-01-03 RX ADMIN — SODIUM CHLORIDE, PRESERVATIVE FREE 10 ML: 5 INJECTION INTRAVENOUS at 21:21

## 2022-01-03 RX ADMIN — POTASSIUM CHLORIDE 20 MEQ: 400 INJECTION, SOLUTION INTRAVENOUS at 12:20

## 2022-01-03 RX ADMIN — POLYMYXIN B SULFATE, TRIMETHOPRIM SULFATE 1 DROP: 10000; 1 SOLUTION/ DROPS OPHTHALMIC at 00:10

## 2022-01-03 RX ADMIN — PANTOPRAZOLE SODIUM 40 MG: 40 INJECTION, POWDER, FOR SOLUTION INTRAVENOUS at 21:20

## 2022-01-03 ASSESSMENT — PULMONARY FUNCTION TESTS
PIF_VALUE: 17
PIF_VALUE: 17
PIF_VALUE: 1
PIF_VALUE: 1
PIF_VALUE: 17
PIF_VALUE: 1
PIF_VALUE: 16
PIF_VALUE: 18
PIF_VALUE: 17
PIF_VALUE: 17
PIF_VALUE: 18
PIF_VALUE: 1
PIF_VALUE: 1
PIF_VALUE: 11
PIF_VALUE: 4
PIF_VALUE: 17
PIF_VALUE: 1
PIF_VALUE: 17
PIF_VALUE: 1
PIF_VALUE: 17
PIF_VALUE: 18
PIF_VALUE: 17
PIF_VALUE: 1
PIF_VALUE: 11
PIF_VALUE: 12
PIF_VALUE: 16
PIF_VALUE: 18
PIF_VALUE: 17
PIF_VALUE: 3
PIF_VALUE: 18
PIF_VALUE: 16
PIF_VALUE: 18
PIF_VALUE: 17
PIF_VALUE: 1
PIF_VALUE: 19
PIF_VALUE: 16
PIF_VALUE: 16
PIF_VALUE: 18
PIF_VALUE: 17
PIF_VALUE: 1
PIF_VALUE: 1
PIF_VALUE: 17
PIF_VALUE: 17
PIF_VALUE: 8
PIF_VALUE: 17
PIF_VALUE: 16
PIF_VALUE: 16
PIF_VALUE: 17
PIF_VALUE: 17
PIF_VALUE: 2
PIF_VALUE: 17
PIF_VALUE: 17
PIF_VALUE: 1
PIF_VALUE: 1
PIF_VALUE: 17
PIF_VALUE: 17
PIF_VALUE: 16
PIF_VALUE: 16
PIF_VALUE: 1
PIF_VALUE: 16
PIF_VALUE: 17
PIF_VALUE: 19
PIF_VALUE: 18
PIF_VALUE: 18
PIF_VALUE: 32
PIF_VALUE: 1
PIF_VALUE: 17
PIF_VALUE: 12
PIF_VALUE: 1
PIF_VALUE: 18
PIF_VALUE: 17
PIF_VALUE: 16
PIF_VALUE: 18
PIF_VALUE: 17
PIF_VALUE: 1
PIF_VALUE: 17
PIF_VALUE: 16
PIF_VALUE: 1
PIF_VALUE: 1
PIF_VALUE: 17
PIF_VALUE: 1
PIF_VALUE: 17
PIF_VALUE: 17
PIF_VALUE: 18
PIF_VALUE: 17
PIF_VALUE: 16
PIF_VALUE: 17
PIF_VALUE: 1
PIF_VALUE: 16
PIF_VALUE: 17
PIF_VALUE: 17
PIF_VALUE: 19
PIF_VALUE: 17
PIF_VALUE: 18
PIF_VALUE: 19
PIF_VALUE: 1
PIF_VALUE: 17
PIF_VALUE: 11
PIF_VALUE: 1
PIF_VALUE: 16
PIF_VALUE: 17
PIF_VALUE: 1
PIF_VALUE: 1
PIF_VALUE: 16
PIF_VALUE: 17
PIF_VALUE: 18
PIF_VALUE: 1
PIF_VALUE: 16
PIF_VALUE: 19
PIF_VALUE: 17
PIF_VALUE: 17
PIF_VALUE: 1
PIF_VALUE: 16
PIF_VALUE: 17
PIF_VALUE: 16
PIF_VALUE: 0
PIF_VALUE: 17
PIF_VALUE: 29
PIF_VALUE: 17
PIF_VALUE: 1
PIF_VALUE: 16
PIF_VALUE: 16
PIF_VALUE: 17
PIF_VALUE: 19
PIF_VALUE: 17
PIF_VALUE: 20
PIF_VALUE: 15
PIF_VALUE: 17
PIF_VALUE: 1
PIF_VALUE: 2
PIF_VALUE: 17
PIF_VALUE: 1
PIF_VALUE: 1
PIF_VALUE: 17
PIF_VALUE: 4
PIF_VALUE: 1
PIF_VALUE: 1
PIF_VALUE: 0
PIF_VALUE: 17
PIF_VALUE: 17
PIF_VALUE: 3
PIF_VALUE: 17
PIF_VALUE: 1
PIF_VALUE: 18
PIF_VALUE: 1
PIF_VALUE: 18
PIF_VALUE: 32
PIF_VALUE: 11
PIF_VALUE: 17
PIF_VALUE: 17
PIF_VALUE: 1
PIF_VALUE: 2
PIF_VALUE: 18
PIF_VALUE: 17
PIF_VALUE: 18
PIF_VALUE: 1
PIF_VALUE: 16
PIF_VALUE: 1
PIF_VALUE: 17
PIF_VALUE: 18
PIF_VALUE: 18
PIF_VALUE: 16
PIF_VALUE: 1
PIF_VALUE: 18
PIF_VALUE: 11
PIF_VALUE: 1
PIF_VALUE: 16
PIF_VALUE: 11
PIF_VALUE: 16
PIF_VALUE: 17
PIF_VALUE: 1
PIF_VALUE: 18
PIF_VALUE: 1
PIF_VALUE: 17
PIF_VALUE: 19
PIF_VALUE: 18
PIF_VALUE: 16
PIF_VALUE: 31
PIF_VALUE: 1
PIF_VALUE: 15
PIF_VALUE: 1
PIF_VALUE: 17
PIF_VALUE: 17
PIF_VALUE: 16
PIF_VALUE: 18
PIF_VALUE: 16
PIF_VALUE: 12
PIF_VALUE: 17
PIF_VALUE: 11
PIF_VALUE: 1
PIF_VALUE: 1
PIF_VALUE: 12
PIF_VALUE: 1
PIF_VALUE: 22
PIF_VALUE: 18
PIF_VALUE: 17
PIF_VALUE: 16
PIF_VALUE: 18
PIF_VALUE: 1
PIF_VALUE: 17
PIF_VALUE: 18
PIF_VALUE: 1
PIF_VALUE: 2
PIF_VALUE: 16
PIF_VALUE: 17
PIF_VALUE: 17
PIF_VALUE: 1
PIF_VALUE: 19
PIF_VALUE: 1
PIF_VALUE: 15
PIF_VALUE: 17
PIF_VALUE: 17
PIF_VALUE: 12
PIF_VALUE: 1
PIF_VALUE: 17
PIF_VALUE: 17
PIF_VALUE: 11
PIF_VALUE: 1
PIF_VALUE: 17
PIF_VALUE: 17
PIF_VALUE: 2
PIF_VALUE: 18
PIF_VALUE: 16
PIF_VALUE: 17
PIF_VALUE: 18
PIF_VALUE: 17
PIF_VALUE: 1
PIF_VALUE: 18
PIF_VALUE: 15
PIF_VALUE: 17
PIF_VALUE: 1
PIF_VALUE: 17
PIF_VALUE: 17
PIF_VALUE: 16
PIF_VALUE: 3
PIF_VALUE: 11
PIF_VALUE: 17
PIF_VALUE: 1
PIF_VALUE: 18
PIF_VALUE: 1
PIF_VALUE: 14
PIF_VALUE: 17
PIF_VALUE: 17
PIF_VALUE: 16
PIF_VALUE: 18
PIF_VALUE: 17
PIF_VALUE: 1
PIF_VALUE: 16
PIF_VALUE: 15
PIF_VALUE: 16

## 2022-01-03 ASSESSMENT — COPD QUESTIONNAIRES: CAT_SEVERITY: MILD

## 2022-01-03 ASSESSMENT — PAIN SCALES - GENERAL: PAINLEVEL_OUTOF10: 0

## 2022-01-03 ASSESSMENT — LIFESTYLE VARIABLES: SMOKING_STATUS: 1

## 2022-01-03 NOTE — PROGRESS NOTES
Pt on RA, resp easy, A+O x 2. Confused to place and time. Forgetful. Easily reorients. Initiated pre op for Surgery in AM, as directed by ICU charge nurse. Consent obtained from sister, [facility], and is in the Chart. Npo after midnight. LR @ 50 ml/hr infusing without difficulty. Call light in reach, bed alarm in place.

## 2022-01-03 NOTE — ANESTHESIA PROCEDURE NOTES
Peripheral Block    Patient location during procedure: OR  Staffing  Performed: anesthesiologist   Anesthesiologist: Enzo Baron MD  Preanesthetic Checklist  Completed: patient identified, IV checked, site marked, risks and benefits discussed, surgical consent, monitors and equipment checked, pre-op evaluation, timeout performed, anesthesia consent given, oxygen available and patient being monitored  Peripheral Block  Patient position: supine  Prep: ChloraPrep  Patient monitoring: cardiac monitor, continuous pulse ox, frequent blood pressure checks and IV access  Block type: PECS II  Laterality: bilateral  Injection technique: single-shot  Guidance: ultrasound guided  Local infiltration: lidocaine  Infiltration strength: 1 %  Dose: 3 mL  Provider prep: mask and sterile gloves  Local infiltration: lidocaine  Needle  Needle gauge: 21 G  Needle length: 10 cm  Needle localization: ultrasound guidance  Assessment  Injection assessment: negative aspiration for heme, no paresthesia on injection and local visualized surrounding nerve on ultrasound  Paresthesia pain: none  Slow fractionated injection: yes  Hemodynamics: stable  Additional Notes  Immediately prior to procedure a \"time out\" was called to verify the correct patient, allergies, laterality, procedure and equipment. Time out performed with  RN    Local Anesthetic:10 cc 0.5 % Bupivacaine + 10 cc Exparel   Amount: 20 ml to each side in 5 ml increments after negative aspiration each time. Pectoralis major, pectoralis minor and serratus muscle are identified; the tip of the needle and the spread of the local anesthetic between the pectoralis minor and serratus muscles are visualized. The muscles appeared to be anatomically normal and there were no abnormal pathologically findings seen.      Medications Administered  Bupivacaine liposome (EXPAREL) injection 1.3%, 20 mL  bupivacaine (MARCAINE) PF injection 0.5%, 20 mL  Reason for block: post-op pain management and at surgeon's request

## 2022-01-03 NOTE — ANESTHESIA PROCEDURE NOTES
Procedure Performed: VIRGIL     Start Time:        End Time:      Preanesthesia Checklist:  Patient identified, IV assessed, risks and benefits discussed, monitors and equipment assessed, procedure being performed at surgeon's request and anesthesia consent obtained. General Procedure Information  Diagnostic Indications for Echo:  hemodynamic monitoring  Physician Requesting Echo: Debbie Corey MD  Location performed:  OR  Intubated  Bite block placed  Heart visualized  Probe Insertion:  Easy  Probe Type:  3D  Modalities:  2D only, 3D, color flow mapping, contrast, pulse wave Doppler and M-mode        Anesthesia Information  Performed Personally  Anesthesiologist:  Ashlyn Akers MD          Transesophageal Echocardiogram (VIRGIL) ProbePlacement Procedure Note    NAME: Rodrigo Cuello     MR#: [id number]    Surgical Procedure: CABG        Surgeon: Pat Serrato             Date: 3 january 2022    Indication: kelby-op monitoring and access    Procedure: After induction of General Endotracheal Anesthesia, a standard VIRGIL probe was placed through the mouth and advanced into the esophagus and stomach. Multiple planes at multiple depths were viewed and the findings related to the surgeon. Ultrasound Findings:  Prepump: LV is distended, thinned myocardium EF 30% there is most notably inferior HK though globally reduced. RV is mildly distended with mildly distended function as well. There are bilateral pleural effusions L>R. There is trace physiologic TR, and mild central MR and trace central AI. MAC is present. Valves appears structurally and functionally normal.  There is grade 3 atherosclerotic disease in the desc ao, nearly circumferential.  Post: EF 40-45% appears asynchronous in M mode of inferior wall. LV function is noticeably improved.  Trace central MR and central trace AI post    Complications: none    Kizzy Borges MD  7:38 AM

## 2022-01-03 NOTE — ANESTHESIA PROCEDURE NOTES
Arterial Line:    An arterial line was placed using surface landmarks, in the OR for the following indication(s): continuous blood pressure monitoring and blood sampling needed. A 20 gauge (size), 5 cm (length), Arrow (type) catheter was placed, Seldinger technique used, into the right brachial artery, secured by suture and Tegaderm. Anesthesia type: General    Events:  patient tolerated procedure well with no complications and EBL 0mL.   Anesthesiologist: Jj Griffith MD  Performed: Anesthesiologist   Preanesthetic Checklist  Completed: patient identified, IV checked, site marked, risks and benefits discussed, surgical consent, monitors and equipment checked, pre-op evaluation, timeout performed, anesthesia consent given, oxygen available and patient being monitored

## 2022-01-03 NOTE — ANESTHESIA PROCEDURE NOTES
Central Venous Line:    A central venous line was placed using ultrasound guidance, in the OR for the following indication(s): central venous access and CVP monitoring. Sterility preparation included the following: hand hygiene performed prior to procedure, maximum sterile barriers used and sterile technique used to drape from head to toe. The patient was placed in Trendelenburg position. The right internal jugular vein was prepped. The site was prepped with Chloraprep. A 9 Fr (size), 10 (length), introducer double lumen was placed. During the procedure, the following specific steps were taken: target vein identified, needle advanced into vein and blood aspirated and guidewire advanced into vein. Intravenous verification was obtained by ultrasound and venous blood return. Post insertion care included: all ports aspirated, all ports flushed easily, guidewire removed intact, Biopatch applied, line sutured in place and dressing applied. During the procedure the patient experienced: patient tolerated procedure well with no complications and EBL 0mL. Anesthesia type: generalA(n) oximetric, 8 (size) Pulmonary Artery Catheter (PAC) was placed through the Introducer CVL in the right internal jugular vein. The PAC placement was confirmed by pressure tracing changes and secured at 50 cm (depth). The patient experienced the following events during the procedure: patient tolerated procedure well with no complications. PA Cath placed?: Yes  Staffing  Performed: Anesthesiologist   Anesthesiologist: Richy Pabon MD  Preanesthetic Checklist  Completed: patient identified, IV checked, site marked, risks and benefits discussed, surgical consent, monitors and equipment checked, pre-op evaluation, timeout performed, anesthesia consent given, oxygen available and patient being monitored

## 2022-01-03 NOTE — PROGRESS NOTES
Pharmacy Note - Extended Infusion Beta-Lactam Adjustment    Piperacillin/Tazobactam ordered for treatment of SSTI. Per [facility] Extended Infusion Beta-Lactam Policy, Zosyn will be changed to 4.5g IV q8h extended infusion. Estimated Creatinine Clearance: Estimated Creatinine Clearance: 106 mL/min (A) (based on SCr of 0.6 mg/dL (L)). Rationale for Adjustment: Agent demonstrates time-dependent effect on bacterial eradication. Extended-infusion dosing strategy aims to enhance microbiologic and clinical efficacy. Currently in ICU setting. Pharmacy will continue to monitor renal function, cultures and sensitivities (where available) and adjust dose as necessary. Please call with any questions.     Jovita FierroD., BCPS   1/3/2022 3:08 PM  Wireless: [phone]

## 2022-01-03 NOTE — ANESTHESIA PRE PROCEDURE
Department of Anesthesiology  Preprocedure Note       Name:  Francisco J Lucia   Age:  70 y.o.  :  1950                                          MRN:  [id number]         Date:  1/3/2022      Surgeon: Corinthia Goodpasture):  Jamir Daniels MD    Procedure: Procedure(s):  CORONARY ARTERY BYPASS GRAFT X 2 OR 3, POSSIBLE LEFT ATRIAL APPENDAGE CLIP PLACEMENT    Medications prior to admission:   Prior to Admission medications    Medication Sig Start Date End Date Taking? Authorizing Provider   acetaminophen (TYLENOL) 325 MG tablet Take 650 mg by mouth daily Pt states he takes to \"clean his sinuses\"    Historical Provider, MD   mupirocin (BACTROBAN) 2 % ointment Apply to affected area once daily 18   Nila Arzola MD   tiotropium (SPIRIVA RESPIMAT) 2.5 MCG/ACT AERS inhaler Inhale 2 puffs into the lungs daily 18   Reynaldo Macdonald MD   zoster recombinant adjuvanted vaccine [facility]) 50 MCG SUSR injection Give vaccine as directed 18   Reynaldo Macdonald MD   buPROPion (WELLBUTRIN XL) 300 MG extended release tablet Take 1 tablet by mouth every morning 18   Reynaldo Macdonald MD   fluorouracil (EFUDEX) 5 % cream Apply twice daily to the upper forehead for 14 days. 18   Pauly Summers MD   hydrocortisone 2.5 % cream Apply to red scaly areas on the chin twice daily for several days or until improved.  18   Pauly Summers MD   zoster recombinant adjuvanted vaccine [facility]) 50 MCG SUSR injection Give vaccine as directed 3/20/18   Reynaldo Macdonald MD   montelukast (SINGULAIR) 10 MG tablet Take 1 tablet by mouth nightly 18   Reynaldo Macdonald MD   umeclidinium-vilanterol [facility] ELLIP) 62.5-25 MCG/INH AEPB inhaler Inhale 1 puff into the lungs daily 18   Reynaldo Macdonald MD   albuterol sulfate HFA (VENTOLIN HFA) 108 (90 Base) MCG/ACT inhaler Inhale 2 puffs into the lungs every 6 hours as needed for Wheezing or Shortness of Breath 17   Reynaldo Macdonald MD   fluticasone-salmeterol (ADVAIR) 250-50 MCG/DOSE AEPB Inhale 1 puff into the lungs every 12 hours 1/17/17   CAITLYN Fofana - CNP   ketoconazole (NIZORAL) 2 % cream Apply topically daily. 11/4/16   Lucero Koehler MD   Flaxseed, Linseed, (FLAX SEED OIL) 1000 MG CAPS Take 2,000 mg by mouth 2 times daily. Historical Provider, MD   Cinnamon 500 MG CAPS Take 1 capsule by mouth daily. Historical Provider, MD   vitamin E 400 UNIT capsule Take 400 Units by mouth daily. Historical Provider, MD   aspirin 325 MG tablet Take 325 mg by mouth daily. Historical Provider, MD   Cyanocobalamin (VITAMIN B 12) 500 MCG LOZG Take 1 tablet by mouth. Historical Provider, MD   fish oil-omega-3 fatty acids 1000 MG capsule Take 1 capsule by mouth daily. Historical Provider, MD   Ascorbic Acid (VITAMIN C) 500 MG tablet Take 500 mg by mouth daily. Historical Provider, MD   vitamin D (CHOLECALCIFEROL) 400 UNIT TABS tablet Take 400 Units by mouth daily. Historical Provider, MD       Current medications:    No current facility-administered medications for this visit. No current outpatient medications on file.      Facility-Administered Medications Ordered in Other Visits   Medication Dose Route Frequency Provider Last Rate Last Admin    sodium chloride 0.9 % 1,000 mL with gentamicin (GARAMYCIN) 80 mg    PRN Dale Topete MD   Given at 01/03/22 0615    sodium chloride 0.9 % irrigation    Continuous PRN Dale Topete MD   1,000 mL at 01/03/22 0615    electrolyte-A (PLASMALYTE-A) 1,000 mL with papaverine 60 mg    PRMATILDA Topete MD   Given at 01/03/22 0615    sodium chloride 0.9 % 8 mL with papaverine 60 mg    PRMATILDA Topete MD   10 mL at 01/03/22 0615    ceFAZolin (ANCEF) 2000 mg in dextrose 5 % 50 mL IVPB  2,000 mg IntraVENous On Call to [address] MD        vancomycin (VANCOCIN) 1,000 mg in dextrose 5 % 250 mL IVPB  1,000 mg IntraVENous On Call to [address] MD        mupirocin (BACTROBAN) 2 % ointment   Nasal BID Dale Topete MD   Given at 01/03/22 0010    chlorhexidine (PERIDEX) 0.12 % solution 15 mL  15 mL Mouth/Throat Once Elicia Frazier MD        chlorhexidine (HIBICLENS) 4 % liquid   Topical See Admin Instructions Elicia Frazier MD        trimethoprim-polymyxin b (POLYTRIM) ophthalmic solution 1 drop  1 drop Both Eyes 6 times per day [facility], DO   1 drop at 01/03/22 0443    mupirocin (BACTROBAN) 2 % ointment   Nasal BID [facility], DO   Given at 01/02/22 2133    Venelex ointment   Topical BID Lynette Andrea MD   Given at 01/02/22 2133    carvedilol (COREG) tablet 3.125 mg  3.125 mg Oral BID WC CAITLYN North - CNP   3.125 mg at 01/02/22 1821    lisinopril (PRINIVIL;ZESTRIL) tablet 2.5 mg  2.5 mg Oral Daily CAITLYN [place] - CNP   2.5 mg at 01/02/22 7376    lactated ringers infusion   IntraVENous Continuous Elicia Frazier MD 50 mL/hr at 01/03/22 0009 New Bag at 01/03/22 0009    sodium chloride flush 0.9 % injection 5-40 mL  5-40 mL IntraVENous 2 times per day Elicia Frazier MD   5 mL at 01/02/22 2133    sodium chloride flush 0.9 % injection 5-40 mL  5-40 mL IntraVENous PRN Elicia Frazier MD        0.9 % sodium chloride infusion  25 mL IntraVENous PRN Elicia Frazier MD   Stopped at 12/30/21 2257    pantoprazole (PROTONIX) tablet 40 mg  40 mg Oral QAM AC Angel Roque MD   40 mg at 01/02/22 3401    multivitamin 1 tablet  1 tablet Oral Daily Elicia Frazier MD   1 tablet at 01/02/22 9861    perflutren lipid microspheres (DEFINITY) injection 1.65 mg  1.5 mL IntraVENous ONCE PRN Court Morales MD        atorvastatin (LIPITOR) tablet 80 mg  80 mg Oral Nightly Court Morales MD   80 mg at 01/02/22 2140    influenza quadrivalent split vaccine (FLUZONE;FLUARIX;FLULAVAL;AFLURIA) injection 0.5 mL  0.5 mL IntraMUSCular Prior to discharge Court Morales MD        fentaNYL (SUBLIMAZE) injection 50 mcg  50 mcg IntraVENous Once Taiwo Frederick MD        albuterol (PROVENTIL) nebulizer solution 2.5 mg  2.5 mg Nebulization Q6H PRN Fannie Walters MD   2.5 mg at 01/01/22 1012    tiotropium (SPIRIVA RESPIMAT) 2.5 MCG/ACT inhaler 2 puff  2 puff Inhalation Daily Fannie Walters MD   2 puff at 01/02/22 0825    ondansetron (ZOFRAN-ODT) disintegrating tablet 4 mg  4 mg Oral Q8H PRN Fannie Walters MD        Or    ondansetron [facility]) injection 4 mg  4 mg IntraVENous Q6H PRN Fannie Walters MD        polyethylene glycol Dior Spark) packet 17 g  17 g Oral Daily PRN Fannie Walters MD        acetaminophen (TYLENOL) tablet 650 mg  650 mg Oral Q6H PRN Fannie Walters MD   650 mg at 12/29/21 1627    Or    acetaminophen (TYLENOL) suppository 650 mg  650 mg Rectal Q6H PRN Fannie Walters MD        budesonide (PULMICORT) nebulizer suspension 500 mcg  0.5 mg Nebulization BID Vic Stubbs MD   500 mcg at 01/02/22 2012    And    Arformoterol Tartrate (BROVANA) nebulizer solution 15 mcg  15 mcg Nebulization BID Vic Stubbs MD   15 mcg at 01/02/22 2012       Allergies:  No Known Allergies    Problem List:    Patient Active Problem List   Diagnosis Code    Hyperglycemia R73.9    Smoker's respiratory syndrome F17.200    BPH with obstruction/lower urinary tract symptoms N40.1, N13.8    Basal cell carcinoma of shoulder C44.611    Squamous cell carcinoma of skin of upper limb, including shoulder C44.621    Actinic keratosis L57.0    Colon polyp K63.5    COPD, mild (HCC) J44.9    Nicotine use disorder F17.200    Mixed hyperlipidemia E78.2    SCCS, mod diff  (squamous cell carcinoma), hand, left C44.629    Basal cell carcinoma of right side of nose C44.311    Visit for suture removal Z48.02    Visit for wound check Z51.89    Anemia D64.9    Syncope and collapse R55    Non-ST elevated myocardial infarction (non-STEMI) ([facility] [address] I21.4       Past Medical History:        Diagnosis Date    BPH with obstruction/lower urinary tract symptoms 8/3/2012    Chicken pox     Colon polyp     Hyperglycemia 12/15/2010    Hyperlipemia 12/15/2010    Post herpetic neuralgia     Shingles     Squamous cell carcinoma of skin of upper limb, including shoulder 9/8/2015       Past Surgical History:        Procedure Laterality Date    HEMICOLECTOMY      right due to mass benign    UPPER GASTROINTESTINAL ENDOSCOPY N/A 12/27/2021    EGD BIOPSY performed by Maria Ines Sánchez MD at [address] History:    Social History     Tobacco Use    Smoking status: Current Every Day Smoker     Packs/day: 1.00     Types: Cigarettes    Smokeless tobacco: Never Used    Tobacco comment: encouraged patient on smoking cessation   Substance Use Topics    Alcohol use: Yes     Alcohol/week: 6.0 standard drinks     Types: 2 Glasses of wine, 2 Cans of beer, 2 Shots of liquor per week     Comment: drinks 1-2 a day                                Ready to quit: Not Answered  Counseling given: Not Answered  Comment: encouraged patient on smoking cessation      Vital Signs (Current): There were no vitals filed for this visit.                                            BP Readings from Last 3 Encounters:   01/03/22 128/68   12/27/21 (!) 113/57   07/12/18 104/76       NPO Status:                                                                                 BMI:   Wt Readings from Last 3 Encounters:   01/03/22 164 lb 0.4 oz (74.4 kg)   07/12/18 179 lb (81.2 kg)   05/31/18 183 lb (83 kg)     There is no height or weight on file to calculate BMI.    CBC:   Lab Results   Component Value Date    WBC 8.1 01/03/2022    RBC 3.86 01/03/2022    HGB 10.8 01/03/2022    HCT 32.4 01/03/2022    MCV 83.9 01/03/2022    RDW 19.8 01/03/2022     01/03/2022       CMP:   Lab Results   Component Value Date     01/03/2022     01/03/2022    K 3.7 01/03/2022    K 3.8 01/03/2022     01/03/2022     01/03/2022    CO2 24 01/03/2022    CO2 24 01/03/2022    BUN 18 01/03/2022    BUN 18 01/03/2022    CREATININE 0.6 01/03/2022    CREATININE 0.6 01/03/2022    GFRAA >60 01/03/2022    GFRAA >60 01/03/2022    GFRAA >60 01/03/2013    AGRATIO 1.1 01/03/2022    LABGLOM >60 01/03/2022    LABGLOM >60 01/03/2022    GLUCOSE 113 01/03/2022    GLUCOSE 111 01/03/2022    GLUCOSE 97 11/12/2011    PROT 5.3 01/03/2022    PROT 7.0 01/03/2013    CALCIUM 7.6 01/03/2022    CALCIUM 7.7 01/03/2022    BILITOT 0.7 01/03/2022    ALKPHOS 92 01/03/2022    AST 23 01/03/2022    ALT 25 01/03/2022       POC Tests: No results for input(s): POCGLU, POCNA, POCK, POCCL, POCBUN, POCHEMO, POCHCT in the last 72 hours. Coags:   Lab Results   Component Value Date    PROTIME 15.9 12/31/2021    INR 1.39 12/31/2021    APTT 36.9 12/31/2021       HCG (If Applicable): No results found for: PREGTESTUR, PREGSERUM, HCG, HCGQUANT     ABGs:   Lab Results   Component Value Date    PHART 7.455 12/25/2021    PO2ART 78.7 12/25/2021    YVI5SFP 31.5 12/25/2021    OLP7DWO 22 12/25/2021    BEART -1.5 12/25/2021    A4HXIDSC 97 12/25/2021        Type & Screen (If Applicable):  No results found for: LABABO, LABRH    Drug/Infectious Status (If Applicable):  No results found for: HIV, HEPCAB    COVID-19 Screening (If Applicable):   Lab Results   Component Value Date    COVID19 Not Detected 12/26/2021           Anesthesia Evaluation  Patient summary reviewed and Nursing notes reviewed  Airway: Mallampati: II  TM distance: >3 FB   Neck ROM: full  Mouth opening: > = 3 FB Dental: normal exam         Pulmonary:normal exam  breath sounds clear to auscultation  (+) COPD: mild,  current smoker                          ROS comment: + smoking hx   Cardiovascular:    (+) past MI:, hyperlipidemia      ECG reviewed  Rhythm: regular  Rate: normal  Echocardiogram reviewed         Beta Blocker:  Dose within 24 Hrs      ROS comment: Normal left ventricle size and wall thickness. Overall left ventricular systolic function appears reduced with an ejection   fraction of 25-30%.    There is hypokinesis of the basal and mid inferior walls.   There is hypokinesis of the mid and basal inferolateral walls. There is hypokinesis of the mid and basal inferoseptal walls. Diastolic filling parameters suggest grade I diastolic dysfunction. Mild mitral and tricuspid regurgitation   Estimated pulmonary artery systolic pressure is 31 mmHg assuming a right   atrial pressure of 3 mmHg. Neuro/Psych:   (+) depression/anxiety             GI/Hepatic/Renal:            ROS comment: BPH. Endo/Other:    (+) DiabetesType II DM, , blood dyscrasia: anemia:., .                 Abdominal:             Vascular: negative vascular ROS. Other Findings:               Anesthesia Plan      general and regional     ASA 4       Induction: intravenous. arterial line, central line, CVP, PA catheter and VIRGIL    Anesthetic plan and risks discussed with patient. Use of blood products discussed with patient whom consented to blood products.                    Julia Pitts MD   1/3/2022

## 2022-01-03 NOTE — PROGRESS NOTES
Pt arrived from OR at 1051 via bed and was connected to vent, and cardiac monitor connected. Pt had a small dose of levophed, milrinone and amicar infusing. Bedside report per anesthesia. Vidya Kapadia at bedside. Safety measures in place. Will continue to monitor.

## 2022-01-03 NOTE — PROGRESS NOTES
Physical Therapy/Occupational Therapy    Discharge note    Pt currently off floor for CABG. Will sign off and await post op orders when pt stable to re-start PT and OT.        Donn Pederson, PT  Stefany Weston, OT

## 2022-01-03 NOTE — PROGRESS NOTES
Day of surgery pre op done as instructed. Reviewed checklist with RN from OR. On call antibiotics Vancomycin and Ancef on IV pole. LR @ 50 maintained. Remained NPO. Voices no complaints.

## 2022-01-03 NOTE — PROGRESS NOTES
[facility]   Cardiology  Note   Dr Rachell Silva MD, Tommy Haney RN, FNP APRN CVNP  Date: 1/3/2022  Admit Date: 12/24/2021       CC:/ NSTEMI / GI bleed   Cardiology following with  syncope  / severe anemia  / COPD /severe CAD / ICM     Interval Hx /  Subjective: Today, he is resting quietly / VSS afebrile   Planning CABG this am    No new complaints today. No major events overnight. 12/28/2021 [facility]   Left ventricular pressure 11 mmHg  Aortic pressure 95/47 mmHg  Coronary anatomy:   The left main coronary artery is heavily calcified.  Has severe ostial narrowing at least 90 to 95%.  Our catheter did ventricularized upon engagement. Left anterior descending artery is mildly stenotic proximally.  There is good mid and distal anatomy with good landing zones. Circumflex artery has proximal calcification and moderate lesions. The right coronary artery is 100% occluded after its takeoff.  I do not identify any left to right or right to right collateralization. Left ventriculogram shows ejection fraction of 25 to 30% %.  Moderate global hypokinesia. Patient seen and examined. Clinical notes reviewed.  Telemetry reviewed / testing reviewed  30 minutes   Pertinent labs, diagnostic, device, and imaging results reviewed as a part of this visit  EKG TTE stress test: any C/RHC reviewed     Past Medical History:  Past Medical History:   Diagnosis Date    BPH with obstruction/lower urinary tract symptoms 8/3/2012    Chicken pox     Colon polyp     Hyperglycemia 12/15/2010    Hyperlipemia 12/15/2010    Post herpetic neuralgia     Shingles     Squamous cell carcinoma of skin of upper limb, including shoulder 9/8/2015          Scheduled Meds:   sodium chloride flush  10 mL IntraVENous 2 times per day    aspirin  325 mg Oral Daily    [START ON 1/4/2022] clopidogrel  75 mg Oral Daily    ketorolac  30 mg IntraVENous Once    chlorhexidine  15 mL Mouth/Throat BID    [START ON 1/4/2022] furosemide  40 mg IntraVENous BID    [START ON 1/4/2022] magnesium oxide  400 mg Oral BID    mupirocin   Nasal BID    [START ON 1/4/2022] potassium chloride  10 mEq Oral TID WC    [START ON 1/4/2022] polyethylene glycol  17 g Oral Daily    [START ON 1/4/2022] metoprolol tartrate  12.5 mg Oral BID    [START ON 1/5/2022] lisinopril  2.5 mg Oral Lunch    [START ON 1/4/2022] atorvastatin  40 mg Oral Nightly    [START ON 1/5/2022] pantoprazole  40 mg Oral BID AC    pantoprazole  40 mg IntraVENous BID    And    sodium chloride (PF)  10 mL IntraVENous BID    ceFAZolin (ANCEF) IVPB  2,000 mg IntraVENous Q8H    vancomycin (VANCOCIN) IV  1,000 mg IntraVENous Q12H    [START ON 1/4/2022] insulin glargine  0.15 Units/kg SubCUTAneous Nightly    [START ON 1/4/2022] insulin lispro  0-12 Units SubCUTAneous 4x Daily AC & HS    acetaminophen  1,000 mg Oral Q6H    gabapentin  300 mg Oral TID    piperacillin-tazobactam  3,375 mg IntraVENous Q8H    trimethoprim-polymyxin b  1 drop Both Eyes 6 times per day    Venelex   Topical BID    sodium chloride flush  5-40 mL IntraVENous 2 times per day    multivitamin  1 tablet Oral Daily    influenza virus vaccine  0.5 mL IntraMUSCular Prior to discharge    fentanNYL  50 mcg IntraVENous Once    tiotropium  2 puff Inhalation Daily    budesonide  0.5 mg Nebulization BID    And    Arformoterol Tartrate  15 mcg Nebulization BID       Vitals:    01/03/22 1200   BP: 112/77   Pulse: 88   Resp: 18   Temp: 98.1 °F (36.7 °C)   SpO2: 100%      In: 3555 [I.V.:2305; Blood:1250]  Out: 925    Wt Readings from Last 3 Encounters:   01/03/22 164 lb 0.4 oz (74.4 kg)   07/12/18 179 lb (81.2 kg)   05/31/18 183 lb (83 kg)       Intake/Output Summary (Last 24 hours) at 1/3/2022 1231  Last data filed at 1/3/2022 1200  Gross per 24 hour   Intake 3795 ml   Output 925 ml   Net 2870 ml       Telemetry: Personally Reviewed    · Constitutional: Cooperative and in no apparent distress, and appears well nourished  · Skin: Warm and pink; no pallor, cyanosis, clubbing, or bruising   · HEENT: Symmetric and normocephalic. · Cardiovascular: Regular rate and rhythm. S1/S2   · Respiratory: Respirations symmetric and unlabored. Lungs clear to auscultation bilaterally, no wheezing, crackles, or rhonchi  · Gastrointestinal: Abdomen soft and round. Bowel sounds normoactive without tenderness or masses. · Musculoskeletal: Bilateral upper and lower extremity strength 5/5 with full ROM  · Neurologic/Psych: Awake and orientated to person, place and time. Calm affect, appropriate mood    Patient Active Problem List    Diagnosis Date Noted    Non-ST elevated myocardial infarction (non-STEMI) ([facility] [address] 12/31/2021    Syncope and collapse     Anemia 12/24/2021    Visit for wound check 06/06/2018    Basal cell carcinoma of right side of nose 05/08/2018    Visit for suture removal 05/08/2018    SCCS, mod diff  (squamous cell carcinoma), hand, left 04/24/2018    Mixed hyperlipidemia 04/18/2017    Nicotine use disorder 02/03/2017    COPD, mild ([facility] [address] 06/23/2016    Colon polyp     Basal cell carcinoma of shoulder 09/08/2015    Squamous cell carcinoma of skin of upper limb, including shoulder 09/08/2015    Actinic keratosis 09/08/2015    BPH with obstruction/lower urinary tract symptoms 08/03/2012    Hyperglycemia 12/15/2010    Smoker's respiratory syndrome 12/15/2010        Assessment     syncope / with severe anemia  GI workup completed     trop elevation with severe anemia / NSTEMI Type II suspected    LHC with severe CAD   CABG 1/3/2022     ICM   2/28/2021 TTE   Normal left ventricle size and wall thickness. Overall left ventricular systolic function appears reduced with an ejectionction of 25-30%. There is hypokinesis of the basal and mid inferior walls. There is hypokinesis of the mid and basal inferolateral walls. There is hypokinesis of the mid and basal inferoseptal walls.  Diastolic filling parameters suggest grade I diastolic dysfunction. Mild mitral and tricuspid regurgitation  estimated pulmonary artery systolic pressure is 31 mmHg assuming a right  atrial pressure of 3 mmHg    COPD  Stable   Inhalers     HLD  LDL 54 optimal      UTI  abx per IM      Plan   CABG planned for today      Thank you for allowing to us to participate in the care of Rita Rice. Cyrus Madsen APRN-CNP-CVNP    [facility]   Interventional cardiology note patient has CAD severe left main ostial disease. Surgery today. Will follow acutely.   Vincent Lara MD, [facility]

## 2022-01-03 NOTE — BRIEF OP NOTE
Pre-op Dx:NSTEMI; 3V CAD; class 3 systolic and diastolic heart failure; bilateral pleural effusions; HTN; pre-op anemia      Post-op Dx:same; acute blood loss anemia; penile and scrotal cellulitis      Procedure:VIRGIL; TCPB: EVH R thigh; CABG x 3, LIMA to LAD, SVG to OM2 and PDA      Anesthesia:General endotracheal anesthesia      Surgeon/Assistants:Devyn/Joleen      Specimens:none      EBL:autotransfusion      Complications: none      Findings:pre-op EF ~30% w/ inferior akinesis; post op EF ~35% w/ inferior dyskinetic hypokinesis

## 2022-01-03 NOTE — PROGRESS NOTES
Odalys Donnelly at bedside, manipulating swan rory catheter,  aware of pt's CT appears to be clotting at times. Will continue to monitor.

## 2022-01-03 NOTE — PROGRESS NOTES
Sister, Diana Stone, at bedside and was provided an update and condition of pt. Sister was given ICU phone number and discussed visitation. Questions answered. Will continue to monitor.

## 2022-01-03 NOTE — ANESTHESIA POSTPROCEDURE EVALUATION
Department of Anesthesiology  Postprocedure Note    Patient: Maia Woodward  MRN: [id number]  YOB: 1950  Date of evaluation: 1/3/2022  Time:  11:37 AM     Procedure Summary     Date: 01/03/22 Room / Location: [address]    Anesthesia Start: 1152 Anesthesia Stop: 1059    Procedure: VIRGIL; TCPB: EVH R thigh; CABG x 3, LIMA to LAD, SVG to OM2 and PDA (N/A Chest) Diagnosis:       Coronary artery disease, unspecified vessel or lesion type, unspecified whether angina present, unspecified whether native or transplanted heart      (CORONARY ARTERY DISEASE)    Surgeons: Estelle Muro MD Responsible Provider: Janny Crump MD    Anesthesia Type: general, regional ASA Status: 4          Anesthesia Type: general, regional    Geoff Phase I: Geoff Score: 10    Geoff Phase II: Geoff Score: 9    Last vitals: Reviewed and per EMR flowsheets.        Anesthesia Post Evaluation    Patient location during evaluation: ICU  Level of consciousness: sedated and ventilated  Airway patency: patent  Nausea & Vomiting: no vomiting  Complications: no  Cardiovascular status: hemodynamically stable  Respiratory status: acceptable  Hydration status: euvolemic

## 2022-01-04 ENCOUNTER — APPOINTMENT (OUTPATIENT)
Dept: GENERAL RADIOLOGY | Age: 72
DRG: 233 | End: 2022-01-04
Payer: MEDICARE

## 2022-01-04 LAB
ANION GAP SERPL CALCULATED.3IONS-SCNC: 8 MMOL/L (ref 3–16)
BASE EXCESS ARTERIAL: -5 (ref -3–3)
BASE EXCESS ARTERIAL: -6 (ref -3–3)
BASE EXCESS ARTERIAL: -7 (ref -3–3)
BASE EXCESS VENOUS: -7 (ref -3–3)
BUN BLDV-MCNC: 18 MG/DL (ref 7–20)
CALCIUM IONIZED: 1.1 MMOL/L (ref 1.12–1.32)
CALCIUM SERPL-MCNC: 7.1 MG/DL (ref 8.3–10.6)
CHLORIDE BLD-SCNC: 108 MMOL/L (ref 99–110)
CO2: 20 MMOL/L (ref 21–32)
CREAT SERPL-MCNC: 0.8 MG/DL (ref 0.8–1.3)
GFR AFRICAN AMERICAN: >60
GFR NON-AFRICAN AMERICAN: >60
GLUCOSE BLD-MCNC: 105 MG/DL (ref 70–99)
GLUCOSE BLD-MCNC: 107 MG/DL (ref 70–99)
GLUCOSE BLD-MCNC: 110 MG/DL (ref 70–99)
GLUCOSE BLD-MCNC: 114 MG/DL (ref 70–99)
GLUCOSE BLD-MCNC: 115 MG/DL (ref 70–99)
GLUCOSE BLD-MCNC: 118 MG/DL (ref 70–99)
GLUCOSE BLD-MCNC: 121 MG/DL (ref 70–99)
GLUCOSE BLD-MCNC: 129 MG/DL (ref 70–99)
GLUCOSE BLD-MCNC: 140 MG/DL (ref 70–99)
GLUCOSE BLD-MCNC: 151 MG/DL (ref 70–99)
GLUCOSE BLD-MCNC: 84 MG/DL (ref 70–99)
GLUCOSE BLD-MCNC: 89 MG/DL (ref 70–99)
GLUCOSE BLD-MCNC: 96 MG/DL (ref 70–99)
GLUCOSE BLD-MCNC: 99 MG/DL (ref 70–99)
HCO3 ARTERIAL: 18.2 MMOL/L (ref 21–29)
HCO3 ARTERIAL: 18.9 MMOL/L (ref 21–29)
HCO3 ARTERIAL: 19.9 MMOL/L (ref 21–29)
HCO3 VENOUS: 18 MMOL/L (ref 23–29)
HCT VFR BLD CALC: 26.9 % (ref 40.5–52.5)
HEMOGLOBIN: 8.9 G/DL (ref 13.5–17.5)
INR BLD: 1.57 (ref 0.88–1.12)
MAGNESIUM: 1.9 MG/DL (ref 1.8–2.4)
MAGNESIUM: 2.3 MG/DL (ref 1.8–2.4)
MAGNESIUM: 2.3 MG/DL (ref 1.8–2.4)
MCH RBC QN AUTO: 28.1 PG (ref 26–34)
MCHC RBC AUTO-ENTMCNC: 33.2 G/DL (ref 31–36)
MCV RBC AUTO: 84.8 FL (ref 80–100)
O2 SAT, ARTERIAL: 92 % (ref 93–100)
O2 SAT, ARTERIAL: 92 % (ref 93–100)
O2 SAT, ARTERIAL: 97 % (ref 93–100)
O2 SAT, VEN: 47 %
PCO2 ARTERIAL: 29.6 MM HG (ref 35–45)
PCO2 ARTERIAL: 29.8 MM HG (ref 35–45)
PCO2 ARTERIAL: 31.4 MM HG (ref 35–45)
PCO2, VEN: 30.7 MM HG (ref 40–50)
PDW BLD-RTO: 19.6 % (ref 12.4–15.4)
PERFORMED ON: ABNORMAL
PH ARTERIAL: 7.39 (ref 7.35–7.45)
PH ARTERIAL: 7.41 (ref 7.35–7.45)
PH ARTERIAL: 7.41 (ref 7.35–7.45)
PH VENOUS: 7.38 (ref 7.35–7.45)
PLATELET # BLD: 139 K/UL (ref 135–450)
PMV BLD AUTO: 8.6 FL (ref 5–10.5)
PO2 ARTERIAL: 62.5 MM HG (ref 75–108)
PO2 ARTERIAL: 62.8 MM HG (ref 75–108)
PO2 ARTERIAL: 91.2 MM HG (ref 75–108)
PO2, VEN: 26 MM HG
POC POTASSIUM: 4 MMOL/L (ref 3.5–5.1)
POC SAMPLE TYPE: ABNORMAL
POTASSIUM SERPL-SCNC: 4.5 MMOL/L (ref 3.5–5.1)
POTASSIUM SERPL-SCNC: 5 MMOL/L (ref 3.5–5.1)
POTASSIUM SERPL-SCNC: 5 MMOL/L (ref 3.5–5.1)
PROTHROMBIN TIME: 18.1 SEC (ref 9.9–12.7)
RBC # BLD: 3.17 M/UL (ref 4.2–5.9)
SODIUM BLD-SCNC: 136 MMOL/L (ref 136–145)
TCO2 ARTERIAL: 19 MMOL/L
TCO2 ARTERIAL: 20 MMOL/L
TCO2 ARTERIAL: 21 MMOL/L
TCO2 CALC VENOUS: 19 MMOL/L
WBC # BLD: 10.2 K/UL (ref 4–11)

## 2022-01-04 PROCEDURE — 36415 COLL VENOUS BLD VENIPUNCTURE: CPT

## 2022-01-04 PROCEDURE — 6360000002 HC RX W HCPCS: Performed by: THORACIC SURGERY (CARDIOTHORACIC VASCULAR SURGERY)

## 2022-01-04 PROCEDURE — 2700000000 HC OXYGEN THERAPY PER DAY

## 2022-01-04 PROCEDURE — 99024 POSTOP FOLLOW-UP VISIT: CPT | Performed by: NURSE PRACTITIONER

## 2022-01-04 PROCEDURE — 2000000000 HC ICU R&B

## 2022-01-04 PROCEDURE — 2500000003 HC RX 250 WO HCPCS: Performed by: THORACIC SURGERY (CARDIOTHORACIC VASCULAR SURGERY)

## 2022-01-04 PROCEDURE — 82330 ASSAY OF CALCIUM: CPT

## 2022-01-04 PROCEDURE — 6360000002 HC RX W HCPCS

## 2022-01-04 PROCEDURE — 85610 PROTHROMBIN TIME: CPT

## 2022-01-04 PROCEDURE — 85027 COMPLETE CBC AUTOMATED: CPT

## 2022-01-04 PROCEDURE — 83735 ASSAY OF MAGNESIUM: CPT

## 2022-01-04 PROCEDURE — 6370000000 HC RX 637 (ALT 250 FOR IP): Performed by: NURSE PRACTITIONER

## 2022-01-04 PROCEDURE — 84132 ASSAY OF SERUM POTASSIUM: CPT

## 2022-01-04 PROCEDURE — 99233 SBSQ HOSP IP/OBS HIGH 50: CPT | Performed by: INTERNAL MEDICINE

## 2022-01-04 PROCEDURE — C9113 INJ PANTOPRAZOLE SODIUM, VIA: HCPCS | Performed by: THORACIC SURGERY (CARDIOTHORACIC VASCULAR SURGERY)

## 2022-01-04 PROCEDURE — 90945 DIALYSIS ONE EVALUATION: CPT

## 2022-01-04 PROCEDURE — 6370000000 HC RX 637 (ALT 250 FOR IP): Performed by: THORACIC SURGERY (CARDIOTHORACIC VASCULAR SURGERY)

## 2022-01-04 PROCEDURE — 82803 BLOOD GASES ANY COMBINATION: CPT

## 2022-01-04 PROCEDURE — 94761 N-INVAS EAR/PLS OXIMETRY MLT: CPT

## 2022-01-04 PROCEDURE — 71045 X-RAY EXAM CHEST 1 VIEW: CPT

## 2022-01-04 PROCEDURE — 99233 SBSQ HOSP IP/OBS HIGH 50: CPT | Performed by: NURSE PRACTITIONER

## 2022-01-04 PROCEDURE — 94150 VITAL CAPACITY TEST: CPT

## 2022-01-04 PROCEDURE — 2580000003 HC RX 258: Performed by: THORACIC SURGERY (CARDIOTHORACIC VASCULAR SURGERY)

## 2022-01-04 PROCEDURE — 94640 AIRWAY INHALATION TREATMENT: CPT

## 2022-01-04 PROCEDURE — 80048 BASIC METABOLIC PNL TOTAL CA: CPT

## 2022-01-04 PROCEDURE — 82947 ASSAY GLUCOSE BLOOD QUANT: CPT

## 2022-01-04 RX ORDER — AMIODARONE HYDROCHLORIDE 200 MG/1
200 TABLET ORAL DAILY
Status: DISCONTINUED | OUTPATIENT
Start: 2022-01-09 | End: 2022-01-10 | Stop reason: HOSPADM

## 2022-01-04 RX ORDER — FUROSEMIDE 10 MG/ML
INJECTION INTRAMUSCULAR; INTRAVENOUS
Status: COMPLETED
Start: 2022-01-04 | End: 2022-01-04

## 2022-01-04 RX ORDER — MAGNESIUM SULFATE 1 G/100ML
1000 INJECTION INTRAVENOUS ONCE
Status: COMPLETED | OUTPATIENT
Start: 2022-01-04 | End: 2022-01-04

## 2022-01-04 RX ORDER — FUROSEMIDE 10 MG/ML
40 INJECTION INTRAMUSCULAR; INTRAVENOUS ONCE
Status: COMPLETED | OUTPATIENT
Start: 2022-01-04 | End: 2022-01-04

## 2022-01-04 RX ORDER — AMIODARONE HYDROCHLORIDE 200 MG/1
400 TABLET ORAL 2 TIMES DAILY
Status: COMPLETED | OUTPATIENT
Start: 2022-01-04 | End: 2022-01-08

## 2022-01-04 RX ADMIN — POLYMYXIN B SULFATE, TRIMETHOPRIM SULFATE 1 DROP: 10000; 1 SOLUTION/ DROPS OPHTHALMIC at 04:33

## 2022-01-04 RX ADMIN — SODIUM CHLORIDE, PRESERVATIVE FREE 10 ML: 5 INJECTION INTRAVENOUS at 09:19

## 2022-01-04 RX ADMIN — POTASSIUM CHLORIDE 10 MEQ: 750 TABLET, EXTENDED RELEASE ORAL at 11:39

## 2022-01-04 RX ADMIN — THERA TABS 1 TABLET: TAB at 09:17

## 2022-01-04 RX ADMIN — KETOROLAC TROMETHAMINE 15 MG: 30 INJECTION, SOLUTION INTRAMUSCULAR at 04:00

## 2022-01-04 RX ADMIN — AMIODARONE HYDROCHLORIDE 400 MG: 200 TABLET ORAL at 22:54

## 2022-01-04 RX ADMIN — POLYMYXIN B SULFATE, TRIMETHOPRIM SULFATE 1 DROP: 10000; 1 SOLUTION/ DROPS OPHTHALMIC at 11:39

## 2022-01-04 RX ADMIN — POTASSIUM CHLORIDE 20 MEQ: 400 INJECTION, SOLUTION INTRAVENOUS at 03:56

## 2022-01-04 RX ADMIN — NOREPINEPHRINE BITARTRATE 0.03 MCG/KG/MIN: 1 INJECTION, SOLUTION, CONCENTRATE INTRAVENOUS at 18:39

## 2022-01-04 RX ADMIN — CHLORHEXIDINE GLUCONATE 15 ML: 1.2 RINSE ORAL at 09:17

## 2022-01-04 RX ADMIN — POLYMYXIN B SULFATE, TRIMETHOPRIM SULFATE 1 DROP: 10000; 1 SOLUTION/ DROPS OPHTHALMIC at 00:41

## 2022-01-04 RX ADMIN — ARFORMOTEROL TARTRATE 15 MCG: 15 SOLUTION RESPIRATORY (INHALATION) at 22:31

## 2022-01-04 RX ADMIN — GABAPENTIN 300 MG: 300 CAPSULE ORAL at 09:17

## 2022-01-04 RX ADMIN — BUDESONIDE 500 MCG: 0.5 SUSPENSION RESPIRATORY (INHALATION) at 11:19

## 2022-01-04 RX ADMIN — PIPERACILLIN AND TAZOBACTAM 4500 MG: 4; .5 INJECTION, POWDER, LYOPHILIZED, FOR SOLUTION INTRAVENOUS; PARENTERAL at 06:20

## 2022-01-04 RX ADMIN — FUROSEMIDE 40 MG: 10 INJECTION, SOLUTION INTRAMUSCULAR; INTRAVENOUS at 11:38

## 2022-01-04 RX ADMIN — ACETAMINOPHEN 1000 MG: 500 TABLET ORAL at 22:54

## 2022-01-04 RX ADMIN — FUROSEMIDE 40 MG: 10 INJECTION, SOLUTION INTRAMUSCULAR; INTRAVENOUS at 18:07

## 2022-01-04 RX ADMIN — PANTOPRAZOLE SODIUM 40 MG: 40 INJECTION, POWDER, FOR SOLUTION INTRAVENOUS at 22:54

## 2022-01-04 RX ADMIN — VANCOMYCIN HYDROCHLORIDE 1000 MG: 10 INJECTION, POWDER, LYOPHILIZED, FOR SOLUTION INTRAVENOUS at 18:42

## 2022-01-04 RX ADMIN — MUPIROCIN: 20 OINTMENT TOPICAL at 09:17

## 2022-01-04 RX ADMIN — ALBUTEROL SULFATE 2.5 MG: 2.5 SOLUTION RESPIRATORY (INHALATION) at 04:10

## 2022-01-04 RX ADMIN — TIOTROPIUM BROMIDE INHALATION SPRAY 2 PUFF: 3.12 SPRAY, METERED RESPIRATORY (INHALATION) at 11:19

## 2022-01-04 RX ADMIN — POTASSIUM CHLORIDE 20 MEQ: 400 INJECTION, SOLUTION INTRAVENOUS at 06:30

## 2022-01-04 RX ADMIN — CEFAZOLIN 2000 MG: 10 INJECTION, POWDER, FOR SOLUTION INTRAVENOUS at 23:27

## 2022-01-04 RX ADMIN — SODIUM CHLORIDE, PRESERVATIVE FREE 10 ML: 5 INJECTION INTRAVENOUS at 09:26

## 2022-01-04 RX ADMIN — SODIUM CHLORIDE, PRESERVATIVE FREE 10 ML: 5 INJECTION INTRAVENOUS at 23:03

## 2022-01-04 RX ADMIN — CLOPIDOGREL BISULFATE 75 MG: 75 TABLET ORAL at 09:26

## 2022-01-04 RX ADMIN — ACETAMINOPHEN 1000 MG: 500 TABLET ORAL at 16:53

## 2022-01-04 RX ADMIN — CEFAZOLIN 2000 MG: 10 INJECTION, POWDER, FOR SOLUTION INTRAVENOUS at 15:04

## 2022-01-04 RX ADMIN — MAGNESIUM SULFATE HEPTAHYDRATE 2000 MG: 2 INJECTION, SOLUTION INTRAVENOUS at 01:43

## 2022-01-04 RX ADMIN — INSULIN GLARGINE 11 UNITS: 100 INJECTION, SOLUTION SUBCUTANEOUS at 23:51

## 2022-01-04 RX ADMIN — DEXMEDETOMIDINE HYDROCHLORIDE 0.05 MCG/KG/HR: 100 INJECTION, SOLUTION INTRAVENOUS at 00:47

## 2022-01-04 RX ADMIN — FUROSEMIDE 40 MG: 10 INJECTION INTRAMUSCULAR; INTRAVENOUS at 04:30

## 2022-01-04 RX ADMIN — KETOROLAC TROMETHAMINE 15 MG: 30 INJECTION, SOLUTION INTRAMUSCULAR at 09:54

## 2022-01-04 RX ADMIN — CHLORHEXIDINE GLUCONATE 15 ML: 1.2 RINSE ORAL at 22:54

## 2022-01-04 RX ADMIN — ACETAMINOPHEN 1000 MG: 500 TABLET ORAL at 09:16

## 2022-01-04 RX ADMIN — CEFAZOLIN 2000 MG: 10 INJECTION, POWDER, FOR SOLUTION INTRAVENOUS at 00:49

## 2022-01-04 RX ADMIN — Medication: at 09:17

## 2022-01-04 RX ADMIN — MUPIROCIN: 20 OINTMENT TOPICAL at 22:55

## 2022-01-04 RX ADMIN — POLYMYXIN B SULFATE, TRIMETHOPRIM SULFATE 1 DROP: 10000; 1 SOLUTION/ DROPS OPHTHALMIC at 16:14

## 2022-01-04 RX ADMIN — FUROSEMIDE 40 MG: 10 INJECTION, SOLUTION INTRAMUSCULAR; INTRAVENOUS at 04:30

## 2022-01-04 RX ADMIN — POLYMYXIN B SULFATE, TRIMETHOPRIM SULFATE 1 DROP: 10000; 1 SOLUTION/ DROPS OPHTHALMIC at 20:00

## 2022-01-04 RX ADMIN — ARFORMOTEROL TARTRATE 15 MCG: 15 SOLUTION RESPIRATORY (INHALATION) at 11:19

## 2022-01-04 RX ADMIN — POLYMYXIN B SULFATE, TRIMETHOPRIM SULFATE 1 DROP: 10000; 1 SOLUTION/ DROPS OPHTHALMIC at 09:16

## 2022-01-04 RX ADMIN — POTASSIUM CHLORIDE 20 MEQ: 400 INJECTION, SOLUTION INTRAVENOUS at 05:24

## 2022-01-04 RX ADMIN — GABAPENTIN 300 MG: 300 CAPSULE ORAL at 13:53

## 2022-01-04 RX ADMIN — POTASSIUM CHLORIDE 10 MEQ: 750 TABLET, EXTENDED RELEASE ORAL at 09:26

## 2022-01-04 RX ADMIN — BUDESONIDE 500 MCG: 0.5 SUSPENSION RESPIRATORY (INHALATION) at 22:30

## 2022-01-04 RX ADMIN — ASPIRIN 325 MG: 325 TABLET, COATED ORAL at 09:16

## 2022-01-04 RX ADMIN — VANCOMYCIN HYDROCHLORIDE 1000 MG: 10 INJECTION, POWDER, LYOPHILIZED, FOR SOLUTION INTRAVENOUS at 05:04

## 2022-01-04 RX ADMIN — AMIODARONE HYDROCHLORIDE 0.5 MG/MIN: 50 INJECTION, SOLUTION INTRAVENOUS at 01:37

## 2022-01-04 RX ADMIN — KETOROLAC TROMETHAMINE 15 MG: 30 INJECTION, SOLUTION INTRAMUSCULAR at 16:21

## 2022-01-04 RX ADMIN — GABAPENTIN 300 MG: 300 CAPSULE ORAL at 22:54

## 2022-01-04 RX ADMIN — Medication 400 MG: at 09:17

## 2022-01-04 RX ADMIN — Medication 400 MG: at 22:53

## 2022-01-04 RX ADMIN — AMIODARONE HYDROCHLORIDE 400 MG: 200 TABLET ORAL at 12:31

## 2022-01-04 RX ADMIN — CEFAZOLIN 2000 MG: 10 INJECTION, POWDER, FOR SOLUTION INTRAVENOUS at 08:11

## 2022-01-04 RX ADMIN — POLYETHYLENE GLYCOL 3350 17 G: 17 POWDER, FOR SOLUTION ORAL at 09:16

## 2022-01-04 RX ADMIN — POTASSIUM CHLORIDE 10 MEQ: 750 TABLET, EXTENDED RELEASE ORAL at 16:53

## 2022-01-04 RX ADMIN — PANTOPRAZOLE SODIUM 40 MG: 40 INJECTION, POWDER, FOR SOLUTION INTRAVENOUS at 09:26

## 2022-01-04 RX ADMIN — MAGNESIUM SULFATE HEPTAHYDRATE 1000 MG: 1 INJECTION, SOLUTION INTRAVENOUS at 08:55

## 2022-01-04 RX ADMIN — ATORVASTATIN CALCIUM 40 MG: 40 TABLET, FILM COATED ORAL at 22:54

## 2022-01-04 RX ADMIN — Medication: at 22:54

## 2022-01-04 ASSESSMENT — PAIN SCALES - GENERAL
PAINLEVEL_OUTOF10: 0
PAINLEVEL_OUTOF10: 9
PAINLEVEL_OUTOF10: 6
PAINLEVEL_OUTOF10: 7
PAINLEVEL_OUTOF10: 7
PAINLEVEL_OUTOF10: 0
PAINLEVEL_OUTOF10: 6
PAINLEVEL_OUTOF10: 0
PAINLEVEL_OUTOF10: 3
PAINLEVEL_OUTOF10: 0
PAINLEVEL_OUTOF10: 0
PAINLEVEL_OUTOF10: 6
PAINLEVEL_OUTOF10: 0

## 2022-01-04 ASSESSMENT — PAIN DESCRIPTION - ONSET
ONSET: GRADUAL
ONSET: AWAKENED FROM SLEEP
ONSET: AWAKENED FROM SLEEP
ONSET: GRADUAL

## 2022-01-04 ASSESSMENT — PAIN DESCRIPTION - DESCRIPTORS
DESCRIPTORS: PATIENT UNABLE TO DESCRIBE
DESCRIPTORS: OTHER (COMMENT)
DESCRIPTORS: ACHING;DISCOMFORT
DESCRIPTORS: ACHING;DISCOMFORT

## 2022-01-04 ASSESSMENT — PAIN DESCRIPTION - ORIENTATION
ORIENTATION: LEFT
ORIENTATION: MID
ORIENTATION: LEFT
ORIENTATION: MID

## 2022-01-04 ASSESSMENT — PAIN DESCRIPTION - LOCATION
LOCATION: CHEST
LOCATION: RIB CAGE
LOCATION: CHEST
LOCATION: CHEST

## 2022-01-04 ASSESSMENT — PAIN DESCRIPTION - PROGRESSION
CLINICAL_PROGRESSION: NOT CHANGED
CLINICAL_PROGRESSION: GRADUALLY WORSENING
CLINICAL_PROGRESSION: GRADUALLY WORSENING
CLINICAL_PROGRESSION: NOT CHANGED

## 2022-01-04 ASSESSMENT — PAIN DESCRIPTION - FREQUENCY
FREQUENCY: INTERMITTENT

## 2022-01-04 ASSESSMENT — PAIN - FUNCTIONAL ASSESSMENT: PAIN_FUNCTIONAL_ASSESSMENT: PREVENTS OR INTERFERES SOME ACTIVE ACTIVITIES AND ADLS

## 2022-01-04 ASSESSMENT — PAIN DESCRIPTION - PAIN TYPE
TYPE: SURGICAL PAIN
TYPE: SURGICAL PAIN
TYPE: ACUTE PAIN;SURGICAL PAIN
TYPE: ACUTE PAIN;SURGICAL PAIN

## 2022-01-04 NOTE — PROGRESS NOTES
Dr. Rubén Iglesias and patient's sister, Laura Ferguson, both updated via phone call. Patient continues to be drowsy and denies pain except to his \"right toe\". He follows commands weakly to all extremities but has not yet opened his eyes. Levophed 0.02 mcg/kg/hr. NSR 82 with one 6-beat run of v-tach. Amiodarone infusing continuously. 4LPM oxygen, NC. Lungs are clear.

## 2022-01-04 NOTE — PROGRESS NOTES
Anuja Guerin NP from [facility] here and discussed plan of care. Will get pt off levophed then okay to transition pt. Once swan is out ok to get out of bed. Will continue to monitor.

## 2022-01-04 NOTE — PROGRESS NOTES
Patient states that his \"ribs\" hurt \"9/10\". Precedex started. Patient is now holding more clear, continuous conversations and attempts to open his eyes, but does not make eye contact. Slight grimacing noted.

## 2022-01-04 NOTE — PROGRESS NOTES
CVTS Cardiothoracic Progress Note:                                CC:  Post op follow up     Surgery: 1/3 VIRGIL; TCPB: EVH R thigh; CABG x 3, LIMA to LAD, SVG to OM2 and PDA    Hospital course:  1/4 Requiring levo for BP support overnight. Stable in SR. Somewhat agitated with nursing staff and resistant to instruction at times.        Past Medical History:   Diagnosis Date    BPH with obstruction/lower urinary tract symptoms 8/3/2012    Chicken pox     Colon polyp     Hyperglycemia 12/15/2010    Hyperlipemia 12/15/2010    Post herpetic neuralgia     Shingles     Squamous cell carcinoma of skin of upper limb, including shoulder 9/8/2015        Past Surgical History:   Procedure Laterality Date    CORONARY ARTERY BYPASS GRAFT N/A 1/3/2022    VIRGIL; TCPB: EVH R thigh; CABG x 3, LIMA to LAD, SVG to OM2 and PDA performed by Eladio Merlin, MD at [address] [place] [address] due to mass benign    UPPER GASTROINTESTINAL ENDOSCOPY N/A 12/27/2021    EGD BIOPSY performed by Nuha Plunkett MD at [address] [place] [address] as of 12/24/2021    (No Known Allergies)        Patient Active Problem List   Diagnosis    Hyperglycemia    Smoker's respiratory syndrome    BPH with obstruction/lower urinary tract symptoms    Basal cell carcinoma of shoulder    Squamous cell carcinoma of skin of upper limb, including shoulder    Actinic keratosis    Colon polyp    COPD, mild (HCC)    Nicotine use disorder    Mixed hyperlipidemia    SCCS, mod diff  (squamous cell carcinoma), hand, left    Basal cell carcinoma of right side of nose    Visit for suture removal    Visit for wound check    Anemia    Syncope and collapse    Non-ST elevated myocardial infarction (non-STEMI) (HCC)        Vital Signs: /78   Pulse 76   Temp 97.9 °F (36.6 °C) (Core)   Resp 19   Ht 5' 7\" (1.702 m)   Wt 174 lb 2.6 oz (79 kg)   SpO2 96%   BMI 27.28 kg/m²  O2 Flow Rate (L/min): 8 L/min     Admission Weight: Weight: 168 lb 10.4 oz (76.5 kg)    Weight on 1/3 (74.4 kg) Pre-op   Weight on 1/4 (79 kg)    Intake/Output:     Intake/Output Summary (Last 24 hours) at 1/4/2022 0837  Last data filed at 1/4/2022 0800  Gross per 24 hour   Intake 7282.03 ml   Output 3155 ml   Net 4127.03 ml        GTTS: levo @ 0.04 mcg/kg/min, insulin   [place] Kayla Catheter: PAP33/20 (23), CVP 12, CO 3.2, CI 1.9    Extubation Time: 1/3 @ 1855  Transition Time:    LABORATORY DATA:     CBC:   Recent Labs     01/03/22  0343 01/03/22  0723 01/03/22  1103 01/03/22  1709 01/04/22  0323   WBC 8.1  --  13.6*  --  10.2   HGB 10.8*   < > 8.9* 9.2* 8.9*   HCT 32.4*   < > 27.3* 28.2* 26.9*   MCV 83.9  --  84.5  --  84.8     --  134*  --  139    < > = values in this interval not displayed. BMP:   Recent Labs     01/03/22  1103 01/03/22  1103 01/03/22  1709 01/04/22  0018 01/04/22  0322     --  136  --  136   K 4.2   < > 4.3 5.0 4.5     --  105  --  108   CO2 24  --  23  --  20*   BUN 15  --  15  --  18   CREATININE 0.6*  --  0.6*  --  0.8    < > = values in this interval not displayed. MG:    Recent Labs     01/03/22  1103 01/04/22  0018 01/04/22  0322   MG 1.90 1.90 2.30      PT/INR:   Recent Labs     01/03/22  1103 01/04/22  0323   PROTIME 23.4* 18.1*   INR 2.01* 1.57*     APTT:   Recent Labs     01/03/22 1103   APTT 37.0     CXR: 1/3  FINDINGS:       HEART / MEDIASTINUM: Heart is mildly enlarged.       LUNGS/PLEURA: Mild basilar airspace disease. Trace costophrenic angle blunting. No evidence of pneumothorax.       BONES / SOFT TISSUES: No acute abnormality.       OTHER: Endotracheal tube tip near level of aortic arch. Pulmonary artery catheter and chest tubes present.           Impression       1. Mild basilar airspace disease and trace pleural effusions.      ________________________________________________________________________    Subjective:   Dietary Intake: needs improvement   no Nausea   Pain Control: adequate   Complaints: none  Bowels: no BM     Objective:   General appearance: resting in bed, in nad  Lungs: bilateral basilar crackles   Heart: S1S2 normal; SR on monitor  Chest: symmetrical expansion with inspiration and expirations; no rocking of sternum noted   Abdomen: soft, non-tender  Bowel sounds: hypoactive  Kidneys: UOP [id number]=2220 ml over 24 hours; Cr 0.8  Wound/Incisions: Midsternal incision CDI; RLE incisions with dressings CDI; Pacing wires intact and secure  Extremities: BLE pulses present; 1+ swelling noted in BLE  Neurological: alert, oriented   Chest tubes/Drains: Chest tube # 1 with [id number]= 1220 ml serosanguinous drainage in 24 hours; no airleak noted    Assessment:   Post-op: 1 days. Condition: In stable condition. Plan:   1. Cardiovascular: S/p CABG x 3 (1/3)   ASA, statin, Plavix. BB then ACE once levo weaned and pressure tolerates   Wean levo today as BP tolerates and transition once off   Episodes of vtach yesterday- on amio gtt. Will transition to PO amioarone   Stable in SR     2. Pulmonary:   Adequate oxygen saturations on 8L HFNC  Pulmonary expansion measures- IS, acapella, oobtc, ambulation when able     3. Neurology:   Continue pain control with scheduled tylenol, gabapentin, PRN toradol     4. Nephrology:   Cr stable at 0.8, UOP 2220 over 24 hours but decreasing over the last 8 hours  Diurese as able- Given 40 mg IV Lasix this morning with good response in UOP. Will hold additional lasix dosing until able to wean levo   Seen by Urology this morning for scrotal swelling- no concerns for cellulitis, rather just dependent edema     5. Endocrinology:   Glucose stable on insulin gtt. Transition to Lantus, SSI this evening     6. Hematology:   Post operative anemia- H&H stable at 8.9/26.9. Monitor   Presented with acute blood loss anemia on admission, likely UGIB. EGD on 12/27. GI following and planning for 3 weeks after CABG     7. Microbiology:   No issues at present     8.  Nutrition:   Advance diet as
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
## 2022-01-04 NOTE — CARE COORDINATION
Case Management Assessment           Initial Evaluation                Date / Time of Evaluation: 1/4/2022 11:09 AM                 Assessment Completed by: Prudence Arteaga RN    Patient Name: Layo Aguilar     YOB: 1950  Diagnosis: Anemia [D64.9]  NSTEMI (non-ST elevated myocardial infarction) ([facility] [I21.4]  Syncope, unspecified syncope type [R55]  Anemia, unspecified type [D64.9]     Date / Time: 12/24/2021  4:36 PM    Patient Admission Status: Inpatient    If patient is discharged prior to next notation, then this note serves as note for discharge by case management. Current PCP: No primary care provider on file. Clinic Patient: No    Chart Reviewed: Yes  Patient/ Family Interviewed: Yes    Initial assessment completed at bedside with: Patient/sister Carola    Hospitalization in the last 30 days: No    Emergency Contacts:  Extended Emergency Contact Information  Primary Emergency Contact: ESTELLA Pelaez [address] [place] [address] Phone: [phone]  Mobile Phone: [phone]  Relation: Brother/Sister  Secondary Emergency Contact: Romi Manjarrez   [address] Phone: [phone]  Relation: Brother/Sister    Advance Directives:   Code Status: [address]: No  Agent: NA  Contact Number: NA    Copy present: No     In paper Chart: No    Scanned into EMR No    Financial  Payor: MEDICARE / Plan: MEDICARE PART A AND B / Product Type: *No Product type* /     Pre-cert required for SNF: Yes    Pharmacy    [facility], [address] [place] [address] [phone] [facility] [phone]  [address] [place] [address] [address] [place] [address]  Phone: [phone] Fax: [phone]      Potential assistance Purchasing Medications: Potential Assistance Purchasing Medications: No  Does Patient want to participate in local refill/ meds to beds program?:      Meds To Beds General Rules:  1. Can ONLY be done Monday- Friday between 8:30am-5pm  2. Prescription(s) must be in pharmacy by 3pm to be filled same day  3. Copy of patient's insurance/ prescription drug card and patient face sheet must be sent along with the prescription(s)  4. Cost of Rx cannot be added to hospital bill. If financial assistance is needed, please contact unit  or ;  or  CANNOT provide pharmacy voucher for patients co-pays  5. Patients can then  the prescription on their way out of the hospital at discharge, or pharmacy can deliver to the bedside if staff is available. (payment due at time of pick-up or delivery - cash, check, or card accepted)     Able to afford home medications/ co-pay costs: Yes    ADLS  Support Systems: Friends/Neighbors    PT AM-PAC: 13 /24  OT AM-PAC: 14 /24    HOUSING  Home Environment: Single family two story  Steps: 6 to enter    Plans to RETURN to current housing: Yes  Barriers to RETURNING to current housing: NA    Home Care Information  Currently ACTIVE with Home Health Care: No  Home Care Agency: Not Applicable    Currently ACTIVE with [place] on Aging: No  Passport/ Waiver: No  Passport/ Waiver Services: Not Applicable      DISCHARGE PLAN:  Disposition: Home vs ARU vs SNF    Transportation PLAN for discharge: tbd     Factors facilitating achievement of predicted outcomes: Family support    Barriers to discharge: Medical stability    Additional Case Management Notes: CM met with patient and sister Hipolito Almeida at bedside. Patient is from home alone with no services. POD #1 CABG, will need to consider placement after discharge. ARU vs SNF.       The Plan for Transition of Care is related to the following treatment goals of Anemia [D64.9]  NSTEMI (non-ST elevated myocardial infarction) ([facility] [I21.4]  Syncope, unspecified syncope type [R55]  Anemia, unspecified type [D64.9]    The Patient and/or patient representative Fred Perez and his family were provided with a choice of provider and agrees with the discharge plan No    Freedom of choice list was provided with basic dialogue that supports the patient's individualized plan of care/goals and shares the quality data associated with the providers.  No    Care Transition patient: Jamia Hernandez RN  [facility]  Case Management Department  Ph: [phone]

## 2022-01-04 NOTE — PLAN OF CARE
Problem: Falls - Risk of:  Goal: Will remain free from falls  Description: Will remain free from falls  1/3/2022 2324 by Juan Daniel Dukes RN  Outcome: Ongoing  1/3/2022 2323 by Juan Daniel Dukes RN  Outcome: Ongoing  Goal: Absence of physical injury  Description: Absence of physical injury  1/3/2022 2324 by Juan Daniel Dukes RN  Outcome: Ongoing  1/3/2022 2323 by Juan Daniel Dukes RN  Outcome: Ongoing     Problem: Nutrition  Goal: Optimal nutrition therapy  1/3/2022 2324 by Juan Daniel Dukes RN  Outcome: Ongoing  1/3/2022 2323 by Juan Daniel Dukes RN  Outcome: Ongoing     Problem: Skin Integrity:  Goal: Will show no infection signs and symptoms  Description: Will show no infection signs and symptoms  1/3/2022 2324 by Juan Daniel Dukes RN  Outcome: Ongoing  1/3/2022 2323 by Juan Daniel Dukes RN  Outcome: Ongoing  Goal: Absence of new skin breakdown  Description: Absence of new skin breakdown  1/3/2022 2324 by Juan Daniel Dukes RN  Outcome: Ongoing  1/3/2022 2323 by Juan Daniel Dukes RN  Outcome: Ongoing     Problem: Cardiac:  Goal: Ability to maintain an adequate cardiac output will improve  Description: Ability to maintain an adequate cardiac output will improve  1/3/2022 2324 by Juan Daniel Dukes RN  Outcome: Ongoing  1/3/2022 2323 by Juan Daniel Dukes RN  Outcome: Ongoing  Goal: Ability to achieve and maintain adequate cardiopulmonary perfusion will improve  Description: Ability to achieve and maintain adequate cardiopulmonary perfusion will improve  1/3/2022 2324 by Juan Daniel Dukes RN  Outcome: Ongoing  1/3/2022 2323 by Juan Daniel Dukes RN  Outcome: Ongoing  Goal: Hemodynamic stability will improve  Description: Hemodynamic stability will improve  Outcome: Ongoing

## 2022-01-04 NOTE — PROGRESS NOTES
Precedex stopped at [facility]. Patient is not following commands on upper or lower extremities. \"Please stop. I am tired. I am so tired. \" Grimacing to pain X 4, he states that \"yeah\" he feels touch sensation X 4. Opens eyes intermittently to command. Pupils are equal and briskly reactive to light, 2mm. Per patient's sister, patient has a history of prolonged awakening from anesthesia.
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
## 2022-01-04 NOTE — PROGRESS NOTES
Physical Therapy/Occupational Therapy  HOLD    Came to see pt for PT/OT following chart review. Per nursing pt presently with [place] and requests therapy to hold at this time. Nursing suggests to check back later today. Will return later as schedule permits. [facility]  [facility], OTR/L [id number]

## 2022-01-04 NOTE — PROGRESS NOTES
[address]   Cardiology  Note   Dr Rachell Silva MD, Tommy Haney RN, FNP APRN CVNP  Date: 1/4/2022  Admit Date: 12/24/2021       CC:/ NSTEMI / GI bleed   Cardiology following with  syncope  / severe anemia  / COPD /severe CAD / ICM   Urology consulted for penile/scrotal cellulitis  1/3/2022 CABG x 3, LIMA to LAD, SVG to OM2 and PDA  pre-op EF ~30% w/ inferior akinesis; post op EF ~35% w/ inferior dyskinetic hypokinesis    Interval Hx /  Subjective: Today, he is resting quietly / b/p low weaning levo  SV02 97% on high flow 02 n/c / No major events overnight.        12/28/2021 [facility]   Left ventricular pressure 11 mmHg  Aortic pressure 95/47 mmHg  Coronary anatomy:   The left main coronary artery is heavily calcified.  Has severe ostial narrowing at least 90 to 95%.  Our catheter did ventricularized upon engagement. Left anterior descending artery is mildly stenotic proximally.  There is good mid and distal anatomy with good landing zones. Circumflex artery has proximal calcification and moderate lesions. The right coronary artery is 100% occluded after its takeoff.  I do not identify any left to right or right to right collateralization. Left ventriculogram shows ejection fraction of 25 to 30% %.  Moderate global hypokinesia. Patient seen and examined. Clinical notes reviewed.  Telemetry reviewed / testing reviewed  30 minutes   Pertinent labs, diagnostic, device, and imaging results reviewed as a part of this visit  EKG TTE stress test: any LHC/RHC reviewed     Past Medical History:  Past Medical History:   Diagnosis Date    BPH with obstruction/lower urinary tract symptoms 8/3/2012    Chicken pox     Colon polyp     Hyperglycemia 12/15/2010    Hyperlipemia 12/15/2010    Post herpetic neuralgia     Shingles     Squamous cell carcinoma of skin of upper limb, including shoulder 9/8/2015         sodium chloride flush  10 mL IntraVENous 2 times per day    aspirin  325 mg Oral Daily    clopidogrel  75 mg Oral Daily    chlorhexidine  15 mL Mouth/Throat BID    furosemide  40 mg IntraVENous BID    magnesium oxide  400 mg Oral BID    mupirocin   Nasal BID    potassium chloride  10 mEq Oral TID WC    polyethylene glycol  17 g Oral Daily    metoprolol tartrate  12.5 mg Oral BID    [START ON 1/5/2022] lisinopril  2.5 mg Oral Lunch    atorvastatin  40 mg Oral Nightly    [START ON 1/5/2022] pantoprazole  40 mg Oral BID AC    pantoprazole  40 mg IntraVENous BID    And    sodium chloride (PF)  10 mL IntraVENous BID    ceFAZolin (ANCEF) IVPB  2,000 mg IntraVENous Q8H    vancomycin (VANCOCIN) IV  1,000 mg IntraVENous Q12H    insulin glargine  0.15 Units/kg SubCUTAneous Nightly    insulin lispro  0-12 Units SubCUTAneous 4x Daily AC & HS    acetaminophen  1,000 mg Oral Q6H    gabapentin  300 mg Oral TID    trimethoprim-polymyxin b  1 drop Both Eyes 6 times per day    Venelex   Topical BID    sodium chloride flush  5-40 mL IntraVENous 2 times per day    multivitamin  1 tablet Oral Daily    influenza virus vaccine  0.5 mL IntraMUSCular Prior to discharge    fentanNYL  50 mcg IntraVENous Once    tiotropium  2 puff Inhalation Daily    budesonide  0.5 mg Nebulization BID    And    Arformoterol Tartrate  15 mcg Nebulization BID       Vitals:    01/04/22 1122   BP:    Pulse:    Resp: 18   Temp:    SpO2: 97%      In: 3132 [I.V.:2358; Blood:274]  Out: 6663    Wt Readings from Last 3 Encounters:   01/04/22 174 lb 2.6 oz (79 kg)   07/12/18 179 lb (81.2 kg)   05/31/18 183 lb (83 kg)       Intake/Output Summary (Last 24 hours) at 1/4/2022 1141  Last data filed at 1/4/2022 1100  Gross per 24 hour   Intake 5032.03 ml   Output 3235 ml   Net 1797.03 ml       Telemetry: Personally Reviewed    · Constitutional: Cooperative and in no apparent distress, and appears well nourished  · Skin: Warm and pink; no pallor, cyanosis, clubbing, or bruising   · HEENT: Symmetric and normocephalic.    · Cardiovascular: Regular rate and rhythm. S1/S2   · Respiratory: Respirations symmetric and unlabored. Lungs clear to auscultation bilaterally, no wheezing, crackles, or rhonchi  · Gastrointestinal: Abdomen soft and round. Bowel sounds normoactive without tenderness or masses. · Musculoskeletal: Bilateral upper and lower extremity strength 5/5 with full ROM  · Neurologic/Psych: Awake and orientated to person, place and time. Calm affect, appropriate mood    Patient Active Problem List    Diagnosis Date Noted    Non-ST elevated myocardial infarction (non-STEMI) ([facility] 12/31/2021    Syncope and collapse     Anemia 12/24/2021    Visit for wound check 06/06/2018    Basal cell carcinoma of right side of nose 05/08/2018    Visit for suture removal 05/08/2018    SCCS, mod diff  (squamous cell carcinoma), hand, left 04/24/2018    Mixed hyperlipidemia 04/18/2017    Nicotine use disorder 02/03/2017    COPD, mild ([facility] 06/23/2016    Colon polyp     Basal cell carcinoma of shoulder 09/08/2015    Squamous cell carcinoma of skin of upper limb, including shoulder 09/08/2015    Actinic keratosis 09/08/2015    BPH with obstruction/lower urinary tract symptoms 08/03/2012    Hyperglycemia 12/15/2010    Smoker's respiratory syndrome 12/15/2010        Assessment     syncope / with severe anemia  GI workup completed     trop elevation with severe anemia / NSTEMI Type II suspected    LHC with severe CAD     1/3/2022 CABG x 3, LIMA to LAD, SVG to OM2 and PDA  pre-op EF ~30% w/ inferior akinesis; post op EF ~35% w/ inferior dyskinetic hypokinesis    ICM   2/28/2021 TTE   Normal left ventricle size and wall thickness. Overall left ventricular systolic function appears reduced with an ejectionction of 25-30%. There is hypokinesis of the basal and mid inferior walls. There is hypokinesis of the mid and basal inferolateral walls. There is hypokinesis of the mid and basal inferoseptal walls.  Diastolic filling parameters suggest grade I diastolic dysfunction. Mild mitral and tricuspid regurgitation  estimated pulmonary artery systolic pressure is 31 mmHg assuming a right  atrial pressure of 3 mmHg    COPD  Stable   Inhalers     HLD  LDL 54 optimal      UTI  abx per IM      Plan:   POD#1 CABG x 3, LIMA to LAD, SVG to OM2 and PDA  b/p low weaning levo / SV02 97% on high flow 02 n/c / no major events overnight. Will add HF medications as b/p allows / discuss life vest at discharge     Thank you for allowing to us to participate in the care of Feng Martins. Corinne Hernandez APRN-CNP-CVNP    [address]  Interventional cardiology  This patient had bypass graft surgery yesterday. Significant anemia three-vessel disease with left main ostial lesion. Surgery was successful. He is awake and alert today and breathing well. Rhythm is stable sinus. Hemodynamics are stable at this time. Had the transfusions. Will follow.   Ros Hunt MD, [facility]

## 2022-01-04 NOTE — CONSULTS
Urology Attending Consult Note      Reason for Consultation: Penile/scrotal cellulitis    History: 69 yo M POD#1 sp CABG now in ICU. He is disoriented and unable to provide history at this time. Scrotal/penile cellulitis was noted during surgery and we were consulted for further recs. Diamond draining clear. Family History, Social History, Review of Systems:  Reviewed and agreed to as per chart    Vitals:  BP 94/70   Pulse 73   Temp 97.9 °F (36.6 °C) (Core)   Resp 21   Ht 5' 7\" (1.702 m)   Wt 174 lb 2.6 oz (79 kg)   SpO2 95%   BMI 27.28 kg/m²   Temp  Av.6 °F (36.4 °C)  Min: 66.9 °F (19.4 °C)  Max: 99.9 °F (37.7 °C)    Intake/Output Summary (Last 24 hours) at 2022 0931  Last data filed at 2022 0900  Gross per 24 hour   Intake 7282.03 ml   Output 3325 ml   Net 3957.03 ml         Physical:   Well developed, well nourished in no acute distress   Mood indicates no abnormalities. Pt doesnt appear depressed   Orientated to time and place   Neck is supple, trachea is midline   Respiratory effort is normal   Cardiovascular show no extremity swelling   Abdomen no masses or hernias are palpated, there is no tenderness. Liver and Spleen appear normal.   Skin show no abnormal lesions   Lymph nodes are not palpated in the inguinal, neck, or axillary area. Male :   Penis with some depedent edema and circumcised   Urethral meatus is normal in size and location   Scrotum with dependent edema noted. No evidence of induration, erythema, or tenderness to palpation.         Labs:  WBC:    Lab Results   Component Value Date    WBC 10.2 2022     Hemoglobin/Hematocrit:    Lab Results   Component Value Date    HGB 8.9 2022    HCT 26.9 2022     BMP:    Lab Results   Component Value Date     2022    K 4.5 2022    K 3.8 2022     2022    CO2 20 2022    BUN 18 2022    LABALBU 2.8 2022    CREATININE 0.8 2022    CALCIUM 7.1 01/04/2022    GFRAA >60 01/04/2022    GFRAA >60 01/03/2013    LABGLOM >60 01/04/2022     PT/INR:    Lab Results   Component Value Date    PROTIME 18.1 01/04/2022    INR 1.57 01/04/2022     PTT:    Lab Results   Component Value Date    APTT 37.0 01/03/2022   [APTT  Impression/Plan: 69 yo M POD#1 sp CABG. We were consulted for penile/scrotal cellulitis. -Patient disoriented during rounds  -Scrotum shows evidence of dependent edema likely due to immobility, no concern for scrotal cellulitis. Skin is not indurated and scrotum is soft without evidence of testicular swelling. Patient is nontender to palpation.  -Swelling should improve with mobility and elevation.  Direct pressure applied to scrotum can help as well.  -Diamond should remain while he is in ICU recovering.  -Call with any questions     ALIE Phillips

## 2022-01-04 NOTE — PROGRESS NOTES
Consulted for pressure injury on buttocks, pt up in chair at this time, spoke to primary RN, small area red and some shearing, Venelex ordered and being applied. Continue with treatment as ordered.

## 2022-01-04 NOTE — PROGRESS NOTES
Patient has had increasing oxygen requirements from 4LPM to 9LPM - see ABGs. Post-op EF 40-45% per VIRGIL. Chest x-ray bedside - awaiting results. Crackles noted to left lower lung field and urine output has been 27-30 mL/hr X 3. IVF paused and Dr. Valente Seip notified on-call. Will give 40 mg Lasix.

## 2022-01-05 LAB
ANION GAP SERPL CALCULATED.3IONS-SCNC: 9 MMOL/L (ref 3–16)
BLOOD CULTURE, ROUTINE: NORMAL
BUN BLDV-MCNC: 26 MG/DL (ref 7–20)
CALCIUM SERPL-MCNC: 7.5 MG/DL (ref 8.3–10.6)
CHLORIDE BLD-SCNC: 105 MMOL/L (ref 99–110)
CO2: 21 MMOL/L (ref 21–32)
CREAT SERPL-MCNC: 1.2 MG/DL (ref 0.8–1.3)
CULTURE, BLOOD 2: NORMAL
GFR AFRICAN AMERICAN: >60
GFR NON-AFRICAN AMERICAN: 60
GLUCOSE BLD-MCNC: 119 MG/DL (ref 70–99)
GLUCOSE BLD-MCNC: 135 MG/DL (ref 70–99)
GLUCOSE BLD-MCNC: 178 MG/DL (ref 70–99)
HCT VFR BLD CALC: 26.6 % (ref 40.5–52.5)
HEMOGLOBIN: 8.7 G/DL (ref 13.5–17.5)
MAGNESIUM: 2.5 MG/DL (ref 1.8–2.4)
MCH RBC QN AUTO: 27.5 PG (ref 26–34)
MCHC RBC AUTO-ENTMCNC: 32.5 G/DL (ref 31–36)
MCV RBC AUTO: 84.7 FL (ref 80–100)
PDW BLD-RTO: 20.2 % (ref 12.4–15.4)
PERFORMED ON: ABNORMAL
PERFORMED ON: ABNORMAL
PLATELET # BLD: 140 K/UL (ref 135–450)
PMV BLD AUTO: 8.8 FL (ref 5–10.5)
POTASSIUM SERPL-SCNC: 5.2 MMOL/L (ref 3.5–5.1)
RBC # BLD: 3.14 M/UL (ref 4.2–5.9)
SODIUM BLD-SCNC: 135 MMOL/L (ref 136–145)
WBC # BLD: 9.8 K/UL (ref 4–11)

## 2022-01-05 PROCEDURE — 80048 BASIC METABOLIC PNL TOTAL CA: CPT

## 2022-01-05 PROCEDURE — 36415 COLL VENOUS BLD VENIPUNCTURE: CPT

## 2022-01-05 PROCEDURE — 94761 N-INVAS EAR/PLS OXIMETRY MLT: CPT

## 2022-01-05 PROCEDURE — 94640 AIRWAY INHALATION TREATMENT: CPT

## 2022-01-05 PROCEDURE — 6360000002 HC RX W HCPCS: Performed by: CLINICAL NURSE SPECIALIST

## 2022-01-05 PROCEDURE — 97166 OT EVAL MOD COMPLEX 45 MIN: CPT

## 2022-01-05 PROCEDURE — 97530 THERAPEUTIC ACTIVITIES: CPT

## 2022-01-05 PROCEDURE — 99233 SBSQ HOSP IP/OBS HIGH 50: CPT | Performed by: INTERNAL MEDICINE

## 2022-01-05 PROCEDURE — 94150 VITAL CAPACITY TEST: CPT

## 2022-01-05 PROCEDURE — 2580000003 HC RX 258: Performed by: THORACIC SURGERY (CARDIOTHORACIC VASCULAR SURGERY)

## 2022-01-05 PROCEDURE — 6370000000 HC RX 637 (ALT 250 FOR IP): Performed by: THORACIC SURGERY (CARDIOTHORACIC VASCULAR SURGERY)

## 2022-01-05 PROCEDURE — 6360000002 HC RX W HCPCS: Performed by: THORACIC SURGERY (CARDIOTHORACIC VASCULAR SURGERY)

## 2022-01-05 PROCEDURE — 6360000002 HC RX W HCPCS

## 2022-01-05 PROCEDURE — 2000000000 HC ICU R&B

## 2022-01-05 PROCEDURE — 2500000003 HC RX 250 WO HCPCS

## 2022-01-05 PROCEDURE — 99024 POSTOP FOLLOW-UP VISIT: CPT | Performed by: NURSE PRACTITIONER

## 2022-01-05 PROCEDURE — 99233 SBSQ HOSP IP/OBS HIGH 50: CPT | Performed by: NURSE PRACTITIONER

## 2022-01-05 PROCEDURE — 31720 CLEARANCE OF AIRWAYS: CPT

## 2022-01-05 PROCEDURE — 6370000000 HC RX 637 (ALT 250 FOR IP): Performed by: NURSE PRACTITIONER

## 2022-01-05 PROCEDURE — 2500000003 HC RX 250 WO HCPCS: Performed by: THORACIC SURGERY (CARDIOTHORACIC VASCULAR SURGERY)

## 2022-01-05 PROCEDURE — 94669 MECHANICAL CHEST WALL OSCILL: CPT

## 2022-01-05 PROCEDURE — 83735 ASSAY OF MAGNESIUM: CPT

## 2022-01-05 PROCEDURE — 85027 COMPLETE CBC AUTOMATED: CPT

## 2022-01-05 PROCEDURE — 2700000000 HC OXYGEN THERAPY PER DAY

## 2022-01-05 PROCEDURE — 6370000000 HC RX 637 (ALT 250 FOR IP): Performed by: CLINICAL NURSE SPECIALIST

## 2022-01-05 RX ORDER — LANOLIN ALCOHOL/MO/W.PET/CERES
3 CREAM (GRAM) TOPICAL NIGHTLY PRN
Status: DISCONTINUED | OUTPATIENT
Start: 2022-01-05 | End: 2022-01-10 | Stop reason: HOSPADM

## 2022-01-05 RX ORDER — CALCIUM CARBONATE 500(1250)
500 TABLET ORAL 2 TIMES DAILY
Status: DISCONTINUED | OUTPATIENT
Start: 2022-01-05 | End: 2022-01-10

## 2022-01-05 RX ORDER — FUROSEMIDE 10 MG/ML
40 INJECTION INTRAMUSCULAR; INTRAVENOUS 3 TIMES DAILY
Status: DISCONTINUED | OUTPATIENT
Start: 2022-01-05 | End: 2022-01-06

## 2022-01-05 RX ADMIN — BUDESONIDE 500 MCG: 0.5 SUSPENSION RESPIRATORY (INHALATION) at 09:11

## 2022-01-05 RX ADMIN — AMIODARONE HYDROCHLORIDE 400 MG: 200 TABLET ORAL at 09:32

## 2022-01-05 RX ADMIN — ACETAMINOPHEN 1000 MG: 500 TABLET ORAL at 12:30

## 2022-01-05 RX ADMIN — FUROSEMIDE 40 MG: 10 INJECTION, SOLUTION INTRAMUSCULAR; INTRAVENOUS at 14:33

## 2022-01-05 RX ADMIN — FUROSEMIDE 40 MG: 10 INJECTION, SOLUTION INTRAMUSCULAR; INTRAVENOUS at 09:33

## 2022-01-05 RX ADMIN — POTASSIUM CHLORIDE 10 MEQ: 750 TABLET, EXTENDED RELEASE ORAL at 09:37

## 2022-01-05 RX ADMIN — POLYMYXIN B SULFATE, TRIMETHOPRIM SULFATE 1 DROP: 10000; 1 SOLUTION/ DROPS OPHTHALMIC at 03:33

## 2022-01-05 RX ADMIN — ATORVASTATIN CALCIUM 40 MG: 40 TABLET, FILM COATED ORAL at 20:39

## 2022-01-05 RX ADMIN — CALCIUM 500 MG: 500 TABLET ORAL at 12:31

## 2022-01-05 RX ADMIN — CHLORHEXIDINE GLUCONATE 15 ML: 1.2 RINSE ORAL at 20:30

## 2022-01-05 RX ADMIN — POLYMYXIN B SULFATE, TRIMETHOPRIM SULFATE 1 DROP: 10000; 1 SOLUTION/ DROPS OPHTHALMIC at 20:30

## 2022-01-05 RX ADMIN — POLYMYXIN B SULFATE, TRIMETHOPRIM SULFATE 1 DROP: 10000; 1 SOLUTION/ DROPS OPHTHALMIC at 08:47

## 2022-01-05 RX ADMIN — GABAPENTIN 300 MG: 300 CAPSULE ORAL at 20:38

## 2022-01-05 RX ADMIN — Medication 400 MG: at 09:31

## 2022-01-05 RX ADMIN — NOREPINEPHRINE BITARTRATE 0.01 MCG/KG/MIN: 1 INJECTION, SOLUTION, CONCENTRATE INTRAVENOUS at 22:15

## 2022-01-05 RX ADMIN — LISINOPRIL 2.5 MG: 2.5 TABLET ORAL at 12:32

## 2022-01-05 RX ADMIN — POLYMYXIN B SULFATE, TRIMETHOPRIM SULFATE 1 DROP: 10000; 1 SOLUTION/ DROPS OPHTHALMIC at 15:56

## 2022-01-05 RX ADMIN — SODIUM CHLORIDE, PRESERVATIVE FREE 10 ML: 5 INJECTION INTRAVENOUS at 20:44

## 2022-01-05 RX ADMIN — POLYETHYLENE GLYCOL 3350 17 G: 17 POWDER, FOR SOLUTION ORAL at 09:30

## 2022-01-05 RX ADMIN — ALBUTEROL SULFATE 2.5 MG: 2.5 SOLUTION RESPIRATORY (INHALATION) at 09:10

## 2022-01-05 RX ADMIN — CHLORHEXIDINE GLUCONATE 15 ML: 1.2 RINSE ORAL at 09:33

## 2022-01-05 RX ADMIN — PANTOPRAZOLE SODIUM 40 MG: 40 TABLET, DELAYED RELEASE ORAL at 15:56

## 2022-01-05 RX ADMIN — GABAPENTIN 300 MG: 300 CAPSULE ORAL at 14:32

## 2022-01-05 RX ADMIN — FUROSEMIDE 40 MG: 10 INJECTION, SOLUTION INTRAMUSCULAR; INTRAVENOUS at 20:29

## 2022-01-05 RX ADMIN — THERA TABS 1 TABLET: TAB at 09:33

## 2022-01-05 RX ADMIN — GABAPENTIN 300 MG: 300 CAPSULE ORAL at 09:32

## 2022-01-05 RX ADMIN — ARFORMOTEROL TARTRATE 15 MCG: 15 SOLUTION RESPIRATORY (INHALATION) at 21:42

## 2022-01-05 RX ADMIN — ARFORMOTEROL TARTRATE 15 MCG: 15 SOLUTION RESPIRATORY (INHALATION) at 09:12

## 2022-01-05 RX ADMIN — Medication: at 20:29

## 2022-01-05 RX ADMIN — BUDESONIDE 500 MCG: 0.5 SUSPENSION RESPIRATORY (INHALATION) at 21:42

## 2022-01-05 RX ADMIN — MUPIROCIN: 20 OINTMENT TOPICAL at 09:34

## 2022-01-05 RX ADMIN — Medication: at 11:18

## 2022-01-05 RX ADMIN — KETOROLAC TROMETHAMINE 15 MG: 30 INJECTION, SOLUTION INTRAMUSCULAR at 00:08

## 2022-01-05 RX ADMIN — AMIODARONE HYDROCHLORIDE 400 MG: 200 TABLET ORAL at 20:30

## 2022-01-05 RX ADMIN — Medication 400 MG: at 20:39

## 2022-01-05 RX ADMIN — POLYMYXIN B SULFATE, TRIMETHOPRIM SULFATE 1 DROP: 10000; 1 SOLUTION/ DROPS OPHTHALMIC at 12:32

## 2022-01-05 RX ADMIN — MUPIROCIN: 20 OINTMENT TOPICAL at 20:30

## 2022-01-05 RX ADMIN — POTASSIUM CHLORIDE 10 MEQ: 750 TABLET, EXTENDED RELEASE ORAL at 12:30

## 2022-01-05 RX ADMIN — SODIUM CHLORIDE, PRESERVATIVE FREE 10 ML: 5 INJECTION INTRAVENOUS at 20:30

## 2022-01-05 RX ADMIN — SODIUM CHLORIDE, PRESERVATIVE FREE 10 ML: 5 INJECTION INTRAVENOUS at 09:38

## 2022-01-05 RX ADMIN — ASPIRIN 325 MG: 325 TABLET, COATED ORAL at 09:33

## 2022-01-05 RX ADMIN — POTASSIUM CHLORIDE 10 MEQ: 750 TABLET, EXTENDED RELEASE ORAL at 17:14

## 2022-01-05 RX ADMIN — ALBUTEROL SULFATE 2.5 MG: 2.5 SOLUTION RESPIRATORY (INHALATION) at 05:45

## 2022-01-05 RX ADMIN — ACETAMINOPHEN 1000 MG: 500 TABLET ORAL at 17:09

## 2022-01-05 RX ADMIN — CLOPIDOGREL BISULFATE 75 MG: 75 TABLET ORAL at 09:31

## 2022-01-05 ASSESSMENT — PAIN DESCRIPTION - DESCRIPTORS: DESCRIPTORS: SORE

## 2022-01-05 ASSESSMENT — PAIN - FUNCTIONAL ASSESSMENT: PAIN_FUNCTIONAL_ASSESSMENT: PREVENTS OR INTERFERES SOME ACTIVE ACTIVITIES AND ADLS

## 2022-01-05 ASSESSMENT — PAIN DESCRIPTION - LOCATION: LOCATION: CHEST

## 2022-01-05 ASSESSMENT — PAIN DESCRIPTION - ORIENTATION: ORIENTATION: MID

## 2022-01-05 ASSESSMENT — PAIN SCALES - GENERAL
PAINLEVEL_OUTOF10: 0
PAINLEVEL_OUTOF10: 0
PAINLEVEL_OUTOF10: 8
PAINLEVEL_OUTOF10: 0

## 2022-01-05 ASSESSMENT — PAIN DESCRIPTION - PROGRESSION: CLINICAL_PROGRESSION: GRADUALLY WORSENING

## 2022-01-05 ASSESSMENT — PAIN DESCRIPTION - ONSET: ONSET: GRADUAL

## 2022-01-05 ASSESSMENT — PAIN DESCRIPTION - PAIN TYPE: TYPE: SURGICAL PAIN

## 2022-01-05 ASSESSMENT — PAIN DESCRIPTION - FREQUENCY: FREQUENCY: INTERMITTENT

## 2022-01-05 NOTE — PROGRESS NOTES
[address]   Cardiology  Note   Dr Alfredo Dixon MD, Patsy Rick RN, FNP APRN CVNP  Date: 1/5/2022  Admit Date: 12/24/2021       CC:/ NSTEMI / GI bleed   Cardiology following with  syncope  / severe anemia  / COPD /severe CAD / ICM   Urology consulted for penile/scrotal cellulitis  1/3/2022 CABG x 3, LIMA to LAD, SVG to OM2 and PDA  pre-op EF ~30% w/ inferior akinesis; post op EF ~35% w/ inferior dyskinetic hypokinesis    Interval Hx /  Subjective:he is up in chair eating breakfast  VSS no c/o voiced / in NSR with PVCs  / chest tube in place / suture line dry and approx. no major events overnight. Cardiac meds amiodarone asa lipitor plavix lasix lisinopril  Mag ox  lopressor      12/28/2021 [facility]   Left ventricular pressure 11 mmHg  Aortic pressure 95/47 mmHg  Coronary anatomy:   The left main coronary artery is heavily calcified.  Has severe ostial narrowing at least 90 to 95%.  Our catheter did ventricularized upon engagement. Left anterior descending artery is mildly stenotic proximally.  There is good mid and distal anatomy with good landing zones. Circumflex artery has proximal calcification and moderate lesions. The right coronary artery is 100% occluded after its takeoff.  I do not identify any left to right or right to right collateralization. Left ventriculogram shows ejection fraction of 25 to 30% %.  Moderate global hypokinesia. Patient seen and examined. Clinical notes reviewed.  Telemetry reviewed / testing reviewed  30 minutes   Pertinent labs, diagnostic, device, and imaging results reviewed as a part of this visit  EKG TTE stress test: any LHC/RHC reviewed     Past Medical History:  Past Medical History:   Diagnosis Date    BPH with obstruction/lower urinary tract symptoms 8/3/2012    Chicken pox     Colon polyp     Hyperglycemia 12/15/2010    Hyperlipemia 12/15/2010    Post herpetic neuralgia     Shingles     Squamous cell carcinoma of skin of upper limb, including shoulder 9/8/2015         calcium elemental  500 mg Oral BID    amiodarone  400 mg Oral BID    Followed by   Lisbeth Kessler ON 1/9/2022] amiodarone  200 mg Oral Daily    sodium chloride flush  10 mL IntraVENous 2 times per day    aspirin  325 mg Oral Daily    clopidogrel  75 mg Oral Daily    chlorhexidine  15 mL Mouth/Throat BID    furosemide  40 mg IntraVENous BID    magnesium oxide  400 mg Oral BID    mupirocin   Nasal BID    potassium chloride  10 mEq Oral TID WC    polyethylene glycol  17 g Oral Daily    metoprolol tartrate  12.5 mg Oral BID    lisinopril  2.5 mg Oral Lunch    atorvastatin  40 mg Oral Nightly    pantoprazole  40 mg Oral BID AC    insulin lispro  0-12 Units SubCUTAneous 4x Daily AC & HS    acetaminophen  1,000 mg Oral Q6H    gabapentin  300 mg Oral TID    trimethoprim-polymyxin b  1 drop Both Eyes 6 times per day    Venelex   Topical BID    sodium chloride flush  5-40 mL IntraVENous 2 times per day    multivitamin  1 tablet Oral Daily    influenza virus vaccine  0.5 mL IntraMUSCular Prior to discharge    fentanNYL  50 mcg IntraVENous Once    tiotropium  2 puff Inhalation Daily    budesonide  0.5 mg Nebulization BID    And    Arformoterol Tartrate  15 mcg Nebulization BID       Vitals:    01/05/22 1232   BP: 135/81   Pulse:    Resp:    Temp:    SpO2:       In: 1386.2 [P.O.:510; I.V.:830.3]  Out: 2420    Wt Readings from Last 3 Encounters:   01/05/22 175 lb 0.7 oz (79.4 kg)   07/12/18 179 lb (81.2 kg)   05/31/18 183 lb (83 kg)       Intake/Output Summary (Last 24 hours) at 1/5/2022 1403  Last data filed at 1/5/2022 0609  Gross per 24 hour   Intake 910.28 ml   Output 1275 ml   Net -364.72 ml       Telemetry: Personally Reviewed    · Constitutional: Cooperative and in no apparent distress, and appears well nourished  · Skin: Warm and pink; no pallor, cyanosis, clubbing, or bruising   · HEENT: Symmetric and normocephalic. · Cardiovascular: Regular rate and rhythm. S1/S2   · Respiratory: Respirations symmetric and unlabored. Lungs clear to auscultation bilaterally, no wheezing, crackles, or rhonchi  · Gastrointestinal: Abdomen soft and round. Bowel sounds normoactive without tenderness or masses. · Musculoskeletal: Bilateral upper and lower extremity strength 5/5 with full ROM  · Neurologic/Psych: Awake and orientated to person, place and time. Calm affect, appropriate mood    Patient Active Problem List    Diagnosis Date Noted    Non-ST elevated myocardial infarction (non-STEMI) ([facility] 12/31/2021    Syncope and collapse     Anemia 12/24/2021    Visit for wound check 06/06/2018    Basal cell carcinoma of right side of nose 05/08/2018    Visit for suture removal 05/08/2018    SCCS, mod diff  (squamous cell carcinoma), hand, left 04/24/2018    Mixed hyperlipidemia 04/18/2017    Nicotine use disorder 02/03/2017    COPD, mild ([facility] [address] 06/23/2016    Colon polyp     Basal cell carcinoma of shoulder 09/08/2015    Squamous cell carcinoma of skin of upper limb, including shoulder 09/08/2015    Actinic keratosis 09/08/2015    BPH with obstruction/lower urinary tract symptoms 08/03/2012    Hyperglycemia 12/15/2010    Smoker's respiratory syndrome 12/15/2010        Assessment     syncope / with severe anemia  GI workup completed     trop elevation with severe anemia / NSTEMI Type II suspected    LHC with severe CAD     1/3/2022 CABG x 3, LIMA to LAD, SVG to OM2 and PDA  pre-op EF ~30% w/ inferior akinesis; post op EF ~35% w/ inferior dyskinetic hypokinesis    ICM   2/28/2021 TTE   Normal left ventricle size and wall thickness. Overall left ventricular systolic function appears reduced with an ejectionction of 25-30%. There is hypokinesis of the basal and mid inferior walls. There is hypokinesis of the mid and basal inferolateral walls. There is hypokinesis of the mid and basal inferoseptal walls. Diastolic filling parameters suggest grade I diastolic dysfunction.  Mild mitral and tricuspid regurgitation  estimated pulmonary artery systolic pressure is 31 mmHg assuming a right  atrial pressure of 3 mmHg    NSVT  / now NSR with PVCS  On amiodarone po     COPD  Stable   Inhalers     HLD  LDL 54 optimal      UTI  abx per IM      Plan:   POD#2 CABG x 3, LIMA to LAD, SVG to OM2 and PDA  VSS up in chair / chest tube in place  Cardiac meds amiodarone asa lipitor plavix lasix lisinopril  Mag ox  lopresso  HF medications BB/ace added   consider SGLT2 in future   discuss life vest at discharge     Thank you for allowing to us to participate in the care of Chyna Coles. Moira Ontiveros APRN-CNP-CVNP    [address]  Interventional cardiology note  Patient is hemodynamically stable. In the ICU post bypass graft surgery on January 3, 2022. Overall ejection fraction may be a bit better postop. Has been transfused and his H&H is improved. Rhythm is stable occasional PVC. Hopefully he will continue to improve. Chest tubes out soon.     Lamar Douglas MD, [facility] [place]

## 2022-01-05 NOTE — PROGRESS NOTES
Pat noted on monitor, though sustained less than one minute.  Will recheck Mg and K+ with a goal of 2.5 and 5.0, per MD.     HR now 72, NSR.

## 2022-01-05 NOTE — PLAN OF CARE
Problem: Falls - Risk of:  Goal: Will remain free from falls  Description: Will remain free from falls  Outcome: Ongoing  Goal: Absence of physical injury  Description: Absence of physical injury  Outcome: Ongoing     Problem: Nutrition  Goal: Optimal nutrition therapy  Outcome: Ongoing     Problem: Skin Integrity:  Goal: Will show no infection signs and symptoms  Description: Will show no infection signs and symptoms  Outcome: Ongoing  Goal: Absence of new skin breakdown  Description: Absence of new skin breakdown  Outcome: Ongoing     Problem: Cardiac:  Goal: Ability to maintain an adequate cardiac output will improve  Description: Ability to maintain an adequate cardiac output will improve  Outcome: Ongoing  Goal: Ability to achieve and maintain adequate cardiopulmonary perfusion will improve  Description: Ability to achieve and maintain adequate cardiopulmonary perfusion will improve  Outcome: Ongoing  Goal: Hemodynamic stability will improve  Description: Hemodynamic stability will improve  Outcome: Ongoing     Problem: OXYGENATION/RESPIRATORY FUNCTION  Goal: Patient will maintain patent airway  Outcome: Ongoing  Goal: Patient will achieve/maintain normal respiratory rate/effort  Description: Respiratory rate and effort will be within normal limits for the patient  Outcome: Ongoing     Problem: HEMODYNAMIC STATUS  Goal: Patient has stable vital signs and fluid balance  Outcome: Ongoing     Problem: FLUID AND ELECTROLYTE IMBALANCE  Goal: Fluid and electrolyte balance are achieved/maintained  Outcome: Ongoing     Problem: ACTIVITY INTOLERANCE/IMPAIRED MOBILITY  Goal: Mobility/activity is maintained at optimum level for patient  Outcome: Ongoing

## 2022-01-05 NOTE — CARE COORDINATION
Case Management Assessment           Daily Note                 Date/ Time of Note: 1/5/2022 1:31 PM         Note completed by: Yamel Mullins RN    Patient Name: Avani Law  YOB: 1950    Cherelle Woodward [D64.9]  NSTEMI (non-ST elevated myocardial infarction) ([facility] [address] [I21.4]  Syncope, unspecified syncope type [R55]  Anemia, unspecified type [D64.9]  Patient Admission Status: Inpatient    Date of Admission:12/24/2021  4:36 PM Length of Stay: 12 GLOS: GMLOS: 11.4    Current Plan of Care: POD #2 CABG   ________________________________________________________________________________________  PT AM-PAC: 13 / 24 per last evaluation on: tbd    OT AM-PAC: 14 / 24 per last evaluation on: tbd    DME Needs for discharge: tbd  ________________________________________________________________________________________  Discharge Plan: Inpatient Rehab: [facility] ARU    Tentative discharge date: tbd    Current barriers to discharge: POD #2 CABG    Referrals completed: Not Applicable    Resources/ information provided: Not indicated at this time  ________________________________________________________________________________________  Case Management Notes: CM met with patient and sister Naty Aguirre at bedside. Patient is from home alone with no services. POD #2 CABG, will need to consider placement after discharge. ARU vs SNF. Spoke with Jean-Claude Parra in ARU to follow. Tea Leo and his family were provided with choice of provider; he and his family are in agreement with the discharge plan.     Care Transition Patient: Jamia Mullins RN  [facility]  Case Management Department  Ph: [phone]

## 2022-01-05 NOTE — PROGRESS NOTES
Patient had much difficulty swallowing his pills this evening, attempting to chew them and then not taking a drink through a straw or the side of the cup.

## 2022-01-05 NOTE — PROGRESS NOTES
Occupational Therapy   Occupational Therapy Initial Assessment/ Treatment  Date: 2022   Patient Name: Ericka Hammonds  MRN: [id number]     : 1950    Date of Service: 2022    Discharge Recommendations:    Ericka Hammonds scored a 9/24 on the AM-PAC ADL Inpatient form. Current research shows that an AM-PAC score of 17 or less is typically not associated with a discharge to the patient's home setting. Based on the patient's AM-PAC score and their current ADL deficits, it is recommended that the patient have 3-5 sessions per week of Occupational Therapy at d/c to increase the patient's independence. Please see assessment section for further patient specific details. If patient discharges prior to next session this note will serve as a discharge summary. Please see below for the latest assessment towards goals. OT Equipment Recommendations  Equipment Needed: No  Other: defer    Assessment   Performance deficits / Impairments: Decreased functional mobility ; Decreased ADL status; Decreased endurance;Decreased strength;Decreased cognition;Decreased high-level IADLs;Decreased coordination;Decreased balance  Assessment: Prior to admission pt was living at home alone, was independent with ADLs and IADLs. Pt now presents below his baseline, now s/p CABG. Pt now demonstrating mod A x 1 for sit to stand transfers, and max A x 1-2 for mobility short distance. Pt significantly limited this date 2/2 fatigue, reporting max fatigue after bed to chair transfer and requesting to return to bed. Pt declining further ADLs at this time. Pt would benefit from continued OT while in acute care, would benefit from continued OT prior to dc home. Pt reporting unsure if he is discharging home or to a hotel at this time. Will continue OT POC.   Treatment Diagnosis: impaired mobility, transfers, ADL  Prognosis: Fair  Decision Making: Medium Complexity  OT Education: OT Role;Plan of Care;Precautions  Patient Education: cont to reinforce  Barriers to Learning: cognition  REQUIRES OT FOLLOW UP: Yes  Activity Tolerance  Activity Tolerance: Patient limited by fatigue  Activity Tolerance: Pt reported max fatigue after mobility from bed to chair. O2 sats WFL on 2lpm O2, HR WFL. Pt encouraged to complete deep breathing, noted congestion and pt unable to cough enough to clear at this time. Pt requested to return to bed at end of session  Safety Devices  Safety Devices in place: Yes  Type of devices: Left in bed;Left in chair;Bed alarm in place;Call light within reach           Patient Diagnosis(es): The primary encounter diagnosis was Anemia, unspecified type. Diagnoses of Syncope, unspecified syncope type and NSTEMI (non-ST elevated myocardial infarction) [facility]) were also pertinent to this visit. 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 has a past surgical history that includes hemicolectomy; Upper gastrointestinal endoscopy (N/A, 12/27/2021); and Coronary artery bypass graft (N/A, 1/3/2022). Treatment Diagnosis: impaired mobility, transfers, ADL      Restrictions  Position Activity Restriction  Sternal Precautions: No Pushing,No Pulling,5# Lifting Restrictions  Other position/activity restrictions: up as tolerated; sternal precautions    Subjective   General  Chart Reviewed: Yes  Additional Pertinent Hx: Pt originally admitted 12/24/21 with c/o lightheadedness and syncopal episode, also c/o chest pain/ pressure. 1/3 for: VIRGIL; TCPB: EVH R thigh; CABG x 3, LIMA to LAD, SVG to OM2 and PDA.  PMHx includes:  has a past medical history of BPH with obstruction/lower urinary tract symptoms (8/3/2012), Chicken pox, Colon polyp, Hyperglycemia (12/15/2010), Hyperlipemia (12/15/2010), Post herpetic neuralgia, Shingles, and Squamous cell carcinoma of skin of upper limb, including shoulder (9/8/2015). Family / Caregiver Present: No  Referring Practitioner: Carlisle Seip, MD  Diagnosis: anemia  Subjective  Subjective: Pt semi supine in bed upon arrival, lethargic however agreeable to OT eval and treat. General Comment  Comments: Pt lethargic throughout session. Pain Assessment  Pain Level:  (scheduled medication)  Vital Signs  BP: 135/81  Social/Functional History  Social/Functional History  Lives With: Alone  Type of Home: House  Home Layout: Able to Live on Main level with bedroom/bathroom,Two level,Laundry in basement  Home Access: Stairs to enter with rails  Entrance Stairs - Number of Steps: 4  Entrance Stairs - Rails: Left  Bathroom Shower/Tub: Tub/Shower unit,Walk-in shower  Bathroom Equipment: Shower chair,Grab bars in shower  Home Equipment: Wheelchair-manual,4 wheeled walker,Rolling walker  ADL Assistance: Independent  Homemaking Assistance: Independent  Ambulation Assistance: Independent (not using AE prior)  Transfer Assistance: Independent  Active : Yes  Type of occupation: part time delivery  Additional Comments: fall about 1 week ago at admission; reporting sister can help periodically but not 24 hr. Pt also reporting he is unsure if he is discharging to his home eventually or to a hotel (?) environmental issues       Objective   Vision: Impaired  Vision Exceptions: Wears glasses at all times  Hearing: Exceptions to [facility]  Hearing Exceptions: Hard of hearing/hearing concerns    Orientation  Overall Orientation Status: Impaired  Orientation Level: Oriented to place;Oriented to person;Disoriented to situation     Balance  Sitting Balance: Moderate assistance (sitting EOB, initially mod A, progressing to CGA.  Pt then reporting max fatigue and requesting to return to supine, however agreeable to attempting bed to chair transfer with encouragement.)  Standing Balance: Dependent/Total (max Ax 1 for bed to chair, then mod A x 2 for chair to bed 2/2 fatigue)  Standing Balance  Time: 1+1 min  Activity: bed <> chair transfer  Functional Mobility  Functional - Mobility Device: 4-Wheeled Walker  Activity: Other (2-3 steps bed <> chair)  Assist Level: Dependent/Total  Functional Mobility Comments: max A x 1 for bed to chair, max A x 2 for chair to bed 2/2 reported fatigue  ADL  Grooming:  (pt declined at this time 2/2 reported max fatigue)  LE Dressing: Dependent/Total (donning socks)  Toileting: Dependent/Total (hernandez catheter; pt had no urge to have BM)        Bed mobility  Supine to Sit: Moderate assistance (+ cueing for pt to adhere to sternal precautions)  Sit to Supine: Maximum assistance;2 Person assistance (2/2 fatigue)  Transfers  Sit to stand: Moderate assistance  Stand to sit: Moderate assistance (for eccentric control)  Transfer Comments: ++ cueing for pt to adhere to sternal precautions     Cognition  Overall Cognitive Status: Exceptions  Arousal/Alertness: Delayed responses to stimuli  Following Commands: Follows one step commands with increased time; Follows one step commands with repetition  Attention Span: Difficulty attending to directions; Difficulty dividing attention  Memory: Decreased recall of recent events;Decreased short term memory  Safety Judgement: Decreased awareness of need for assistance;Decreased awareness of need for safety  Problem Solving: Decreased awareness of errors  Insights: Decreased awareness of deficits  Initiation: Requires cues for some  Sequencing: Requires cues for some                 LUE AROM (degrees)  LUE General AROM: WFL elbow flexion, reporting increased fatigue and unable to bring UEs to 90 deg shoulder flexion  Left Hand AROM (degrees)  Left Hand AROM: WFL  RUE AROM (degrees)  RUE General AROM: WFL elbow flexion, reporting increased fatigue and unable to bring UEs to 90 deg shoulder flexion  Right Hand AROM (degrees)  Right Hand AROM: WFL          Treatment included functional transfer training, ADLs, and patient education. Plan   Plan  Times per week: 2-5  Times per day: Daily  Current Treatment Recommendations: Nereyda Barajas Mobility Training,Endurance Training,Self-Care / Bhavna Nielsen Re-education,Equipment Evaluation, Education, & procurement,Patient/Caregiver Education & Training  AM-PAC Score        AM-PAC Inpatient Daily Activity Raw Score: 9 (01/05/22 1404)  AM-PAC Inpatient ADL T-Scale Score : 25.33 (01/05/22 1404)  ADL Inpatient CMS 0-100% Score: 79.59 (01/05/22 1404)  ADL Inpatient CMS G-Code Modifier : CL (01/05/22 1404)    Goals  Short term goals  Time Frame for Short term goals: by dc  Short term goal 1: Pt will complete LB dressing with mod A  Short term goal 2: Pt will complete toilet transfer with min A  Short term goal 3: Pt will complete B UE HEP x 15 reps to improve activity tolerance for ADLs  Short term goal 4: Pt will complete grooming tasks with setup  Short term goal 5: .  Patient Goals   Patient goals : not stated       Therapy Time   Individual Concurrent Group Co-treatment   Time In 1140         Time Out 1218         Minutes 38         Timed Code Treatment Minutes: 23 Minutes (+15 min eval)    Toya MEDINA  [address] [facility], OTR/L [id number]

## 2022-01-05 NOTE — PROGRESS NOTES
Patient NT suctioned per RT and this writer - he was unable to cough up secretions with worsening rhonchi. A large amount of white, pale yellow secretions produced. BP WNL during suctioning. Will assist patient to the chair and continue breathing exercises.

## 2022-01-05 NOTE — PROGRESS NOTES
CVTS Cardiothoracic Progress Note:                                CC:  Post op follow up     Surgery: 1/3 VIRGIL; TCPB: EVH R thigh; CABG x 3, LIMA to LAD, SVG to OM2 and PDA    Hospital course:  1/4 Requiring levo for BP support overnight. Stable in SR. Somewhat agitated with nursing staff and resistant to instruction at times. 1/5 Up in chair, on minimal dose of Levo. Remains in SR.      Past Medical History:   Diagnosis Date    BPH with obstruction/lower urinary tract symptoms 8/3/2012    Chicken pox     Colon polyp     Hyperglycemia 12/15/2010    Hyperlipemia 12/15/2010    Post herpetic neuralgia     Shingles     Squamous cell carcinoma of skin of upper limb, including shoulder 9/8/2015        Past Surgical History:   Procedure Laterality Date    CORONARY ARTERY BYPASS GRAFT N/A 1/3/2022    VIRGIL; TCPB: EVH R thigh; CABG x 3, LIMA to LAD, SVG to OM2 and PDA performed by Lucy Singh MD at [address] [place] [address] due to mass benign    UPPER GASTROINTESTINAL ENDOSCOPY N/A 12/27/2021    EGD BIOPSY performed by Reza Cordova MD at [address] [place] [address] as of 12/24/2021    (No Known Allergies)        Patient Active Problem List   Diagnosis    Hyperglycemia    Smoker's respiratory syndrome    BPH with obstruction/lower urinary tract symptoms    Basal cell carcinoma of shoulder    Squamous cell carcinoma of skin of upper limb, including shoulder    Actinic keratosis    Colon polyp    COPD, mild (HCC)    Nicotine use disorder    Mixed hyperlipidemia    SCCS, mod diff  (squamous cell carcinoma), hand, left    Basal cell carcinoma of right side of nose    Visit for suture removal    Visit for wound check    Anemia    Syncope and collapse    Non-ST elevated myocardial infarction (non-STEMI) (HCC)        Vital Signs: /66   Pulse 79   Temp 98 °F (36.7 °C) (Oral)   Resp 22   Ht 5' 7\" (1.702 m)   Wt 175 lb 0.7 oz (79.4 kg)   SpO2 95%   BMI 27.42 kg/m²  O2 Flow Rate (L/min): 3 L/min     Admission Weight: Weight: 168 lb 10.4 oz (76.5 kg)    Weight on 1/3 (74.4 kg) Pre-op   Weight on 1/4 (79 kg)  1/5  79.4 kg (+5 kg)     Intake/Output:     Intake/Output Summary (Last 24 hours) at 1/5/2022 1039  Last data filed at 1/5/2022 1645  Gross per 24 hour   Intake 1386.21 ml   Output 2105 ml   Net -718.79 ml      GTTS: levo @ 0.01 mcg/kg/min, insulin     Extubation Time: 1/3 @ 1855  Transition Time: 1/4 (after 17:50, not documented)    LABORATORY DATA:  CBC:   Recent Labs     01/03/22 1103 01/03/22 1103 01/03/22 1709 01/04/22 0323 01/05/22 0319   WBC 13.6*  --   --  10.2 9.8   HGB 8.9*   < > 9.2* 8.9* 8.7*   HCT 27.3*   < > 28.2* 26.9* 26.6*   MCV 84.5  --   --  84.8 84.7   *  --   --  139 140    < > = values in this interval not displayed. BMP:   Recent Labs     01/03/22 1709 01/04/22 0018 01/04/22 0322 01/04/22 2332 01/05/22 0319     --  136  --  135*   K 4.3   < > 4.5 5.0 5.2*     --  108  --  105   CO2 23  --  20*  --  21   BUN 15  --  18  --  26*   CREATININE 0.6*  --  0.8  --  1.2    < > = values in this interval not displayed. MG:    Recent Labs     01/04/22 0322 01/04/22 2332 01/05/22 0319   MG 2.30 2.30 2.50*      PT/INR:   Recent Labs     01/03/22 1103 01/04/22 0323   PROTIME 23.4* 18.1*   INR 2.01* 1.57*     APTT:   Recent Labs     01/03/22 1103   APTT 37.0     CXR: 1/3  FINDINGS:       HEART / MEDIASTINUM: Heart is mildly enlarged.       LUNGS/PLEURA: Mild basilar airspace disease. Trace costophrenic angle blunting. No evidence of pneumothorax.       BONES / SOFT TISSUES: No acute abnormality.       OTHER: Endotracheal tube tip near level of aortic arch. Pulmonary artery catheter and chest tubes present.           Impression       1. Mild basilar airspace disease and trace pleural effusions.      CXR: 1/4/22    Impression   1.  The endotracheal tube has been removed.  Right IJ PA catheter    terminates within the main pulmonary artery.  Mediastinal drain    and left basilar chest tube unchanged. 2.  Left basilar atelectasis.         __________________________________________________    Subjective:   Dietary Intake: needs improvement   no Nausea   Pain Control: adequate   Complaints: none  Bowels: no BM     Objective:   General appearance: just returned to bed, in nad  Lungs: bilateral crackles   Heart: S1S2 normal; SR in the 70's w/ occasional PVC on monitor  Chest: symmetrical expansion with inspiration and expirations; no rocking of sternum noted   Abdomen: soft, non-tender  Bowel sounds: hypoactive  Kidneys: UOP 1295-775= 2070 ml over 24 hours; Cr 0.8  Wound/Incisions: Midsternal incision CDI; RLE incisions with dressings CDI; Pacing wires intact and secure  Extremities: BLE pulses present; 1+ swelling noted in bilateral feet, 2+ pitting edema noted in BUE's  Neurological: alert, oriented   Chest tubes/Drains: Chest tube # 1 with 200-150= 350 ml serosanguinous drainage in 24 hours; no airleak noted    Assessment:   Post-op: 2 days. Condition: In stable condition. Plan:   1. Cardiovascular: S/p CABG x 3 (1/3)   ASA, statin, Plavix. BB then ACE once levo weaned and pressure tolerates   Wean levo today as BP tolerates. Episodes of vtach 1/3. Transitioned to PO amiodarone. Remains stable in SR with occasional PVC     2. Pulmonary:   Saturating 95% on 3L per NC. Will wean as tolerated. Pulmonary expansion measures- IS, acapella, oobtc, PT/OT and ambulation. 3. Neurology:   Continue pain control with scheduled tylenol, gabapentin, PRN toradol     4. Nephrology:   Cr stable at 0.8, UOP 2070 over past 24 hours   Jeanmarie as able- continue IV lasix 40 mg BID. Seen by Urology for scrotal swelling- no concerns for cellulitis, rather just dependent edema. Would benefit from elevation while he is in bed. Keep hernandez catheter while he remains in the ICU. 5. Endocrinology:   BG stable - continue Lantus and SSI     6.

## 2022-01-05 NOTE — PROGRESS NOTES
Physical Therapy  Yahir Vazquez  After reviewing pt's chart this afternoon, nurse informs therapist that pt's pacing wires have been removed this afternoon causing pt to be on bedrest. Hold PT evaluation today.    Guanako Henry, [facility], DPT [id number]

## 2022-01-05 NOTE — PROGRESS NOTES
Patient's cough continues to be very wet and non-productive with rhonchi throughout lung fields. Cough effort is weak. Encouraged patient to cough - educated on pillow support and attempting after pain medication administration. Flutter valve used, as patient was unable to use IS.     98 oxygen saturation on 1 LPM.

## 2022-01-06 ENCOUNTER — APPOINTMENT (OUTPATIENT)
Dept: GENERAL RADIOLOGY | Age: 72
DRG: 233 | End: 2022-01-06
Payer: MEDICARE

## 2022-01-06 LAB
ANION GAP SERPL CALCULATED.3IONS-SCNC: 7 MMOL/L (ref 3–16)
BUN BLDV-MCNC: 31 MG/DL (ref 7–20)
CALCIUM SERPL-MCNC: 7.8 MG/DL (ref 8.3–10.6)
CHLORIDE BLD-SCNC: 103 MMOL/L (ref 99–110)
CO2: 26 MMOL/L (ref 21–32)
CREAT SERPL-MCNC: 0.9 MG/DL (ref 0.8–1.3)
EKG ATRIAL RATE: 75 BPM
EKG DIAGNOSIS: NORMAL
EKG P AXIS: 61 DEGREES
EKG P-R INTERVAL: 140 MS
EKG Q-T INTERVAL: 440 MS
EKG QRS DURATION: 142 MS
EKG QTC CALCULATION (BAZETT): 491 MS
EKG R AXIS: 44 DEGREES
EKG T AXIS: 0 DEGREES
EKG VENTRICULAR RATE: 75 BPM
GFR AFRICAN AMERICAN: >60
GFR NON-AFRICAN AMERICAN: >60
GLUCOSE BLD-MCNC: 100 MG/DL (ref 70–99)
GLUCOSE BLD-MCNC: 109 MG/DL (ref 70–99)
GLUCOSE BLD-MCNC: 113 MG/DL (ref 70–99)
GLUCOSE BLD-MCNC: 121 MG/DL (ref 70–99)
GLUCOSE BLD-MCNC: 138 MG/DL (ref 70–99)
GLUCOSE BLD-MCNC: 164 MG/DL (ref 70–99)
HCT VFR BLD CALC: 26.8 % (ref 40.5–52.5)
HEMOGLOBIN: 8.8 G/DL (ref 13.5–17.5)
MAGNESIUM: 2.2 MG/DL (ref 1.8–2.4)
MCH RBC QN AUTO: 27.8 PG (ref 26–34)
MCHC RBC AUTO-ENTMCNC: 32.9 G/DL (ref 31–36)
MCV RBC AUTO: 84.5 FL (ref 80–100)
PDW BLD-RTO: 20.2 % (ref 12.4–15.4)
PERFORMED ON: ABNORMAL
PLATELET # BLD: 174 K/UL (ref 135–450)
PMV BLD AUTO: 9.1 FL (ref 5–10.5)
POTASSIUM SERPL-SCNC: 4.4 MMOL/L (ref 3.5–5.1)
RBC # BLD: 3.17 M/UL (ref 4.2–5.9)
SODIUM BLD-SCNC: 136 MMOL/L (ref 136–145)
WBC # BLD: 8.2 K/UL (ref 4–11)

## 2022-01-06 PROCEDURE — 6370000000 HC RX 637 (ALT 250 FOR IP): Performed by: THORACIC SURGERY (CARDIOTHORACIC VASCULAR SURGERY)

## 2022-01-06 PROCEDURE — 97530 THERAPEUTIC ACTIVITIES: CPT

## 2022-01-06 PROCEDURE — 6370000000 HC RX 637 (ALT 250 FOR IP): Performed by: INTERNAL MEDICINE

## 2022-01-06 PROCEDURE — 99024 POSTOP FOLLOW-UP VISIT: CPT | Performed by: NURSE PRACTITIONER

## 2022-01-06 PROCEDURE — 94640 AIRWAY INHALATION TREATMENT: CPT

## 2022-01-06 PROCEDURE — 94150 VITAL CAPACITY TEST: CPT

## 2022-01-06 PROCEDURE — 2700000000 HC OXYGEN THERAPY PER DAY

## 2022-01-06 PROCEDURE — 6360000002 HC RX W HCPCS: Performed by: INTERNAL MEDICINE

## 2022-01-06 PROCEDURE — 2580000003 HC RX 258: Performed by: THORACIC SURGERY (CARDIOTHORACIC VASCULAR SURGERY)

## 2022-01-06 PROCEDURE — 99233 SBSQ HOSP IP/OBS HIGH 50: CPT | Performed by: NURSE PRACTITIONER

## 2022-01-06 PROCEDURE — 97164 PT RE-EVAL EST PLAN CARE: CPT

## 2022-01-06 PROCEDURE — P9041 ALBUMIN (HUMAN),5%, 50ML: HCPCS | Performed by: THORACIC SURGERY (CARDIOTHORACIC VASCULAR SURGERY)

## 2022-01-06 PROCEDURE — 80048 BASIC METABOLIC PNL TOTAL CA: CPT

## 2022-01-06 PROCEDURE — 85027 COMPLETE CBC AUTOMATED: CPT

## 2022-01-06 PROCEDURE — 6360000002 HC RX W HCPCS: Performed by: THORACIC SURGERY (CARDIOTHORACIC VASCULAR SURGERY)

## 2022-01-06 PROCEDURE — 6360000002 HC RX W HCPCS: Performed by: CLINICAL NURSE SPECIALIST

## 2022-01-06 PROCEDURE — 6370000000 HC RX 637 (ALT 250 FOR IP): Performed by: CLINICAL NURSE SPECIALIST

## 2022-01-06 PROCEDURE — 31720 CLEARANCE OF AIRWAYS: CPT

## 2022-01-06 PROCEDURE — 94761 N-INVAS EAR/PLS OXIMETRY MLT: CPT

## 2022-01-06 PROCEDURE — 83735 ASSAY OF MAGNESIUM: CPT

## 2022-01-06 PROCEDURE — 99233 SBSQ HOSP IP/OBS HIGH 50: CPT | Performed by: INTERNAL MEDICINE

## 2022-01-06 PROCEDURE — 6360000002 HC RX W HCPCS

## 2022-01-06 PROCEDURE — 2000000000 HC ICU R&B

## 2022-01-06 PROCEDURE — 36415 COLL VENOUS BLD VENIPUNCTURE: CPT

## 2022-01-06 PROCEDURE — 6370000000 HC RX 637 (ALT 250 FOR IP): Performed by: NURSE PRACTITIONER

## 2022-01-06 PROCEDURE — 97535 SELF CARE MNGMENT TRAINING: CPT

## 2022-01-06 PROCEDURE — 94669 MECHANICAL CHEST WALL OSCILL: CPT

## 2022-01-06 PROCEDURE — P9041 ALBUMIN (HUMAN),5%, 50ML: HCPCS | Performed by: NURSE PRACTITIONER

## 2022-01-06 PROCEDURE — 97162 PT EVAL MOD COMPLEX 30 MIN: CPT

## 2022-01-06 PROCEDURE — 71045 X-RAY EXAM CHEST 1 VIEW: CPT

## 2022-01-06 PROCEDURE — 6360000002 HC RX W HCPCS: Performed by: NURSE PRACTITIONER

## 2022-01-06 PROCEDURE — 93005 ELECTROCARDIOGRAM TRACING: CPT | Performed by: THORACIC SURGERY (CARDIOTHORACIC VASCULAR SURGERY)

## 2022-01-06 PROCEDURE — 93010 ELECTROCARDIOGRAM REPORT: CPT | Performed by: INTERNAL MEDICINE

## 2022-01-06 RX ORDER — FUROSEMIDE 10 MG/ML
20 INJECTION INTRAMUSCULAR; INTRAVENOUS 4 TIMES DAILY
Status: DISCONTINUED | OUTPATIENT
Start: 2022-01-06 | End: 2022-01-06

## 2022-01-06 RX ORDER — POTASSIUM CHLORIDE 29.8 MG/ML
20 INJECTION INTRAVENOUS ONCE
Status: COMPLETED | OUTPATIENT
Start: 2022-01-06 | End: 2022-01-06

## 2022-01-06 RX ORDER — FUROSEMIDE 10 MG/ML
20 INJECTION INTRAMUSCULAR; INTRAVENOUS EVERY 6 HOURS
Status: DISCONTINUED | OUTPATIENT
Start: 2022-01-06 | End: 2022-01-10

## 2022-01-06 RX ORDER — FUROSEMIDE 10 MG/ML
20 INJECTION INTRAMUSCULAR; INTRAVENOUS 3 TIMES DAILY
Status: DISCONTINUED | OUTPATIENT
Start: 2022-01-06 | End: 2022-01-06

## 2022-01-06 RX ORDER — POTASSIUM CHLORIDE 29.8 MG/ML
INJECTION INTRAVENOUS
Status: COMPLETED
Start: 2022-01-06 | End: 2022-01-06

## 2022-01-06 RX ORDER — POTASSIUM CHLORIDE 20 MEQ/1
20 TABLET, EXTENDED RELEASE ORAL
Status: DISCONTINUED | OUTPATIENT
Start: 2022-01-06 | End: 2022-01-10

## 2022-01-06 RX ORDER — ALBUMIN, HUMAN INJ 5% 5 %
12.5 SOLUTION INTRAVENOUS ONCE
Status: COMPLETED | OUTPATIENT
Start: 2022-01-06 | End: 2022-01-06

## 2022-01-06 RX ADMIN — POLYETHYLENE GLYCOL 3350 17 G: 17 POWDER, FOR SOLUTION ORAL at 08:03

## 2022-01-06 RX ADMIN — ACETAMINOPHEN 1000 MG: 500 TABLET ORAL at 23:00

## 2022-01-06 RX ADMIN — AMIODARONE HYDROCHLORIDE 400 MG: 200 TABLET ORAL at 21:03

## 2022-01-06 RX ADMIN — ACETAMINOPHEN 1000 MG: 500 TABLET ORAL at 11:28

## 2022-01-06 RX ADMIN — AMIODARONE HYDROCHLORIDE 400 MG: 200 TABLET ORAL at 09:06

## 2022-01-06 RX ADMIN — GABAPENTIN 300 MG: 300 CAPSULE ORAL at 08:03

## 2022-01-06 RX ADMIN — POLYMYXIN B SULFATE, TRIMETHOPRIM SULFATE 1 DROP: 10000; 1 SOLUTION/ DROPS OPHTHALMIC at 17:23

## 2022-01-06 RX ADMIN — ARFORMOTEROL TARTRATE 15 MCG: 15 SOLUTION RESPIRATORY (INHALATION) at 11:46

## 2022-01-06 RX ADMIN — ALBUTEROL SULFATE 2.5 MG: 2.5 SOLUTION RESPIRATORY (INHALATION) at 20:39

## 2022-01-06 RX ADMIN — MUPIROCIN: 20 OINTMENT TOPICAL at 09:03

## 2022-01-06 RX ADMIN — POLYMYXIN B SULFATE, TRIMETHOPRIM SULFATE 1 DROP: 10000; 1 SOLUTION/ DROPS OPHTHALMIC at 12:26

## 2022-01-06 RX ADMIN — POTASSIUM CHLORIDE 20 MEQ: 29.8 INJECTION INTRAVENOUS at 04:16

## 2022-01-06 RX ADMIN — POTASSIUM CHLORIDE 20 MEQ: 29.8 INJECTION, SOLUTION INTRAVENOUS at 04:16

## 2022-01-06 RX ADMIN — FUROSEMIDE 20 MG: 10 INJECTION, SOLUTION INTRAMUSCULAR; INTRAVENOUS at 21:03

## 2022-01-06 RX ADMIN — POTASSIUM CHLORIDE 10 MEQ: 750 TABLET, EXTENDED RELEASE ORAL at 08:04

## 2022-01-06 RX ADMIN — Medication 400 MG: at 20:59

## 2022-01-06 RX ADMIN — FUROSEMIDE 40 MG: 10 INJECTION, SOLUTION INTRAMUSCULAR; INTRAVENOUS at 08:04

## 2022-01-06 RX ADMIN — PANTOPRAZOLE SODIUM 40 MG: 40 TABLET, DELAYED RELEASE ORAL at 06:07

## 2022-01-06 RX ADMIN — CLOPIDOGREL BISULFATE 75 MG: 75 TABLET ORAL at 08:04

## 2022-01-06 RX ADMIN — PANTOPRAZOLE SODIUM 40 MG: 40 TABLET, DELAYED RELEASE ORAL at 17:23

## 2022-01-06 RX ADMIN — ATORVASTATIN CALCIUM 40 MG: 40 TABLET, FILM COATED ORAL at 21:00

## 2022-01-06 RX ADMIN — Medication: at 08:04

## 2022-01-06 RX ADMIN — ASPIRIN 325 MG: 325 TABLET, COATED ORAL at 08:03

## 2022-01-06 RX ADMIN — ACETAMINOPHEN 1000 MG: 500 TABLET ORAL at 17:23

## 2022-01-06 RX ADMIN — SODIUM CHLORIDE, PRESERVATIVE FREE 10 ML: 5 INJECTION INTRAVENOUS at 08:04

## 2022-01-06 RX ADMIN — SODIUM CHLORIDE, PRESERVATIVE FREE 10 ML: 5 INJECTION INTRAVENOUS at 20:59

## 2022-01-06 RX ADMIN — POLYMYXIN B SULFATE, TRIMETHOPRIM SULFATE 1 DROP: 10000; 1 SOLUTION/ DROPS OPHTHALMIC at 04:19

## 2022-01-06 RX ADMIN — ALBUMIN (HUMAN) 12.5 G: 12.5 INJECTION, SOLUTION INTRAVENOUS at 11:24

## 2022-01-06 RX ADMIN — CHLORHEXIDINE GLUCONATE 15 ML: 1.2 RINSE ORAL at 20:59

## 2022-01-06 RX ADMIN — ALBUMIN (HUMAN) 12.5 G: 12.5 INJECTION, SOLUTION INTRAVENOUS at 03:54

## 2022-01-06 RX ADMIN — POLYMYXIN B SULFATE, TRIMETHOPRIM SULFATE 1 DROP: 10000; 1 SOLUTION/ DROPS OPHTHALMIC at 20:00

## 2022-01-06 RX ADMIN — POLYMYXIN B SULFATE, TRIMETHOPRIM SULFATE 1 DROP: 10000; 1 SOLUTION/ DROPS OPHTHALMIC at 08:05

## 2022-01-06 RX ADMIN — POTASSIUM CHLORIDE 20 MEQ: 20 TABLET, EXTENDED RELEASE ORAL at 17:23

## 2022-01-06 RX ADMIN — GABAPENTIN 300 MG: 300 CAPSULE ORAL at 14:52

## 2022-01-06 RX ADMIN — CALCIUM 500 MG: 500 TABLET ORAL at 08:04

## 2022-01-06 RX ADMIN — GABAPENTIN 300 MG: 300 CAPSULE ORAL at 21:00

## 2022-01-06 RX ADMIN — BUDESONIDE 500 MCG: 0.5 SUSPENSION RESPIRATORY (INHALATION) at 11:46

## 2022-01-06 RX ADMIN — CHLORHEXIDINE GLUCONATE 15 ML: 1.2 RINSE ORAL at 08:03

## 2022-01-06 RX ADMIN — Medication: at 20:56

## 2022-01-06 RX ADMIN — SODIUM CHLORIDE, PRESERVATIVE FREE 10 ML: 5 INJECTION INTRAVENOUS at 21:00

## 2022-01-06 RX ADMIN — TIOTROPIUM BROMIDE INHALATION SPRAY 2 PUFF: 3.12 SPRAY, METERED RESPIRATORY (INHALATION) at 11:46

## 2022-01-06 RX ADMIN — MUPIROCIN: 20 OINTMENT TOPICAL at 20:59

## 2022-01-06 RX ADMIN — Medication 400 MG: at 08:04

## 2022-01-06 RX ADMIN — BUDESONIDE 500 MCG: 0.5 SUSPENSION RESPIRATORY (INHALATION) at 20:39

## 2022-01-06 RX ADMIN — ARFORMOTEROL TARTRATE 15 MCG: 15 SOLUTION RESPIRATORY (INHALATION) at 20:39

## 2022-01-06 RX ADMIN — FUROSEMIDE 20 MG: 10 INJECTION, SOLUTION INTRAMUSCULAR; INTRAVENOUS at 14:52

## 2022-01-06 RX ADMIN — POLYMYXIN B SULFATE, TRIMETHOPRIM SULFATE 1 DROP: 10000; 1 SOLUTION/ DROPS OPHTHALMIC at 00:38

## 2022-01-06 RX ADMIN — POTASSIUM CHLORIDE 20 MEQ: 20 TABLET, EXTENDED RELEASE ORAL at 11:28

## 2022-01-06 RX ADMIN — CALCIUM 500 MG: 500 TABLET ORAL at 21:03

## 2022-01-06 RX ADMIN — THERA TABS 1 TABLET: TAB at 08:04

## 2022-01-06 ASSESSMENT — PAIN DESCRIPTION - PAIN TYPE: TYPE: SURGICAL PAIN

## 2022-01-06 ASSESSMENT — PAIN DESCRIPTION - PROGRESSION: CLINICAL_PROGRESSION: GRADUALLY WORSENING

## 2022-01-06 ASSESSMENT — PAIN DESCRIPTION - FREQUENCY: FREQUENCY: INTERMITTENT

## 2022-01-06 ASSESSMENT — PAIN DESCRIPTION - ORIENTATION: ORIENTATION: MID

## 2022-01-06 ASSESSMENT — PAIN SCALES - GENERAL
PAINLEVEL_OUTOF10: 0
PAINLEVEL_OUTOF10: 2
PAINLEVEL_OUTOF10: 2
PAINLEVEL_OUTOF10: 0

## 2022-01-06 ASSESSMENT — PAIN DESCRIPTION - DESCRIPTORS: DESCRIPTORS: SORE

## 2022-01-06 ASSESSMENT — PAIN DESCRIPTION - ONSET: ONSET: GRADUAL

## 2022-01-06 NOTE — PROGRESS NOTES
Occupational Therapy  Facility/Department: [address] [facility] ICU  Daily Treatment Note  NAME: Jose Daniel Patton  : 1950  MRN: [id number]    Date of Service: 2022    Discharge Recommendations:    Jose Daniel Patton scored a 9/24 on the AM-PAC ADL Inpatient form. Current research shows that an AM-PAC score of 17 or less is typically not associated with a discharge to the patient's home setting. Based on the patient's AM-PAC score and their current ADL deficits, it is recommended that the patient have 3-5 sessions per week of Occupational Therapy at d/c to increase the patient's independence. Please see assessment section for further patient specific details. If patient discharges prior to next session this note will serve as a discharge summary. Please see below for the latest assessment towards goals. OT Equipment Recommendations  Other: defer    Assessment   Performance deficits / Impairments: Decreased functional mobility ; Decreased ADL status; Decreased endurance;Decreased strength;Decreased cognition;Decreased high-level IADLs;Decreased coordination;Decreased balance  Assessment: Pt demonstrated improved alertness this date, was able to participate in ADLs and functional tasks with decreased lethargy. Pt however requiring mod A x 2 for transfers and short distance mobility. Pt with flexed posture and difficulty coordinating stand pivot transfer with 4ww. Pt would benefit from continued OT while in acute care, continue OT POC. Treatment Diagnosis: impaired mobility, transfers, ADL  OT Education: OT Role;Plan of Care;Precautions;Transfer Training  Patient Education: cont to reinforce  Barriers to Learning: cognition  REQUIRES OT FOLLOW UP: Yes  Activity Tolerance  Activity Tolerance: Patient limited by fatigue  Activity Tolerance: Pt demonstrated max fatigue after sit to stand transfer and short mobiltiy in room with chair follow.  Pt unable to coordinate transfer from chair to bed, requiring use of wes stedy to return to bed. O2 sats WFL, BP upon return to bed 92/63. Safety Devices  Safety Devices in place: Yes  Type of devices: Left in bed;Left in chair;Bed alarm in place;Call light within reach         Patient Diagnosis(es): The primary encounter diagnosis was Anemia, unspecified type. Diagnoses of Syncope, unspecified syncope type and NSTEMI (non-ST elevated myocardial infarction) [facility]) were also pertinent to this visit. 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 has a past surgical history that includes hemicolectomy; Upper gastrointestinal endoscopy (N/A, 12/27/2021); and Coronary artery bypass graft (N/A, 1/3/2022). Restrictions  Position Activity Restriction  Sternal Precautions: No Pushing,No Pulling,5# Lifting Restrictions  Other position/activity restrictions: up as tolerated; sternal precautions  Subjective   General  Chart Reviewed: Yes  Additional Pertinent Hx: Pt originally admitted 12/24/21 with c/o lightheadedness and syncopal episode, also c/o chest pain/ pressure. 1/3 for: VIRGIL; TCPB: EVH R thigh; CABG x 3, LIMA to LAD, SVG to OM2 and PDA. PMHx includes:  has a past medical history of BPH with obstruction/lower urinary tract symptoms (8/3/2012), Chicken pox, Colon polyp, Hyperglycemia (12/15/2010), Hyperlipemia (12/15/2010), Post herpetic neuralgia, Shingles, and Squamous cell carcinoma of skin of upper limb, including shoulder (9/8/2015). Family / Caregiver Present: No  Referring Practitioner: Glenny Aguirre MD  Diagnosis: anemia  Subjective  Subjective: Pt seated in bedside chair upon arrival, agreeable to OT session for returning to bed and for grooming tasks seated in chair. General Comment  Comments: Pt lethargic throughout session.       Orientation  Orientation  Overall Orientation Status: Impaired  Orientation Level: Oriented to person;Disoriented to situation;Disoriented to place (pt unsure of location- required multiple choice options. not oriented to date)  Objective    ADL  Feeding: Setup; Beverage management (+ verbal cueing to attend to cup in pts hand)  Grooming: Setup (assist to gather materials for oral care. Washing face with setup)        Balance  Sitting Balance: Minimal assistance (sitting EOB- pt with occasional anterior lean)  Standing Balance: Dependent/Total (mod A x 2 standing at 4ww; flexed posture)  Standing Balance  Time: 60 sec, 10+10 sec  Activity: 4-5 steps forward with 4ww, then standing at stedy for transfer bed to chair  Functional Mobility  Functional - Mobility Device: 4-Wheeled Walker  Activity: Other (4-5 steps forward, + chair follow)  Assist Level: Dependent/Total  Functional Mobility Comments: mod A x 2 for steps forward, + close chair follow. Pt noted with flexed posture during mobiltiy, difficulty fully extending trunk  Bed mobility  Sit to Supine: 2 Person assistance  Scooting: Moderate assistance (scooting forward in chair, ++ cueing for pt to adhere to sternal precautions)  Transfers  Sit to stand: Moderate assistance;2 Person assistance  Stand to sit: 2 Person assistance; Moderate assistance  Transfer Comments: ++ cueing for pt to adhere to sternal precautions. STS for short distance mobility with 4ww with mod A x 2, chair to bed via MARS STEDY 2/2 fatigue and mod A x 2. Pt required assist to extend trunk to place stedy paddles behind him                       Cognition  Overall Cognitive Status: Exceptions  Arousal/Alertness: Delayed responses to stimuli  Following Commands: Follows one step commands with increased time; Follows one step commands with repetition  Attention Span: Difficulty attending to directions; Difficulty dividing attention  Memory: Decreased recall of recent events;Decreased short term memory  Safety Judgement: Decreased awareness of need for assistance;Decreased awareness of need for safety  Problem Solving: Decreased awareness of errors  Insights: Decreased awareness of deficits  Initiation: Requires cues for some  Sequencing: Requires cues for some               Plan   Plan  Times per week: 2-5  Times per day: Daily  Current Treatment Recommendations: Strengthening,Balance Training,Functional Mobility Training,Endurance Training,Self-Care / Jayesh Giovanni Re-education,Equipment Evaluation, Education, & procurement,Patient/Caregiver Education & Training  AM-PAC Score        AM-PAC Inpatient Daily Activity Raw Score: 9 (01/05/22 1404)  AM-PAC Inpatient ADL T-Scale Score : 25.33 (01/05/22 1404)  ADL Inpatient CMS 0-100% Score: 79.59 (01/05/22 1404)  ADL Inpatient CMS G-Code Modifier : CL (01/05/22 1404)    Goals  Short term goals  Time Frame for Short term goals: by dc  Short term goal 1: Pt will complete LB dressing with mod A -ongoing, continue  Short term goal 2: Pt will complete toilet transfer with min A -ongoing, continue  Short term goal 3: Pt will complete B UE HEP x 15 reps to improve activity tolerance for ADLs -ongoing, continue  Short term goal 4: Pt will complete grooming tasks with setup -ongoing, continue  Short term goal 5: .  Patient Goals   Patient goals : not stated       Therapy Time   Individual Concurrent Group Co-treatment   Time In 0946         Time Out 1024         Minutes 38         Timed Code Treatment Minutes: [facility].  [address] [facility], OTR/L [id number]

## 2022-01-06 NOTE — PROGRESS NOTES
CVTS Cardiothoracic Progress Note:                                CC:  Post op follow up     Surgery: 1/3 VIRGIL; TCPB: EVH R thigh; CABG x 3, LIMA to LAD, SVG to OM2 and PDA    Hospital course:  1/4 Requiring levo for BP support overnight. Stable in SR. Somewhat agitated with nursing staff and resistant to instruction at times. 1/5 Up in chair, on minimal dose of Levo. Remains in SR.   1/6 Up in chair, more talkative today. Remains in SR.      Past Medical History:   Diagnosis Date    BPH with obstruction/lower urinary tract symptoms 8/3/2012    Chicken pox     Colon polyp     Hyperglycemia 12/15/2010    Hyperlipemia 12/15/2010    Post herpetic neuralgia     Shingles     Squamous cell carcinoma of skin of upper limb, including shoulder 9/8/2015        Past Surgical History:   Procedure Laterality Date    CORONARY ARTERY BYPASS GRAFT N/A 1/3/2022    VIRGIL; TCPB: EVH R thigh; CABG x 3, LIMA to LAD, SVG to OM2 and PDA performed by Joseph Lara MD at [address] [place] [address]      right due to mass benign    UPPER GASTROINTESTINAL ENDOSCOPY N/A 12/27/2021    EGD BIOPSY performed by Noreen Bird MD at [address] [place] [address] as of 12/24/2021    (No Known Allergies)        Patient Active Problem List   Diagnosis    Hyperglycemia    Smoker's respiratory syndrome    BPH with obstruction/lower urinary tract symptoms    Basal cell carcinoma of shoulder    Squamous cell carcinoma of skin of upper limb, including shoulder    Actinic keratosis    Colon polyp    COPD, mild (HCC)    Nicotine use disorder    Mixed hyperlipidemia    SCCS, mod diff  (squamous cell carcinoma), hand, left    Basal cell carcinoma of right side of nose    Visit for suture removal    Visit for wound check    Anemia    Syncope and collapse    Non-ST elevated myocardial infarction (non-STEMI) (HCC)        Vital Signs: BP 93/62   Pulse 73   Temp 98.2 °F (36.8 °C) (Oral)   Resp 21   Ht 5' 7\" (1.702 m)   Wt 167 lb 12.3 oz (76.1 kg)   SpO2 96%   BMI 26.28 kg/m²  O2 Flow Rate (L/min): 1 L/min     Admission Weight: Weight: 168 lb 10.4 oz (76.5 kg)    Weight on 1/3 (74.4 kg) Pre-op   Weight on 1/4 (79 kg)  1/5  79.4 kg (+5 kg)   1/6  76.1 kg (+1.7 kg)    Intake/Output:     Intake/Output Summary (Last 24 hours) at 1/6/2022 0853  Last data filed at 1/6/2022 0354  Gross per 24 hour   Intake 680 ml   Output 1940 ml   Net -1260 ml      Extubation Time: 1/3 @ 1855  Transition Time: 1/4 (after 17:50, not documented)    LABORATORY DATA:  CBC:   Recent Labs     01/04/22 0323 01/05/22 0319 01/06/22 0339   WBC 10.2 9.8 8.2   HGB 8.9* 8.7* 8.8*   HCT 26.9* 26.6* 26.8*   MCV 84.8 84.7 84.5    140 174     BMP:   Recent Labs     01/04/22 0322 01/04/22 0322 01/04/22 2332 01/05/22 0319 01/06/22  0339     --   --  135* 136   K 4.5   < > 5.0 5.2* 4.4     --   --  105 103   CO2 20*  --   --  21 26   BUN 18  --   --  26* 31*   CREATININE 0.8  --   --  1.2 0.9    < > = values in this interval not displayed. MG:    Recent Labs     01/04/22 2332 01/05/22 0319 01/06/22 0339   MG 2.30 2.50* 2.20      PT/INR:   Recent Labs     01/03/22 1103 01/04/22 0323   PROTIME 23.4* 18.1*   INR 2.01* 1.57*     APTT:   Recent Labs     01/03/22 1103   APTT 37.0     CXR: 1/3  FINDINGS:       HEART / MEDIASTINUM: Heart is mildly enlarged.       LUNGS/PLEURA: Mild basilar airspace disease. Trace costophrenic angle blunting. No evidence of pneumothorax.       BONES / SOFT TISSUES: No acute abnormality.       OTHER: Endotracheal tube tip near level of aortic arch. Pulmonary artery catheter and chest tubes present.           Impression       1. Mild basilar airspace disease and trace pleural effusions. CXR: 1/4/22    Impression   1.  The endotracheal tube has been removed.  Right IJ PA catheter    terminates within the main pulmonary artery.  Mediastinal drain    and left basilar chest tube unchanged. 2.  Left basilar atelectasis.       __________________________________________________    Subjective:   Dietary Intake: improving   no Nausea   Pain Control: adequate   Complaints: none  Bowels: no BM, +flatus     Objective:   General appearance: Up in chair, easily conversational   Lungs: scattered fine crackles  Heart: S1S2 normal; SR in the 70's w/ occasional PVC on monitor  Chest: symmetrical expansion with inspiration and expirations; no rocking of sternum noted   Abdomen: soft, non-tender  Bowel sounds: normoactive  Kidneys: -675-355= 1680 ml over 24 hours; Cr 0.9  Wound/Incisions: Midsternal incision CDI; RLE incisions with dressings CDI; Pacing wires removed 1/5  Extremities: BLE pulses present; 1+ swelling noted in bilateral feet, 1+ edema noted in BUE's  Neurological: alert, oriented   Chest tubes/Drains: Chest tube # 1 with 100-140-20= 260 ml serosanguinous drainage in 24 hours; no airleak noted    Assessment:   Post-op: 3 days. Condition: In stable condition. Plan:   1. Cardiovascular: S/p CABG x 3 (1/3)   ASA, statin, Plavix. Hypotension: improving, BB being held as parameters not met. ACEI on hold. Episodes of vtach 1/3. Transitioned to PO amiodarone. Remains stable in SR with occasional PVC   Will order limited echo for tomorrow morning (for [employer] needs at d/c). 2. Pulmonary:   Saturating 96% on 1L per NC. Will wean as tolerated. Pulmonary expansion measures- IS, acapella, coughing and deep breathing exercises, oobtc, PT/OT and ambulation. 3. Neurology:   Continue pain control with scheduled tylenol, gabapentin, PRN toradol     4. Nephrology:   Cr stable at 0.9, UOP 1680 over past 24 hours   Diurese as able- continue IV lasix 40 mg TID. Will likely decrease to BID tomorrow. Seen by Urology for scrotal swelling- no concerns for cellulitis, rather just dependent edema. Would benefit from elevation while he is in bed. Keep hernandez catheter while he remains in the ICU.        5. Endocrinology: BG stable - continue Lantus and SSI     6. Hematology:   Post operative anemia- H&H stable at 8.8/26. 8. Monitor   Presented with acute blood loss anemia on admission. GI was consulted (have signed off). EGD on 12/27 with no overt signs of UGIB. They are planning to perform a colonoscopy 4-6 weeks after CABG. He is to follow up with GI 3 weeks after discharge. 7. Microbiology:   No issues at present     8. Nutrition:   Cardiac diet     9. Labs:   AM labs and imaging reviewed as above     10. Post-op Drains/Wires: will d/c chest tube. 11. D/C Goals: CM following. She spoke with pt and his sister Karina Torres). Pt lives at home alone in very poor living conditions. They have chosen to ARU as their first option once he is ready for discharge.      12. Continue post-op care of patient in the ICU    GI prophylaxis: protonix   DVT prophylaxis: SCDs    Meds:    The patient is not on a beta-blocker 2/2 hypotension    The patient is not on an ace-i/ARB 2/2 hypotension    The patient is on a statin   The patient is on oral antiplatelet therapy   __________________________________________________    CAITLYN Montague - CNP  1/6/2022  8:53 AM

## 2022-01-06 NOTE — PROGRESS NOTES
Patient is hypotensive to 19'K-18'M systolic. He received 2.5 mg Lisinopril at 1232 this afternoon. Will restart Levophed, which was just just stopped this AM.   Diuresing. Will monitor closely. Rhonchi continues throughout with a very moist, productive cough (white, pale yellow).

## 2022-01-06 NOTE — PROGRESS NOTES
[address]   Cardiology  Note   Dr Laron Aldridge MD, Samia Miller RN, FNP APRN CVNP  Date: 1/6/2022  Admit Date: 12/24/2021       CC:/ NSTEMI / GI bleed   Cardiology following with  syncope  / severe anemia  / COPD /severe CAD / ICM   Urology consulted for penile/scrotal cellulitis  1/3/2022 CABG x 3, LIMA to LAD, SVG to OM2 and PDA  pre-op EF ~30% w/ inferior akinesis; post op EF ~35% w/ inferior dyskinetic hypokinesis    Interval Hx /  Subjective:he is up in chair /  b/p on low side on levo gtt / in NSR   suture line dry and approx. no major events overnight. Holding b/p meds / he has been hypotensive   Cardiac meds amiodarone asa lipitor plavix lasix lisinopril  Mag ox  lopressor       12/28/2021 [facility]   Left ventricular pressure 11 mmHg  Aortic pressure 95/47 mmHg  Coronary anatomy:   The left main coronary artery is heavily calcified.  Has severe ostial narrowing at least 90 to 95%.  Our catheter did ventricularized upon engagement. Left anterior descending artery is mildly stenotic proximally.  There is good mid and distal anatomy with good landing zones. Circumflex artery has proximal calcification and moderate lesions. The right coronary artery is 100% occluded after its takeoff.  I do not identify any left to right or right to right collateralization. Left ventriculogram shows ejection fraction of 25 to 30% %.  Moderate global hypokinesia. Patient seen and examined. Clinical notes reviewed.  Telemetry reviewed / testing reviewed  30 minutes   Pertinent labs, diagnostic, device, and imaging results reviewed as a part of this visit  EKG TTE stress test: any LHC/RHC reviewed     Past Medical History:  Past Medical History:   Diagnosis Date    BPH with obstruction/lower urinary tract symptoms 8/3/2012    Chicken pox     Colon polyp     Hyperglycemia 12/15/2010    Hyperlipemia 12/15/2010    Post herpetic neuralgia     Shingles     Squamous cell carcinoma of skin of upper limb, including shoulder 9/8/2015         calcium elemental  500 mg Oral BID    furosemide  40 mg IntraVENous TID    amiodarone  400 mg Oral BID    Followed by   Delroy Rutledge ON 1/9/2022] amiodarone  200 mg Oral Daily    sodium chloride flush  10 mL IntraVENous 2 times per day    aspirin  325 mg Oral Daily    clopidogrel  75 mg Oral Daily    chlorhexidine  15 mL Mouth/Throat BID    magnesium oxide  400 mg Oral BID    mupirocin   Nasal BID    potassium chloride  10 mEq Oral TID WC    polyethylene glycol  17 g Oral Daily    metoprolol tartrate  12.5 mg Oral BID    [Held by provider] lisinopril  2.5 mg Oral Lunch    atorvastatin  40 mg Oral Nightly    pantoprazole  40 mg Oral BID AC    insulin lispro  0-12 Units SubCUTAneous 4x Daily AC & HS    acetaminophen  1,000 mg Oral Q6H    gabapentin  300 mg Oral TID    trimethoprim-polymyxin b  1 drop Both Eyes 6 times per day    Venelex   Topical BID    sodium chloride flush  5-40 mL IntraVENous 2 times per day    multivitamin  1 tablet Oral Daily    influenza virus vaccine  0.5 mL IntraMUSCular Prior to discharge    tiotropium  2 puff Inhalation Daily    budesonide  0.5 mg Nebulization BID    And    Arformoterol Tartrate  15 mcg Nebulization BID       Vitals:    01/06/22 0900   BP: (!) 156/89   Pulse: 89   Resp: 23   Temp:    SpO2:       In: 680 [P.O.:420; I.V.:10]  Out: 1940    Wt Readings from Last 3 Encounters:   01/06/22 167 lb 12.3 oz (76.1 kg)   07/12/18 179 lb (81.2 kg)   05/31/18 183 lb (83 kg)       Intake/Output Summary (Last 24 hours) at 1/6/2022 4635  Last data filed at 1/6/2022 0354  Gross per 24 hour   Intake 280 ml   Output 1940 ml   Net -1660 ml       Telemetry: Personally Reviewed    · Constitutional: Cooperative and in no apparent distress, and appears well nourished  · Skin: Warm and pink; no pallor, cyanosis, clubbing, or bruising   · HEENT: Symmetric and normocephalic. · Cardiovascular: Regular rate and rhythm.  S1/S2   · Respiratory: Respirations symmetric and unlabored. Lungs clear to auscultation bilaterally, no wheezing, crackles, or rhonchi  · Gastrointestinal: Abdomen soft and round. Bowel sounds normoactive without tenderness or masses. · Musculoskeletal: Bilateral upper and lower extremity strength 5/5 with full ROM  · Neurologic/Psych: Awake and orientated to person, place and time. Calm affect, appropriate mood    Patient Active Problem List    Diagnosis Date Noted    Non-ST elevated myocardial infarction (non-STEMI) ([facility] [address] 12/31/2021    Syncope and collapse     Anemia 12/24/2021    Visit for wound check 06/06/2018    Basal cell carcinoma of right side of nose 05/08/2018    Visit for suture removal 05/08/2018    SCCS, mod diff  (squamous cell carcinoma), hand, left 04/24/2018    Mixed hyperlipidemia 04/18/2017    Nicotine use disorder 02/03/2017    COPD, mild ([facility] [address] 06/23/2016    Colon polyp     Basal cell carcinoma of shoulder 09/08/2015    Squamous cell carcinoma of skin of upper limb, including shoulder 09/08/2015    Actinic keratosis 09/08/2015    BPH with obstruction/lower urinary tract symptoms 08/03/2012    Hyperglycemia 12/15/2010    Smoker's respiratory syndrome 12/15/2010        Assessment     syncope / with severe anemia  GI workup completed     trop elevation with severe anemia / NSTEMI Type II suspected    LHC with severe CAD     1/3/2022 CABG x 3, LIMA to LAD, SVG to OM2 and PDA  pre-op EF ~30% w/ inferior akinesis; post op EF ~35% w/ inferior dyskinetic hypokinesis    ICM   2/28/2021 TTE   Normal left ventricle size and wall thickness. Overall left ventricular systolic function appears reduced with an ejectionction of 25-30%. There is hypokinesis of the basal and mid inferior walls. There is hypokinesis of the mid and basal inferolateral walls. There is hypokinesis of the mid and basal inferoseptal walls. Diastolic filling parameters suggest grade I diastolic dysfunction.  Mild mitral and tricuspid regurgitation  estimated pulmonary artery systolic pressure is 31 mmHg assuming a right  atrial pressure of 3 mmHg    NSVT  / now NSR with PVCS  On amiodarone po     COPD  Stable   Inhalers     HLD  LDL 54 optimal      UTI  abx per IM      Plan:   POD#3 CABG x 3, LIMA to LAD, SVG to OM2 and PDA  Cardiac meds amiodarone asa lipitor plavix lasix lisinopril mag ox lopressor   HF medications BB/ace added / hold for hypotension   consider SGLT2 in future   Limited TTE before discharge  /  may need life vest before discharge       Thank you for allowing to us to participate in the care of Rodrigo Cuello. Estefania Young APRN-CNP-CVNP    Interventional cardiology note  Patient still in ICU post bypass graft surgery on 1/3/2022. Breathing better awake and alert in no distress. Chest tubes are out. We will continue current medicines as noted. Will follow.   Ofelia Franks MD, [facility]

## 2022-01-06 NOTE — PROGRESS NOTES
Patient requires much encouragement to cough, use flutter valve, and reposition. Discussed benefits of assisting with ADLs as able, using IS/flutter, and attempting ambulation. He required the Britany Harvard today and an assist X 2 to stand. Cough continues to be moist, though it was productive overnight. He is now in NSR with PVCs 70's.

## 2022-01-06 NOTE — PLAN OF CARE
Problem: Falls - Risk of:  Goal: Will remain free from falls  Description: Will remain free from falls  Outcome: Ongoing  Goal: Absence of physical injury  Description: Absence of physical injury  Outcome: Ongoing     Problem: Nutrition  Goal: Optimal nutrition therapy  Outcome: Ongoing     Problem: Nutrition  Goal: Optimal nutrition therapy  Outcome: Ongoing     Problem: Skin Integrity:  Goal: Will show no infection signs and symptoms  Description: Will show no infection signs and symptoms  Outcome: Ongoing  Goal: Absence of new skin breakdown  Description: Absence of new skin breakdown  Outcome: Ongoing     Problem: Cardiac:  Goal: Ability to maintain an adequate cardiac output will improve  Description: Ability to maintain an adequate cardiac output will improve  Outcome: Ongoing  Goal: Ability to achieve and maintain adequate cardiopulmonary perfusion will improve  Description: Ability to achieve and maintain adequate cardiopulmonary perfusion will improve  Outcome: Ongoing  Goal: Hemodynamic stability will improve  Description: Hemodynamic stability will improve  Outcome: Ongoing     Problem: OXYGENATION/RESPIRATORY FUNCTION  Goal: Patient will maintain patent airway  Outcome: Ongoing  Goal: Patient will achieve/maintain normal respiratory rate/effort  Description: Respiratory rate and effort will be within normal limits for the patient  Outcome: Not Met This Shift     Problem: HEMODYNAMIC STATUS  Goal: Patient has stable vital signs and fluid balance  Outcome: Ongoing     Problem: HEMODYNAMIC STATUS  Goal: Patient has stable vital signs and fluid balance  Outcome: Ongoing     Problem: FLUID AND ELECTROLYTE IMBALANCE  Goal: Fluid and electrolyte balance are achieved/maintained  Outcome: Ongoing     Problem: ACTIVITY INTOLERANCE/IMPAIRED MOBILITY  Goal: Mobility/activity is maintained at optimum level for patient  Outcome: Ongoing     Problem: Pain:  Description: Pain management should include both nonpharmacologic and pharmacologic interventions. Goal: Pain level will decrease  Description: Pain level will decrease  Outcome: Met This Shift  Goal: Control of acute pain  Description: Control of acute pain  Outcome: Met This Shift  Goal: Control of chronic pain  Description: Control of chronic pain  Outcome: Ongoing     Problem: Pain:  Description: Pain management should include both nonpharmacologic and pharmacologic interventions.   Goal: Pain level will decrease  Description: Pain level will decrease  Outcome: Met This Shift  Goal: Control of acute pain  Description: Control of acute pain  Outcome: Met This Shift  Goal: Control of chronic pain  Description: Control of chronic pain  Outcome: Ongoing

## 2022-01-06 NOTE — PROGRESS NOTES
Physical Therapy    Facility/Department: [address] [facility] ICU  Re-eval/Treatment    NAME: Sunita Hamilton  : 1950  MRN: [id number]    Date of Service: 2022    Discharge Recommendations: Sunita Hamilton scored a 7/24 on the AM-PAC short mobility form. Current research shows that an AM-PAC score of 17 or less is typically not associated with a discharge to the patient's home setting. Based on the patient's AM-PAC score and their current functional mobility deficits, it is recommended that the patient have 3-5 sessions per week of Physical Therapy at d/c to increase the patient's independence. Please see assessment section for further patient specific details. If patient discharges prior to next session this note will serve as a discharge summary. Please see below for the latest assessment towards goals. PT Equipment Recommendations  Equipment Needed:  (defer to SNF)    Assessment   Assessment: Pt seen POD 3 from CABG x 3. Pt alert & cooperative but appears confused at times. Difficulty remembering sternal precautions & needs constant reminders. Standing balance is poor & requires 2 person A to stand up & maintain standing. Demo very flexed posture with standing making ambulation difficult. Anticipate lengthy rehab process. Will continue to follow & progress as tolerated. Treatment Diagnosis: impaired mobilty/balance  Prognosis: Fair  Decision Making: Medium Complexity  PT Education: Goals;PT Role;Plan of Care  Patient Education: sternal precautions- verbalized partial understanding  REQUIRES PT FOLLOW UP: Yes  Activity Tolerance  Activity Tolerance: Patient limited by cognitive status  Activity Tolerance: pt confused with directions, constantly trying to use his UEs- needs frequent reminders for sternal precautions       Patient Diagnosis(es): The primary encounter diagnosis was Anemia, unspecified type.  Diagnoses of Syncope, unspecified syncope type and NSTEMI (non-ST elevated myocardial infarction) [facility]) were also pertinent to this visit. 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 has a past surgical history that includes hemicolectomy; Upper gastrointestinal endoscopy (N/A, 12/27/2021); and Coronary artery bypass graft (N/A, 1/3/2022). Restrictions  Position Activity Restriction  Sternal Precautions: No Pushing,No Pulling,5# Lifting Restrictions  Other position/activity restrictions: up as tolerated; sternal precautions        Subjective  General  Chart Reviewed: Yes  Additional Pertinent Hx: 70 y.o. male with history of squamous cell carcinoma, hyperlipidemia, COPD presenting to the emergency department today for syncope and chest pressure. Pt admitted 12/24 after falling/passing out while walking into store for supplies to fumegate for bed bugs. Hgb was 5.6 upon arrival. s/p CABG x 3 on 1/3. Referring Practitioner: Flako Sloan MD  Diagnosis: anemia  Follows Commands: Impaired  Subjective  Subjective: Found in chair, agreeable to PT. Asking to get back to bed. \"I have a sore on my butt. \"  Pain Screening  Patient Currently in Pain: Denies  Vital Signs  Patient Currently in Pain: Denies       Orientation  Orientation  Overall Orientation Status: Impaired  Orientation Level: Oriented to person;Disoriented to place; Disoriented to time (knew January but not exact date; knew hospital when given choices)  Social/Functional History  Social/Functional History  Lives With: Alone  Type of Home: House  Home Layout: Able to Live on Main level with bedroom/bathroom,Two level,Laundry in basement  Home Access: Stairs to enter with rails  Entrance Stairs - Number of Steps: 4  Entrance Stairs - Rails: Left  Bathroom Shower/Tub: Tub/Shower unit,Walk-in shower  Bathroom Equipment: Shower chair,Grab bars in shower  Home Equipment: Wheelchair-manual,4 wheeled walker,Rolling walker  ADL Assistance: Independent  Homemaking Assistance: Independent  Ambulation Assistance: Independent (not using AE prior)  Transfer Assistance: Independent  Active : Yes  Type of occupation: part time delivery  Additional Comments: fall about 1 week ago at admission; reporting sister can help periodically but not 24 hr. Pt also reporting he is unsure if he is discharging to his home eventually or to a hotel (?) environmental issues       Objective          AROM RLE (degrees)  RLE AROM: WFL  AROM LLE (degrees)  LLE AROM : WFL  Strength RLE  Strength RLE: WFL  Strength LLE  Strength LLE: WFL        Bed mobility  Sit to Supine: 2 Person assistance (D of 2 to lie down)  Scooting: Moderate assistance (to scoot forward in chair to prepare for sit>stand; constant reminders to not use his UEs)  Transfers  Sit to Stand: 2 Person Assistance (MOD A of 2 to stand 2x to walker & 1x to wes stedy; cues to not use UEs)  Stand to sit: 2 Person Assistance (MIN A of 2)  Bed to Chair: 2 Person Assistance (Nisha Life for safe mobility chair>bed)  Ambulation  Ambulation?: Yes  Ambulation 1  Surface: level tile  Device: Rolling Walker  Assistance: 2 Person assistance (MOD A of 2)  Quality of Gait: unsteady gait with post lean, shuffling steps especially having difficulty picking up LLE; very flexed posture at waist  Gait Deviations: Decreased step length;Decreased step height;Shuffles  Distance: 5 small steps forward with chair closely following     Balance  Sitting - Static: Fair (Min A- CGA (pt impulsive & trying to stand/scoot without warning))  Standing - Static:  (post lean with standing)  Exercises  Knee Long Arc Quad: x 5 reps B independent  Ankle Pumps: x 5 reps B independent   Treatment included gait/transfer training, pt education.     Plan   Plan  Times per week: 2-5  Current Treatment Recommendations: Balance Training,Functional Mobility Training,Transfer Training,Gait Training,Patient/Caregiver Education & Training,Safety Education & Training,Strengthening  Safety Devices  Type of devices: Left in bed,Nurse notified,Bed alarm in place,Call light within reach    G-Code                                                         AM-PAC Score  AM-PAC Inpatient Mobility Raw Score : 7 (01/06/22 1119)  AM-PAC Inpatient T-Scale Score : 26.42 (01/06/22 1119)  Mobility Inpatient CMS 0-100% Score: 92.36 (01/06/22 1119)  Mobility Inpatient CMS G-Code Modifier : CM (01/06/22 1119)          Goals  Short term goals  Time Frame for Short term goals: dc  Short term goal 1: Bed Mobility mod A of 2  Short term goal 2: Sit<>stand MIN A of 2  Short term goal 3: Ambulate 10' with RW min A of 2  Short term goal 4: Perform 10 reps LE ex independent  Short term goal 5: Demo indep sitting balance on side of bed  Patient Goals   Patient goals : wants to get better       Therapy Time   Individual Concurrent Group Co-treatment   Time In 0946         Time Out 1024         Minutes 38           Timed Code Treatment Minutes:   25    Total Treatment Minutes:  [place], [address] [facility]

## 2022-01-06 NOTE — CONSULTS
[facility]  1/6/2022  1:49 PM    Patient Name:   Francisco J Lucia  YOB: 1950    Diagnosis: NSTEMI, CABG x3      Subjective: Rehab consult received. Chart reviewed. Patient seen. Patient discussed with our rehab unit admission nurse. 78-year-old patient admitted with NSTEMI / GI bleed, who also has syncope, severe anemia, COPD,severe CAD. On 1/3 patient underwent CABG x3 for definitive treatment of his severe CAD. Patient stable postop, chest tubes are being DC'd. Patient started therapies and is now two-person assist for bed mobility and transfers and ambulation of 5 steps with a rolling walker. Patient lives at home alone in a two-level house, but his home is a mess and patient will not be returning to his house, and will need a different alternative discharge place to live. Patient has regular Medicare insurance.     BP (!) 90/45   Pulse 72   Temp 98.1 °F (36.7 °C) (Oral)   Resp 20   Ht 5' 7\" (1.702 m)   Wt 167 lb 12.3 oz (76.1 kg)   SpO2 97%   BMI 26.28 kg/m²     Last 24 hour lab  Recent Results (from the past 24 hour(s))   POCT Glucose    Collection Time: 01/05/22  5:12 PM   Result Value Ref Range    POC Glucose 135 (H) 70 - 99 mg/dl    Performed on ACCU-CHEK    POCT Glucose    Collection Time: 01/06/22 12:45 AM   Result Value Ref Range    POC Glucose 113 (H) 70 - 99 mg/dl    Performed on ACCU-CHEK    Basic Metabolic Panel    Collection Time: 01/06/22  3:39 AM   Result Value Ref Range    Sodium 136 136 - 145 mmol/L    Potassium 4.4 3.5 - 5.1 mmol/L    Chloride 103 99 - 110 mmol/L    CO2 26 21 - 32 mmol/L    Anion Gap 7 3 - 16    Glucose 109 (H) 70 - 99 mg/dL    BUN 31 (H) 7 - 20 mg/dL    CREATININE 0.9 0.8 - 1.3 mg/dL    GFR Non-African American >60 >60    GFR African American >60 >60    Calcium 7.8 (L) 8.3 - 10.6 mg/dL   CBC    Collection Time: 01/06/22  3:39 AM   Result Value Ref Range    WBC 8.2 4.0 - 11.0 K/uL    RBC 3.17 (L) 4.20 - 5.90 M/uL    Hemoglobin 8.8 (L) 13.5 - 17.5 g/dL    Hematocrit 26.8 (L) 40.5 - 52.5 %    MCV 84.5 80.0 - 100.0 fL    MCH 27.8 26.0 - 34.0 pg    MCHC 32.9 31.0 - 36.0 g/dL    RDW 20.2 (H) 12.4 - 15.4 %    Platelets 214 282 - 491 K/uL    MPV 9.1 5.0 - 10.5 fL   Magnesium    Collection Time: 01/06/22  3:39 AM   Result Value Ref Range    Magnesium 2.20 1.80 - 2.40 mg/dL   EKG 12 lead    Collection Time: 01/06/22  7:02 AM   Result Value Ref Range    Ventricular Rate 75 BPM    Atrial Rate 75 BPM    P-R Interval 140 ms    QRS Duration 142 ms    Q-T Interval 440 ms    QTc Calculation (Bazett) 491 ms    P Axis 61 degrees    R Axis 44 degrees    T Axis 0 degrees    Diagnosis       Normal sinus rhythmRight bundle branch blockT wave abnormality, consider inferior ischemiaAbnormal ECGConfirmed by Troy Gandhi MD, Bre Nogueira ([id number]) on 1/6/2022 8:36:50 AM   POCT Glucose    Collection Time: 01/06/22  8:18 AM   Result Value Ref Range    POC Glucose 100 (H) 70 - 99 mg/dl    Performed on ACCU-CHEK    POCT Glucose    Collection Time: 01/06/22 11:45 AM   Result Value Ref Range    POC Glucose 164 (H) 70 - 99 mg/dl    Performed on ACCU-CHEK          Plan: Patient is weak overall, and could benefit from admission to our rehabilitation unit before discharge to home safely when he is medically stable. ARU when medically ready- insurance approval .  Patient with new functional deficits and ongoing medical complexity. He is a good candidate for acute inpatient rehab when medically appropriate.     Thank you for this consult.  Please contact me with any questions or concerns.        Dr. Kassidy Duarte

## 2022-01-06 NOTE — PLAN OF CARE
Problem: Falls - Risk of:  Goal: Will remain free from falls  Description: Will remain free from falls  1/6/2022 0813 by Carolina Keen RN  Outcome: Ongoing  Note: Pt is a fall risk. Fall risk protocol in place. See Nimo Reed Fall Score. Pt bed is in low position, bed alarm is on, side rails up, fall risk bracelet applied. , non-skid footwear in use. Patient/family educated on fall risk protocol, instructed to call for assistance when needed and belongings are in reach. assistance. Will continue with hourly rounds for po intake, pain needs, toileting and repositioning as needed. Will continue to monitor for needs. Problem: Nutrition  Goal: Optimal nutrition therapy  1/6/2022 0813 by Carolina Keen RN  Outcome: Ongoing  Note: Pt able to tolerated PO will monitor. Problem: Skin Integrity:  Goal: Absence of new skin breakdown  Description: Absence of new skin breakdown  1/6/2022 0813 by Carolina Keen RN  Outcome: Ongoing  Note: Pt at risk for skin breakdown. See Chad score. Pt is able to help to reposition self in bed. Heels elevated off bed. Sacral heart mepilex intact to protect, site inspected and wound noted underneath. Will continue to turn and reposition patient every two hours and as needed. Will continue to keep patient clean and dry, applying skin care cream as needed. Pillows used for repositioning q2hs. Will continue to monitor and assess for skin breakdown. Problem: Pain:  Goal: Control of acute pain  Description: Control of acute pain  1/6/2022 0813 by Carolina Keen RN  Outcome: Ongoing  Note: Pt has not had any  c/o pain thus far this shift. Pt resting comfortably in chair. Will monitor.

## 2022-01-07 LAB
ANION GAP SERPL CALCULATED.3IONS-SCNC: 6 MMOL/L (ref 3–16)
BUN BLDV-MCNC: 38 MG/DL (ref 7–20)
CALCIUM SERPL-MCNC: 7.9 MG/DL (ref 8.3–10.6)
CHLORIDE BLD-SCNC: 104 MMOL/L (ref 99–110)
CO2: 27 MMOL/L (ref 21–32)
CREAT SERPL-MCNC: 1 MG/DL (ref 0.8–1.3)
GFR AFRICAN AMERICAN: >60
GFR NON-AFRICAN AMERICAN: >60
GLUCOSE BLD-MCNC: 103 MG/DL (ref 70–99)
GLUCOSE BLD-MCNC: 110 MG/DL (ref 70–99)
GLUCOSE BLD-MCNC: 115 MG/DL (ref 70–99)
GLUCOSE BLD-MCNC: 148 MG/DL (ref 70–99)
GLUCOSE BLD-MCNC: 152 MG/DL (ref 70–99)
HCT VFR BLD CALC: 24.7 % (ref 40.5–52.5)
HEMOGLOBIN: 8.2 G/DL (ref 13.5–17.5)
MAGNESIUM: 2.3 MG/DL (ref 1.8–2.4)
MCH RBC QN AUTO: 28.1 PG (ref 26–34)
MCHC RBC AUTO-ENTMCNC: 33 G/DL (ref 31–36)
MCV RBC AUTO: 84.9 FL (ref 80–100)
PDW BLD-RTO: 20.6 % (ref 12.4–15.4)
PERFORMED ON: ABNORMAL
PLATELET # BLD: 193 K/UL (ref 135–450)
PMV BLD AUTO: 8.8 FL (ref 5–10.5)
POTASSIUM SERPL-SCNC: 4.8 MMOL/L (ref 3.5–5.1)
RBC # BLD: 2.91 M/UL (ref 4.2–5.9)
SODIUM BLD-SCNC: 137 MMOL/L (ref 136–145)
WBC # BLD: 6 K/UL (ref 4–11)

## 2022-01-07 PROCEDURE — 83735 ASSAY OF MAGNESIUM: CPT

## 2022-01-07 PROCEDURE — 80048 BASIC METABOLIC PNL TOTAL CA: CPT

## 2022-01-07 PROCEDURE — 94761 N-INVAS EAR/PLS OXIMETRY MLT: CPT

## 2022-01-07 PROCEDURE — 36415 COLL VENOUS BLD VENIPUNCTURE: CPT

## 2022-01-07 PROCEDURE — 6370000000 HC RX 637 (ALT 250 FOR IP): Performed by: CLINICAL NURSE SPECIALIST

## 2022-01-07 PROCEDURE — 6370000000 HC RX 637 (ALT 250 FOR IP): Performed by: THORACIC SURGERY (CARDIOTHORACIC VASCULAR SURGERY)

## 2022-01-07 PROCEDURE — 94669 MECHANICAL CHEST WALL OSCILL: CPT

## 2022-01-07 PROCEDURE — 2580000003 HC RX 258: Performed by: THORACIC SURGERY (CARDIOTHORACIC VASCULAR SURGERY)

## 2022-01-07 PROCEDURE — 6360000002 HC RX W HCPCS: Performed by: CLINICAL NURSE SPECIALIST

## 2022-01-07 PROCEDURE — 6370000000 HC RX 637 (ALT 250 FOR IP): Performed by: NURSE PRACTITIONER

## 2022-01-07 PROCEDURE — 94640 AIRWAY INHALATION TREATMENT: CPT

## 2022-01-07 PROCEDURE — 2000000000 HC ICU R&B

## 2022-01-07 PROCEDURE — 99233 SBSQ HOSP IP/OBS HIGH 50: CPT | Performed by: INTERNAL MEDICINE

## 2022-01-07 PROCEDURE — 99233 SBSQ HOSP IP/OBS HIGH 50: CPT | Performed by: NURSE PRACTITIONER

## 2022-01-07 PROCEDURE — 6360000002 HC RX W HCPCS: Performed by: THORACIC SURGERY (CARDIOTHORACIC VASCULAR SURGERY)

## 2022-01-07 PROCEDURE — 85027 COMPLETE CBC AUTOMATED: CPT

## 2022-01-07 PROCEDURE — 2700000000 HC OXYGEN THERAPY PER DAY

## 2022-01-07 PROCEDURE — 93308 TTE F-UP OR LMTD: CPT

## 2022-01-07 RX ORDER — LACTULOSE 10 G/15ML
20 SOLUTION ORAL ONCE
Status: COMPLETED | OUTPATIENT
Start: 2022-01-07 | End: 2022-01-07

## 2022-01-07 RX ADMIN — Medication: at 09:55

## 2022-01-07 RX ADMIN — PANTOPRAZOLE SODIUM 40 MG: 40 TABLET, DELAYED RELEASE ORAL at 18:07

## 2022-01-07 RX ADMIN — Medication: at 21:20

## 2022-01-07 RX ADMIN — SODIUM CHLORIDE, PRESERVATIVE FREE 10 ML: 5 INJECTION INTRAVENOUS at 21:21

## 2022-01-07 RX ADMIN — ARFORMOTEROL TARTRATE 15 MCG: 15 SOLUTION RESPIRATORY (INHALATION) at 09:24

## 2022-01-07 RX ADMIN — ATORVASTATIN CALCIUM 40 MG: 40 TABLET, FILM COATED ORAL at 21:13

## 2022-01-07 RX ADMIN — CHLORHEXIDINE GLUCONATE 15 ML: 1.2 RINSE ORAL at 09:53

## 2022-01-07 RX ADMIN — SODIUM CHLORIDE, PRESERVATIVE FREE 10 ML: 5 INJECTION INTRAVENOUS at 09:54

## 2022-01-07 RX ADMIN — POLYMYXIN B SULFATE, TRIMETHOPRIM SULFATE 1 DROP: 10000; 1 SOLUTION/ DROPS OPHTHALMIC at 16:10

## 2022-01-07 RX ADMIN — CHLORHEXIDINE GLUCONATE 15 ML: 1.2 RINSE ORAL at 21:14

## 2022-01-07 RX ADMIN — GABAPENTIN 300 MG: 300 CAPSULE ORAL at 09:53

## 2022-01-07 RX ADMIN — MUPIROCIN: 20 OINTMENT TOPICAL at 21:18

## 2022-01-07 RX ADMIN — ARFORMOTEROL TARTRATE 15 MCG: 15 SOLUTION RESPIRATORY (INHALATION) at 20:43

## 2022-01-07 RX ADMIN — POTASSIUM CHLORIDE 20 MEQ: 20 TABLET, EXTENDED RELEASE ORAL at 10:00

## 2022-01-07 RX ADMIN — PANTOPRAZOLE SODIUM 40 MG: 40 TABLET, DELAYED RELEASE ORAL at 10:00

## 2022-01-07 RX ADMIN — AMIODARONE HYDROCHLORIDE 400 MG: 200 TABLET ORAL at 21:11

## 2022-01-07 RX ADMIN — Medication 400 MG: at 21:12

## 2022-01-07 RX ADMIN — ACETAMINOPHEN 1000 MG: 500 TABLET ORAL at 22:52

## 2022-01-07 RX ADMIN — ACETAMINOPHEN 1000 MG: 500 TABLET ORAL at 18:07

## 2022-01-07 RX ADMIN — POLYMYXIN B SULFATE, TRIMETHOPRIM SULFATE 1 DROP: 10000; 1 SOLUTION/ DROPS OPHTHALMIC at 08:25

## 2022-01-07 RX ADMIN — ASPIRIN 325 MG: 325 TABLET, COATED ORAL at 09:53

## 2022-01-07 RX ADMIN — POTASSIUM CHLORIDE 20 MEQ: 20 TABLET, EXTENDED RELEASE ORAL at 13:10

## 2022-01-07 RX ADMIN — POLYMYXIN B SULFATE, TRIMETHOPRIM SULFATE 1 DROP: 10000; 1 SOLUTION/ DROPS OPHTHALMIC at 13:10

## 2022-01-07 RX ADMIN — FUROSEMIDE 20 MG: 10 INJECTION, SOLUTION INTRAMUSCULAR; INTRAVENOUS at 03:29

## 2022-01-07 RX ADMIN — Medication 400 MG: at 09:53

## 2022-01-07 RX ADMIN — FUROSEMIDE 20 MG: 10 INJECTION, SOLUTION INTRAMUSCULAR; INTRAVENOUS at 15:53

## 2022-01-07 RX ADMIN — POLYMYXIN B SULFATE, TRIMETHOPRIM SULFATE 1 DROP: 10000; 1 SOLUTION/ DROPS OPHTHALMIC at 00:00

## 2022-01-07 RX ADMIN — THERA TABS 1 TABLET: TAB at 09:53

## 2022-01-07 RX ADMIN — FUROSEMIDE 20 MG: 10 INJECTION, SOLUTION INTRAMUSCULAR; INTRAVENOUS at 09:53

## 2022-01-07 RX ADMIN — BUDESONIDE 500 MCG: 0.5 SUSPENSION RESPIRATORY (INHALATION) at 09:24

## 2022-01-07 RX ADMIN — GABAPENTIN 300 MG: 300 CAPSULE ORAL at 13:10

## 2022-01-07 RX ADMIN — FUROSEMIDE 20 MG: 10 INJECTION, SOLUTION INTRAMUSCULAR; INTRAVENOUS at 21:16

## 2022-01-07 RX ADMIN — CALCIUM 500 MG: 500 TABLET ORAL at 09:53

## 2022-01-07 RX ADMIN — ACETAMINOPHEN 1000 MG: 500 TABLET ORAL at 10:03

## 2022-01-07 RX ADMIN — POTASSIUM CHLORIDE 20 MEQ: 20 TABLET, EXTENDED RELEASE ORAL at 18:07

## 2022-01-07 RX ADMIN — GABAPENTIN 300 MG: 300 CAPSULE ORAL at 21:12

## 2022-01-07 RX ADMIN — MUPIROCIN: 20 OINTMENT TOPICAL at 09:54

## 2022-01-07 RX ADMIN — CALCIUM 500 MG: 500 TABLET ORAL at 21:13

## 2022-01-07 RX ADMIN — SODIUM CHLORIDE, PRESERVATIVE FREE 10 ML: 5 INJECTION INTRAVENOUS at 21:22

## 2022-01-07 RX ADMIN — POLYMYXIN B SULFATE, TRIMETHOPRIM SULFATE 1 DROP: 10000; 1 SOLUTION/ DROPS OPHTHALMIC at 05:07

## 2022-01-07 RX ADMIN — AMIODARONE HYDROCHLORIDE 400 MG: 200 TABLET ORAL at 10:06

## 2022-01-07 RX ADMIN — LACTULOSE 20 G: 20 SOLUTION ORAL at 09:53

## 2022-01-07 RX ADMIN — POLYMYXIN B SULFATE, TRIMETHOPRIM SULFATE 1 DROP: 10000; 1 SOLUTION/ DROPS OPHTHALMIC at 20:21

## 2022-01-07 RX ADMIN — CLOPIDOGREL BISULFATE 75 MG: 75 TABLET ORAL at 09:53

## 2022-01-07 ASSESSMENT — PAIN SCALES - GENERAL
PAINLEVEL_OUTOF10: 0

## 2022-01-07 ASSESSMENT — PAIN SCALES - WONG BAKER
WONGBAKER_NUMERICALRESPONSE: 0

## 2022-01-07 NOTE — PLAN OF CARE
Problem: Falls - Risk of:  Goal: Will remain free from falls  Description: Will remain free from falls  1/7/2022 1142 by Esme Huitron RN  Outcome: Ongoing  Note: Pt is a fall risk. Fall risk protocol in place. See Olga Leone Fall Score. Pt bed is in low position, bed alarm is on, side rails up, fall risk bracelet applied. , non-skid footwear in use. Patient/family educated on fall risk protocol, instructed to call for assistance when needed and belongings are in reach. assistance. Will continue with hourly rounds for po intake, pain needs, toileting and repositioning as needed. Will continue to monitor for needs. Problem: Nutrition  Goal: Optimal nutrition therapy  1/7/2022 1142 by Esme Huitron RN  Outcome: Ongoing  Note: Pt able to tolerate PO intake will monitor. Problem: Skin Integrity:  Goal: Absence of new skin breakdown  Description: Absence of new skin breakdown  1/7/2022 1142 by Esme Huitron RN  Outcome: Ongoing  Note: Pt at risk for skin breakdown. See Chad score. Pt able to help reposition self in bed. Heels elevated off bed. Sacral heart mepilex intact to protect, site inspected and wound noted underneath. Will continue to turn and reposition patient every two hours and as needed. Will continue to keep patient clean and dry, applying skin care cream as needed. Pillows used for repositioning q2hs. Will continue to monitor and assess for skin breakdown. Problem: Pain:  Goal: Control of acute pain  Description: Control of acute pain  1/7/2022 1142 by Esme Huitron RN  Outcome: Ongoing  Note: Pt has not had any c/o pain thus far this shift. Pt resting comfortably in bed. Will monitor.

## 2022-01-07 NOTE — CARE COORDINATION
CM met with Mr. Rosaura Parham and his sister, Gracie Spaulding, to discuss discharge plan. Bryan Asher stated Mr. Ashrafs home is a not livable. Described it as a hoarded situation and will be condemned. He was struggling to maintain independence in the unsanitary conditions. He cannot return to prior level of care. Discussed levels of care (Acute rehab, skilled nursing facility, long-term care). Explained the Medicare benefit with regard to payments and qualifications. Given Mr. Rosaura Parham has very limited means and stands to lose his house, it may be in his best interest to go to a skilled nursing facility and transition to long-term care. Explained the process for applying for Medicaid and provided the number of financial counselor here at the hospital. The number for [employer] ([phone]) was also provided to Bryan Asher because she will need assistance with securing Parag Fabian in order to [address] [place] [address] with disposition of his house and car. A list of skilled nursing facilities was provided to Bryan Asher. She lives in the [facility] and plans to look at facilities close to her home. Mr. Rosaura Parham and Ms. Nicolas Gomez verbalized understanding and are in agreement with the above recommendations and plan. Encouraged Bryan Asher to call case management with questions.     Electronically signed by JAY Mares RN-[facility]  Case Management  [phone]

## 2022-01-07 NOTE — CARE COORDINATION
Case Management Assessment           Daily Note                 Date/ Time of Note: 1/7/2022 11:07 AM         Note completed by: Harriet Sousa RN    Patient Name: Ramses Robles  YOB: 1950    Norrine Flock [D64.9]  [facility]STEM[facility] (non-ST elevated [facility] [I21.4]  Syncope, unspecified syncope type [R55]  Anemia, unspecified type [D64.9]  Patient Admission Status: Inpatient    Date of Admission:12/24/2021  4:36 PM Length of Stay: 14 GLOS: GMLOS: 11.4    Current Plan of Care: POD #4 s/p CABG. Chest tube back to suction. Wean O2 as tolerated. Lasix changed to PO. Plan [facility] ARU once medically stable.  ________________________________________________________________________________________  PT AM-PAC: 7 / 24 per last evaluation on: 1/6    OT AM-PAC: 9 / 24 per last evaluation on: 1/6    DME Needs for discharge TBD  ________________________________________________________________________________________  Discharge Plan: [facility] ARU    Tentative discharge date: TBD  _____________________________________  Case Management Notes: Chart reviewed.  [facility] approved for pt to go to the ARU once stable. Dianelys Christiansen and his family were provided with choice of provider; he and his family are in agreement with the discharge plan.       Harriet Sousa RN  [facility]  Case Management Department

## 2022-01-07 NOTE — PLAN OF CARE
Problem: Skin Integrity:  Goal: Will show no infection signs and symptoms  Description: Will show no infection signs and symptoms  1/7/2022 0743 by Shanta Gomez RN  Outcome: Ongoing  Note: Will monitor insertion site for signs of infection and skin breakdown. Problem: Infection - Surgical Site:  Goal: Will show no infection signs and symptoms  Description: Will show no infection signs and symptoms  1/7/2022 0743 by Shanta Gomez RN  Outcome: Ongoing  Note: Will monitor insertion site for signs of infection and skin breakdown. Problem: Infection - Central Venous Catheter-Associated Bloodstream Infection:  Goal: Will show no infection signs and symptoms  Description: Will show no infection signs and symptoms  1/7/2022 0743 by Shanta Gomez RN  Outcome: Ongoing  Note: Will monitor insertion site for signs of infection and skin breakdown. Problem: Urinary Elimination:  Goal: Signs and symptoms of infection will decrease  Description: Signs and symptoms of infection will decrease  Outcome: Ongoing  Note: Will monitor insertion site for signs of skin breakdown and infection and provide kelby and hernandez care as needed.

## 2022-01-07 NOTE — PROGRESS NOTES
Department of [facility] Nila Worthy Progress Note  1/7/2022  3:03 PM    Patient Name:   Angeles Overton  YOB: 1950    Diagnosis: NSTEMI, CABG x3       Subjective: Rehab consult follow-up . Chart reviewed. Patient discussed with our rehab unit admission nurse. 79-year-old patient underwent CABG x3 for definitive treatment of his severe CAD. Patient started therapies and is now two-person assist for bed mobility and transfers and ambulation of 5 steps with a rolling walker. Patient lives at home alone in a two-level house, but his home is a mess and patient will not be returning to his house, and will need a different alternative discharge place to live. Are concerned about bringing this patient to the rehabilitation it is his discharge plan. His sister states he cannot return to his home, but she is no other plans for him at this time for a different place to go to at discharge. We estimate he will be on a rehabilitation about 2 weeks, but she did not volunteer about cleaning up his house so that he could return to his own home. We are concerned about bring him to the rehabilitation unit and after working with him for 2 weeks and getting him stronger, that he will be homeless with no further plans by his family.   We will have to work this out with the patient and his family before admission to the rehabilitation unit.       BP (!) 82/59   Pulse 70   Temp 98.4 °F (36.9 °C) (Oral)   Resp 18   Ht 5' 7\" (1.702 m)   Wt 168 lb 14 oz (76.6 kg)   SpO2 96%   BMI 26.45 kg/m²     Last 24 hour lab  Recent Results (from the past 24 hour(s))   POCT Glucose    Collection Time: 01/06/22  4:19 PM   Result Value Ref Range    POC Glucose 138 (H) 70 - 99 mg/dl    Performed on ACCU-CHEK    POCT Glucose    Collection Time: 01/06/22  9:22 PM   Result Value Ref Range    POC Glucose 121 (H) 70 - 99 mg/dl    Performed on ACCU-CHEK    Basic Metabolic Panel    Collection Time: 01/07/22  6:35 AM   Result Value Ref Range    Sodium 137 136 - 145 mmol/L    Potassium 4.8 3.5 - 5.1 mmol/L    Chloride 104 99 - 110 mmol/L    CO2 27 21 - 32 mmol/L    Anion Gap 6 3 - 16    Glucose 103 (H) 70 - 99 mg/dL    BUN 38 (H) 7 - 20 mg/dL    CREATININE 1.0 0.8 - 1.3 mg/dL    GFR Non-African American >60 >60    GFR African American >60 >60    Calcium 7.9 (L) 8.3 - 10.6 mg/dL   CBC    Collection Time: 01/07/22  6:35 AM   Result Value Ref Range    WBC 6.0 4.0 - 11.0 K/uL    RBC 2.91 (L) 4.20 - 5.90 M/uL    Hemoglobin 8.2 (L) 13.5 - 17.5 g/dL    Hematocrit 24.7 (L) 40.5 - 52.5 %    MCV 84.9 80.0 - 100.0 fL    MCH 28.1 26.0 - 34.0 pg    MCHC 33.0 31.0 - 36.0 g/dL    RDW 20.6 (H) 12.4 - 15.4 %    Platelets 324 793 - 669 K/uL    MPV 8.8 5.0 - 10.5 fL   Magnesium    Collection Time: 01/07/22  6:35 AM   Result Value Ref Range    Magnesium 2.30 1.80 - 2.40 mg/dL   POCT Glucose    Collection Time: 01/07/22  7:47 AM   Result Value Ref Range    POC Glucose 110 (H) 70 - 99 mg/dl    Performed on ACCU-CHEK    POCT Glucose    Collection Time: 01/07/22 11:57 AM   Result Value Ref Range    POC Glucose 152 (H) 70 - 99 mg/dl    Performed on ACCU-CHEK          Plan: We will follow, and see how he progresses in his recovery with removal of his chest tubes and better tolerance of his therapies. We are concerned about bring him to the rehabilitation unit and after working with him for 2 weeks and getting him stronger, that he will be homeless with no further plans by his family. We will have to work this out with the patient and his family before admission to the rehabilitation unit.       Dr. Trish Qureshi

## 2022-01-07 NOTE — PROGRESS NOTES
[address]   Cardiology  Note   Dr Jamaal Collins MD, Raphael Michelle RN, FNP APRN CVNP  Date: 1/7/2022  Admit Date: 12/24/2021       CC:/ NSTEMI / GI bleed   Cardiology following with  syncope  / severe anemia  / COPD /severe CAD / ICM   Urology consulted for penile/scrotal cellulitis  1/3/2022 CABG x 3, LIMA to LAD, SVG to OM2 and PDA  pre-op EF ~30% w/ inferior akinesis; post op EF ~35% w/ inferior dyskinetic hypokinesis    Interval Hx /  Subjective:he is up in chair /  b/p now WNL / in NSR   suture line dry and approx. no major events overnight. Cardiac meds amiodarone asa lipitor plavix lasix mag ox  lopressor   Planning ARU soon       12/28/2021 [facility]   Left ventricular pressure 11 mmHg  Aortic pressure 95/47 mmHg  Coronary anatomy:   The left main coronary artery is heavily calcified.  Has severe ostial narrowing at least 90 to 95%.  Our catheter did ventricularized upon engagement. Left anterior descending artery is mildly stenotic proximally.  There is good mid and distal anatomy with good landing zones. Circumflex artery has proximal calcification and moderate lesions. The right coronary artery is 100% occluded after its takeoff.  I do not identify any left to right or right to right collateralization. Left ventriculogram shows ejection fraction of 25 to 30% %.  Moderate global hypokinesia. Patient seen and examined. Clinical notes reviewed.  Telemetry reviewed / testing reviewed  30 minutes   Pertinent labs, diagnostic, device, and imaging results reviewed as a part of this visit  EKG TTE stress test: any LHC/RHC reviewed     Past Medical History:  Past Medical History:   Diagnosis Date    BPH with obstruction/lower urinary tract symptoms 8/3/2012    Chicken pox     Colon polyp     Hyperglycemia 12/15/2010    Hyperlipemia 12/15/2010    Post herpetic neuralgia     Shingles     Squamous cell carcinoma of skin of upper limb, including shoulder 9/8/2015         lactulose  20 g Oral Once    potassium chloride  20 mEq Oral TID     furosemide  20 mg IntraVENous Q6H    calcium elemental  500 mg Oral BID    amiodarone  400 mg Oral BID    Followed by   Tommie Mojica ON 1/9/2022] amiodarone  200 mg Oral Daily    sodium chloride flush  10 mL IntraVENous 2 times per day    aspirin  325 mg Oral Daily    clopidogrel  75 mg Oral Daily    chlorhexidine  15 mL Mouth/Throat BID    magnesium oxide  400 mg Oral BID    mupirocin   Nasal BID    polyethylene glycol  17 g Oral Daily    metoprolol tartrate  12.5 mg Oral BID    atorvastatin  40 mg Oral Nightly    pantoprazole  40 mg Oral BID AC    insulin lispro  0-12 Units SubCUTAneous 4x Daily AC & HS    acetaminophen  1,000 mg Oral Q6H    gabapentin  300 mg Oral TID    trimethoprim-polymyxin b  1 drop Both Eyes 6 times per day    Venelex   Topical BID    sodium chloride flush  5-40 mL IntraVENous 2 times per day    multivitamin  1 tablet Oral Daily    influenza virus vaccine  0.5 mL IntraMUSCular Prior to discharge    tiotropium  2 puff Inhalation Daily    budesonide  0.5 mg Nebulization BID    And    Arformoterol Tartrate  15 mcg Nebulization BID       Vitals:    01/07/22 0925   BP:    Pulse:    Resp: 15   Temp:    SpO2: 100%      In: 500   Out: 1774    Wt Readings from Last 3 Encounters:   01/07/22 168 lb 14 oz (76.6 kg)   07/12/18 179 lb (81.2 kg)   05/31/18 183 lb (83 kg)       Intake/Output Summary (Last 24 hours) at 1/7/2022 0950  Last data filed at 1/7/2022 0600  Gross per 24 hour   Intake 500 ml   Output 1774 ml   Net -1274 ml       Telemetry: Personally Reviewed    · Constitutional: Cooperative and in no apparent distress, and appears well nourished  · Skin: Warm and pink; no pallor, cyanosis, clubbing, or bruising   · HEENT: Symmetric and normocephalic. · Cardiovascular: Regular rate and rhythm. S1/S2   · Respiratory: Respirations symmetric and unlabored.  Lungs clear to auscultation bilaterally, no wheezing, crackles, or rhonchi  · Gastrointestinal: Abdomen soft and round. Bowel sounds normoactive without tenderness or masses. · Musculoskeletal: Bilateral upper and lower extremity strength 5/5 with full ROM  · Neurologic/Psych: Awake and orientated to person, place and time. Calm affect, appropriate mood    Patient Active Problem List    Diagnosis Date Noted    Non-ST elevated myocardial infarction (non-STEMI) ([facility] 12/31/2021    Syncope and collapse     Anemia 12/24/2021    Visit for wound check 06/06/2018    Basal cell carcinoma of right side of nose 05/08/2018    Visit for suture removal 05/08/2018    SCCS, mod diff  (squamous cell carcinoma), hand, left 04/24/2018    Mixed hyperlipidemia 04/18/2017    Nicotine use disorder 02/03/2017    COPD, mild ([facility] 06/23/2016    Colon polyp     Basal cell carcinoma of shoulder 09/08/2015    Squamous cell carcinoma of skin of upper limb, including shoulder 09/08/2015    Actinic keratosis 09/08/2015    BPH with obstruction/lower urinary tract symptoms 08/03/2012    Hyperglycemia 12/15/2010    Smoker's respiratory syndrome 12/15/2010        Assessment     syncope / with severe anemia  GI workup completed     trop elevation with severe anemia / NSTEMI Type II suspected    LHC with severe CAD     1/3/2022 CABG x 3, LIMA to LAD, SVG to OM2 and PDA  pre-op EF ~30% w/ inferior akinesis; post op EF ~35% w/ inferior dyskinetic hypokinesis    ICM   2/28/2021 TTE   Normal left ventricle size and wall thickness. Overall left ventricular systolic function appears reduced with an ejectionction of 25-30%. There is hypokinesis of the basal and mid inferior walls. There is hypokinesis of the mid and basal inferolateral walls. There is hypokinesis of the mid and basal inferoseptal walls. Diastolic filling parameters suggest grade I diastolic dysfunction.  Mild mitral and tricuspid regurgitation  estimated pulmonary artery systolic pressure is 31 mmHg assuming a right  atrial pressure of 3 mmHg    NSVT  / now NSR with PVCS  On amiodarone po     COPD  Stable   Inhalers     HLD  LDL 54 optimal      UTI  abx per IM      Plan:   POD#4 CABG x 3, LIMA to LAD, SVG to OM2 and PDA  Cardiac meds amiodarone asa lipitor plavix lasix mag ox lopressor   consider ace/arb/arni when ok with CTS  SGLT2 in future as tolerated   Limited TTE before discharge  /  may need life vest before discharge    Planning ARU soon     Thank you for allowing to us to participate in the care of Dony Delgado. Praveena Hogan APRN-CNP-CVNP  Interventional cardiology note  Patient is postop day #4 and seem to be improving. Rhythm seems stable. Planning to go to ARU soon. All else looks fairly stable.   Emily Manrique MD, [facility]

## 2022-01-07 NOTE — PROGRESS NOTES
Progress Note  Hospital Day: 15  POD#  4  S/P Coronary artery bypass x 3 (1/3/2022)    Chief Complaint: Postop follow up    Mr. Arianna Valdez has no complaints of incisional pain    24 hour Interval History:    Requiring levo for BP support overnight. Stable in SR. Somewhat agitated with nursing staff and resistant to instruction at times.  Up in chair, on minimal dose of Levo. Remains in SR.    Up in chair, more talkative today. Remains in SR. Changed Lasix to 20 mg q6h    Today's plan: chest tube back to suction. Bowel med. Decrease BB 6.25 mg bid if BP tolerates    VITAL SIGNS   Temperature:  Current - Temp: 98.7 °F (37.1 °C); Max - Temp  Av.3 °F (36.8 °C)  Min: 98.1 °F (36.7 °C)  Max: 98.7 °F (37.1 °C)    Respiratory Rate : Resp  Av.7  Min: 16  Max: 24    Pulse Range: Pulse  Av.4  Min: 66  Max: 89    Blood Pressure Range:  Systolic (58IXZ), ERZ:350 , Min:69 , CWJ:091   ; Diastolic (76JYX), SRC:63, Min:44, Max:89    Pulse ox Range: SpO2  Av.3 %  Min: 90 %  Max: 100 %  O2 Flow Rate (L/min): 2 L/min    Admission Weight: Weight: 168 lb 10.4 oz (76.5 kg)  Up 4 lbs from preop  Current Weight: Patient Vitals for the past 96 hrs (Last 3 readings):   Weight   22 0645 168 lb 14 oz (76.6 kg)   22 0600 167 lb 12.3 oz (76.1 kg)   22 0645 175 lb 0.7 oz (79.4 kg)     I/O:     Intake/Output Summary (Last 24 hours) at 2022 0831  Last data filed at 2022 0600  Gross per 24 hour   Intake 500 ml   Output 1774 ml   Net -1274 ml     Urine (hernandez): 355-470-650 ml/shift    DO NOT REMOVE  Chest tube:  120- ml/shift  serosanguinous, on waterseal, + leak    LINES/ACCESS:   Central Access: RIJ Day #: 4   Peripheral Access: Lt forearm Day #: 9  Peripheral Access: Rt forearm Day #: 4                               Hernandez Day #: 4             Diet: ADULT ORAL NUTRITION SUPPLEMENT; Breakfast, Lunch; Wound Healing Oral Supplement  ADULT DIET;  Regular; 3 carb choices (45 gm/meal); Low Sodium (2 gm); 1500 ml    MEDICATIONS:      potassium chloride  20 mEq Oral TID WC    furosemide  20 mg IntraVENous Q6H    calcium elemental  500 mg Oral BID    amiodarone  400 mg Oral BID    Followed by   Rupa Friend ON 1/9/2022] amiodarone  200 mg Oral Daily    sodium chloride flush  10 mL IntraVENous 2 times per day    aspirin  325 mg Oral Daily    clopidogrel  75 mg Oral Daily    chlorhexidine  15 mL Mouth/Throat BID    magnesium oxide  400 mg Oral BID    mupirocin   Nasal BID    polyethylene glycol  17 g Oral Daily    metoprolol tartrate  12.5 mg Oral BID    atorvastatin  40 mg Oral Nightly    pantoprazole  40 mg Oral BID AC    insulin lispro  0-12 Units SubCUTAneous 4x Daily AC & HS    acetaminophen  1,000 mg Oral Q6H    gabapentin  300 mg Oral TID    trimethoprim-polymyxin b  1 drop Both Eyes 6 times per day    Venelex   Topical BID    sodium chloride flush  5-40 mL IntraVENous 2 times per day    multivitamin  1 tablet Oral Daily    influenza virus vaccine  0.5 mL IntraMUSCular Prior to discharge    tiotropium  2 puff Inhalation Daily    budesonide  0.5 mg Nebulization BID    And    Arformoterol Tartrate  15 mcg Nebulization BID     DATA REVIEW:   CBC:   Recent Labs     01/05/22  0319 01/06/22  0339 01/07/22  0635   WBC 9.8 8.2 6.0   HGB 8.7* 8.8* 8.2*   HCT 26.6* 26.8* 24.7*   MCV 84.7 84.5 84.9    174 193     BMP:   Recent Labs     01/04/22  2332 01/05/22  0319 01/06/22  0339 01/07/22  0635   NA  --  135* 136 137   K 5.0 5.2* 4.4 4.8   CL  --  105 103 104   CO2  --  21 26 27   GLUCOSE  --  119* 109* 103*   BUN  --  26* 31* 38*   CREATININE  --  1.2 0.9 1.0   CALCIUM  --  7.5* 7.8* 7.9*   MG 2.30 2.50* 2.20 2.30     Accucheck Glucoses:   Recent Labs     01/06/22  0818 01/06/22  1145 01/06/22  1619 01/06/22  2122 01/07/22  0747   POCGLU 100* 164* 138* 121* 110*     ABG:    Lab Results   Component Value Date    PHART 7.395 01/04/2022    PO2ART 91.2 01/04/2022    CGW1DVF 29.8 01/04/2022    Q0OOAECA 97 01/04/2022    SXG8VIL 19 01/04/2022    FSL8LON 18.2 01/04/2022    BEART -7 01/04/2022    BEART -6 01/04/2022    BEART -5 01/04/2022    BEART -4 01/03/2022    BEART -1 01/03/2022         ECHO (Limited) 1/7/2022: results pending    ASSESSMENT:   Patient Active Problem List:     Hyperglycemia     Smoker's respiratory syndrome     BPH with obstruction/lower urinary tract symptoms     Basal cell carcinoma of shoulder     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     SCCS, mod diff  (squamous cell carcinoma), hand, left     Basal cell carcinoma of right side of nose     Visit for suture removal     Visit for wound check     Anemia     Syncope and collapse     Non-ST elevated myocardial infarction (non-STEMI) ([facility]      Neuro: awake, alert and oriented x 3  CV:   Sinus  Pulm:  normal work of breathing, diminished, congested cough slightly improved  Abd:  soft, non-tender; bowel sounds normal; passing flatus, no BM  Diamond: slightly cloudy yellow  Wounds: clean, dry and intact  Ext: warm, + edema    PLAN:   · Neuro - pain tolerated    - intraop pec blocks   - continued scheduled APAP and gabapentin, PRN ketoralac   - PT  7/24 on 1/06  on the AM-PAC short mobility form   - OT  9/24 on 1/06 on the AM-PAC ADL Inpatient form  · CV - on Beta blocker, ace inhibitor, statin, ASA/Plavix   - Continue Amiodarone for periop AF   - decrease metoprolol to 6.25 mg if BP improves    - Lisinopril   - Lipitor 40 mg  · Pulm - wean O2 as tolerated, continue incentive spirometry and respiratory treatments   - chest tube 299 ml, place back to suction for expiratory leak  · Renal - creatinine stable - Cr 1.0, preop Cr 0.6  - wt 168 lbs, preop wt 163.7 lbs   - continue gentle diuresis for postop fluid retention              - keep urinary catheter for accurate I & O and scrotal cellulitis  · Heme - Acute blood loss anemia as expected- hgb 8.2, continue to monitor    · ID - surgical prophylaxis antibiotics completed  · FEN - cardiac diet with 1500 ml fluid restriction     - SSI     - bowel med  · GI prophylaxis - Protonix 40 mg bid  · VTE prophylaxis - SCD and YUMI hose  · Disposition - PT/OT. ARU at discharge. ? Precert. Await echo results, may need Lifevest at discharge  Discussed patient with Dr Arun Kellogg who is agreeable to assessment and plan.         Duran Dumont, [phone] [facility] pager  1/7/2022  8:31 AM

## 2022-01-08 LAB
ANION GAP SERPL CALCULATED.3IONS-SCNC: 5 MMOL/L (ref 3–16)
BUN BLDV-MCNC: 33 MG/DL (ref 7–20)
CALCIUM SERPL-MCNC: 8 MG/DL (ref 8.3–10.6)
CHLORIDE BLD-SCNC: 104 MMOL/L (ref 99–110)
CO2: 27 MMOL/L (ref 21–32)
CREAT SERPL-MCNC: 0.9 MG/DL (ref 0.8–1.3)
GFR AFRICAN AMERICAN: >60
GFR NON-AFRICAN AMERICAN: >60
GLUCOSE BLD-MCNC: 108 MG/DL (ref 70–99)
GLUCOSE BLD-MCNC: 125 MG/DL (ref 70–99)
GLUCOSE BLD-MCNC: 158 MG/DL (ref 70–99)
GLUCOSE BLD-MCNC: 96 MG/DL (ref 70–99)
HCT VFR BLD CALC: 26.6 % (ref 40.5–52.5)
HEMOGLOBIN: 8.7 G/DL (ref 13.5–17.5)
MAGNESIUM: 2.2 MG/DL (ref 1.8–2.4)
MCH RBC QN AUTO: 27.4 PG (ref 26–34)
MCHC RBC AUTO-ENTMCNC: 32.6 G/DL (ref 31–36)
MCV RBC AUTO: 84.1 FL (ref 80–100)
PDW BLD-RTO: 20.6 % (ref 12.4–15.4)
PERFORMED ON: ABNORMAL
PLATELET # BLD: 246 K/UL (ref 135–450)
PMV BLD AUTO: 8.6 FL (ref 5–10.5)
POTASSIUM SERPL-SCNC: 4.7 MMOL/L (ref 3.5–5.1)
RBC # BLD: 3.17 M/UL (ref 4.2–5.9)
SODIUM BLD-SCNC: 136 MMOL/L (ref 136–145)
WBC # BLD: 6.1 K/UL (ref 4–11)

## 2022-01-08 PROCEDURE — 6370000000 HC RX 637 (ALT 250 FOR IP): Performed by: CLINICAL NURSE SPECIALIST

## 2022-01-08 PROCEDURE — 83735 ASSAY OF MAGNESIUM: CPT

## 2022-01-08 PROCEDURE — 36592 COLLECT BLOOD FROM PICC: CPT

## 2022-01-08 PROCEDURE — 94669 MECHANICAL CHEST WALL OSCILL: CPT

## 2022-01-08 PROCEDURE — 6370000000 HC RX 637 (ALT 250 FOR IP): Performed by: THORACIC SURGERY (CARDIOTHORACIC VASCULAR SURGERY)

## 2022-01-08 PROCEDURE — 80048 BASIC METABOLIC PNL TOTAL CA: CPT

## 2022-01-08 PROCEDURE — 2580000003 HC RX 258: Performed by: THORACIC SURGERY (CARDIOTHORACIC VASCULAR SURGERY)

## 2022-01-08 PROCEDURE — 6360000002 HC RX W HCPCS: Performed by: THORACIC SURGERY (CARDIOTHORACIC VASCULAR SURGERY)

## 2022-01-08 PROCEDURE — 6370000000 HC RX 637 (ALT 250 FOR IP): Performed by: NURSE PRACTITIONER

## 2022-01-08 PROCEDURE — 99233 SBSQ HOSP IP/OBS HIGH 50: CPT | Performed by: NURSE PRACTITIONER

## 2022-01-08 PROCEDURE — 85027 COMPLETE CBC AUTOMATED: CPT

## 2022-01-08 PROCEDURE — 36415 COLL VENOUS BLD VENIPUNCTURE: CPT

## 2022-01-08 PROCEDURE — 94640 AIRWAY INHALATION TREATMENT: CPT

## 2022-01-08 PROCEDURE — 6360000002 HC RX W HCPCS: Performed by: CLINICAL NURSE SPECIALIST

## 2022-01-08 PROCEDURE — 99024 POSTOP FOLLOW-UP VISIT: CPT | Performed by: THORACIC SURGERY (CARDIOTHORACIC VASCULAR SURGERY)

## 2022-01-08 PROCEDURE — 94761 N-INVAS EAR/PLS OXIMETRY MLT: CPT

## 2022-01-08 PROCEDURE — 31720 CLEARANCE OF AIRWAYS: CPT

## 2022-01-08 PROCEDURE — 2700000000 HC OXYGEN THERAPY PER DAY

## 2022-01-08 PROCEDURE — 2000000000 HC ICU R&B

## 2022-01-08 RX ADMIN — GABAPENTIN 300 MG: 300 CAPSULE ORAL at 20:47

## 2022-01-08 RX ADMIN — PANTOPRAZOLE SODIUM 40 MG: 40 TABLET, DELAYED RELEASE ORAL at 15:31

## 2022-01-08 RX ADMIN — PANTOPRAZOLE SODIUM 40 MG: 40 TABLET, DELAYED RELEASE ORAL at 08:34

## 2022-01-08 RX ADMIN — FUROSEMIDE 20 MG: 10 INJECTION, SOLUTION INTRAMUSCULAR; INTRAVENOUS at 08:34

## 2022-01-08 RX ADMIN — SODIUM CHLORIDE, PRESERVATIVE FREE 10 ML: 5 INJECTION INTRAVENOUS at 20:59

## 2022-01-08 RX ADMIN — CHLORHEXIDINE GLUCONATE 15 ML: 1.2 RINSE ORAL at 08:33

## 2022-01-08 RX ADMIN — CALCIUM 500 MG: 500 TABLET ORAL at 20:48

## 2022-01-08 RX ADMIN — Medication: at 08:33

## 2022-01-08 RX ADMIN — THERA TABS 1 TABLET: TAB at 08:34

## 2022-01-08 RX ADMIN — Medication 400 MG: at 20:47

## 2022-01-08 RX ADMIN — ACETAMINOPHEN 1000 MG: 500 TABLET ORAL at 05:20

## 2022-01-08 RX ADMIN — POTASSIUM CHLORIDE 20 MEQ: 20 TABLET, EXTENDED RELEASE ORAL at 18:08

## 2022-01-08 RX ADMIN — GABAPENTIN 300 MG: 300 CAPSULE ORAL at 15:31

## 2022-01-08 RX ADMIN — CLOPIDOGREL BISULFATE 75 MG: 75 TABLET ORAL at 08:34

## 2022-01-08 RX ADMIN — CALCIUM 500 MG: 500 TABLET ORAL at 08:34

## 2022-01-08 RX ADMIN — POTASSIUM CHLORIDE 20 MEQ: 20 TABLET, EXTENDED RELEASE ORAL at 08:34

## 2022-01-08 RX ADMIN — POLYMYXIN B SULFATE, TRIMETHOPRIM SULFATE 1 DROP: 10000; 1 SOLUTION/ DROPS OPHTHALMIC at 12:21

## 2022-01-08 RX ADMIN — POLYMYXIN B SULFATE, TRIMETHOPRIM SULFATE 1 DROP: 10000; 1 SOLUTION/ DROPS OPHTHALMIC at 08:33

## 2022-01-08 RX ADMIN — SODIUM CHLORIDE, PRESERVATIVE FREE 10 ML: 5 INJECTION INTRAVENOUS at 21:00

## 2022-01-08 RX ADMIN — SODIUM CHLORIDE, PRESERVATIVE FREE 10 ML: 5 INJECTION INTRAVENOUS at 08:36

## 2022-01-08 RX ADMIN — Medication 400 MG: at 08:35

## 2022-01-08 RX ADMIN — POLYMYXIN B SULFATE, TRIMETHOPRIM SULFATE 1 DROP: 10000; 1 SOLUTION/ DROPS OPHTHALMIC at 03:34

## 2022-01-08 RX ADMIN — GABAPENTIN 300 MG: 300 CAPSULE ORAL at 08:34

## 2022-01-08 RX ADMIN — FUROSEMIDE 20 MG: 10 INJECTION, SOLUTION INTRAMUSCULAR; INTRAVENOUS at 03:31

## 2022-01-08 RX ADMIN — ACETAMINOPHEN 1000 MG: 500 TABLET ORAL at 18:09

## 2022-01-08 RX ADMIN — AMIODARONE HYDROCHLORIDE 400 MG: 200 TABLET ORAL at 08:34

## 2022-01-08 RX ADMIN — Medication: at 21:01

## 2022-01-08 RX ADMIN — ASPIRIN 325 MG: 325 TABLET, COATED ORAL at 08:34

## 2022-01-08 RX ADMIN — ACETAMINOPHEN 1000 MG: 500 TABLET ORAL at 23:02

## 2022-01-08 RX ADMIN — POLYMYXIN B SULFATE, TRIMETHOPRIM SULFATE 1 DROP: 10000; 1 SOLUTION/ DROPS OPHTHALMIC at 15:31

## 2022-01-08 RX ADMIN — ATORVASTATIN CALCIUM 40 MG: 40 TABLET, FILM COATED ORAL at 20:47

## 2022-01-08 RX ADMIN — AMIODARONE HYDROCHLORIDE 400 MG: 200 TABLET ORAL at 20:48

## 2022-01-08 RX ADMIN — POLYMYXIN B SULFATE, TRIMETHOPRIM SULFATE 1 DROP: 10000; 1 SOLUTION/ DROPS OPHTHALMIC at 20:57

## 2022-01-08 RX ADMIN — POLYETHYLENE GLYCOL 3350 17 G: 17 POWDER, FOR SOLUTION ORAL at 08:33

## 2022-01-08 RX ADMIN — FUROSEMIDE 20 MG: 10 INJECTION, SOLUTION INTRAMUSCULAR; INTRAVENOUS at 20:55

## 2022-01-08 RX ADMIN — FUROSEMIDE 20 MG: 10 INJECTION, SOLUTION INTRAMUSCULAR; INTRAVENOUS at 15:31

## 2022-01-08 RX ADMIN — ARFORMOTEROL TARTRATE 15 MCG: 15 SOLUTION RESPIRATORY (INHALATION) at 21:02

## 2022-01-08 RX ADMIN — POLYMYXIN B SULFATE, TRIMETHOPRIM SULFATE 1 DROP: 10000; 1 SOLUTION/ DROPS OPHTHALMIC at 01:07

## 2022-01-08 RX ADMIN — ARFORMOTEROL TARTRATE 15 MCG: 15 SOLUTION RESPIRATORY (INHALATION) at 11:22

## 2022-01-08 ASSESSMENT — PAIN SCALES - GENERAL
PAINLEVEL_OUTOF10: 0

## 2022-01-08 ASSESSMENT — PAIN SCALES - WONG BAKER
WONGBAKER_NUMERICALRESPONSE: 0

## 2022-01-08 NOTE — OP NOTE
[address]               [address] [place], [address]                                OPERATIVE REPORT    PATIENT NAME: Felipe Cox                     :        1950  MED REC NO:   [id number]                          ROOM:       6528  ACCOUNT NO:   [de-identified]                           ADMIT DATE: 2021  PROVIDER:     Roberto Loza MD    DATE OF PROCEDURE:  2022    PREOPERATIVE DIAGNOSES:  NSTEMI with three-vessel coronary artery  disease; class III acute systolic and diastolic heart failure; bilateral  pleural effusions; chronic hypertension; preoperative anemia. POSTOPERATIVE DIAGNOSES:  NSTEMI with three-vessel coronary artery  disease; class III acute systolic and diastolic heart failure; bilateral  pleural effusions; chronic hypertension; preoperative anemia; acute  blood loss anemia; penile and scrotal cellulitis. PROCEDURES:  Coronary artery bypass grafting x3 - LIMA to mid LAD,  reverse aortic coronary saphenous vein graft sequentially to second  obtuse marginal and posterior descending; endoscopic saphenous vein  harvest from right thigh; transesophageal echocardiography; total  cardiopulmonary bypass. SURGEON:  Leonie Lockhart MD    ASSISTANT:  ALIE Reid (Beltran Sandra  presence was essential as  no qualified resident or staff surgeon was available for endoscopic  saphenous vein harvest or to assist with this complex case). ANESTHESIA:  General endotracheal.    INDICATIONS:  The patient was admitted via ambulance after collapsing in  a [employer]. On admission, he was found to have a non-ST elevation  myocardial infarction. Subsequent cardiac catheterization found to have  a dense three-vessel coronary artery disease. He was treated medically  and stabilized.   His ongoing heart failure was medically managed and  once he was medically settled, he is now brought to the operating room  on an expedited basis for surgical revascularization. FINDINGS AT SURGERY:  The patient's ejection fraction was around 30%  with inferior akinesis. His postop ejection fraction was a bit higher  and perhaps 35% with the inferior akinesis now showing signs of  functioning, but was still graeme less vigorously than the anterior  and lateral walls. There was hypokinesis of the inferior wall. OPERATIVE PROCEDURE:  After obtaining adequate general endotracheal  anesthesia and administration of appropriate preoperative prophylactic  antibiotics, the patient's anterior chest, abdomen, and legs were all  meticulously prepped and draped in a sterile manner. In the course of  prepping the patient, the patient's penis, scrotum, and perineal area  were found to be impressively erythematous. In deference to this, it  was intended that he will be placed also on gram-negative coverage  postoperatively to help ameliorate what looks like cellulitis of his  perineal anatomy. Once sterile prep and drape had been completed, the patient had a  standard timeout completed. A segment of saphenous vein was endoscopically harvested from the  patient's right thigh. The chest was entered via limited skin incision  median sternotomy and the left internal mammary artery was fully  mobilized. The patient was systemically heparinized. Thereafter, the  pericardium was incised and suspended. Routine cannulation of the  distal ascending aorta and right atrium was completed. Perfusion was  initiated once an adequate ACT was noted. Both antegrade and retrograde cardioplegia cannulae were placed. Distal  targets were identified. The aorta was then cross-clamped. Cardioplegia was delivered first through the aortic root and then  through the coronary sinus. This provided thorough myocardial cooling  and satisfactory arrest.  Subsequent doses were given at about 20-minute  intervals thereafter.     The heart was then appropriately positioned and the saphenous vein was  then grafted to the posterior descending in an end-to-side fashion and  then subsequently in a side-to-side fashion to the large second obtuse  marginal.  As each graft was placed, it was tested with heparinized  saline solution and flow through each was found to be superb. Thereafter, the left mammary artery was brought through a generous  lateral tunnel in the pericardium and grafted to the mid left anterior  descending. The heart was returned to its normal anatomic position and  the saphenous vein was then grafted to the proximal ascending aorta in  an end-to-side fashion. The ascending aorta was thoroughly and  meticulously deaired. The aortic cross-clamp was then released with  generous backbleeding out through the proximal anastomotic site to flush  out any possible microbubbles or debris. The entirety of the graft construct was then carefully inspected to make  sure that the lie of the graft was as desired and that good hemostasis  was achieved throughout. This being the case, the patient was then  weaned from cardiopulmonary bypass without difficulty and remained  hemodynamically stable thereafter. The postop ejection fraction was a bit better than the preoperative one. Of note is that the inferior akinesis was now showing some signs of  resolution with hypokinesis replacing it. Hopefully, with time, this  will improve further. Once off the cardiopulmonary bypass, the right heart was decannulated. The patient's heparin was then reversed with protamine sulfate. Once  all protamine had been given, the aortic cannula was removed and the  site secured and then oversewn with a 5-0 Prolene over-and-over suture. A single bipolar ventricular pacing wire was placed in the anterior  surface of the right ventricle. Two 36-Argentine mediastinal drainage  catheters were placed directing the angled tube in the partially opened  left pleural cavity. The pericardium was tacked together anteriorly  with a total of four simple 0 Vicryl sutures. This provided a complete  tissue coverage at the front of the heart. The straight chest tube was  placed anterior to this. Final inspection was made to ensure that hemostasis was pristine  throughout before reapproximating the sternum with multiple  figure-of-eight #6 stainless steel wire sutures. Subcutaneous tissues  and skin were then closed with running absorbable suture. A Prineo  dressing was applied. The Anesthesia staff then placed a pectoralis II  block. Once this was completed, the patient was then prepared for  transfer to the ICU for ongoing care.         Jose Armando Caruso MD    D: 01/07/2022 17:36:44       T: 01/07/2022 23:59:48     [email or web address]  Job#: [id number]     Doc#: [id number]    CC:  Soraya Gallegos Md

## 2022-01-08 NOTE — PROGRESS NOTES
Patient refusing PO Tylenol and potassium. Educated on importance of taking medication and patient still refused. Will continue to monitor and update MD as needed.

## 2022-01-08 NOTE — PLAN OF CARE
Problem: Falls - Risk of:  Goal: Will remain free from falls  Description: Will remain free from falls  1/7/2022 2130 by Gadiel Davila RN  Outcome: Ongoing  Note: Pt is a fall risk. Fall risk protocol in place. See Leory Seeds Fall Score. Pt bed is in low position, bed alarm is on, side rails up, fall risk bracelet applied. , non-skid footwear in use. Patient/family educated on fall risk protocol, instructed to call for assistance when needed and belongings are in reach. assistance. Will continue with hourly rounds for po intake, pain needs, toileting and repositioning as needed. Will continue to monitor for needs. Problem: Falls - Risk of:  Goal: Absence of physical injury  Description: Absence of physical injury  1/7/2022 2130 by Gadiel Davila RN  Outcome: Ongoing     Problem: Nutrition  Goal: Optimal nutrition therapy  1/7/2022 2130 by Gadiel Davila RN  Outcome: Ongoing     Problem: Skin Integrity:  Goal: Will show no infection signs and symptoms  Description: Will show no infection signs and symptoms  1/7/2022 2130 by Gadiel Davial RN  Outcome: Ongoing  Note: Pt at risk for skin breakdown. See Chad score. Pt remains on bedrest. Unable to reposition self in bed. Heels elevated off bed. Sacral heart mepilex intact to protect,  site inspected and intact underneath. Will continue to turn and reposition patient every two hours and as needed. Will continue to keep patient clean and dry, applying skin care cream as needed. Pillows used for repositioning q2hs. Will continue to monitor and assess for skin breakdown.      Problem: Skin Integrity:  Goal: Absence of new skin breakdown  Description: Absence of new skin breakdown  1/7/2022 2130 by Gadiel Davila RN  Outcome: Ongoing     Problem: Cardiac:  Goal: Ability to maintain an adequate cardiac output will improve  Description: Ability to maintain an adequate cardiac output will improve  1/7/2022 2130 by Gadiel Davila RN  Outcome: Ongoing     Problem: Cardiac:  Goal: Ability to achieve and maintain adequate cardiopulmonary perfusion will improve  Description: Ability to achieve and maintain adequate cardiopulmonary perfusion will improve  1/7/2022 2130 by Pavel De La Cruz RN  Outcome: Ongoing     Problem: Cardiac:  Goal: Hemodynamic stability will improve  Description: Hemodynamic stability will improve  1/7/2022 2130 by Pavel De La Cruz RN  Outcome: Ongoing     Problem: OXYGENATION/RESPIRATORY FUNCTION  Goal: Patient will maintain patent airway  1/7/2022 2130 by Pavel De La Cruz RN  Outcome: Ongoing     Problem: HEMODYNAMIC STATUS  Goal: Patient has stable vital signs and fluid balance  1/7/2022 2130 by Pavel De La Cruz RN  Outcome: Ongoing     Problem: FLUID AND ELECTROLYTE IMBALANCE  Goal: Fluid and electrolyte balance are achieved/maintained  1/7/2022 2130 by Pavel De La Cruz RN  Outcome: Ongoing     Problem: ACTIVITY INTOLERANCE/IMPAIRED MOBILITY  Goal: Mobility/activity is maintained at optimum level for patient  1/7/2022 2130 by Pavel De La Cruz RN  Outcome: Ongoing     Problem: Pain:  Goal: Pain level will decrease  Description: Pain level will decrease  1/7/2022 2130 by Pavel De La Cruz RN  Outcome: Ongoing  Note: Per protocol     Problem: Pain:  Goal: Control of acute pain  Description: Control of acute pain  1/7/2022 2130 by Pavel De La Cruz RN  Outcome: Ongoing     Problem: Pain:  Goal: Control of chronic pain  Description: Control of chronic pain  1/7/2022 2130 by Pavel De La Cruz RN  Outcome: Ongoing     Problem: Infection - Central Venous Catheter-Associated Bloodstream Infection:  Goal: Will show no infection signs and symptoms  Description: Will show no infection signs and symptoms  1/7/2022 2130 by Pavel De La Cruz RN  Outcome: Ongoing  Note: Pt at risk for skin breakdown. See Chad score. Pt remains on bedrest. Unable to reposition self in bed. Heels elevated off bed. Sacral heart mepilex intact to protect,  site inspected and intact underneath.   Will continue to turn and reposition patient every two hours and as needed. Will continue to keep patient clean and dry, applying skin care cream as needed. Pillows used for repositioning q2hs. Will continue to monitor and assess for skin breakdown.      Problem: Urinary Elimination:  Goal: Signs and symptoms of infection will decrease  Description: Signs and symptoms of infection will decrease  1/7/2022 2130 by Dagoberto Casillas RN  Outcome: Ongoing     Problem: Urinary Elimination:  Goal: Complications related to the disease process, condition or treatment will be avoided or minimized  Description: Complications related to the disease process, condition or treatment will be avoided or minimized  1/7/2022 2130 by Dagoberto Casillas RN  Outcome: Ongoing     Problem: Urinary Elimination:  Goal: Complications related to the disease process, condition or treatment will be avoided or minimized  Description: Complications related to the disease process, condition or treatment will be avoided or minimized  1/7/2022 2130 by Dagoberto Casillas RN  Outcome: Ongoing

## 2022-01-08 NOTE — PROGRESS NOTES
[facility]   Cardiology  Note   Dr Smita Alicea MD, Mary Baez RN, FNP APRN CVNP  Date: 1/8/2022  Admit Date: 12/24/2021       CC:/ NSTEMI / GI bleed   Cardiology following with  syncope  / severe anemia  / COPD /severe CAD / ICM   Urology consulted for penile/scrotal cellulitis  1/3/2022 CABG x 3, LIMA to LAD, SVG to OM2 and PDA  pre-op EF ~30% w/ inferior akinesis; post op EF ~35% w/ inferior dyskinetic hypokinesis    Interval Hx /  Subjective:he is up in chair /  b/p low side / in NSR   suture line dry and approx. no major events overnight. dc chest tube today per CTS   Cardiac meds amiodarone asa lipitor plavix lasix mag ox  lopressor   No additional HF meds at this time due to low b/p   Planning ARU soon       12/28/2021 [facility]   Left ventricular pressure 11 mmHg  Aortic pressure 95/47 mmHg  Coronary anatomy:   The left main coronary artery is heavily calcified.  Has severe ostial narrowing at least 90 to 95%.  Our catheter did ventricularized upon engagement. Left anterior descending artery is mildly stenotic proximally.  There is good mid and distal anatomy with good landing zones. Circumflex artery has proximal calcification and moderate lesions. The right coronary artery is 100% occluded after its takeoff.  I do not identify any left to right or right to right collateralization. Left ventriculogram shows ejection fraction of 25 to 30% %.  Moderate global hypokinesia. Patient seen and examined. Clinical notes reviewed.  Telemetry reviewed / testing reviewed  30 minutes   Pertinent labs, diagnostic, device, and imaging results reviewed as a part of this visit  EKG TTE stress test: any LHC/RHC reviewed     Past Medical History:  Past Medical History:   Diagnosis Date    BPH with obstruction/lower urinary tract symptoms 8/3/2012    Chicken pox     Colon polyp     Hyperglycemia 12/15/2010    Hyperlipemia 12/15/2010    Post herpetic neuralgia     Shingles     Squamous cell carcinoma of skin of upper limb, including shoulder 9/8/2015         metoprolol tartrate  6.25 mg Oral BID    potassium chloride  20 mEq Oral TID WC    furosemide  20 mg IntraVENous Q6H    calcium elemental  500 mg Oral BID    amiodarone  400 mg Oral BID    Followed by   Durga Servin ON 1/9/2022] amiodarone  200 mg Oral Daily    sodium chloride flush  10 mL IntraVENous 2 times per day    aspirin  325 mg Oral Daily    clopidogrel  75 mg Oral Daily    chlorhexidine  15 mL Mouth/Throat BID    magnesium oxide  400 mg Oral BID    polyethylene glycol  17 g Oral Daily    atorvastatin  40 mg Oral Nightly    pantoprazole  40 mg Oral BID AC    insulin lispro  0-12 Units SubCUTAneous 4x Daily AC & HS    acetaminophen  1,000 mg Oral Q6H    gabapentin  300 mg Oral TID    trimethoprim-polymyxin b  1 drop Both Eyes 6 times per day    Venelex   Topical BID    sodium chloride flush  5-40 mL IntraVENous 2 times per day    multivitamin  1 tablet Oral Daily    influenza virus vaccine  0.5 mL IntraMUSCular Prior to discharge    Arformoterol Tartrate  15 mcg Nebulization BID       Vitals:    01/08/22 1124   BP:    Pulse:    Resp: 18   Temp:    SpO2: 96%      In: 1340 [P.O.:1320; I.V.:20]  Out: 2545    Wt Readings from Last 3 Encounters:   01/07/22 168 lb 14 oz (76.6 kg)   07/12/18 179 lb (81.2 kg)   05/31/18 183 lb (83 kg)       Intake/Output Summary (Last 24 hours) at 1/8/2022 1148  Last data filed at 1/8/2022 0800  Gross per 24 hour   Intake 1340 ml   Output 2215 ml   Net -875 ml       Telemetry: Personally Reviewed    · Constitutional: Cooperative and in no apparent distress, and appears well nourished  · Skin: Warm and pink; no pallor, cyanosis, clubbing, or bruising   · HEENT: Symmetric and normocephalic. · Cardiovascular: Regular rate and rhythm. S1/S2   · Respiratory: Respirations symmetric and unlabored.  Lungs clear to auscultation bilaterally, no wheezing, crackles, or rhonchi  · Gastrointestinal: Abdomen soft and round. Bowel sounds normoactive without tenderness or masses. · Musculoskeletal: Bilateral upper and lower extremity strength 5/5 with full ROM  · Neurologic/Psych: Awake and orientated to person, place and time. Calm affect, appropriate mood    Patient Active Problem List    Diagnosis Date Noted    Non-ST elevated myocardial infarction (non-STEMI) ([facility] [address] 12/31/2021    Syncope and collapse     Anemia 12/24/2021    Visit for wound check 06/06/2018    Basal cell carcinoma of right side of nose 05/08/2018    Visit for suture removal 05/08/2018    SCCS, mod diff  (squamous cell carcinoma), hand, left 04/24/2018    Mixed hyperlipidemia 04/18/2017    Nicotine use disorder 02/03/2017    COPD, mild ([facility] [address] 06/23/2016    Colon polyp     Basal cell carcinoma of shoulder 09/08/2015    Squamous cell carcinoma of skin of upper limb, including shoulder 09/08/2015    Actinic keratosis 09/08/2015    BPH with obstruction/lower urinary tract symptoms 08/03/2012    Hyperglycemia 12/15/2010    Smoker's respiratory syndrome 12/15/2010        Assessment     syncope / with severe anemia  GI workup completed     trop elevation with severe anemia / NSTEMI Type II suspected    LHC with severe CAD     1/3/2022 CABG x 3, LIMA to LAD, SVG to OM2 and PDA  pre-op EF ~30% w/ inferior akinesis; post op EF ~35% w/ inferior dyskinetic hypokinesis    ICM   2/28/2021 TTE   Normal left ventricle size and wall thickness. Overall left ventricular systolic function appears reduced with an ejectionction of 25-30%. There is hypokinesis of the basal and mid inferior walls. There is hypokinesis of the mid and basal inferolateral walls. There is hypokinesis of the mid and basal inferoseptal walls. Diastolic filling parameters suggest grade I diastolic dysfunction.  Mild mitral and tricuspid regurgitation  estimated pulmonary artery systolic pressure is 31 mmHg assuming a right  atrial pressure of 3 mmHg    NSVT  / now NSR with PVCS  On amiodarone po     COPD  Stable   Inhalers     HLD  LDL 54 optimal      UTI  abx per IM      Plan:   POD#5 CABG x 3, LIMA to LAD, SVG to OM2 and PDA  Cardiac meds amiodarone asa lipitor plavix lasix mag ox lopressor   consider ace/arb/arni when ok with CTS  & when b/p allows   SGLT2 in future as tolerated   Limited TTE before discharge  /  may need life vest before discharge    Planning ARU soon     Thank you for allowing to us to participate in the care of Ramses Robles.   Yury Wheeler APRN-CNP-CVNP

## 2022-01-08 NOTE — PROGRESS NOTES
Chest Tube Removal Documentation    _____ Order is written to remove chest tube  __X___ Criteria is met per Clinical Pathway to remove    No air leak is noted and crepitus is absent. Instructed patient on the procedure. Premedicated patient with gabapentin    Wound is clean, dry and intact. Cleansed with chloroprep. Chest tube:   Mediastinal x 1 Pleural x 1  Removed without difficulty and vaseline gauze applied. Purse string sutures secured and dry sterile dressing applied. Bilateral breath sounds are audible. SpO2 is 99%  Patient tolerated procedure well.       Esperanza Barnes RN  1/8/2022  4:55 PM

## 2022-01-08 NOTE — PROGRESS NOTES
Progress Note  Hospital Day: 16  POD#  4  S/P Coronary artery bypass x 3 (1/3/2022)    Chief Complaint: Postop follow up     [facility] has no complaints of incisional pain    24 hour Interval History:    Requiring levo for BP support overnight. Stable in SR. Somewhat agitated with nursing staff and resistant to instruction at times.  Up in chair, on minimal dose of Levo. Remains in SR.    Up in chair, more talkative today. Remains in SR. Changed Lasix to 20 mg q6h   up with the chair oriented no major complaint    Today's plan: chest tube back to suction. Bowel med. Decrease BB 6.25 mg bid if BP tolerates    VITAL SIGNS   Temperature:  Current - Temp: 98.3 °F (36.8 °C);  Max - Temp  Av.4 °F (36.9 °C)  Min: 98.1 °F (36.7 °C)  Max: 98.9 °F (37.2 °C)    Respiratory Rate : Resp  Av  Min: 15  Max: 23    Pulse Range: Pulse  Av.2  Min: 67  Max: 75    Blood Pressure Range:  Systolic (75VMU), LEV:10 , Min:79 , SVP:103   ; Diastolic (45YBN), TCE:92, Min:50, Max:64    Pulse ox Range: SpO2  Av.3 %  Min: 93 %  Max: 100 %  O2 Flow Rate (L/min): 1 L/min    Admission Weight: Weight: 168 lb 10.4 oz (76.5 kg)  Up 4 lbs from preop  Current Weight:   Patient Vitals for the past 96 hrs (Last 3 readings):   Weight   22 0645 168 lb 14 oz (76.6 kg)   22 0600 167 lb 12.3 oz (76.1 kg)   22 0645 175 lb 0.7 oz (79.4 kg)     I/O:     Intake/Output Summary (Last 24 hours) at 2022 0815  Last data filed at 2022 0400  Gross per 24 hour   Intake 1100 ml   Output 2430 ml   Net -1330 ml     Urine (hernandez): 355-470-650 ml/shift    DO NOT REMOVE  Chest tube: 60 ml/shift  serosanguinous, on waterseal, + leak    MEDICATIONS:      metoprolol tartrate  6.25 mg Oral BID    potassium chloride  20 mEq Oral TID WC    furosemide  20 mg IntraVENous Q6H    calcium elemental  500 mg Oral BID    amiodarone  400 mg Oral BID    Followed by   Jolanta Aldridge ON 2022] amiodarone  200 mg Oral Daily  sodium chloride flush  10 mL IntraVENous 2 times per day    aspirin  325 mg Oral Daily    clopidogrel  75 mg Oral Daily    chlorhexidine  15 mL Mouth/Throat BID    magnesium oxide  400 mg Oral BID    polyethylene glycol  17 g Oral Daily    atorvastatin  40 mg Oral Nightly    pantoprazole  40 mg Oral BID AC    insulin lispro  0-12 Units SubCUTAneous 4x Daily AC & HS    acetaminophen  1,000 mg Oral Q6H    gabapentin  300 mg Oral TID    trimethoprim-polymyxin b  1 drop Both Eyes 6 times per day    Venelex   Topical BID    sodium chloride flush  5-40 mL IntraVENous 2 times per day    multivitamin  1 tablet Oral Daily    influenza virus vaccine  0.5 mL IntraMUSCular Prior to discharge    Arformoterol Tartrate  15 mcg Nebulization BID     DATA REVIEW:   CBC:   Recent Labs     01/06/22  0339 01/07/22  0635 01/08/22  0752   WBC 8.2 6.0 6.1   HGB 8.8* 8.2* 8.7*   HCT 26.8* 24.7* 26.6*   MCV 84.5 84.9 84.1    193 246     BMP:   Recent Labs     01/06/22  0339 01/07/22  0635 01/08/22  0534    137 136   K 4.4 4.8 4.7    104 104   CO2 26 27 27   GLUCOSE 109* 103* 96   BUN 31* 38* 33*   CREATININE 0.9 1.0 0.9   CALCIUM 7.8* 7.9* 8.0*   MG 2.20 2.30 2.20     Accucheck Glucoses:   Recent Labs     01/06/22  2122 01/07/22  0747 01/07/22  1157 01/07/22  1629 01/07/22  2107   POCGLU 121* 110* 152* 115* 148*     ABG:    Lab Results   Component Value Date    PHART 7.395 01/04/2022    PO2ART 91.2 01/04/2022    WNO1VKT 29.8 01/04/2022    Z6PAOBRJ 97 01/04/2022    RTO1NSW 19 01/04/2022    OLC6TSG 18.2 01/04/2022    BEART -7 01/04/2022    BEART -6 01/04/2022    BEART -5 01/04/2022    BEART -4 01/03/2022    BEART -1 01/03/2022         ECHO (Limited) 1/7/2022: results pending    ASSESSMENT:   Patient Active Problem List:     Hyperglycemia     Smoker's respiratory syndrome     BPH with obstruction/lower urinary tract symptoms     Basal cell carcinoma of shoulder     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     SCCS, mod diff  (squamous cell carcinoma), hand, left     Basal cell carcinoma of right side of nose     Visit for suture removal     Visit for wound check     Anemia     Syncope and collapse     Non-ST elevated myocardial infarction (non-STEMI) ([facility] [address]      Neuro: awake, alert and oriented x 3  CV:   Sinus  Pulm:  normal work of breathing, diminished, congested cough slightly improved  Abd:  soft, non-tender; bowel sounds normal; passing flatus, no BM  Diamond: slightly cloudy yellow  Wounds: clean, dry and intact  Ext: warm, + edema    PLAN:   DC chest tube  Out of bed as tolerated keep Diamond catheter  Call acute rehab for possible transfer and discharge tomorrow  Discussed with the nurse taking care of the patient

## 2022-01-09 LAB
ANION GAP SERPL CALCULATED.3IONS-SCNC: 6 MMOL/L (ref 3–16)
BUN BLDV-MCNC: 30 MG/DL (ref 7–20)
CALCIUM SERPL-MCNC: 8.2 MG/DL (ref 8.3–10.6)
CHLORIDE BLD-SCNC: 102 MMOL/L (ref 99–110)
CO2: 30 MMOL/L (ref 21–32)
CREAT SERPL-MCNC: 0.8 MG/DL (ref 0.8–1.3)
GFR AFRICAN AMERICAN: >60
GFR NON-AFRICAN AMERICAN: >60
GLUCOSE BLD-MCNC: 112 MG/DL (ref 70–99)
GLUCOSE BLD-MCNC: 117 MG/DL (ref 70–99)
GLUCOSE BLD-MCNC: 119 MG/DL (ref 70–99)
GLUCOSE BLD-MCNC: 124 MG/DL (ref 70–99)
GLUCOSE BLD-MCNC: 131 MG/DL (ref 70–99)
HCT VFR BLD CALC: 26.9 % (ref 40.5–52.5)
HEMOGLOBIN: 8.8 G/DL (ref 13.5–17.5)
MAGNESIUM: 2 MG/DL (ref 1.8–2.4)
MCH RBC QN AUTO: 27.3 PG (ref 26–34)
MCHC RBC AUTO-ENTMCNC: 32.5 G/DL (ref 31–36)
MCV RBC AUTO: 84 FL (ref 80–100)
PDW BLD-RTO: 20.8 % (ref 12.4–15.4)
PERFORMED ON: ABNORMAL
PLATELET # BLD: 264 K/UL (ref 135–450)
PMV BLD AUTO: 8.4 FL (ref 5–10.5)
POTASSIUM SERPL-SCNC: 4.6 MMOL/L (ref 3.5–5.1)
RBC # BLD: 3.21 M/UL (ref 4.2–5.9)
SODIUM BLD-SCNC: 138 MMOL/L (ref 136–145)
WBC # BLD: 6.9 K/UL (ref 4–11)

## 2022-01-09 PROCEDURE — 36415 COLL VENOUS BLD VENIPUNCTURE: CPT

## 2022-01-09 PROCEDURE — 2000000000 HC ICU R&B

## 2022-01-09 PROCEDURE — 80048 BASIC METABOLIC PNL TOTAL CA: CPT

## 2022-01-09 PROCEDURE — 94761 N-INVAS EAR/PLS OXIMETRY MLT: CPT

## 2022-01-09 PROCEDURE — 6360000002 HC RX W HCPCS: Performed by: THORACIC SURGERY (CARDIOTHORACIC VASCULAR SURGERY)

## 2022-01-09 PROCEDURE — 99233 SBSQ HOSP IP/OBS HIGH 50: CPT | Performed by: NURSE PRACTITIONER

## 2022-01-09 PROCEDURE — 6370000000 HC RX 637 (ALT 250 FOR IP): Performed by: CLINICAL NURSE SPECIALIST

## 2022-01-09 PROCEDURE — 2580000003 HC RX 258: Performed by: THORACIC SURGERY (CARDIOTHORACIC VASCULAR SURGERY)

## 2022-01-09 PROCEDURE — 85027 COMPLETE CBC AUTOMATED: CPT

## 2022-01-09 PROCEDURE — 6360000002 HC RX W HCPCS: Performed by: CLINICAL NURSE SPECIALIST

## 2022-01-09 PROCEDURE — 94640 AIRWAY INHALATION TREATMENT: CPT

## 2022-01-09 PROCEDURE — 36592 COLLECT BLOOD FROM PICC: CPT

## 2022-01-09 PROCEDURE — 94669 MECHANICAL CHEST WALL OSCILL: CPT

## 2022-01-09 PROCEDURE — 99024 POSTOP FOLLOW-UP VISIT: CPT | Performed by: THORACIC SURGERY (CARDIOTHORACIC VASCULAR SURGERY)

## 2022-01-09 PROCEDURE — 6370000000 HC RX 637 (ALT 250 FOR IP): Performed by: THORACIC SURGERY (CARDIOTHORACIC VASCULAR SURGERY)

## 2022-01-09 PROCEDURE — 6370000000 HC RX 637 (ALT 250 FOR IP): Performed by: NURSE PRACTITIONER

## 2022-01-09 PROCEDURE — 83735 ASSAY OF MAGNESIUM: CPT

## 2022-01-09 RX ORDER — GABAPENTIN 300 MG/1
300 CAPSULE ORAL 3 TIMES DAILY
Qty: 90 CAPSULE | Refills: 3 | Status: ON HOLD | OUTPATIENT
Start: 2022-01-09 | End: 2022-02-07 | Stop reason: HOSPADM

## 2022-01-09 RX ORDER — FUROSEMIDE 20 MG/1
20 TABLET ORAL DAILY
Qty: 60 TABLET | Refills: 3 | Status: ON HOLD | OUTPATIENT
Start: 2022-01-09 | End: 2022-02-07 | Stop reason: HOSPADM

## 2022-01-09 RX ORDER — POTASSIUM CHLORIDE 20 MEQ/1
20 TABLET, EXTENDED RELEASE ORAL
Qty: 60 TABLET | Refills: 3 | Status: SHIPPED | OUTPATIENT
Start: 2022-01-09 | End: 2022-01-10

## 2022-01-09 RX ORDER — MULTIVITAMIN WITH IRON
1 TABLET ORAL DAILY
Qty: 30 TABLET | Refills: 0 | Status: ON HOLD | OUTPATIENT
Start: 2022-01-10 | End: 2022-02-07 | Stop reason: HOSPADM

## 2022-01-09 RX ORDER — AMIODARONE HYDROCHLORIDE 200 MG/1
200 TABLET ORAL DAILY
Qty: 19 TABLET | Refills: 0 | Status: SHIPPED | OUTPATIENT
Start: 2022-01-10 | End: 2022-01-10

## 2022-01-09 RX ORDER — POLYETHYLENE GLYCOL 3350 17 G/17G
17 POWDER, FOR SOLUTION ORAL DAILY PRN
Qty: 527 G | Refills: 1 | Status: ON HOLD | OUTPATIENT
Start: 2022-01-09 | End: 2022-02-07 | Stop reason: HOSPADM

## 2022-01-09 RX ORDER — LANOLIN ALCOHOL/MO/W.PET/CERES
400 CREAM (GRAM) TOPICAL 2 TIMES DAILY
Qty: 30 TABLET | Refills: 0 | Status: SHIPPED | OUTPATIENT
Start: 2022-01-09 | End: 2022-01-10 | Stop reason: HOSPADM

## 2022-01-09 RX ORDER — PANTOPRAZOLE SODIUM 40 MG/1
40 TABLET, DELAYED RELEASE ORAL
Qty: 30 TABLET | Refills: 3 | Status: ON HOLD | OUTPATIENT
Start: 2022-01-09 | End: 2022-02-07 | Stop reason: HOSPADM

## 2022-01-09 RX ORDER — ATORVASTATIN CALCIUM 40 MG/1
40 TABLET, FILM COATED ORAL NIGHTLY
Qty: 30 TABLET | Refills: 3 | Status: ON HOLD | OUTPATIENT
Start: 2022-01-09 | End: 2022-02-07 | Stop reason: HOSPADM

## 2022-01-09 RX ORDER — CALCIUM CARBONATE 500(1250)
500 TABLET ORAL 2 TIMES DAILY
Qty: 30 TABLET | Refills: 3 | Status: SHIPPED | OUTPATIENT
Start: 2022-01-09 | End: 2022-01-10 | Stop reason: HOSPADM

## 2022-01-09 RX ORDER — CLOPIDOGREL BISULFATE 75 MG/1
75 TABLET ORAL DAILY
Qty: 30 TABLET | Refills: 3 | Status: ON HOLD | OUTPATIENT
Start: 2022-01-10 | End: 2022-02-07 | Stop reason: HOSPADM

## 2022-01-09 RX ORDER — LANOLIN ALCOHOL/MO/W.PET/CERES
3 CREAM (GRAM) TOPICAL NIGHTLY PRN
Qty: 30 TABLET | Refills: 3 | Status: ON HOLD | OUTPATIENT
Start: 2022-01-09 | End: 2022-02-07 | Stop reason: HOSPADM

## 2022-01-09 RX ADMIN — FUROSEMIDE 20 MG: 10 INJECTION, SOLUTION INTRAMUSCULAR; INTRAVENOUS at 08:25

## 2022-01-09 RX ADMIN — ARFORMOTEROL TARTRATE 15 MCG: 15 SOLUTION RESPIRATORY (INHALATION) at 12:13

## 2022-01-09 RX ADMIN — ACETAMINOPHEN 1000 MG: 500 TABLET ORAL at 04:21

## 2022-01-09 RX ADMIN — SODIUM CHLORIDE, PRESERVATIVE FREE 10 ML: 5 INJECTION INTRAVENOUS at 08:29

## 2022-01-09 RX ADMIN — POTASSIUM CHLORIDE 20 MEQ: 20 TABLET, EXTENDED RELEASE ORAL at 16:16

## 2022-01-09 RX ADMIN — Medication: at 08:27

## 2022-01-09 RX ADMIN — ARFORMOTEROL TARTRATE 15 MCG: 15 SOLUTION RESPIRATORY (INHALATION) at 19:37

## 2022-01-09 RX ADMIN — POLYMYXIN B SULFATE, TRIMETHOPRIM SULFATE 1 DROP: 10000; 1 SOLUTION/ DROPS OPHTHALMIC at 08:28

## 2022-01-09 RX ADMIN — POTASSIUM CHLORIDE 20 MEQ: 20 TABLET, EXTENDED RELEASE ORAL at 11:59

## 2022-01-09 RX ADMIN — METOPROLOL 6.25 MG: 25 TABLET ORAL at 22:12

## 2022-01-09 RX ADMIN — THERA TABS 1 TABLET: TAB at 08:25

## 2022-01-09 RX ADMIN — ASPIRIN 325 MG: 325 TABLET, COATED ORAL at 08:25

## 2022-01-09 RX ADMIN — ATORVASTATIN CALCIUM 40 MG: 40 TABLET, FILM COATED ORAL at 21:13

## 2022-01-09 RX ADMIN — Medication 400 MG: at 08:25

## 2022-01-09 RX ADMIN — POLYMYXIN B SULFATE, TRIMETHOPRIM SULFATE 1 DROP: 10000; 1 SOLUTION/ DROPS OPHTHALMIC at 16:00

## 2022-01-09 RX ADMIN — GABAPENTIN 300 MG: 300 CAPSULE ORAL at 08:25

## 2022-01-09 RX ADMIN — POTASSIUM CHLORIDE 20 MEQ: 20 TABLET, EXTENDED RELEASE ORAL at 08:26

## 2022-01-09 RX ADMIN — POLYMYXIN B SULFATE, TRIMETHOPRIM SULFATE 1 DROP: 10000; 1 SOLUTION/ DROPS OPHTHALMIC at 01:21

## 2022-01-09 RX ADMIN — POLYETHYLENE GLYCOL 3350 17 G: 17 POWDER, FOR SOLUTION ORAL at 08:26

## 2022-01-09 RX ADMIN — FUROSEMIDE 20 MG: 10 INJECTION, SOLUTION INTRAMUSCULAR; INTRAVENOUS at 04:21

## 2022-01-09 RX ADMIN — CALCIUM 500 MG: 500 TABLET ORAL at 21:13

## 2022-01-09 RX ADMIN — POLYMYXIN B SULFATE, TRIMETHOPRIM SULFATE 1 DROP: 10000; 1 SOLUTION/ DROPS OPHTHALMIC at 21:13

## 2022-01-09 RX ADMIN — ACETAMINOPHEN 1000 MG: 500 TABLET ORAL at 11:57

## 2022-01-09 RX ADMIN — POLYMYXIN B SULFATE, TRIMETHOPRIM SULFATE 1 DROP: 10000; 1 SOLUTION/ DROPS OPHTHALMIC at 11:58

## 2022-01-09 RX ADMIN — PANTOPRAZOLE SODIUM 40 MG: 40 TABLET, DELAYED RELEASE ORAL at 08:25

## 2022-01-09 RX ADMIN — CALCIUM 500 MG: 500 TABLET ORAL at 08:25

## 2022-01-09 RX ADMIN — CLOPIDOGREL BISULFATE 75 MG: 75 TABLET ORAL at 08:25

## 2022-01-09 RX ADMIN — Medication: at 21:16

## 2022-01-09 RX ADMIN — AMIODARONE HYDROCHLORIDE 200 MG: 200 TABLET ORAL at 08:25

## 2022-01-09 RX ADMIN — PANTOPRAZOLE SODIUM 40 MG: 40 TABLET, DELAYED RELEASE ORAL at 16:16

## 2022-01-09 RX ADMIN — GABAPENTIN 300 MG: 300 CAPSULE ORAL at 21:13

## 2022-01-09 RX ADMIN — Medication 400 MG: at 21:13

## 2022-01-09 RX ADMIN — FUROSEMIDE 20 MG: 10 INJECTION, SOLUTION INTRAMUSCULAR; INTRAVENOUS at 21:13

## 2022-01-09 RX ADMIN — SODIUM CHLORIDE, PRESERVATIVE FREE 10 ML: 5 INJECTION INTRAVENOUS at 21:13

## 2022-01-09 RX ADMIN — POLYMYXIN B SULFATE, TRIMETHOPRIM SULFATE 1 DROP: 10000; 1 SOLUTION/ DROPS OPHTHALMIC at 04:22

## 2022-01-09 ASSESSMENT — PAIN SCALES - WONG BAKER
WONGBAKER_NUMERICALRESPONSE: 0

## 2022-01-09 ASSESSMENT — PAIN SCALES - GENERAL
PAINLEVEL_OUTOF10: 5
PAINLEVEL_OUTOF10: 0
PAINLEVEL_OUTOF10: 0
PAINLEVEL_OUTOF10: 4
PAINLEVEL_OUTOF10: 0
PAINLEVEL_OUTOF10: 4
PAINLEVEL_OUTOF10: 0
PAINLEVEL_OUTOF10: 4

## 2022-01-09 ASSESSMENT — PAIN DESCRIPTION - PROGRESSION
CLINICAL_PROGRESSION: NOT CHANGED

## 2022-01-09 ASSESSMENT — PAIN DESCRIPTION - ONSET: ONSET: GRADUAL

## 2022-01-09 ASSESSMENT — PAIN DESCRIPTION - FREQUENCY: FREQUENCY: CONTINUOUS

## 2022-01-09 ASSESSMENT — PAIN DESCRIPTION - PAIN TYPE
TYPE: CHRONIC PAIN
TYPE: CHRONIC PAIN

## 2022-01-09 ASSESSMENT — PAIN DESCRIPTION - LOCATION: LOCATION: BUTTOCKS

## 2022-01-09 ASSESSMENT — PAIN - FUNCTIONAL ASSESSMENT: PAIN_FUNCTIONAL_ASSESSMENT: PREVENTS OR INTERFERES SOME ACTIVE ACTIVITIES AND ADLS

## 2022-01-09 ASSESSMENT — PAIN DESCRIPTION - DESCRIPTORS: DESCRIPTORS: SORE

## 2022-01-09 NOTE — PROGRESS NOTES
Patient night unremarkable. Patient is still A&O 2-3 with bouts or brief periods of confusion prior to re-orientation. Patient still weak and requires steady with 2 man assist. Patient sleeps most of the night when uninterrupted. Will continue to monitor.

## 2022-01-09 NOTE — CARE COORDINATION
Case Management Assessment           Daily Note                 Date/ Time of Note: 1/9/2022 9:43 AM         Note completed by: Yamel Mullins RN    Patient Name: Avani Law  YOB: 1950    Cherelle Woodward [D64.9]  NSTEMI (non-ST elevated myocardial infarction) ([facility] [address] [I21.4]  Syncope, unspecified syncope type [R55]  Anemia, unspecified type [D64.9]  Patient Admission Status: Inpatient    Date of Admission:12/24/2021  4:36 PM Length of Stay: 16 GLOS: GMLOS: 11.4    Current Plan of Care: CABG POD #6, pending placement  ________________________________________________________________________________________  PT AM-PAC: 7 / 24 per last evaluation on: 01/06    OT AM-PAC: 9 / 24 per last evaluation on: 01/06    DME Needs for discharge: tbd  ________________________________________________________________________________________  Discharge Plan: SNF: [facility] [address] (family [facility])[place]    Tentative discharge date: 01/09 vs 01/10    Current barriers to discharge: Placement    Referrals completed: SNF: [facility] [address], [place]    Resources/ information provided: SNF List  ________________________________________________________________________________________  Case Management Notes:CM met with sister and patient at bedside. Patient will not be able to return home, patient is agreeable to SNF. Referrals sent to [facility] [address] [facility] (family preference), [address] and [place]. Awaiting response. Tea Leo and his family were provided with choice of provider; he and his family are in agreement with the discharge plan.     Care Transition Patient: Jamia Mullins, PRIYANKA  [facility]  Case Management Department  Ph: [phone]

## 2022-01-09 NOTE — PROGRESS NOTES
Progress Note  Hospital Day: 16  POD#  4  S/P Coronary artery bypass x 3 (1/3/2022)    Chief Complaint: Postop follow up    Mr. Tammy Esposito has no complaints of incisional pain    24 hour Interval History:    Requiring levo for BP support overnight. Stable in SR. Somewhat agitated with nursing staff and resistant to instruction at times.  Up in chair, on minimal dose of Levo. Remains in SR.    Up in chair, more talkative today. Remains in SR. Changed Lasix to 20 mg q6h   up with the chair oriented no major complaint    Today's plan: chest tube back to suction. Bowel med. Decrease BB 6.25 mg bid if BP tolerates    VITAL SIGNS   Temperature:  Current - Temp: 97.7 °F (36.5 °C);  Max - Temp  Av °F (36.7 °C)  Min: 97.7 °F (36.5 °C)  Max: 98.3 °F (36.8 °C)    Respiratory Rate : Resp  Av.3  Min: 14  Max: 23    Pulse Range: Pulse  Av.9  Min: 68  Max: 72    Blood Pressure Range:  Systolic (40YLW), :00 , Min:81 , MDF:154   ; Diastolic (16LLT), JKA:03, Min:51, Max:62    Pulse ox Range: SpO2  Av.8 %  Min: 85 %  Max: 100 %  O2 Flow Rate (L/min): 1 L/min    Admission Weight: Weight: 168 lb 10.4 oz (76.5 kg)  Up 4 lbs from preop  Current Weight:   Patient Vitals for the past 96 hrs (Last 3 readings):   Weight   22 0556 167 lb 12.3 oz (76.1 kg)   22 0645 168 lb 14 oz (76.6 kg)   22 0600 167 lb 12.3 oz (76.1 kg)     I/O:     Intake/Output Summary (Last 24 hours) at 2022 0907  Last data filed at 2022 0600  Gross per 24 hour   Intake 600 ml   Output 1810 ml   Net -1210 ml     Urine (hernandez): 355-470-650 ml/shift    DO NOT REMOVE  Chest tube: 60 ml/shift  serosanguinous, on waterseal, + leak    MEDICATIONS:      metoprolol tartrate  6.25 mg Oral BID    potassium chloride  20 mEq Oral TID WC    furosemide  20 mg IntraVENous Q6H    calcium elemental  500 mg Oral BID    amiodarone  200 mg Oral Daily    sodium chloride flush  10 mL IntraVENous 2 times per day    aspirin 325 mg Oral Daily    clopidogrel  75 mg Oral Daily    chlorhexidine  15 mL Mouth/Throat BID    magnesium oxide  400 mg Oral BID    polyethylene glycol  17 g Oral Daily    atorvastatin  40 mg Oral Nightly    pantoprazole  40 mg Oral BID AC    insulin lispro  0-12 Units SubCUTAneous 4x Daily AC & HS    acetaminophen  1,000 mg Oral Q6H    gabapentin  300 mg Oral TID    trimethoprim-polymyxin b  1 drop Both Eyes 6 times per day    Venelex   Topical BID    sodium chloride flush  5-40 mL IntraVENous 2 times per day    multivitamin  1 tablet Oral Daily    influenza virus vaccine  0.5 mL IntraMUSCular Prior to discharge    Arformoterol Tartrate  15 mcg Nebulization BID     DATA REVIEW:   CBC:   Recent Labs     01/07/22  0635 01/08/22  0752 01/09/22  0445   WBC 6.0 6.1 6.9   HGB 8.2* 8.7* 8.8*   HCT 24.7* 26.6* 26.9*   MCV 84.9 84.1 84.0    246 264     BMP:   Recent Labs     01/07/22  0635 01/08/22  0534 01/09/22  0445    136 138   K 4.8 4.7 4.6    104 102   CO2 27 27 30   GLUCOSE 103* 96 112*   BUN 38* 33* 30*   CREATININE 1.0 0.9 0.8   CALCIUM 7.9* 8.0* 8.2*   MG 2.30 2.20 2.00     Accucheck Glucoses:   Recent Labs     01/07/22  2107 01/08/22  1213 01/08/22  1647 01/08/22  2105 01/09/22  0756   POCGLU 148* 108* 125* 158* 119*     ABG:    Lab Results   Component Value Date    PHART 7.395 01/04/2022    PO2ART 91.2 01/04/2022    JNZ6CWQ 29.8 01/04/2022    L2XIROEQ 97 01/04/2022    MRT8SWQ 19 01/04/2022    WDD1GMK 18.2 01/04/2022    BEART -7 01/04/2022    BEART -6 01/04/2022    BEART -5 01/04/2022    BEART -4 01/03/2022    BEART -1 01/03/2022         ECHO (Limited) 1/7/2022: results pending    ASSESSMENT:   Patient Active Problem List:     Hyperglycemia     Smoker's respiratory syndrome     BPH with obstruction/lower urinary tract symptoms     Basal cell carcinoma of shoulder     Squamous cell carcinoma of skin of upper limb, including shoulder     Actinic keratosis     Colon polyp     COPD, mild (HCC) Nicotine use disorder     Mixed hyperlipidemia     SCCS, mod diff  (squamous cell carcinoma), hand, left     Basal cell carcinoma of right side of nose     Visit for suture removal     Visit for wound check     Anemia     Syncope and collapse     Non-ST elevated myocardial infarction (non-STEMI) (HCC)      Neuro: awake, alert and oriented x 3  CV:   Sinus  Pulm:  normal work of breathing, diminished, congested cough slightly improved  Abd:  soft, non-tender; bowel sounds normal; passing flatus, no BM  Diamond: slightly cloudy yellow  Wounds: clean, dry and intact  Ext: warm, + edema    PLAN:   DC to acute rehab

## 2022-01-09 NOTE — PROGRESS NOTES
[address]   Cardiology  Note   Dr Brody Hammonds MD, Minnie Rivas RN, FNP APRN CVNP  Date: 1/9/2022  Admit Date: 12/24/2021       CC:/ NSTEMI / GI bleed   Cardiology following with  syncope  / severe anemia  / COPD /severe CAD / ICM   Urology consulted for penile/scrotal cellulitis  1/3/2022 CABG x 3, LIMA to LAD, SVG to OM2 and PDA  pre-op EF ~30% w/ inferior akinesis; post op EF ~35% w/ inferior dyskinetic hypokinesis    Interval Hx /  Subjective:he is up in chair /  b/p low side / in NSR   suture line dry and approx. no major events overnight. chest tube is out    Cardiac meds amiodarone asa lipitor plavix lasix mag ox  lopressor   No additional HF meds at this time due to low b/p   will need to go to SNF vs ARU       12/28/2021 [facility]   Left ventricular pressure 11 mmHg  Aortic pressure 95/47 mmHg  Coronary anatomy:   The left main coronary artery is heavily calcified.  Has severe ostial narrowing at least 90 to 95%.  Our catheter did ventricularized upon engagement. Left anterior descending artery is mildly stenotic proximally.  There is good mid and distal anatomy with good landing zones. Circumflex artery has proximal calcification and moderate lesions. The right coronary artery is 100% occluded after its takeoff.  I do not identify any left to right or right to right collateralization. Left ventriculogram shows ejection fraction of 25 to 30% %.  Moderate global hypokinesia. Patient seen and examined. Clinical notes reviewed.  Telemetry reviewed / testing reviewed  30 minutes   Pertinent labs, diagnostic, device, and imaging results reviewed as a part of this visit  EKG TTE stress test: any LHC/RHC reviewed     Past Medical History:  Past Medical History:   Diagnosis Date    BPH with obstruction/lower urinary tract symptoms 8/3/2012    Chicken pox     Colon polyp     Hyperglycemia 12/15/2010    Hyperlipemia 12/15/2010    Post herpetic neuralgia     Shingles     Squamous cell carcinoma of skin of upper limb, including shoulder 9/8/2015         metoprolol tartrate  6.25 mg Oral BID    potassium chloride  20 mEq Oral TID WC    furosemide  20 mg IntraVENous Q6H    calcium elemental  500 mg Oral BID    amiodarone  200 mg Oral Daily    sodium chloride flush  10 mL IntraVENous 2 times per day    aspirin  325 mg Oral Daily    clopidogrel  75 mg Oral Daily    chlorhexidine  15 mL Mouth/Throat BID    magnesium oxide  400 mg Oral BID    polyethylene glycol  17 g Oral Daily    atorvastatin  40 mg Oral Nightly    pantoprazole  40 mg Oral BID AC    insulin lispro  0-12 Units SubCUTAneous 4x Daily AC & HS    acetaminophen  1,000 mg Oral Q6H    gabapentin  300 mg Oral TID    trimethoprim-polymyxin b  1 drop Both Eyes 6 times per day    Venelex   Topical BID    sodium chloride flush  5-40 mL IntraVENous 2 times per day    multivitamin  1 tablet Oral Daily    influenza virus vaccine  0.5 mL IntraMUSCular Prior to discharge    Arformoterol Tartrate  15 mcg Nebulization BID       Vitals:    01/09/22 1000   BP: (!) 84/56   Pulse: 72   Resp: 18   Temp:    SpO2:       In: 1020 [P.O.:1020]  Out: 2350    Wt Readings from Last 3 Encounters:   01/09/22 167 lb 12.3 oz (76.1 kg)   07/12/18 179 lb (81.2 kg)   05/31/18 183 lb (83 kg)       Intake/Output Summary (Last 24 hours) at 1/9/2022 1110  Last data filed at 1/9/2022 1000  Gross per 24 hour   Intake 780 ml   Output 2235 ml   Net -1455 ml       Telemetry: Personally Reviewed    · Constitutional: Cooperative and in no apparent distress, and appears well nourished  · Skin: Warm and pink; no pallor, cyanosis, clubbing, or bruising   · HEENT: Symmetric and normocephalic. · Cardiovascular: Regular rate and rhythm. S1/S2   · Respiratory: Respirations symmetric and unlabored. Lungs clear to auscultation bilaterally, no wheezing, crackles, or rhonchi  · Gastrointestinal: Abdomen soft and round.  Bowel sounds normoactive without tenderness or masses. · Musculoskeletal: Bilateral upper and lower extremity strength 5/5 with full ROM  · Neurologic/Psych: Awake and orientated to person, place and time. Calm affect, appropriate mood    Patient Active Problem List    Diagnosis Date Noted    Non-ST elevated myocardial infarction (non-STEMI) ([facility] [address] 12/31/2021    Syncope and collapse     Anemia 12/24/2021    Visit for wound check 06/06/2018    Basal cell carcinoma of right side of nose 05/08/2018    Visit for suture removal 05/08/2018    SCCS, mod diff  (squamous cell carcinoma), hand, left 04/24/2018    Mixed hyperlipidemia 04/18/2017    Nicotine use disorder 02/03/2017    COPD, mild ([facility] [address] 06/23/2016    Colon polyp     Basal cell carcinoma of shoulder 09/08/2015    Squamous cell carcinoma of skin of upper limb, including shoulder 09/08/2015    Actinic keratosis 09/08/2015    BPH with obstruction/lower urinary tract symptoms 08/03/2012    Hyperglycemia 12/15/2010    Smoker's respiratory syndrome 12/15/2010        Assessment     syncope / with severe anemia /stable   GI workup completed     trop elevation with severe anemia / NSTEMI Type II suspected    LHC with severe CAD     1/3/2022 CABG x 3, LIMA to LAD, SVG to OM2 and PDA  pre-op EF ~30% w/ inferior akinesis; post op EF ~35% w/ inferior dyskinetic hypokinesis    ICM   2/28/2021 TTE   Normal left ventricle size and wall thickness. Overall left ventricular systolic function appears reduced with an ejectionction of 25-30%. There is hypokinesis of the basal and mid inferior walls. There is hypokinesis of the mid and basal inferolateral walls. There is hypokinesis of the mid and basal inferoseptal walls. Diastolic filling parameters suggest grade I diastolic dysfunction.  Mild mitral and tricuspid regurgitation  estimated pulmonary artery systolic pressure is 31 mmHg assuming a right  atrial pressure of 3 mmHg    NSVT  / now NSR with PVCS  On amiodarone po     COPD  Stable   Inhalers     HLD  LDL 54 optimal      UTI  abx per IM      Plan:   POD#6 CABG x 3, LIMA to LAD, SVG to OM2 and PDA  Cardiac meds amiodarone asa lipitor plavix lasix mag ox lopressor   consider ace/arb/arni when ok with CTS  & when b/p allows   SGLT2 in future as tolerated   Limited TTE before discharge  /  may need life vest before discharge    will need to go to SNF vs ARU     Thank you for allowing to us to participate in the care of Francisco J Lucia.   Prateek BRADY-CNP-CVNP

## 2022-01-09 NOTE — CARE COORDINATION
Spoke with Debbie Boothe in [address], patient will need to go to SNF vs ARU as will be transitioning to long term care. Patient is agreement to plan. Spoke with patient sister Matthew Hayes who is reviewing facilities, she has elected [facility], referral has been sent, left message for admissions who is not in today. Advised sister we will need 3-4 additional choices. She stated she will review and will be in later this morning and give me elections for referrals to be sent.

## 2022-01-09 NOTE — PROGRESS NOTES
Progress Note  Hospital Day: 16  POD#  4  S/P Coronary artery bypass x 3 (1/3/2022)    Chief Complaint: Postop follow up    Mr. Terrence Champion has no complaints of incisional pain    24 hour Interval History:    Requiring levo for BP support overnight. Stable in SR. Somewhat agitated with nursing staff and resistant to instruction at times.  Up in chair, on minimal dose of Levo. Remains in SR.    Up in chair, more talkative today. Remains in SR. Changed Lasix to 20 mg q6h   up with the chair oriented no major complaint    Today's plan: chest tube back to suction. Bowel med. Decrease BB 6.25 mg bid if BP tolerates    VITAL SIGNS   Temperature:  Current - Temp: 97.7 °F (36.5 °C);  Max - Temp  Av °F (36.7 °C)  Min: 97.7 °F (36.5 °C)  Max: 98.3 °F (36.8 °C)    Respiratory Rate : Resp  Av.4  Min: 14  Max: 21    Pulse Range: Pulse  Av.4  Min: 69  Max: 73    Blood Pressure Range:  Systolic (61ISJ), PUX:87 , Min:80 , KCY:433   ; Diastolic (47WFO), NXX:92, Min:51, Max:62    Pulse ox Range: SpO2  Av.7 %  Min: 85 %  Max: 100 %  O2 Flow Rate (L/min): 1 L/min    Admission Weight: Weight: 168 lb 10.4 oz (76.5 kg)  Up 4 lbs from preop  Current Weight:   Patient Vitals for the past 96 hrs (Last 3 readings):   Weight   22 0556 167 lb 12.3 oz (76.1 kg)   22 0645 168 lb 14 oz (76.6 kg)   22 0600 167 lb 12.3 oz (76.1 kg)     I/O:     Intake/Output Summary (Last 24 hours) at 2022 1121  Last data filed at 2022 1000  Gross per 24 hour   Intake 780 ml   Output 2235 ml   Net -1455 ml     Urine (hernandez): 355-470-650 ml/shift    DO NOT REMOVE  Chest tube: 60 ml/shift  serosanguinous, on waterseal, + leak    MEDICATIONS:      metoprolol tartrate  6.25 mg Oral BID    potassium chloride  20 mEq Oral TID WC    furosemide  20 mg IntraVENous Q6H    calcium elemental  500 mg Oral BID    amiodarone  200 mg Oral Daily    sodium chloride flush  10 mL IntraVENous 2 times per day    aspirin  325 mg Oral Daily    clopidogrel  75 mg Oral Daily    chlorhexidine  15 mL Mouth/Throat BID    magnesium oxide  400 mg Oral BID    polyethylene glycol  17 g Oral Daily    atorvastatin  40 mg Oral Nightly    pantoprazole  40 mg Oral BID AC    insulin lispro  0-12 Units SubCUTAneous 4x Daily AC & HS    acetaminophen  1,000 mg Oral Q6H    gabapentin  300 mg Oral TID    trimethoprim-polymyxin b  1 drop Both Eyes 6 times per day    Venelex   Topical BID    sodium chloride flush  5-40 mL IntraVENous 2 times per day    multivitamin  1 tablet Oral Daily    influenza virus vaccine  0.5 mL IntraMUSCular Prior to discharge    Arformoterol Tartrate  15 mcg Nebulization BID     DATA REVIEW:   CBC:   Recent Labs     01/07/22  0635 01/08/22  0752 01/09/22  0445   WBC 6.0 6.1 6.9   HGB 8.2* 8.7* 8.8*   HCT 24.7* 26.6* 26.9*   MCV 84.9 84.1 84.0    246 264     BMP:   Recent Labs     01/07/22  0635 01/08/22  0534 01/09/22  0445    136 138   K 4.8 4.7 4.6    104 102   CO2 27 27 30   GLUCOSE 103* 96 112*   BUN 38* 33* 30*   CREATININE 1.0 0.9 0.8   CALCIUM 7.9* 8.0* 8.2*   MG 2.30 2.20 2.00     Accucheck Glucoses:   Recent Labs     01/07/22  2107 01/08/22  1213 01/08/22  1647 01/08/22  2105 01/09/22  0756   POCGLU 148* 108* 125* 158* 119*     ABG:    Lab Results   Component Value Date    PHART 7.395 01/04/2022    PO2ART 91.2 01/04/2022    GSC8YLO 29.8 01/04/2022    N9WNUSPD 97 01/04/2022    KIU9AFE 19 01/04/2022    NCN3EMA 18.2 01/04/2022    BEART -7 01/04/2022    BEART -6 01/04/2022    BEART -5 01/04/2022    BEART -4 01/03/2022    BEART -1 01/03/2022         ECHO (Limited) 1/7/2022: results pending    ASSESSMENT:   Patient Active Problem List:     Hyperglycemia     Smoker's respiratory syndrome     BPH with obstruction/lower urinary tract symptoms     Basal cell carcinoma of shoulder     Squamous cell carcinoma of skin of upper limb, including shoulder     Actinic keratosis     Colon polyp COPD, mild (HCC)     Nicotine use disorder     Mixed hyperlipidemia     SCCS, mod diff  (squamous cell carcinoma), hand, left     Basal cell carcinoma of right side of nose     Visit for suture removal     Visit for wound check     Anemia     Syncope and collapse     Non-ST elevated myocardial infarction (non-STEMI) ([facility] [address]      Neuro: awake, alert and oriented x 3  CV:   Sinus  Pulm:  normal work of breathing, diminished, congested cough slightly improved  Abd:  soft, non-tender; bowel sounds normal; passing flatus, no BM  Diamond: slightly cloudy yellow  Wounds: clean, dry and intact  Ext: warm, + edema    PLAN:   DC to acute rehab if bed is available discharge order was written

## 2022-01-09 NOTE — DISCHARGE INSTR - COC
Continuity of Care Form    Patient Name: Roz Cabrera   :  1950  MRN:  [id number]    Admit date:  2021  Discharge date:  ***    Code Status Order: Full Code   Advance Directives:   Advance Care Flowsheet Documentation       Date/Time Healthcare Directive Type of Healthcare Directive Copy in [address] Agent's Name Healthcare Agent's Phone Number    21 1343 No, patient does not have an advance directive for healthcare treatment -- -- -- -- --            Admitting Physician:  No admitting provider for patient encounter. PCP: No primary care provider on file.     Discharging Nurse: [facility] Unit/Room#: 3864/3285-80  Discharging Unit Phone Number: ***    Emergency Contact:   Extended Emergency Contact Information  Primary Emergency Contact: [address] Phone: [phone]  Mobile Phone: [phone]  Relation: Brother/Sister  Secondary Emergency Contact: Leon [address] Phone: [phone]  Relation: Brother/Sister    Past Surgical History:  Past Surgical History:   Procedure Laterality Date    CORONARY ARTERY BYPASS GRAFT N/A 1/3/2022    VIRGIL; TCPB: EVH R thigh; CABG x 3, LIMA to LAD, SVG to OM2 and PDA performed by Gisel Torres MD at [place] [address]      right due to mass benign    UPPER GASTROINTESTINAL ENDOSCOPY N/A 2021    EGD BIOPSY performed by Ranjeet Jamison MD at [facility]       Immunization History:   Immunization History   Administered Date(s) Administered    COVID-19, Pfizer, PF, 30mcg/0.3mL 2021, 2021    Pneumococcal Conjugate 13-valent (Blkkvlv02) 2018    Pneumococcal Polysaccharide (Lnzxoxbyb87) 2017    Tdap (Boostrix, Adacel) 2012    Zoster Live (Zostavax) 2015       Active Problems:  Patient Active Problem List   Diagnosis Code    Hyperglycemia R73.9    Smoker's respiratory syndrome F17.200    BPH with 0420   Drainage Amount None 01/09/22 0420   Odor None 01/09/22 0420   Velma-wound Assessment Fragile 01/09/22 0420   Number of days:         Elimination:  Continence: Bowel: {YES / HC:64921}  Bladder: {YES / WP:61679}  Urinary Catheter: {Urinary Catheter:934927852}   Colostomy/Ileostomy/Ileal Conduit: {YES / CB:12100}       Date of Last BM: ***    Intake/Output Summary (Last 24 hours) at 1/9/2022 0920  Last data filed at 1/9/2022 0600  Gross per 24 hour   Intake 600 ml   Output 1810 ml   Net -1210 ml     I/O last 3 completed shifts: In: 1100 [P.O.:1080; I.V.:20]  Out: 3065 [Urine:2920; Chest Tube:145]    Safety Concerns:     [address] Safety Concerns:842544080}    Impairments/Disabilities:      [address] Impairments/Disabilities:765587772}    Nutrition Therapy:  Current Nutrition Therapy:   [address] Diet List:050081127}    Routes of Feeding: {CHP DME Other Feedings:198499483}  Liquids: {Slp liquid thickness:95985}  Daily Fluid Restriction: {CHP DME Yes amt example:953089163}  Last Modified Barium Swallow with Video (Video Swallowing Test): {Done Not Done JRAV:013492308}    Treatments at the Time of Hospital Discharge:   Respiratory Treatments: ***  Oxygen Therapy:  {Therapy; copd oxygen:63950}  Ventilator:    {MH CC Vent [facility]:963701089}    Heart Failure Instructions for Daily Management  Patient was treated for chronic systolic heart failure. he  will require the following:    Please weigh daily on the same scale and approximately the same time of day. Report weight gain of 3 pounds/day or 5 pounds/week to : facility MD and Surinder Ulrich / Nuria Guerra [phone]. Please use hospital discharge weight as baseline reference.    Please monitor for signs and symptoms of and report to MD:  Worsening Heart Failure: sudden weight gain, shortness of breath, lower extremity or general edema/swelling, abdominal bloating/swelling, inability to lie flat, intolerance to usual activity, or cough (especially at night). Report these finding even if no increase in weight. Dehydration:  having difficulty or a decrease in urination, dizziness, worsening fatigue, or new onset/worsening of generalized weakness. Please continue a LOW SODIUM diet and LIMIT fluid intake to 48 - 64 ounces ( 1.5 - 2 liters) per day. Call facility MD and [facility] [employer] [phone] with any questions or concerns. Please continue heart failure education to patient and family/support system. See After Visit Summary for hospital follow up appointment details. Consider spiritual care referral for support and/or completion of advance directives . Consider: having the facility MD complete required 7 day follow up, [place] [address] telehealth program if patient agreeable and able to participate, and palliative care consult for ongoing goals of care, end of life, and/or chronic disease management discussions.       Rehab Therapies: {THERAPEUTIC INTERVENTION:3474828453}  Weight Bearing Status/Restrictions: [facility] Weight Bearin}  Other Medical Equipment (for information only, NOT a DME order):  {EQUIPMENT:058396443}  Other Treatments: ***    Patient's personal belongings (please select all that are sent with patient):  {CHP DME Belongings:638375118}    RN SIGNATURE:  {Esignature:184452417}    CASE MANAGEMENT/SOCIAL WORK SECTION    Inpatient Status Date: ***    Readmission Risk Assessment Score:  Readmission Risk              Risk of Unplanned Readmission:  26           Discharging to Facility/ Agency   Name:   Address:  Phone:  Fax:    Dialysis Facility (if applicable)   Name:  Address:  Dialysis Schedule:  Phone:  Fax:    / signature: {Esignature:139176151}    PHYSICIAN SECTION    Prognosis: Good    Condition at Discharge: Stable    Rehab Potential (if transferring to Rehab): Good     Recommended Labs or Other Treatments After Discharge: MUST  [address] 24-48 HOURS [facility]    Pre-op weight:   164 lbs    Incision/Wound Care:    Midsternal      Chest Tube Site     Legs         Clean with antibacterial soap and water daily. Monitor for signs of infection    Upper Extremity Restrictions (sternal precautions):  No lifting, pushing, pulling over 5 pounds for 8 weeks. Remove chest tubes sutures 10 days post-op, after checking with surgeon's office. Please remove IJ suture if it is still in.   1.  Wash EACH incision daily with separate wash cloth with antibacterial soap and water; pat dry. Do NOT apply anything to incisions - NO LOTIONS, HYDROGEN PEROXIDE, POWDER. 2.  If discharged with dressing on wound, remove dressing and wash daily with antibacterial soap and water. Leave open to air unless draining. 3. YUMI hose on during the day and off at night. Keep legs elevated while sitting, especially if edematous. 4.  Incentive spiropmeter X 10, cough and deep breath every hour while awake. 5.  Pulse Ox checks with each visit for home care and with vital signs for ECF. CALL if pulse ox less than 90%. 6.  Keep activity log (ambulate as directed and bring log to follow up appointment.)  7. Daily weight and record  8. No tub bath. May shower  9. Up in chair for all meals  10. PT/OT - evaluate and treat  11. Follow up to see Dr Constance Saleh on Wednesday, January 19, 2022 at 3:00 PM    Call the surgeon at [phone] for any for any of the following reasons:  Changes in incision (increased redness, tenderness, warmth or drainage)  Call for pain not relieved with pain medication  Call for temp >101, HR <50 or >110 beats/min, SBP < 90 or >160. No bowel movement for 3 days  Daily weights: call if 2 pound weight gain in 24 hours or 5 pound weight gain in 1 week. Call for persistent diarrhea, nausea, or vomiting.   Pain, tenderness, warmth, or redness in calf  Call for symptomatic fluttering in chest, irregular pulse, dyspnea  DEPRESSION: Call Primary Care Physician if feelings of depression and depression persists  DIABETICS: Call Primary Care Physician for blood sugars >130 consistently       Physician Certification: I certify the above information and transfer of Rita Rice  is necessary for the continuing treatment of the diagnosis listed and that he requires Acute Rehab for greater 30 days.      Update Admission H&P: No change in H&P    PHYSICIAN SIGNATURE:  Electronically signed by Tiffanie Martin MD on 1/9/22 at 9:20 AM EST

## 2022-01-09 NOTE — PLAN OF CARE
Problem: Falls - Risk of:  Goal: Will remain free from falls  Description: Will remain free from falls  Outcome: Ongoing  Note: Pt is a fall risk. Fall risk protocol in place. See Matt Goode Fall Score. Pt bed is in low position, bed alarm is on, side rails up, fall risk bracelet applied. , non-skid footwear in use. Patient/family educated on fall risk protocol, instructed to call for assistance when needed and belongings are in reach. assistance. Will continue with hourly rounds for po intake, pain needs, toileting and repositioning as needed. Will continue to monitor for needs. Problem: Falls - Risk of:  Goal: Absence of physical injury  Description: Absence of physical injury  Outcome: Ongoing     Problem: Nutrition  Goal: Optimal nutrition therapy  Outcome: Ongoing     Problem: Skin Integrity:  Goal: Will show no infection signs and symptoms  Description: Will show no infection signs and symptoms  Outcome: Ongoing  Note: Pt at risk for skin breakdown. See Chad score. Pt remains on bedrest. Unable to reposition self in bed. Heels elevated off bed. Sacral heart mepilex intact to protect,  site inspected and intact underneath. Will continue to turn and reposition patient every two hours and as needed. Will continue to keep patient clean and dry, applying skin care cream as needed. Pillows used for repositioning q2hs. Will continue to monitor and assess for skin breakdown.      Problem: Skin Integrity:  Goal: Absence of new skin breakdown  Description: Absence of new skin breakdown  Outcome: Ongoing     Problem: Cardiac:  Goal: Ability to maintain an adequate cardiac output will improve  Description: Ability to maintain an adequate cardiac output will improve  Outcome: Ongoing     Problem: Cardiac:  Goal: Ability to achieve and maintain adequate cardiopulmonary perfusion will improve  Description: Ability to achieve and maintain adequate cardiopulmonary perfusion will improve  Outcome: Ongoing     Problem: Cardiac:  Goal: Hemodynamic stability will improve  Description: Hemodynamic stability will improve  Outcome: Ongoing     Problem: OXYGENATION/RESPIRATORY FUNCTION  Goal: Patient will maintain patent airway  Outcome: Ongoing     Problem: OXYGENATION/RESPIRATORY FUNCTION  Goal: Patient will achieve/maintain normal respiratory rate/effort  Description: Respiratory rate and effort will be within normal limits for the patient  Outcome: Ongoing     Problem: HEMODYNAMIC STATUS  Goal: Patient has stable vital signs and fluid balance  Outcome: Ongoing     Problem: FLUID AND ELECTROLYTE IMBALANCE  Goal: Fluid and electrolyte balance are achieved/maintained  Outcome: Ongoing     Problem: ACTIVITY INTOLERANCE/IMPAIRED MOBILITY  Goal: Mobility/activity is maintained at optimum level for patient  Outcome: Ongoing     Problem: Pain:  Goal: Pain level will decrease  Description: Pain level will decrease  Outcome: Ongoing  Note: Per protocol     Problem: Pain:  Goal: Control of acute pain  Description: Control of acute pain  Outcome: Ongoing     Problem: Pain:  Goal: Control of chronic pain  Description: Control of chronic pain  Outcome: Ongoing     Problem: Infection - Surgical Site:  Goal: Will show no infection signs and symptoms  Description: Will show no infection signs and symptoms  Outcome: Ongoing  Note: Pt at risk for skin breakdown. See Chad score. Pt remains on bedrest. Unable to reposition self in bed. Heels elevated off bed. Sacral heart mepilex intact to protect,  site inspected and intact underneath. Will continue to turn and reposition patient every two hours and as needed. Will continue to keep patient clean and dry, applying skin care cream as needed. Pillows used for repositioning q2hs. Will continue to monitor and assess for skin breakdown.      Problem: Infection - Central Venous Catheter-Associated Bloodstream Infection:  Goal: Will show no infection signs and symptoms  Description: Will show no infection signs and symptoms  Outcome: Ongoing  Note: Pt at risk for skin breakdown. See Chad score. Pt remains on bedrest. Unable to reposition self in bed. Heels elevated off bed. Sacral heart mepilex intact to protect,  site inspected and intact underneath. Will continue to turn and reposition patient every two hours and as needed. Will continue to keep patient clean and dry, applying skin care cream as needed. Pillows used for repositioning q2hs. Will continue to monitor and assess for skin breakdown.      Problem: Urinary Elimination:  Goal: Signs and symptoms of infection will decrease  Description: Signs and symptoms of infection will decrease  Outcome: Ongoing     Problem: Urinary Elimination:  Goal: Complications related to the disease process, condition or treatment will be avoided or minimized  Description: Complications related to the disease process, condition or treatment will be avoided or minimized  Outcome: Ongoing

## 2022-01-10 VITALS
RESPIRATION RATE: 18 BRPM | TEMPERATURE: 98.1 F | OXYGEN SATURATION: 93 % | HEART RATE: 72 BPM | WEIGHT: 155.42 LBS | SYSTOLIC BLOOD PRESSURE: 106 MMHG | DIASTOLIC BLOOD PRESSURE: 60 MMHG | BODY MASS INDEX: 24.39 KG/M2 | HEIGHT: 67 IN

## 2022-01-10 LAB
ANION GAP SERPL CALCULATED.3IONS-SCNC: 6 MMOL/L (ref 3–16)
BUN BLDV-MCNC: 34 MG/DL (ref 7–20)
CALCIUM SERPL-MCNC: 8.2 MG/DL (ref 8.3–10.6)
CHLORIDE BLD-SCNC: 102 MMOL/L (ref 99–110)
CO2: 29 MMOL/L (ref 21–32)
CREAT SERPL-MCNC: 0.8 MG/DL (ref 0.8–1.3)
GFR AFRICAN AMERICAN: >60
GFR NON-AFRICAN AMERICAN: >60
GLUCOSE BLD-MCNC: 105 MG/DL (ref 70–99)
GLUCOSE BLD-MCNC: 116 MG/DL (ref 70–99)
GLUCOSE BLD-MCNC: 117 MG/DL (ref 70–99)
GLUCOSE BLD-MCNC: 125 MG/DL (ref 70–99)
HCT VFR BLD CALC: 25.4 % (ref 40.5–52.5)
HEMOGLOBIN: 8.5 G/DL (ref 13.5–17.5)
MAGNESIUM: 2.1 MG/DL (ref 1.8–2.4)
MCH RBC QN AUTO: 27.7 PG (ref 26–34)
MCHC RBC AUTO-ENTMCNC: 33.5 G/DL (ref 31–36)
MCV RBC AUTO: 82.6 FL (ref 80–100)
PDW BLD-RTO: 20.3 % (ref 12.4–15.4)
PERFORMED ON: ABNORMAL
PLATELET # BLD: 265 K/UL (ref 135–450)
PMV BLD AUTO: 8.2 FL (ref 5–10.5)
POTASSIUM SERPL-SCNC: 4.6 MMOL/L (ref 3.5–5.1)
RBC # BLD: 3.07 M/UL (ref 4.2–5.9)
SARS-COV-2, NAAT: NOT DETECTED
SODIUM BLD-SCNC: 137 MMOL/L (ref 136–145)
WBC # BLD: 5.8 K/UL (ref 4–11)

## 2022-01-10 PROCEDURE — 36592 COLLECT BLOOD FROM PICC: CPT

## 2022-01-10 PROCEDURE — 6370000000 HC RX 637 (ALT 250 FOR IP): Performed by: THORACIC SURGERY (CARDIOTHORACIC VASCULAR SURGERY)

## 2022-01-10 PROCEDURE — 94761 N-INVAS EAR/PLS OXIMETRY MLT: CPT

## 2022-01-10 PROCEDURE — 83735 ASSAY OF MAGNESIUM: CPT

## 2022-01-10 PROCEDURE — 2700000000 HC OXYGEN THERAPY PER DAY

## 2022-01-10 PROCEDURE — 97110 THERAPEUTIC EXERCISES: CPT

## 2022-01-10 PROCEDURE — 85027 COMPLETE CBC AUTOMATED: CPT

## 2022-01-10 PROCEDURE — 94640 AIRWAY INHALATION TREATMENT: CPT

## 2022-01-10 PROCEDURE — 99233 SBSQ HOSP IP/OBS HIGH 50: CPT | Performed by: NURSE PRACTITIONER

## 2022-01-10 PROCEDURE — 97116 GAIT TRAINING THERAPY: CPT

## 2022-01-10 PROCEDURE — 99233 SBSQ HOSP IP/OBS HIGH 50: CPT | Performed by: INTERNAL MEDICINE

## 2022-01-10 PROCEDURE — 6370000000 HC RX 637 (ALT 250 FOR IP): Performed by: NURSE PRACTITIONER

## 2022-01-10 PROCEDURE — 2580000003 HC RX 258: Performed by: THORACIC SURGERY (CARDIOTHORACIC VASCULAR SURGERY)

## 2022-01-10 PROCEDURE — 97530 THERAPEUTIC ACTIVITIES: CPT

## 2022-01-10 PROCEDURE — 87635 SARS-COV-2 COVID-19 AMP PRB: CPT

## 2022-01-10 PROCEDURE — 6360000002 HC RX W HCPCS: Performed by: CLINICAL NURSE SPECIALIST

## 2022-01-10 PROCEDURE — 6360000002 HC RX W HCPCS: Performed by: THORACIC SURGERY (CARDIOTHORACIC VASCULAR SURGERY)

## 2022-01-10 PROCEDURE — 80048 BASIC METABOLIC PNL TOTAL CA: CPT

## 2022-01-10 PROCEDURE — 94669 MECHANICAL CHEST WALL OSCILL: CPT

## 2022-01-10 PROCEDURE — 6370000000 HC RX 637 (ALT 250 FOR IP): Performed by: CLINICAL NURSE SPECIALIST

## 2022-01-10 RX ORDER — FUROSEMIDE 40 MG/1
40 TABLET ORAL DAILY
Status: DISCONTINUED | OUTPATIENT
Start: 2022-01-11 | End: 2022-01-10 | Stop reason: HOSPADM

## 2022-01-10 RX ORDER — ARFORMOTEROL TARTRATE 15 UG/2ML
15 SOLUTION RESPIRATORY (INHALATION) 2 TIMES DAILY
Qty: 120 ML | Refills: 3 | Status: ON HOLD | DISCHARGE
Start: 2022-01-10 | End: 2022-02-07 | Stop reason: HOSPADM

## 2022-01-10 RX ORDER — CASTOR OIL AND BALSAM, PERU 788; 87 MG/G; MG/G
OINTMENT TOPICAL 2 TIMES DAILY
Status: ON HOLD | DISCHARGE
Start: 2022-01-10 | End: 2022-02-07 | Stop reason: HOSPADM

## 2022-01-10 RX ORDER — POTASSIUM CHLORIDE 20 MEQ/1
20 TABLET, EXTENDED RELEASE ORAL DAILY
Status: DISCONTINUED | OUTPATIENT
Start: 2022-01-11 | End: 2022-01-10 | Stop reason: HOSPADM

## 2022-01-10 RX ORDER — ALBUTEROL SULFATE 2.5 MG/3ML
2.5 SOLUTION RESPIRATORY (INHALATION) EVERY 6 HOURS PRN
Qty: 120 EACH | Refills: 3 | Status: ON HOLD | DISCHARGE
Start: 2022-01-10 | End: 2022-02-07 | Stop reason: HOSPADM

## 2022-01-10 RX ORDER — POLYMYXIN B SULFATE AND TRIMETHOPRIM 1; 10000 MG/ML; [USP'U]/ML
1 SOLUTION OPHTHALMIC EVERY 4 HOURS
DISCHARGE
Start: 2022-01-10 | End: 2022-01-20

## 2022-01-10 RX ORDER — MONTELUKAST SODIUM 10 MG/1
10 TABLET ORAL NIGHTLY
Qty: 30 TABLET | Refills: 3 | Status: ON HOLD | DISCHARGE
Start: 2022-01-10 | End: 2022-02-07 | Stop reason: HOSPADM

## 2022-01-10 RX ORDER — POTASSIUM CHLORIDE 750 MG/1
10 TABLET, EXTENDED RELEASE ORAL DAILY
Status: ON HOLD | DISCHARGE
Start: 2022-01-11 | End: 2022-02-07 | Stop reason: HOSPADM

## 2022-01-10 RX ORDER — BUDESONIDE AND FORMOTEROL FUMARATE DIHYDRATE 160; 4.5 UG/1; UG/1
2 AEROSOL RESPIRATORY (INHALATION) 2 TIMES DAILY
Status: CANCELLED | OUTPATIENT
Start: 2022-01-10

## 2022-01-10 RX ORDER — MONTELUKAST SODIUM 10 MG/1
10 TABLET ORAL NIGHTLY
Status: DISCONTINUED | OUTPATIENT
Start: 2022-01-10 | End: 2022-01-10 | Stop reason: HOSPADM

## 2022-01-10 RX ORDER — AMIODARONE HYDROCHLORIDE 200 MG/1
200 TABLET ORAL DAILY
Qty: 19 TABLET | Refills: 0 | Status: ON HOLD | DISCHARGE
Start: 2022-01-10 | End: 2022-02-07 | Stop reason: HOSPADM

## 2022-01-10 RX ADMIN — PANTOPRAZOLE SODIUM 40 MG: 40 TABLET, DELAYED RELEASE ORAL at 07:56

## 2022-01-10 RX ADMIN — POLYMYXIN B SULFATE, TRIMETHOPRIM SULFATE 1 DROP: 10000; 1 SOLUTION/ DROPS OPHTHALMIC at 16:45

## 2022-01-10 RX ADMIN — POTASSIUM CHLORIDE 20 MEQ: 20 TABLET, EXTENDED RELEASE ORAL at 07:56

## 2022-01-10 RX ADMIN — ACETAMINOPHEN 1000 MG: 500 TABLET ORAL at 06:07

## 2022-01-10 RX ADMIN — POLYETHYLENE GLYCOL 3350 17 G: 17 POWDER, FOR SOLUTION ORAL at 07:55

## 2022-01-10 RX ADMIN — POLYMYXIN B SULFATE, TRIMETHOPRIM SULFATE 1 DROP: 10000; 1 SOLUTION/ DROPS OPHTHALMIC at 12:38

## 2022-01-10 RX ADMIN — POLYMYXIN B SULFATE, TRIMETHOPRIM SULFATE 1 DROP: 10000; 1 SOLUTION/ DROPS OPHTHALMIC at 07:44

## 2022-01-10 RX ADMIN — AMIODARONE HYDROCHLORIDE 200 MG: 200 TABLET ORAL at 07:55

## 2022-01-10 RX ADMIN — THERA TABS 1 TABLET: TAB at 07:55

## 2022-01-10 RX ADMIN — CALCIUM 500 MG: 500 TABLET ORAL at 07:55

## 2022-01-10 RX ADMIN — SODIUM CHLORIDE, PRESERVATIVE FREE 10 ML: 5 INJECTION INTRAVENOUS at 07:57

## 2022-01-10 RX ADMIN — Medication: at 07:45

## 2022-01-10 RX ADMIN — ACETAMINOPHEN 1000 MG: 500 TABLET ORAL at 10:08

## 2022-01-10 RX ADMIN — GABAPENTIN 300 MG: 300 CAPSULE ORAL at 07:55

## 2022-01-10 RX ADMIN — ASPIRIN 325 MG: 325 TABLET, COATED ORAL at 07:55

## 2022-01-10 RX ADMIN — PANTOPRAZOLE SODIUM 40 MG: 40 TABLET, DELAYED RELEASE ORAL at 17:00

## 2022-01-10 RX ADMIN — Medication 400 MG: at 07:55

## 2022-01-10 RX ADMIN — ACETAMINOPHEN 1000 MG: 500 TABLET ORAL at 17:45

## 2022-01-10 RX ADMIN — FUROSEMIDE 20 MG: 10 INJECTION, SOLUTION INTRAMUSCULAR; INTRAVENOUS at 07:44

## 2022-01-10 RX ADMIN — ARFORMOTEROL TARTRATE 15 MCG: 15 SOLUTION RESPIRATORY (INHALATION) at 09:01

## 2022-01-10 RX ADMIN — SODIUM CHLORIDE, PRESERVATIVE FREE 10 ML: 5 INJECTION INTRAVENOUS at 07:56

## 2022-01-10 RX ADMIN — POLYMYXIN B SULFATE, TRIMETHOPRIM SULFATE 1 DROP: 10000; 1 SOLUTION/ DROPS OPHTHALMIC at 00:08

## 2022-01-10 RX ADMIN — CLOPIDOGREL BISULFATE 75 MG: 75 TABLET ORAL at 07:55

## 2022-01-10 RX ADMIN — ACETAMINOPHEN 1000 MG: 500 TABLET ORAL at 00:08

## 2022-01-10 ASSESSMENT — PAIN DESCRIPTION - LOCATION: LOCATION: BUTTOCKS

## 2022-01-10 ASSESSMENT — PAIN DESCRIPTION - PAIN TYPE: TYPE: CHRONIC PAIN

## 2022-01-10 ASSESSMENT — PAIN SCALES - GENERAL
PAINLEVEL_OUTOF10: 4
PAINLEVEL_OUTOF10: 0
PAINLEVEL_OUTOF10: 4

## 2022-01-10 NOTE — PLAN OF CARE
Problem: Falls - Risk of:  Goal: Will remain free from falls  Description: Will remain free from falls  Outcome: Ongoing     Problem: Skin Integrity:  Goal: Will show no infection signs and symptoms  Description: Will show no infection signs and symptoms  Outcome: Ongoing  Goal: Absence of new skin breakdown  Description: Absence of new skin breakdown  Outcome: Ongoing     Problem: Cardiac:  Goal: Ability to maintain an adequate cardiac output will improve  Description: Ability to maintain an adequate cardiac output will improve  Outcome: Ongoing  Goal: Ability to achieve and maintain adequate cardiopulmonary perfusion will improve  Description: Ability to achieve and maintain adequate cardiopulmonary perfusion will improve  Outcome: Ongoing     Problem: OXYGENATION/RESPIRATORY FUNCTION  Goal: Patient will maintain patent airway  Outcome: Ongoing  Goal: Patient will achieve/maintain normal respiratory rate/effort  Description: Respiratory rate and effort will be within normal limits for the patient  Outcome: Ongoing     Problem: ACTIVITY INTOLERANCE/IMPAIRED MOBILITY  Goal: Mobility/activity is maintained at optimum level for patient  Outcome: Ongoing     Problem: Pain:  Goal: Control of acute pain  Description: Control of acute pain  Outcome: Ongoing     Problem: Infection - Surgical Site:  Goal: Will show no infection signs and symptoms  Description: Will show no infection signs and symptoms  Outcome: Ongoing     Problem: Infection - Central Venous Catheter-Associated Bloodstream Infection:  Goal: Will show no infection signs and symptoms  Description: Will show no infection signs and symptoms  Outcome: Ongoing     Problem: Urinary Elimination:  Goal: Signs and symptoms of infection will decrease  Description: Signs and symptoms of infection will decrease  Outcome: Ongoing  Goal: Complications related to the disease process, condition or treatment will be avoided or minimized  Description: Complications related to the disease process, condition or treatment will be avoided or minimized  Outcome: Ongoing

## 2022-01-10 NOTE — PROGRESS NOTES
Progress Note  Hospital Day: 18  POD#  7  S/P Coronary artery bypass x 3 (1/3/2022)    Chief Complaint: Postop follow up    Mr. Gudelia Escoto has no complaints of incisional pain. 24 hour Interval History:    Requiring levo for BP support overnight. Stable in SR. Somewhat agitated with nursing staff and resistant to instruction at times.  Up in chair, on minimal dose of Levo. Remains in SR.    Up in chair, more talkative today. Remains in SR. Changed Lasix to 20 mg q6h   up with the chair oriented no major complaint    Today's plan: add preop Spiriva and Singulair. Decrease Lasix to 40 mg daily. ARU deferred, needs SNF    VITAL SIGNS   Temperature:  Current - Temp: 97.7 °F (36.5 °C);  Max - Temp  Av.1 °F (36.7 °C)  Min: 97.7 °F (36.5 °C)  Max: 98.4 °F (36.9 °C)    Respiratory Rate : Resp  Av.7  Min: 14  Max: 21    Pulse Range: Pulse  Av.5  Min: 65  Max: 76    Blood Pressure Range:  Systolic (25LDF), RZD:79 , Min:80 , HEB:657   ; Diastolic (23FMF), NUO:50, Min:52, Max:68    Pulse ox Range: SpO2  Av.2 %  Min: 91 %  Max: 98 %  O2 Flow Rate (L/min): 1 L/min    Admission Weight: Weight: 168 lb 10.4 oz (76.5 kg)    Current Weight: down 9 lbs from preop  Patient Vitals for the past 96 hrs (Last 3 readings):   Weight   01/10/22 0500 155 lb 6.8 oz (70.5 kg)   22 0556 167 lb 12.3 oz (76.1 kg)   22 0645 168 lb 14 oz (76.6 kg)     I/O:     Intake/Output Summary (Last 24 hours) at 1/10/2022 0846  Last data filed at 1/10/2022 0800  Gross per 24 hour   Intake 810 ml   Output 1875 ml   Net -1065 ml     Urine (hernandez): [phone] ml/shift        MEDICATIONS:      metoprolol tartrate  6.25 mg Oral BID    potassium chloride  20 mEq Oral TID WC    furosemide  20 mg IntraVENous Q6H    calcium elemental  500 mg Oral BID    amiodarone  200 mg Oral Daily    sodium chloride flush  10 mL IntraVENous 2 times per day    aspirin  325 mg Oral Daily    clopidogrel  75 mg Oral Daily    chlorhexidine  15 mL Mouth/Throat BID    magnesium oxide  400 mg Oral BID    polyethylene glycol  17 g Oral Daily    atorvastatin  40 mg Oral Nightly    pantoprazole  40 mg Oral BID AC    insulin lispro  0-12 Units SubCUTAneous 4x Daily AC & HS    acetaminophen  1,000 mg Oral Q6H    gabapentin  300 mg Oral TID    trimethoprim-polymyxin b  1 drop Both Eyes 6 times per day    Venelex   Topical BID    sodium chloride flush  5-40 mL IntraVENous 2 times per day    multivitamin  1 tablet Oral Daily    influenza virus vaccine  0.5 mL IntraMUSCular Prior to discharge    Arformoterol Tartrate  15 mcg Nebulization BID     DATA REVIEW:   CBC:   Recent Labs     01/08/22  0752 01/09/22  0445 01/10/22  0517   WBC 6.1 6.9 5.8   HGB 8.7* 8.8* 8.5*   HCT 26.6* 26.9* 25.4*   MCV 84.1 84.0 82.6    264 265     BMP:   Recent Labs     01/08/22  0534 01/09/22  0445 01/10/22  0517    138 137   K 4.7 4.6 4.6    102 102   CO2 27 30 29   GLUCOSE 96 112* 105*   BUN 33* 30* 34*   CREATININE 0.9 0.8 0.8   CALCIUM 8.0* 8.2* 8.2*   MG 2.20 2.00 2.10     Accucheck Glucoses:   Recent Labs     01/09/22  0756 01/09/22  1152 01/09/22  1600 01/09/22  2029 01/10/22  0740   POCGLU 119* 117* 131* 124* 116*     ABG:    Lab Results   Component Value Date    PHART 7.395 01/04/2022    PO2ART 91.2 01/04/2022    USR3UVR 29.8 01/04/2022    N5YHKCCD 97 01/04/2022    QIU6GHF 19 01/04/2022    SUD0ECS 18.2 01/04/2022    BEART -7 01/04/2022    BEART -6 01/04/2022    BEART -5 01/04/2022    BEART -4 01/03/2022    BEART -1 01/03/2022         ECHO (Limited) 1/7/2022: EF 30-35%    ASSESSMENT:   Patient Active Problem List:     Hyperglycemia     Smoker's respiratory syndrome     BPH with obstruction/lower urinary tract symptoms     Basal cell carcinoma of shoulder     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

## 2022-01-10 NOTE — CARE COORDINATION
Case Management Assessment           Daily Note                 Date/ Time of Note: 1/10/2022 9:48 AM         Note completed by: Lori Camacho RN    Patient Name: Tere Ayoub  YOB: 1950    [place] [D64.9]  [facility]non[facility] [I21.4]  Syncope, unspecified syncope type [R55]  Anemia, unspecified type [D64.9]  Patient Admission Status: Inpatient    Date of Admission:12/24/2021  4:36 PM Length of Stay: 17 GLOS: GMLOS: 11.4    Current Plan of Care: POD 10 CABG  ________________________________________________________________________________________  PT AM-PAC: 7 / 24 per last evaluation on: 01/06    OT AM-PAC: 9 / 24 per last evaluation on: 01/06    DME Needs for discharge: NA  ________________________________________________________________________________________  Discharge Plan: SNF: [facility] ([facility]), [address] [facility]ent[facility]    Tentative discharge date: 01/10 vs 01/11    Current barriers to discharge: Pacement    Referrals completed: SNF: [facility], [facility] [address], [place]    Resources/ information provided: SNF List  ________________________________________________________________________________________  Case Management Notes:  CM continues to follow patient for discharge planning. Patient will not be able to return home as house is uninhabitable. Patient is agreeable to SNF placement, family will be filing for medicaid and attempt to place in long term care. Referrals sent to [facility], family preference. Spoke with Tawnya Worley who will continue to review and will reach out to sister. Left messages for updates on referrals to [facility], [facility] and [facility]. Awaiting return calls. Shanita Reed and his family were provided with choice of provider; he and his family are in agreement with the discharge plan.     Care Transition Patient: Jamia Hilario RN  [facility] [employer] [employer]  Case Management Department  Ph: [phone]

## 2022-01-10 NOTE — PROGRESS NOTES
Called patients wife, Mel Acevedo, to update her on patients night, day, and transfer orders out of ICU. answered all questions. Pt currently in ENDO awaiting EGD.

## 2022-01-10 NOTE — CARE COORDINATION
Case Management Assessment            Discharge Note                    Date / Time of Note: 1/10/2022 10:36 AM                  Discharge Note Completed by: Kianna Swain RN    Patient Name: Marion Abdalal   YOB: 1950  Diagnosis: Anemia [D64.9]  NSTEMI (non-ST elevated myocardial infarction) ([facility] [place] [facility] [I21.4]  Syncope, unspecified syncope type [R55]  Anemia, unspecified type [D64.9]   Date / Time: 12/24/2021  4:36 PM    Current PCP: No primary care provider on file. Clinic patient: No    Hospitalization in the last 30 days: No    Advance Directives:  Code Status: Full Code  [place] DNR form completed and on chart: No    Financial:  Payor: MEDICARE / Plan: MEDICARE PART A AND B / Product Type: *No Product type* /      Pharmacy:    [facility] [address], [address] [place] [address] Avenue  [phone] Petr Martinez [place] [address] [address] [facility] [address]  Phone: [phone] Fax: [phone]      Assistance purchasing medications?: Potential Assistance Purchasing Medications: No  Assistance provided by Case Management: None at this time    Does patient want to participate in local refill/ meds to beds program?:      Meds To Beds General Rules:  1. Can ONLY be done Monday- Friday between 8:30am-5pm  2. Prescription(s) must be in pharmacy by 3pm to be filled same day  3. Copy of patient's insurance/ prescription drug card and patient face sheet must be sent along with the prescription(s)  4. Cost of Rx cannot be added to hospital bill. If financial assistance is needed, please contact unit  or ;  or  CANNOT provide pharmacy voucher for patients co-pays  5.  Patients can then  the prescription on their way out of the hospital at discharge, or pharmacy can deliver to the bedside if staff is available. (payment due at time of pick-up or delivery - cash, check, or card accepted)     Able to afford home medications/ co-pay costs: Yes    ADLS:  Current PT AM-PAC Score: 7 /24  Current OT AM-PAC Score: 9 /24      DISCHARGE Disposition: [place] (SNF): [facility] Phone: [phone] Fax: [phone]    LOC at discharge: Skilled  DELMI Completed: {RESPONSES; YES/NO/NOT [id number]}    Notification completed in HENS/PAS?:  Yes : CM has completed HENS online through secure website for SNF admission at ***. Document ID #: ***    IMM Completed:   Yes, Case management has presented and reviewed IMM letter #2 to the patient and/or family/ POA. Patient and/or family/POA verbalized understanding of their medicare rights and appeal process if needed. Patient and/or family/POA has signed, initialed and placed today's date (01/10/2022) and time ([phone]) on IMM letter #2 on the the appropriate lines. Patient and/or family/POA, copy of letter offered and they are aware that this original copy of IMM letter #2 is available prior to discharge from the paper chart on the unit. Electronic documentation has been entered into epic for IMM letter #2 and original paper copy has been added to the paper chart at the nurses station. Transportation:  Transportation PLAN for discharge: EMS transportation   Mode of Transport: Ambulance stretcher - BLS  Reason for medical transport: Moderate to Severe pain related to sternal incision requires ambulance transport due to CABG  Name of Transport Company: [facility]  Phone: [phone]  Time of Transport: 1700    Transport form completed: Not Indicated    Additional CM Notes: Patient has been accepted at [facility], no precert required. Patient and sister Daniella Inks are in agreement with discharge plan. Transport scheduled for 1700, rapid covid test ordered & pending results. Due to history of infestation at home, all belongings will be required to be in bags when sent to facility. Transport form completed and faxed per request, Hens completed, IMM completed via phone with sister. AVS faxed to facility. The Plan for Transition of Care is related to the following treatment goals of Anemia [D64.9]  NSTEMI (non-ST elevated myocardial infarction) ([facility] [place] [address] [I21.4]  Syncope, unspecified syncope type [R55]  Anemia, unspecified type [D64.9]    The Patient and/or patient representative Odell Kussmaul and his family were provided with a choice of provider and agrees with the discharge plan Yes    Freedom of choice list was provided with basic dialogue that supports the patient's individualized plan of care/goals and shares the quality data associated with the providers.  Yes    Care Transitions patient: No    Anders Herrera RN  [facility]  Case Management Department  Ph: [phone]

## 2022-01-10 NOTE — PROGRESS NOTES
[address]   Cardiology  Note   Dr Alfredo Dixon MD, Patsy Rick RN, FNP APRN CVNP  Date: 1/10/2022  Admit Date: 12/24/2021       CC:/ NSTEMI / GI bleed   Cardiology following with  syncope  / severe anemia  / COPD /severe CAD / ICM   Urology consulted for penile/scrotal cellulitis  1/3/2022 CABG x 3, LIMA to LAD, SVG to OM2 and PDA  pre-op EF ~30% w/ inferior akinesis; post op EF ~35% w/ inferior dyskinetic hypokinesis    Interval Hx /  Subjective:he is up in chair /  b/p low side stable  / in NSR   suture line dry and approx. no major events overnight. chest tube is out    Cardiac meds amiodarone asa lipitor plavix lasix mag ox  lopressor   No additional HF meds at this time due to low b/p   will need to go to [facility]    12/28/2021 [facility] [place]   Left ventricular pressure 11 mmHg  Aortic pressure 95/47 mmHg  Coronary anatomy:   The left main coronary artery is heavily calcified.  Has severe ostial narrowing at least 90 to 95%.  Our catheter did ventricularized upon engagement. Left anterior descending artery is mildly stenotic proximally.  There is good mid and distal anatomy with good landing zones. Circumflex artery has proximal calcification and moderate lesions. The right coronary artery is 100% occluded after its takeoff.  I do not identify any left to right or right to right collateralization. Left ventriculogram shows ejection fraction of 25 to 30% %.  Moderate global hypokinesia. Patient seen and examined. Clinical notes reviewed.  Telemetry reviewed / testing reviewed  30 minutes   Pertinent labs, diagnostic, device, and imaging results reviewed as a part of this visit  EKG TTE stress test: any LHC/RHC reviewed     Past Medical History:  Past Medical History:   Diagnosis Date    BPH with obstruction/lower urinary tract symptoms 8/3/2012    Chicken pox     Colon polyp     Hyperglycemia 12/15/2010    Hyperlipemia 12/15/2010    Post herpetic neuralgia     Shingles     Squamous cell carcinoma of skin of upper limb, including shoulder 9/8/2015         [START ON 1/11/2022] furosemide  40 mg Oral Daily    [START ON 1/11/2022] potassium chloride  20 mEq Oral Daily    montelukast  10 mg Oral Nightly    tiotropium  2 puff Inhalation Daily    metoprolol tartrate  6.25 mg Oral BID    amiodarone  200 mg Oral Daily    sodium chloride flush  10 mL IntraVENous 2 times per day    aspirin  325 mg Oral Daily    clopidogrel  75 mg Oral Daily    chlorhexidine  15 mL Mouth/Throat BID    magnesium oxide  400 mg Oral BID    polyethylene glycol  17 g Oral Daily    atorvastatin  40 mg Oral Nightly    pantoprazole  40 mg Oral BID AC    acetaminophen  1,000 mg Oral Q6H    gabapentin  300 mg Oral TID    trimethoprim-polymyxin b  1 drop Both Eyes 6 times per day    Venelex   Topical BID    sodium chloride flush  5-40 mL IntraVENous 2 times per day    multivitamin  1 tablet Oral Daily    influenza virus vaccine  0.5 mL IntraMUSCular Prior to discharge    Arformoterol Tartrate  15 mcg Nebulization BID       Vitals:    01/10/22 1200   BP: (!) 104/56   Pulse: 70   Resp: 16   Temp: 98.1 °F (36.7 °C)   SpO2: 96%      In: 870 [P.O.:870]  Out: 2545    Wt Readings from Last 3 Encounters:   01/10/22 155 lb 6.8 oz (70.5 kg)   07/12/18 179 lb (81.2 kg)   05/31/18 183 lb (83 kg)       Intake/Output Summary (Last 24 hours) at 1/10/2022 1222  Last data filed at 1/10/2022 1200  Gross per 24 hour   Intake 450 ml   Output 2020 ml   Net -1570 ml       Telemetry: Personally Reviewed    · Constitutional: Cooperative and in no apparent distress, and appears well nourished  · Skin: Warm and pink; no pallor, cyanosis, clubbing, or bruising   · HEENT: Symmetric and normocephalic. · Cardiovascular: Regular rate and rhythm. S1/S2   · Respiratory: Respirations symmetric and unlabored. Lungs clear to auscultation bilaterally, no wheezing, crackles, or rhonchi  · Gastrointestinal: Abdomen soft and round.  Bowel sounds normoactive without tenderness or masses. · Musculoskeletal: Bilateral upper and lower extremity strength 5/5 with full ROM  · Neurologic/Psych: Awake and orientated to person, place and time. Calm affect, appropriate mood    Patient Active Problem List    Diagnosis Date Noted    Non-ST elevated myocardial infarction (non-STEMI) ([facility] [facility] 12/31/2021    Syncope and collapse     Anemia 12/24/2021    Visit for wound check 06/06/2018    Basal cell carcinoma of right side of nose 05/08/2018    Visit for suture removal 05/08/2018    SCCS, mod diff  (squamous cell carcinoma), hand, left 04/24/2018    Mixed hyperlipidemia 04/18/2017    Nicotine use disorder 02/03/2017    COPD, mild ([facility] 06/23/2016    Colon polyp     Basal cell carcinoma of shoulder 09/08/2015    Squamous cell carcinoma of skin of upper limb, including shoulder 09/08/2015    Actinic keratosis 09/08/2015    BPH with obstruction/lower urinary tract symptoms 08/03/2012    Hyperglycemia 12/15/2010    Smoker's respiratory syndrome 12/15/2010        Assessment     syncope / with severe anemia /stable   GI workup completed     trop elevation with severe anemia / NSTEMI Type II suspected    LHC with severe CAD     1/3/2022 CABG x 3, LIMA to LAD, SVG to OM2 and PDA  pre-op EF ~30% w/ inferior akinesis; post op EF ~35% w/ inferior dyskinetic hypokinesis    ICM   2/28/2021 TTE   Normal left ventricle size and wall thickness. Overall left ventricular systolic function appears reduced with an ejectionction of 25-30%. There is hypokinesis of the basal and mid inferior walls. There is hypokinesis of the mid and basal inferolateral walls. There is hypokinesis of the mid and basal inferoseptal walls. Diastolic filling parameters suggest grade I diastolic dysfunction.  Mild mitral and tricuspid regurgitation  estimated pulmonary artery systolic pressure is 31 mmHg assuming a right  atrial pressure of 3 mmHg    NSVT  / now NSR with PVCS  On amiodarone po COPD  Stable   Inhalers     HLD  LDL 54 optimal      UTI  abx per IM      Plan:   POD#7 CABG x 3, LIMA to LAD, SVG to OM2 and PDA  Cardiac meds amiodarone asa lipitor plavix lasix mag ox lopressor   consider ace/arb/arni when ok with CTS  & when b/p allows   SGLT2 in future as tolerated   consider  life vest before discharge    will need to go to SNF    Thank you for allowing to us to participate in the care of Daniel Perez. Marvella Sicard APRN-CNP-CVNP    Interventional cardiology  Post bypass and slowly getting better. Hemodynamics are improved. LifeVest when the discharge. To go to skilled nursing facility at discharge.   Dali Tai MD, [facility] [place]

## 2022-01-10 NOTE — PROGRESS NOTES
Called report to Kayy Dsouza, nurse, at [facility]. Answered all questions and gave call back number.

## 2022-01-10 NOTE — PROGRESS NOTES
Physical Therapy  Facility/Department: [facility] ICU  Daily Treatment Note  NAME: Jannet Dobson  : 1950  MRN: [id number]    Date of Service: 1/10/2022    Discharge Recommendations:    Jannet Dobson scored a 10/24 on the AM-PAC short mobility form. Current research shows that an AM-PAC score of 17 or less is typically not associated with a discharge to the patient's home setting. Based on the patient's AM-PAC score and their current functional mobility deficits, it is recommended that the patient have 3-5 sessions per week of Physical Therapy at d/c to increase the patient's independence. Please see assessment section for further patient specific details. If patient discharges prior to next session this note will serve as a discharge summary. Please see below for the latest assessment towards goals. PT Equipment Recommendations  Equipment Needed: No    Assessment   Body structures, Functions, Activity limitations: Decreased functional mobility ; Decreased balance;Decreased cognition  Assessment: Pt with limited mobility due to posterior lean, difficulty maintaining sternal precautions, and decreased cognition. Nursing has been using [place] [employer] for transfer of pt up to chair, but pt able to ambulate several steps this date using walker with multiple cues. Rec continued inpt PT at d/c. Will follow. Treatment Diagnosis: impaired mobilty/balance  Prognosis: Fair  Decision Making: Medium Complexity  PT Education: Goals;PT Role;Plan of Care  Patient Education: sternal precautions- verbalized partial understanding  REQUIRES PT FOLLOW UP: Yes  Activity Tolerance  Activity Tolerance: Patient limited by cognitive status     Patient Diagnosis(es): The primary encounter diagnosis was Anemia, unspecified type. Diagnoses of Syncope, unspecified syncope type and NSTEMI (non-ST elevated myocardial infarction) [facility]) were also pertinent to this visit.      has a past medical history of BPH with obstruction/lower

## 2022-01-20 NOTE — DISCHARGE SUMMARY
DISCHARGE SUMMARY    Patient ID: Ericka Hammonds  MRN:  [id number]  YOB: 1950      Admission Date:  12/24/2021  4:36 PM  Discharge Date:  1/10/2022  5:45 PM     Principle Diagnosis:  NSTEMI    Secondary Diagnosis:  Active Problems:    Anemia    Syncope and collapse    Three vessel CAD    Class III acute systolic and diastolic heart failure    Bilateral pleural effusions    Chronic hypertension    Hyperlipidemia    Mild COPD    Preoperative BPH with obstruction    Preoperative anemia    Acute blood loss anemia    Penile and scrotal cellulitis    Procedure:  Coronary artery bypass grafting x3 - LIMA to mid LAD, reverse aortic coronary saphenous vein graft sequentially to second obtuse marginal and posterior descending; endoscopic saphenous vein harvest from right thigh; transesophageal echocardiography; total cardiopulmonary bypass. History:  The patient is a 70 y.o.  male admitted via ambulance after collapsing in a [employer] store. On admission, he was found to have a non-ST elevation myocardial infarction. Subsequent cardiac catheterization found to have a dense three-vessel coronary artery disease. He was treated medically and stabilized. His ongoing heart failure was medically managed and once he was medically settled, he proceeded on an expedited basis for coronary surgical revascularization. Hospital Course: The patient underwent CABG x 3 on January 3, 2022. In surgery, the patient's ejection fraction was around 30% with inferior akinesis. His postop ejection fraction was a bit higher and perhaps 35% with the inferior akinesis now showing signs of  functioning, but was still graeme less vigorously than the anterior and lateral walls. There was hypokinesis of the inferior wall. His operative course was uncomplicated and afterwards transferred to ICU for ongoing care. He was extubated the day of surgery.  He transitioned to progressive care once norepinephrine infusion was weaned off. PT/OT were consulted to assist with discharge planning. A small dose of beta blocker was slowlly introduced and ace inhibitor held due to marginal blood pressure. He was gently diuresed due to soft BP. His bladder and bowels were functioning normally. Since he lives alone, he was discharged to a SNF on POD 7.     Consults:  IP CONSULT TO HOSPITALIST  IP CONSULT TO CARDIOLOGY  IP CONSULT TO GI  IP CONSULT TO GI  IP CONSULT TO SOCIAL WORK  PHARMACY TO DOSE VANCOMYCIN  IP CONSULT TO UROLOGY  IP CONSULT TO CARDIAC REHAB  IP CONSULT TO CASE MANAGEMENT  IP CONSULT TO SOCIAL WORK  IP CONSULT TO DIETITIAN  IP CONSULT TO PHYSICAL MEDICINE REHAB     Significant Diagnostic Studies:   Chest X-ray  EKG    LABS:  CBC:   Recent Labs     01/08/22  0752 01/09/22  0445 01/10/22  0517   WBC 6.1 6.9 5.8   HGB 8.7* 8.8* 8.5*   HCT 26.6* 26.9* 25.4*   MCV 84.1 84.0 82.6    264 265      BMP:   Recent Labs     01/08/22  0534 01/09/22  0445 01/10/22  0517    138 137   K 4.7 4.6 4.6    102 102   CO2 27 30 29   GLUCOSE 96 112* 105*   BUN 33* 30* 34*   CREATININE 0.9 0.8 0.8   CALCIUM 8.0* 8.2* 8.2*   MG 2.20 2.00 2.10                                                                 Condition at discharge: Stable     Disposition:  SNF - [facility]    Physical Exam on discharge day:   /60   Pulse 72   Temp 98.1 °F (36.7 °C) (Oral)   Resp 18   Ht 5' 7\" (1.702 m)   Wt 155 lb 6.8 oz (70.5 kg)   SpO2 93%   BMI 24.34 kg/m²   Neuro: awake, alert and oriented x 3  CV:   Sinus  Pulm:  normal work of breathing, diminished, congested cough slightly improved  Abd:  soft, non-tender; bowel sounds normal; passing flatus, +BM  Diamond: slightly cloudy yellow  Wounds: clean, dry and intact  Ext: warm, + edema    Discharge Medications:      Medication List      START taking these medications    albuterol (2.5 MG/3ML) 0.083% nebulizer solution  Commonly known as: PROVENTIL  Take 3 mLs by nebulization every 6 hours vitamin C 500 MG tablet  Commonly known as: ASCORBIC ACID        STOP taking these medications    acetaminophen 325 MG tablet  Commonly known as: TYLENOL     albuterol sulfate  (90 Base) MCG/ACT inhaler  Commonly known as: Ventolin HFA  Replaced by: albuterol (2.5 MG/3ML) 0.083% nebulizer solution     aspirin 325 MG tablet  Replaced by: aspirin 325 MG EC tablet     Cinnamon 500 MG Caps     fish oil-omega-3 fatty acids 1000 MG capsule     Flax Seed Oil 1000 MG Caps     fluorouracil 5 % cream  Commonly known as: Efudex     fluticasone-salmeterol 250-50 MCG/DOSE Aepb  Commonly known as: ADVAIR     hydrocortisone 2.5 % cream     ketoconazole 2 % cream  Commonly known as: NIZORAL     mupirocin 2 % ointment  Commonly known as: BACTROBAN     umeclidinium-vilanterol 62.5-25 MCG/INH Aepb inhaler  Commonly known as: Anoro Ellipta     Vitamin B 12 500 MCG Lozg     vitamin D 400 units Tabs tablet  Commonly known as: CHOLECALCIFEROL     vitamin E 400 UNIT capsule     zoster recombinant adjuvanted vaccine 50 MCG/0.5ML Susr injection  Commonly known as:  Shingrix           Where to Get Your Medications      These medications were sent to [facility] [address] [facility] [employer] - [place], [place] - [address] [facility] [phone] [facility] [phone]  [address] [place] [address] Road To [facility] [address]    Phone: [phone]   · atorvastatin 40 MG tablet  · clopidogrel 75 MG tablet  · furosemide 20 MG tablet  · gabapentin 300 MG capsule  · melatonin 3 MG Tabs tablet  · metoprolol tartrate 25 MG tablet  · multivitamin Tabs tablet  · pantoprazole 40 MG tablet  · polyethylene glycol 17 g packet     Information about where to get these medications is not yet available    Ask your nurse or doctor about these medications  · albuterol (2.5 MG/3ML) 0.083% nebulizer solution  · amiodarone 200 MG tablet  · Arformoterol Tartrate 15 MCG/2ML Nebu  · aspirin 325 MG EC tablet  · montelukast 10 MG tablet  · potassium chloride 10 MEQ extended release tablet  · tiotropium

## 2022-01-24 ENCOUNTER — TELEPHONE (OUTPATIENT)
Dept: CARDIOTHORACIC SURGERY | Age: 72
End: 2022-01-24

## 2022-01-24 NOTE — TELEPHONE ENCOUNTER
Maddie from [facility] called, she said that Mr. Kurt Mendes is doing very poorly, not eating or drinking despite encouragement. The physician there wanted to initiate hospice care, she wanted to know if Aiyanamaynor Adiel would like to see Mr. Kurt Mendes before this process has begun. I told them yes Antoine Chun would like to see the patient. He does have an appointment for this Wednesday.

## 2022-01-26 ENCOUNTER — TELEPHONE (OUTPATIENT)
Dept: CARDIOTHORACIC SURGERY | Age: 72
End: 2022-01-26

## 2022-01-26 ENCOUNTER — APPOINTMENT (OUTPATIENT)
Dept: GENERAL RADIOLOGY | Age: 72
DRG: 871 | End: 2022-01-26
Payer: MEDICARE

## 2022-01-26 ENCOUNTER — HOSPITAL ENCOUNTER (INPATIENT)
Age: 72
LOS: 12 days | Discharge: HOSPICE | DRG: 871 | End: 2022-02-07
Attending: STUDENT IN AN ORGANIZED HEALTH CARE EDUCATION/TRAINING PROGRAM | Admitting: INTERNAL MEDICINE
Payer: MEDICARE

## 2022-01-26 ENCOUNTER — APPOINTMENT (OUTPATIENT)
Dept: CT IMAGING | Age: 72
DRG: 871 | End: 2022-01-26
Payer: MEDICARE

## 2022-01-26 ENCOUNTER — OFFICE VISIT (OUTPATIENT)
Dept: CARDIOTHORACIC SURGERY | Age: 72
End: 2022-01-26

## 2022-01-26 VITALS
OXYGEN SATURATION: 98 % | TEMPERATURE: 96.6 F | SYSTOLIC BLOOD PRESSURE: 98 MMHG | DIASTOLIC BLOOD PRESSURE: 50 MMHG | HEART RATE: 90 BPM | HEIGHT: 67 IN | BODY MASS INDEX: 24.34 KG/M2

## 2022-01-26 DIAGNOSIS — E87.0 HYPERNATREMIA: Primary | ICD-10-CM

## 2022-01-26 DIAGNOSIS — Z09 FOLLOW-UP SURGERY CARE: Primary | ICD-10-CM

## 2022-01-26 DIAGNOSIS — R41.82 ALTERED MENTAL STATUS, UNSPECIFIED ALTERED MENTAL STATUS TYPE: ICD-10-CM

## 2022-01-26 PROBLEM — E86.0 DEHYDRATION: Status: ACTIVE | Noted: 2022-01-26

## 2022-01-26 LAB
A/G RATIO: 1.1 (ref 1.1–2.2)
ALBUMIN SERPL-MCNC: 3.8 G/DL (ref 3.4–5)
ALP BLD-CCNC: 132 U/L (ref 40–129)
ALT SERPL-CCNC: 11 U/L (ref 10–40)
ANION GAP SERPL CALCULATED.3IONS-SCNC: 13 MMOL/L (ref 3–16)
AST SERPL-CCNC: 12 U/L (ref 15–37)
BACTERIA: ABNORMAL /HPF
BASE EXCESS VENOUS: 3.2 MMOL/L (ref -2–3)
BASOPHILS ABSOLUTE: 0 K/UL (ref 0–0.2)
BASOPHILS RELATIVE PERCENT: 0.2 %
BILIRUB SERPL-MCNC: 0.6 MG/DL (ref 0–1)
BILIRUBIN URINE: ABNORMAL
BLOOD, URINE: ABNORMAL
BUN BLDV-MCNC: 70 MG/DL (ref 7–20)
CALCIUM SERPL-MCNC: 9.7 MG/DL (ref 8.3–10.6)
CARBOXYHEMOGLOBIN: 0.7 % (ref 0–1.5)
CHLORIDE BLD-SCNC: 119 MMOL/L (ref 99–110)
CLARITY: ABNORMAL
CO2: 29 MMOL/L (ref 21–32)
COLOR: YELLOW
CREAT SERPL-MCNC: 1.6 MG/DL (ref 0.8–1.3)
EKG ATRIAL RATE: 97 BPM
EKG DIAGNOSIS: NORMAL
EKG P AXIS: 85 DEGREES
EKG P-R INTERVAL: 168 MS
EKG Q-T INTERVAL: 416 MS
EKG QRS DURATION: 134 MS
EKG QTC CALCULATION (BAZETT): 528 MS
EKG R AXIS: 69 DEGREES
EKG T AXIS: 44 DEGREES
EKG VENTRICULAR RATE: 97 BPM
EOSINOPHILS ABSOLUTE: 0.3 K/UL (ref 0–0.6)
EOSINOPHILS RELATIVE PERCENT: 2.3 %
GFR AFRICAN AMERICAN: 52
GFR NON-AFRICAN AMERICAN: 43
GLUCOSE BLD-MCNC: 104 MG/DL (ref 70–99)
GLUCOSE URINE: NEGATIVE MG/DL
HCO3 VENOUS: 30.6 MMOL/L (ref 24–28)
HCT VFR BLD CALC: 45 % (ref 40.5–52.5)
HEMOGLOBIN, VEN, REDUCED: 81.4 %
HEMOGLOBIN: 13.7 G/DL (ref 13.5–17.5)
HYALINE CASTS: ABNORMAL /LPF (ref 0–2)
KETONES, URINE: ABNORMAL MG/DL
LACTIC ACID: 2 MMOL/L (ref 0.4–2)
LEUKOCYTE ESTERASE, URINE: ABNORMAL
LYMPHOCYTES ABSOLUTE: 2.5 K/UL (ref 1–5.1)
LYMPHOCYTES RELATIVE PERCENT: 20.6 %
MCH RBC QN AUTO: 26.1 PG (ref 26–34)
MCHC RBC AUTO-ENTMCNC: 30.4 G/DL (ref 31–36)
MCV RBC AUTO: 85.6 FL (ref 80–100)
METHEMOGLOBIN VENOUS: 0.2 % (ref 0–1.5)
MICROSCOPIC EXAMINATION: YES
MONOCYTES ABSOLUTE: 0.7 K/UL (ref 0–1.3)
MONOCYTES RELATIVE PERCENT: 5.6 %
NEUTROPHILS ABSOLUTE: 8.7 K/UL (ref 1.7–7.7)
NEUTROPHILS RELATIVE PERCENT: 71.3 %
NITRITE, URINE: NEGATIVE
O2 SAT, VEN: 18 %
PCO2, VEN: 56.2 MMHG (ref 41–51)
PDW BLD-RTO: 21.2 % (ref 12.4–15.4)
PH UA: 5.5 (ref 5–8)
PH VENOUS: 7.34 (ref 7.35–7.45)
PLATELET # BLD: 267 K/UL (ref 135–450)
PMV BLD AUTO: 9 FL (ref 5–10.5)
PO2, VEN: <30 MMHG (ref 25–40)
POTASSIUM REFLEX MAGNESIUM: 3.9 MMOL/L (ref 3.5–5.1)
PRO-BNP: 1194 PG/ML (ref 0–124)
PROTEIN UA: NEGATIVE MG/DL
RBC # BLD: 5.26 M/UL (ref 4.2–5.9)
RBC UA: ABNORMAL /HPF (ref 0–4)
SODIUM BLD-SCNC: 161 MMOL/L (ref 136–145)
SPECIFIC GRAVITY UA: >=1.03 (ref 1–1.03)
TCO2 CALC VENOUS: 32 MMOL/L
TOTAL PROTEIN: 7.3 G/DL (ref 6.4–8.2)
URINE TYPE: ABNORMAL
UROBILINOGEN, URINE: 0.2 E.U./DL
WBC # BLD: 12.2 K/UL (ref 4–11)
WBC UA: ABNORMAL /HPF (ref 0–5)
YEAST: PRESENT /HPF

## 2022-01-26 PROCEDURE — 6360000002 HC RX W HCPCS: Performed by: INTERNAL MEDICINE

## 2022-01-26 PROCEDURE — 70450 CT HEAD/BRAIN W/O DYE: CPT

## 2022-01-26 PROCEDURE — 99024 POSTOP FOLLOW-UP VISIT: CPT | Performed by: THORACIC SURGERY (CARDIOTHORACIC VASCULAR SURGERY)

## 2022-01-26 PROCEDURE — 87086 URINE CULTURE/COLONY COUNT: CPT

## 2022-01-26 PROCEDURE — 82803 BLOOD GASES ANY COMBINATION: CPT

## 2022-01-26 PROCEDURE — 36415 COLL VENOUS BLD VENIPUNCTURE: CPT

## 2022-01-26 PROCEDURE — 99285 EMERGENCY DEPT VISIT HI MDM: CPT

## 2022-01-26 PROCEDURE — 83880 ASSAY OF NATRIURETIC PEPTIDE: CPT

## 2022-01-26 PROCEDURE — 2580000003 HC RX 258: Performed by: INTERNAL MEDICINE

## 2022-01-26 PROCEDURE — 85025 COMPLETE CBC W/AUTO DIFF WBC: CPT

## 2022-01-26 PROCEDURE — 93005 ELECTROCARDIOGRAM TRACING: CPT | Performed by: STUDENT IN AN ORGANIZED HEALTH CARE EDUCATION/TRAINING PROGRAM

## 2022-01-26 PROCEDURE — 2700000000 HC OXYGEN THERAPY PER DAY

## 2022-01-26 PROCEDURE — 81001 URINALYSIS AUTO W/SCOPE: CPT

## 2022-01-26 PROCEDURE — 71045 X-RAY EXAM CHEST 1 VIEW: CPT

## 2022-01-26 PROCEDURE — 2580000003 HC RX 258: Performed by: STUDENT IN AN ORGANIZED HEALTH CARE EDUCATION/TRAINING PROGRAM

## 2022-01-26 PROCEDURE — 80053 COMPREHEN METABOLIC PANEL: CPT

## 2022-01-26 PROCEDURE — 83605 ASSAY OF LACTIC ACID: CPT

## 2022-01-26 PROCEDURE — 94761 N-INVAS EAR/PLS OXIMETRY MLT: CPT

## 2022-01-26 PROCEDURE — 1200000000 HC SEMI PRIVATE

## 2022-01-26 RX ORDER — AMIODARONE HYDROCHLORIDE 200 MG/1
200 TABLET ORAL DAILY
Status: DISCONTINUED | OUTPATIENT
Start: 2022-01-27 | End: 2022-02-07 | Stop reason: HOSPADM

## 2022-01-26 RX ORDER — CLOPIDOGREL BISULFATE 75 MG/1
75 TABLET ORAL DAILY
Status: DISCONTINUED | OUTPATIENT
Start: 2022-01-27 | End: 2022-02-07 | Stop reason: HOSPADM

## 2022-01-26 RX ORDER — PANTOPRAZOLE SODIUM 40 MG/1
40 TABLET, DELAYED RELEASE ORAL
Status: DISCONTINUED | OUTPATIENT
Start: 2022-01-27 | End: 2022-01-28

## 2022-01-26 RX ORDER — SODIUM CHLORIDE 0.9 % (FLUSH) 0.9 %
10 SYRINGE (ML) INJECTION EVERY 12 HOURS SCHEDULED
Status: DISCONTINUED | OUTPATIENT
Start: 2022-01-26 | End: 2022-02-07 | Stop reason: HOSPADM

## 2022-01-26 RX ORDER — SENNA AND DOCUSATE SODIUM 50; 8.6 MG/1; MG/1
1 TABLET, FILM COATED ORAL 2 TIMES DAILY
Status: DISCONTINUED | OUTPATIENT
Start: 2022-01-26 | End: 2022-02-07 | Stop reason: HOSPADM

## 2022-01-26 RX ORDER — BUPROPION HYDROCHLORIDE 150 MG/1
300 TABLET ORAL EVERY MORNING
Status: DISCONTINUED | OUTPATIENT
Start: 2022-01-27 | End: 2022-01-31

## 2022-01-26 RX ORDER — AMOXICILLIN AND CLAVULANATE POTASSIUM 500; 125 MG/1; MG/1
1 TABLET, FILM COATED ORAL 2 TIMES DAILY
Status: ON HOLD | COMMUNITY
End: 2022-02-07 | Stop reason: HOSPADM

## 2022-01-26 RX ORDER — ATORVASTATIN CALCIUM 40 MG/1
40 TABLET, FILM COATED ORAL NIGHTLY
Status: DISCONTINUED | OUTPATIENT
Start: 2022-01-26 | End: 2022-02-07 | Stop reason: HOSPADM

## 2022-01-26 RX ORDER — DEXTROSE MONOHYDRATE 50 MG/ML
INJECTION, SOLUTION INTRAVENOUS CONTINUOUS
Status: DISCONTINUED | OUTPATIENT
Start: 2022-01-26 | End: 2022-01-27

## 2022-01-26 RX ORDER — MONTELUKAST SODIUM 10 MG/1
10 TABLET ORAL NIGHTLY
Status: DISCONTINUED | OUTPATIENT
Start: 2022-01-26 | End: 2022-02-07 | Stop reason: HOSPADM

## 2022-01-26 RX ORDER — ONDANSETRON 4 MG/1
4 TABLET, ORALLY DISINTEGRATING ORAL EVERY 8 HOURS PRN
Status: DISCONTINUED | OUTPATIENT
Start: 2022-01-26 | End: 2022-02-07 | Stop reason: HOSPADM

## 2022-01-26 RX ORDER — SODIUM CHLORIDE 0.9 % (FLUSH) 0.9 %
10 SYRINGE (ML) INJECTION PRN
Status: DISCONTINUED | OUTPATIENT
Start: 2022-01-26 | End: 2022-02-07 | Stop reason: HOSPADM

## 2022-01-26 RX ORDER — ARFORMOTEROL TARTRATE 15 UG/2ML
15 SOLUTION RESPIRATORY (INHALATION) 2 TIMES DAILY
Status: DISCONTINUED | OUTPATIENT
Start: 2022-01-26 | End: 2022-02-07 | Stop reason: HOSPADM

## 2022-01-26 RX ORDER — SODIUM CHLORIDE 9 MG/ML
1000 INJECTION, SOLUTION INTRAVENOUS CONTINUOUS
Status: DISCONTINUED | OUTPATIENT
Start: 2022-01-26 | End: 2022-01-26

## 2022-01-26 RX ORDER — ACETAMINOPHEN 325 MG/1
650 TABLET ORAL EVERY 6 HOURS PRN
Status: DISCONTINUED | OUTPATIENT
Start: 2022-01-26 | End: 2022-02-07 | Stop reason: HOSPADM

## 2022-01-26 RX ORDER — SODIUM CHLORIDE 9 MG/ML
25 INJECTION, SOLUTION INTRAVENOUS PRN
Status: DISCONTINUED | OUTPATIENT
Start: 2022-01-26 | End: 2022-02-07 | Stop reason: HOSPADM

## 2022-01-26 RX ORDER — ACETAMINOPHEN 650 MG/1
650 SUPPOSITORY RECTAL EVERY 6 HOURS PRN
Status: DISCONTINUED | OUTPATIENT
Start: 2022-01-26 | End: 2022-02-07 | Stop reason: HOSPADM

## 2022-01-26 RX ORDER — POTASSIUM CHLORIDE 750 MG/1
10 TABLET, EXTENDED RELEASE ORAL DAILY
Status: DISCONTINUED | OUTPATIENT
Start: 2022-01-27 | End: 2022-01-30

## 2022-01-26 RX ORDER — 0.9 % SODIUM CHLORIDE 0.9 %
500 INTRAVENOUS SOLUTION INTRAVENOUS ONCE
Status: COMPLETED | OUTPATIENT
Start: 2022-01-26 | End: 2022-01-26

## 2022-01-26 RX ORDER — ONDANSETRON 2 MG/ML
4 INJECTION INTRAMUSCULAR; INTRAVENOUS EVERY 6 HOURS PRN
Status: DISCONTINUED | OUTPATIENT
Start: 2022-01-26 | End: 2022-02-07 | Stop reason: HOSPADM

## 2022-01-26 RX ADMIN — CEFTRIAXONE 1000 MG: 1 INJECTION, POWDER, FOR SOLUTION INTRAMUSCULAR; INTRAVENOUS at 22:18

## 2022-01-26 RX ADMIN — SODIUM CHLORIDE, PRESERVATIVE FREE 10 ML: 5 INJECTION INTRAVENOUS at 22:45

## 2022-01-26 RX ADMIN — SODIUM CHLORIDE 500 ML: 9 INJECTION, SOLUTION INTRAVENOUS at 18:21

## 2022-01-26 RX ADMIN — SODIUM CHLORIDE 1000 ML: 9 INJECTION, SOLUTION INTRAVENOUS at 22:10

## 2022-01-26 NOTE — ED PROVIDER NOTES
[id number]          ATTENDING PHYSICIAN NOTE       Date of evaluation: 1/26/2022    Chief Complaint     Lethargy, poor PO intake    History of Present Illness     Kristine Tubbs is a 70 y.o. male who presents with poor PO intake, lethargy after a recent major cardiac surgery    History is limited by patient condition: altered mental status    Presents from the cardiac surgeons office. Patiently recently underwent CABG x 3 LIMA to LAD, SVG to OM and PDA on 1/3/2022. He was discharged from the hospital on 1/10/2022 to a nursing facility. He has reportedly refused to eat anything there and is very satisfied with his life there. He is also upset because reportedly the contents of his home were cleaned out while he was hospitalized by family member , which was upsetting to him because there are many items that he had been keeping in the house that he did not want to taken out of the house. There were some reported laboratory abnormalities related to his sodium levels, but I do not have the records available to me. On my history, the patient speaks softly and uses 1-2 words to answer questions. It is very difficult to understand him, and he volitionally keeps his eyes closed. To the best of my understanding, he endorses some mild suprapubic pain but is unable to give me any further details. PMHx: CABG, BPH, and as below  SH: currently living in nursing facility, no recent alcohol, and as below    Review of Systems       ROS:      Unable to obtain due to patient condition: altered mental status       Past Medical, Surgical, Family, and Social History     He has a past medical history of BPH with obstruction/lower urinary tract symptoms, Chicken pox, Colon polyp, Hyperglycemia, Hyperlipemia, Post herpetic neuralgia, Shingles, and Squamous cell carcinoma of skin of upper limb, including shoulder. He has a past surgical history that includes hemicolectomy;  Upper gastrointestinal endoscopy (N/A, 12/27/2021); and Coronary artery bypass graft (N/A, 1/3/2022). His family history includes Arthritis in his father, mother, and sister; Cancer in his sister; Depression in his mother; Heart Disease in his father and mother; High Blood Pressure in his father, mother, and sister; High Cholesterol in his father, mother, and sister; Stroke in his father. He reports that he has been smoking cigarettes. He has been smoking about 1.00 pack per day. He has never used smokeless tobacco. He reports current alcohol use of about 6.0 standard drinks of alcohol per week. He reports that he does not use drugs. Medications     Previous Medications    ALBUTEROL (PROVENTIL) (2.5 MG/3ML) 0.083% NEBULIZER SOLUTION    Take 3 mLs by nebulization every 6 hours as needed for Shortness of Breath    AMIODARONE (CORDARONE) 200 MG TABLET    Take 1 tablet by mouth daily for 19 doses    AMOXICILLIN-CLAVULANATE (AUGMENTIN) 500-125 MG PER TABLET    Take 1 tablet by mouth 2 times daily    ARFORMOTEROL TARTRATE (BROVANA) 15 MCG/2ML NEBU    Take 2 mLs by nebulization 2 times daily    ASCORBIC ACID (VITAMIN C) 500 MG TABLET    Take 500 mg by mouth daily. ASPIRIN 325 MG EC TABLET    Take 1 tablet by mouth daily    ATORVASTATIN (LIPITOR) 40 MG TABLET    Take 1 tablet by mouth nightly    BALSAM PERU-CASTOR OIL (VENELEX) OINT OINTMENT    Apply topically 2 times daily    BUPROPION (WELLBUTRIN XL) 300 MG EXTENDED RELEASE TABLET    Take 1 tablet by mouth every morning    CLOPIDOGREL (PLAVIX) 75 MG TABLET    Take 1 tablet by mouth daily    FUROSEMIDE (LASIX) 20 MG TABLET    Take 1 tablet by mouth daily    GABAPENTIN (NEURONTIN) 300 MG CAPSULE    Take 1 capsule by mouth 3 times daily for 30 days.     MELATONIN 3 MG TABS TABLET    Take 1 tablet by mouth nightly as needed (sleep)    METOPROLOL TARTRATE (LOPRESSOR) 25 MG TABLET    Take 0.25 tablets by mouth 2 times daily    MONTELUKAST (SINGULAIR) 10 MG TABLET    Take 1 tablet by mouth nightly    MULTIPLE VITAMIN (MULTIVITAMIN) TABS TABLET    Take 1 tablet by mouth daily    PANTOPRAZOLE (PROTONIX) 40 MG TABLET    Take 1 tablet by mouth 2 times daily (before meals)    POLYETHYLENE GLYCOL (GLYCOLAX) 17 G PACKET    Take 17 g by mouth daily as needed for Constipation    POTASSIUM CHLORIDE (KLOR-CON M) 10 MEQ EXTENDED RELEASE TABLET    Take 1 tablet by mouth daily    TIOTROPIUM (SPIRIVA RESPIMAT) 2.5 MCG/ACT AERS INHALER    Inhale 2 puffs into the lungs daily       Allergies     He has No Known Allergies. Physical Exam     INITIAL VITALS: BP: 101/75, Temp: 98.3 °F (36.8 °C), Pulse: 88, Resp: 18, SpO2: 95 %     General:  Elderly thin gentleman in no acute distress. Non-toxic appearing    Eyes:  Pupils equally round, reactive, brisk. No discharge from eyes. Attempts to close his eyes even when I open eyelids directly  ENT:  No discharge from nose. OP clear. Neck:  Supple. Nontender. Pulmonary:   Non-labored breathing. Breath sounds clear bilaterally. Cardiac:  Regular rate and rhythm. No murmurs. Abdomen:  Soft. Minimally tender in suprapubic region but no rebound or guarding. Non-distended. No masses. : normal external male genitalia, no erythema, warmth, skin thickening, crepitus. Musculoskeletal:  No long bone deformity. No ankle or wrist deformity. Vascular:  Extremities warm and perfused. Radial pulses 2+ bilaterally. Dorsalis pedis pulses 2+ bilaterally. Skin:  No rash. Warm. C/d/i sternal scar. Neuro: Alert to voice, follows commands in all 4 extremities with intact distal motor strength by finger  and ankle dorsi/plantar flexion. No aphasia or dysarthria, but extremely softspoken and answers questions with 1-2 word answers. Sensation grossly intact to light touch. VIKKI. Does open eyes with stimulus (for example, when bed height moved). Extremities:  No peripheral edema. LE symmetric.     Diagnostic Results     EKG   Indication altered mental status     EKG Interpretation     Interpreted by me (emergency department physician)     Rhythm: normal sinus   Rate: 97  Axis: normal  Ectopy: none  Conduction: RBBB-, prolonged QTc 528,  normal  ST Segments: no acute change  T Waves: no acute change  Q Waves: nonspecific pattern     Clinical Impression:   Normal sinus rhythm. This is a non-specific EKG with no significant change compared to the prior EKG dated 12/24/21. There is no evidence of significant new interval prolongation. There is no evidence of acute ischemia. [facility]    RADIOLOGY:  XR CHEST PORTABLE   Final Result      Left pleural effusion with adjacent parenchymal consolidation, similar to January 6, 2022. CT HEAD WO CONTRAST   Final Result      1. No evidence for acute intracranial abnormality. 2.  Moderate diffuse cerebral atrophy with ventricular white matter changes bilaterally consistent with chronic small vessel ischemia.           LABS:   Results for orders placed or performed during the hospital encounter of 01/26/22   CBC Auto Differential   Result Value Ref Range    WBC 12.2 (H) 4.0 - 11.0 K/uL    RBC 5.26 4.20 - 5.90 M/uL    Hemoglobin 13.7 13.5 - 17.5 g/dL    Hematocrit 45.0 40.5 - 52.5 %    MCV 85.6 80.0 - 100.0 fL    MCH 26.1 26.0 - 34.0 pg    MCHC 30.4 (L) 31.0 - 36.0 g/dL    RDW 21.2 (H) 12.4 - 15.4 %    Platelets 550 439 - 460 K/uL    MPV 9.0 5.0 - 10.5 fL    Neutrophils % 71.3 %    Lymphocytes % 20.6 %    Monocytes % 5.6 %    Eosinophils % 2.3 %    Basophils % 0.2 %    Neutrophils Absolute 8.7 (H) 1.7 - 7.7 K/uL    Lymphocytes Absolute 2.5 1.0 - 5.1 K/uL    Monocytes Absolute 0.7 0.0 - 1.3 K/uL    Eosinophils Absolute 0.3 0.0 - 0.6 K/uL    Basophils Absolute 0.0 0.0 - 0.2 K/uL   Comprehensive Metabolic Panel w/ Reflex to MG   Result Value Ref Range    Sodium 161 (HH) 136 - 145 mmol/L    Potassium reflex Magnesium 3.9 3.5 - 5.1 mmol/L    Chloride 119 (H) 99 - 110 mmol/L    CO2 29 21 - 32 mmol/L Medical Hx, Past Surgical Hx, Social Hx, Allergies, and Family Hx were reviewed. The patient was given the following medications:  Orders Placed This Encounter   Medications    0.9 % sodium chloride bolus       CONSULTS:  IP CONSULT TO HOSPITALIST    MEDICAL DECISIONMAKING / ASSESSMENT / Efrenbirgit Pascal is a 70 y.o. male with altered metnal status. Pt was hemodynamically stable and afebrile in the Emergency Department. History suggests reduced poor PO intake, likely volitional in setting of acute adjustment disroder. Labs and exam consistent with acute kidney injury and hypernatremia, likely hypovolemic. UA nonspecific, urine culture sent, will not treat. Gentle IVF 500cc NS ordered. CBC with mild leukocytosis, nonspecific. Lactate not elevated reassuringly  CXR with stable findings. No fevers reported so do not feel in absence of fever or definite infectious source that risks of antibiotics are indicated at this time. BNP elevated, but not above baseline. VBG with mild respiratory acidosis. CT head WO reassuring against acute intracranial abnormality.      Further, evaluation considered multiple possible etiologies:  Infectious:   -Patient was well appearing without signs on exam or specific referent symptoms to suggestion infection  -I had a low suspicion for meningitis or encephalitis given the patient was not  febrile, complaining of neck pain, or demonstrating suggestive signs on exam    -Blood cultures x2 were not sent given very low suspicion for bacteremia    -Hepatic function panel with no evidence of significant hepatic disfunction  -Thyroid studies were not obtained given low suspicion and absence of multiple signs/symptoms to suggest hypo or hyperthyroid state  -Patient denied ingestion or substance use, and examination did not suggest these etiologies  -Review of medication list did not  identify any likely culprit medications  Neurologic/neurovascular  -The history and examination were not  suggestive of cerebrovascular accident, TIA, or other neurovascular process such as intracranial hemorrhage - nonfocal exam.  -History did not suggest a seizure disorder, and patient does not have history of epilepsy  Cardiac/Pulmonary  -EKG unremarkable, as above      At this point given the severe hypernatremia,  and acute kidney injury, the patient meets criteria for inpatient status. Discussed the patient with the hospitalist who accepted the patient. Clinical Impression     1. Hypernatremia    2. Altered mental status, unspecified altered mental status type           Disposition     PATIENT REFERRED TO:  No follow-up provider specified.     DISCHARGE MEDICATIONS:  New Prescriptions    No medications on file       DISPOSITION Decision To Admit 01/26/2022 05:56:37 PM       Steven Madrid MD  01/26/22 [id number]

## 2022-01-26 NOTE — ED NOTES
Dr. Scottie King called and gave report on pt. Dr. Scottie King stated that he operated on pt 3.5 weeks ago, a Bypass, and that he was inpatient until 1/10/22. Pts sister states that pt is a hoarder and his house had to be cleaned out as well as fumigated for bed bugs. Pt returned home and Dr. Scottie King states that pt is not eating or drinking and he feels that he is having \"Failure to thrive\" along with \"Depression. \" Dr. Roosevelt Mukherjee states that he feels pt needs a psych eval but his lab work was not good and pt needs medical attention first.     December PRIYANKA Cruz  01/26/22 0801

## 2022-01-26 NOTE — TELEPHONE ENCOUNTER
I called [facility] and spoke with Melchor Jackson RN to update on plan of care. They were notified that patient was sent to [facility] ED for further evaluation.

## 2022-01-26 NOTE — PROGRESS NOTES
highly inappropriate for him to be transferred to hospice at this time as he is likely experiencing a significant adjustment disorder related to his recent major cardiac surgery, his loss of control over his previous behavior of hoarding which happened concurrently. He needs to have an extensive psychiatric evaluation as well as rehydration from his current lack of PO intake. I am fine with him being provided any food which he will eat, it sounds as though he would currently eat food from [facility] and I am fine with this despite his recent coronary surgery. I am recommending he be evaluated in a psychiatric ER at this time. He should continue sternal precautions for now. Alexandr Bruner MD   Thoracic Surgery Resident   [phone]  01/26/22 3:05 PM       I saw and evaluated the patient. I agree with the findings/plan of care in the fellow's note.       Yamileth Herrera MD  1/26/2022  3:59 PM

## 2022-01-27 ENCOUNTER — APPOINTMENT (OUTPATIENT)
Dept: GENERAL RADIOLOGY | Age: 72
DRG: 871 | End: 2022-01-27
Payer: MEDICARE

## 2022-01-27 PROBLEM — E43 SEVERE MALNUTRITION (HCC): Chronic | Status: ACTIVE | Noted: 2022-01-27

## 2022-01-27 LAB
ALBUMIN SERPL-MCNC: 3.3 G/DL (ref 3.4–5)
ALBUMIN SERPL-MCNC: 3.3 G/DL (ref 3.4–5)
ALBUMIN SERPL-MCNC: 3.7 G/DL (ref 3.4–5)
ALP BLD-CCNC: 113 U/L (ref 40–129)
ALT SERPL-CCNC: 8 U/L (ref 10–40)
ANION GAP SERPL CALCULATED.3IONS-SCNC: 10 MMOL/L (ref 3–16)
ANION GAP SERPL CALCULATED.3IONS-SCNC: 12 MMOL/L (ref 3–16)
ANION GAP SERPL CALCULATED.3IONS-SCNC: 8 MMOL/L (ref 3–16)
AST SERPL-CCNC: 12 U/L (ref 15–37)
BASE EXCESS ARTERIAL: 2.5 MMOL/L (ref -3–3)
BILIRUB SERPL-MCNC: 0.5 MG/DL (ref 0–1)
BILIRUBIN DIRECT: <0.2 MG/DL (ref 0–0.3)
BILIRUBIN, INDIRECT: ABNORMAL MG/DL (ref 0–1)
BUN BLDV-MCNC: 69 MG/DL (ref 7–20)
BUN BLDV-MCNC: 74 MG/DL (ref 7–20)
BUN BLDV-MCNC: 76 MG/DL (ref 7–20)
CALCIUM SERPL-MCNC: 8.9 MG/DL (ref 8.3–10.6)
CALCIUM SERPL-MCNC: 8.9 MG/DL (ref 8.3–10.6)
CALCIUM SERPL-MCNC: 9.3 MG/DL (ref 8.3–10.6)
CARBOXYHEMOGLOBIN ARTERIAL: 0.6 % (ref 0–1.5)
CHLORIDE BLD-SCNC: 122 MMOL/L (ref 99–110)
CHLORIDE BLD-SCNC: 123 MMOL/L (ref 99–110)
CHLORIDE BLD-SCNC: 127 MMOL/L (ref 99–110)
CO2: 25 MMOL/L (ref 21–32)
CO2: 25 MMOL/L (ref 21–32)
CO2: 29 MMOL/L (ref 21–32)
CREAT SERPL-MCNC: 1.5 MG/DL (ref 0.8–1.3)
CREAT SERPL-MCNC: 1.5 MG/DL (ref 0.8–1.3)
CREAT SERPL-MCNC: 2 MG/DL (ref 0.8–1.3)
GFR AFRICAN AMERICAN: 40
GFR AFRICAN AMERICAN: 56
GFR AFRICAN AMERICAN: 56
GFR NON-AFRICAN AMERICAN: 33
GFR NON-AFRICAN AMERICAN: 46
GFR NON-AFRICAN AMERICAN: 46
GLUCOSE BLD-MCNC: 135 MG/DL (ref 70–99)
GLUCOSE BLD-MCNC: 146 MG/DL (ref 70–99)
GLUCOSE BLD-MCNC: 148 MG/DL (ref 70–99)
GLUCOSE BLD-MCNC: 150 MG/DL (ref 70–99)
GLUCOSE BLD-MCNC: 156 MG/DL (ref 70–99)
HCO3 ARTERIAL: 27 MMOL/L (ref 21–29)
HCT VFR BLD CALC: 39 % (ref 40.5–52.5)
HEMOGLOBIN, ART, EXTENDED: 12.6 G/DL
HEMOGLOBIN: 12.3 G/DL (ref 13.5–17.5)
MAGNESIUM: 2.7 MG/DL (ref 1.8–2.4)
MCH RBC QN AUTO: 26.3 PG (ref 26–34)
MCHC RBC AUTO-ENTMCNC: 31.4 G/DL (ref 31–36)
MCV RBC AUTO: 83.8 FL (ref 80–100)
METHEMOGLOBIN ARTERIAL: 0.4 % (ref 0–1.4)
O2 SAT, ARTERIAL: 96 % (ref 93–100)
ORGANISM: ABNORMAL
PCO2 ARTERIAL: 38.4 MMHG (ref 35–45)
PDW BLD-RTO: 20.7 % (ref 12.4–15.4)
PERFORMED ON: ABNORMAL
PERFORMED ON: ABNORMAL
PH ARTERIAL: 7.45 (ref 7.35–7.45)
PHOSPHORUS: 2.3 MG/DL (ref 2.5–4.9)
PHOSPHORUS: 3.3 MG/DL (ref 2.5–4.9)
PLATELET # BLD: 215 K/UL (ref 135–450)
PMV BLD AUTO: 9.1 FL (ref 5–10.5)
PO2 ARTERIAL: 74.9 MMHG (ref 75–108)
POTASSIUM REFLEX MAGNESIUM: 3.5 MMOL/L (ref 3.5–5.1)
POTASSIUM SERPL-SCNC: 3.5 MMOL/L (ref 3.5–5.1)
POTASSIUM SERPL-SCNC: 3.9 MMOL/L (ref 3.5–5.1)
RBC # BLD: 4.66 M/UL (ref 4.2–5.9)
SODIUM BLD-SCNC: 159 MMOL/L (ref 136–145)
SODIUM BLD-SCNC: 160 MMOL/L (ref 136–145)
SODIUM BLD-SCNC: 162 MMOL/L (ref 136–145)
TCO2 ARTERIAL: 28 MMOL/L
TOTAL PROTEIN: 6.5 G/DL (ref 6.4–8.2)
URINE CULTURE, ROUTINE: ABNORMAL
WBC # BLD: 12.3 K/UL (ref 4–11)

## 2022-01-27 PROCEDURE — 74018 RADEX ABDOMEN 1 VIEW: CPT

## 2022-01-27 PROCEDURE — 94761 N-INVAS EAR/PLS OXIMETRY MLT: CPT

## 2022-01-27 PROCEDURE — 82803 BLOOD GASES ANY COMBINATION: CPT

## 2022-01-27 PROCEDURE — 36600 WITHDRAWAL OF ARTERIAL BLOOD: CPT

## 2022-01-27 PROCEDURE — 80069 RENAL FUNCTION PANEL: CPT

## 2022-01-27 PROCEDURE — 6360000002 HC RX W HCPCS: Performed by: INTERNAL MEDICINE

## 2022-01-27 PROCEDURE — 99223 1ST HOSP IP/OBS HIGH 75: CPT | Performed by: INTERNAL MEDICINE

## 2022-01-27 PROCEDURE — 87040 BLOOD CULTURE FOR BACTERIA: CPT

## 2022-01-27 PROCEDURE — 94664 DEMO&/EVAL PT USE INHALER: CPT

## 2022-01-27 PROCEDURE — 80076 HEPATIC FUNCTION PANEL: CPT

## 2022-01-27 PROCEDURE — 2000000000 HC ICU R&B

## 2022-01-27 PROCEDURE — 2580000003 HC RX 258: Performed by: STUDENT IN AN ORGANIZED HEALTH CARE EDUCATION/TRAINING PROGRAM

## 2022-01-27 PROCEDURE — 6360000002 HC RX W HCPCS: Performed by: STUDENT IN AN ORGANIZED HEALTH CARE EDUCATION/TRAINING PROGRAM

## 2022-01-27 PROCEDURE — 85027 COMPLETE CBC AUTOMATED: CPT

## 2022-01-27 PROCEDURE — 99024 POSTOP FOLLOW-UP VISIT: CPT | Performed by: THORACIC SURGERY (CARDIOTHORACIC VASCULAR SURGERY)

## 2022-01-27 PROCEDURE — 83735 ASSAY OF MAGNESIUM: CPT

## 2022-01-27 PROCEDURE — 94640 AIRWAY INHALATION TREATMENT: CPT

## 2022-01-27 PROCEDURE — 36415 COLL VENOUS BLD VENIPUNCTURE: CPT

## 2022-01-27 PROCEDURE — 6370000000 HC RX 637 (ALT 250 FOR IP): Performed by: CLINICAL NURSE SPECIALIST

## 2022-01-27 PROCEDURE — 2500000003 HC RX 250 WO HCPCS: Performed by: CLINICAL NURSE SPECIALIST

## 2022-01-27 PROCEDURE — 6370000000 HC RX 637 (ALT 250 FOR IP): Performed by: STUDENT IN AN ORGANIZED HEALTH CARE EDUCATION/TRAINING PROGRAM

## 2022-01-27 PROCEDURE — 2700000000 HC OXYGEN THERAPY PER DAY

## 2022-01-27 PROCEDURE — 2580000003 HC RX 258: Performed by: INTERNAL MEDICINE

## 2022-01-27 RX ORDER — MULTIVITAMIN WITH IRON
1 TABLET ORAL DAILY
Status: COMPLETED | OUTPATIENT
Start: 2022-01-27 | End: 2022-02-02

## 2022-01-27 RX ORDER — FLUCONAZOLE 200 MG/1
200 TABLET ORAL DAILY
Status: DISCONTINUED | OUTPATIENT
Start: 2022-01-27 | End: 2022-01-29

## 2022-01-27 RX ORDER — DEXTROSE, SODIUM CHLORIDE, AND POTASSIUM CHLORIDE 5; .45; .15 G/100ML; G/100ML; G/100ML
INJECTION INTRAVENOUS CONTINUOUS
Status: DISCONTINUED | OUTPATIENT
Start: 2022-01-27 | End: 2022-01-27

## 2022-01-27 RX ORDER — GAUZE BANDAGE 2" X 2"
100 BANDAGE TOPICAL DAILY
Status: DISCONTINUED | OUTPATIENT
Start: 2022-01-27 | End: 2022-01-27

## 2022-01-27 RX ORDER — GAUZE BANDAGE 2" X 2"
100 BANDAGE TOPICAL DAILY
Status: COMPLETED | OUTPATIENT
Start: 2022-01-28 | End: 2022-02-03

## 2022-01-27 RX ORDER — LIDOCAINE HYDROCHLORIDE 20 MG/ML
JELLY TOPICAL ONCE
Status: DISCONTINUED | OUTPATIENT
Start: 2022-01-27 | End: 2022-02-07 | Stop reason: HOSPADM

## 2022-01-27 RX ORDER — OXYMETAZOLINE HYDROCHLORIDE 0.05 G/100ML
2 SPRAY NASAL ONCE
Status: DISPENSED | OUTPATIENT
Start: 2022-01-27 | End: 2022-01-30

## 2022-01-27 RX ADMIN — CEFTRIAXONE 1000 MG: 1 INJECTION, POWDER, FOR SOLUTION INTRAMUSCULAR; INTRAVENOUS at 22:38

## 2022-01-27 RX ADMIN — THIAMINE HCL TAB 100 MG 100 MG: 100 TAB at 14:15

## 2022-01-27 RX ADMIN — SODIUM CHLORIDE, PRESERVATIVE FREE 10 ML: 5 INJECTION INTRAVENOUS at 20:48

## 2022-01-27 RX ADMIN — ATORVASTATIN CALCIUM 40 MG: 40 TABLET, FILM COATED ORAL at 20:47

## 2022-01-27 RX ADMIN — DEXTROSE MONOHYDRATE: 50 INJECTION, SOLUTION INTRAVENOUS at 00:40

## 2022-01-27 RX ADMIN — MONTELUKAST 10 MG: 10 TABLET, FILM COATED ORAL at 20:47

## 2022-01-27 RX ADMIN — DEXTROSE MONOHYDRATE, SODIUM CHLORIDE, AND POTASSIUM CHLORIDE: 50; 4.5; 1.49 INJECTION, SOLUTION INTRAVENOUS at 11:01

## 2022-01-27 RX ADMIN — POTASSIUM CHLORIDE: 2 INJECTION, SOLUTION, CONCENTRATE INTRAVENOUS at 19:40

## 2022-01-27 RX ADMIN — SENNOSIDES AND DOCUSATE SODIUM 1 TABLET: 50; 8.6 TABLET ORAL at 20:47

## 2022-01-27 RX ADMIN — THERA TABS 1 TABLET: TAB at 20:48

## 2022-01-27 RX ADMIN — ARFORMOTEROL TARTRATE 15 MCG: 15 SOLUTION RESPIRATORY (INHALATION) at 08:08

## 2022-01-27 RX ADMIN — FLUCONAZOLE 200 MG: 200 TABLET ORAL at 18:54

## 2022-01-27 RX ADMIN — ARFORMOTEROL TARTRATE 15 MCG: 15 SOLUTION RESPIRATORY (INHALATION) at 21:31

## 2022-01-27 ASSESSMENT — PAIN SCALES - PAIN ASSESSMENT IN ADVANCED DEMENTIA (PAINAD)
FACIALEXPRESSION: 0
NEGVOCALIZATION: 0
TOTALSCORE: 0
BODYLANGUAGE: 0
BREATHING: 0
NEGVOCALIZATION: 0
BODYLANGUAGE: 0
CONSOLABILITY: 0
TOTALSCORE: 0
NEGVOCALIZATION: 0
CONSOLABILITY: 0
TOTALSCORE: 0
FACIALEXPRESSION: 0
BODYLANGUAGE: 0
CONSOLABILITY: 0
BREATHING: 0
BREATHING: 0
CONSOLABILITY: 0
BREATHING: 0
FACIALEXPRESSION: 0
FACIALEXPRESSION: 0
BODYLANGUAGE: 0
TOTALSCORE: 0
NEGVOCALIZATION: 0

## 2022-01-27 ASSESSMENT — PAIN SCALES - WONG BAKER: WONGBAKER_NUMERICALRESPONSE: 0

## 2022-01-27 ASSESSMENT — PAIN SCALES - GENERAL
PAINLEVEL_OUTOF10: 0

## 2022-01-27 NOTE — PLAN OF CARE
Problem: Nutrition  Goal: Optimal nutrition therapy  Outcome: Ongoing  Note: Nutrition Problem #1: Severe malnutrition  Intervention: Food and/or Nutrient Delivery: Continue NPO,Start Tube Feeding  Nutritional Goals: Pt will tolerate most appropriate form of nutrition to meet 100% of nutrition needs

## 2022-01-27 NOTE — PROGRESS NOTES
Patient admitted from [address] [place] [address] to 2057. Placed on monitor. VSS. Patient unresponsive at this time which is no change from previous nurse. Patients sister came to bedside. Updated on plan of care. Dr. Yue Vigil with nephrology and Dr. Elizabeth Poole with CT surgery by to see patient and also updated patients sister on plan of care. Paperwork for HCPOA  And living will obtained from sister and copies were placed in patients chart. Will monitor.

## 2022-01-27 NOTE — PROGRESS NOTES
Patient made it to his room around 5:30 this morning. On rounds just now this morning he is more sedated/obtunded than yesterday in the office. Just barely arousable. Mouth dessiccated. SaO2 95%, blood sugar 146. Na+ this morning 162. No urine output recorded. This patient is not able to call for a nurse let alone open his eyes. He needs to be in the ICU until his Na+ is corrected and his mental status has improved. Diamond being placed now at our request and ABG pending.  Dr. Verenice Mane, ICU attending, has been made aware and is awaiting his arrival.

## 2022-01-27 NOTE — PROGRESS NOTES
Speech Language Pathology - Contact Note - No Treatment  Order received and chart reviewed. Spoke with RN, who indicates that pt is transferring to ICU and not appropriate for swallow assessment at this time. Will re-attempt at a later time/date. Please page ST department with any concerns.     Electronically Signed by:  Lizzy Jaimes., [address] [id number]  Pager #[phone]  1/27/22, 9:44 AM EST

## 2022-01-27 NOTE — CONSULTS
[address] [address] Continence Nurse  Consult Note       NAME:   Phylicia Klein RECORD NUMBER:  [id number]  AGE: 70 y.o. GENDER: male  : 1950  TODAY'S DATE:  2022    Subjective   Reason for WOCN Evaluation and Assessment: Sacrum - unstageable      Tyler Hernández is a 70 y.o. male referred by:   [] Physician  [x] Nursing  [] Other:     Wound Identification:  Wound Type: pressure  Contributing Factors: chronic pressure, decreased mobility and shear force    Wound History: 70 y.o. male who presented to [facility] [employer]. for failure to thrive. He is unable to provide history at this time, so this is gathered from chart review and discussion w/ other providers. He underwent CABG x3 1/3 w/ Dr. Nikki Hayes and after this was discharged to a SNF. He has been unhappy there and has refused to eat anything. He has become progressively weaker and has become altered recently so he is brought in today.    Current Wound Care Treatment: Sacrum - alginate ag, foam    Patient Goal of Care:  [x] Wound Healing  [] Odor Control  [] Palliative Care  [] Pain Control   [] Other:         PAST MEDICAL HISTORY        Diagnosis Date    BPH with obstruction/lower urinary tract symptoms 8/3/2012    Chicken pox     Colon polyp     Hyperglycemia 12/15/2010    Hyperlipemia 12/15/2010    Post herpetic neuralgia     Shingles     Squamous cell carcinoma of skin of upper limb, including shoulder 2015       PAST SURGICAL HISTORY    Past Surgical History:   Procedure Laterality Date    CORONARY ARTERY BYPASS GRAFT N/A 1/3/2022    VIRGIL; TCPB: EVH R thigh; CABG x 3, LIMA to LAD, SVG to OM2 and PDA performed by Jessy Krishnamurthy MD at [facility]      right due to mass benign    UPPER GASTROINTESTINAL ENDOSCOPY N/A 2021    EGD BIOPSY performed by Rema Baca MD at [address] [facility]    Family History   Problem Relation Age of Onset    Depression Mother    Floydene Ada Arthritis Mother    Floydene Ada High Blood Pressure Mother     High Cholesterol Mother     Heart Disease Mother     Arthritis Father     Heart Disease Father     High Blood Pressure Father     High Cholesterol Father     Stroke Father     Cancer Sister         ovarian    High Cholesterol Sister     High Blood Pressure Sister     Arthritis Sister        SOCIAL HISTORY    Social History     Tobacco Use    Smoking status: Current Every Day Smoker     Packs/day: 1.00     Types: Cigarettes    Smokeless tobacco: Never Used    Tobacco comment: encouraged patient on smoking cessation   Vaping Use    Vaping Use: Never used   Substance Use Topics    Alcohol use: Yes     Alcohol/week: 6.0 standard drinks     Types: 2 Glasses of wine, 2 Cans of beer, 2 Shots of liquor per week     Comment: drinks 1-2 a day    Drug use: No       ALLERGIES    No Known Allergies    MEDICATIONS    No current facility-administered medications on file prior to encounter.      Current Outpatient Medications on File Prior to Encounter   Medication Sig Dispense Refill    amoxicillin-clavulanate (AUGMENTIN) 500-125 MG per tablet Take 1 tablet by mouth 2 times daily      montelukast (SINGULAIR) 10 MG tablet Take 1 tablet by mouth nightly 30 tablet 3    tiotropium (SPIRIVA RESPIMAT) 2.5 MCG/ACT AERS inhaler Inhale 2 puffs into the lungs daily      aspirin 325 MG EC tablet Take 1 tablet by mouth daily 30 tablet 3    amiodarone (CORDARONE) 200 MG tablet Take 1 tablet by mouth daily for 19 doses 19 tablet 0    albuterol (PROVENTIL) (2.5 MG/3ML) 0.083% nebulizer solution Take 3 mLs by nebulization every 6 hours as needed for Shortness of Breath 120 each 3    Balsam [place]-Castor Oil (VENELEX) OINT ointment Apply topically 2 times daily      Arformoterol Tartrate (BROVANA) 15 MCG/2ML NEBU Take 2 mLs by nebulization 2 times daily 120 mL 3    potassium chloride (KLOR-CON M) 10 MEQ extended release tablet Take 1 tablet by mouth daily      gabapentin (NEURONTIN) 300 MG capsule Take 1 capsule by mouth 3 times daily for 30 days. 90 capsule 3    atorvastatin (LIPITOR) 40 MG tablet Take 1 tablet by mouth nightly 30 tablet 3    metoprolol tartrate (LOPRESSOR) 25 MG tablet Take 0.25 tablets by mouth 2 times daily (Patient taking differently: Take 12.5 mg by mouth 2 times daily ) 60 tablet 3    polyethylene glycol (GLYCOLAX) 17 g packet Take 17 g by mouth daily as needed for Constipation 527 g 1    melatonin 3 MG TABS tablet Take 1 tablet by mouth nightly as needed (sleep) 30 tablet 3    clopidogrel (PLAVIX) 75 MG tablet Take 1 tablet by mouth daily 30 tablet 3    Multiple Vitamin (MULTIVITAMIN) TABS tablet Take 1 tablet by mouth daily 30 tablet 0    pantoprazole (PROTONIX) 40 MG tablet Take 1 tablet by mouth 2 times daily (before meals) 30 tablet 3    furosemide (LASIX) 20 MG tablet Take 1 tablet by mouth daily 60 tablet 3    buPROPion (WELLBUTRIN XL) 300 MG extended release tablet Take 1 tablet by mouth every morning 90 tablet 1    Ascorbic Acid (VITAMIN C) 500 MG tablet Take 500 mg by mouth daily.          Objective    BP 94/63   Pulse 98   Temp 100.9 °F (38.3 °C) (Rectal)   Resp 17   Ht 5' 7\" (1.702 m)   Wt 155 lb (70.3 kg)   SpO2 94%   BMI 24.28 kg/m²     LABS:  WBC:    Lab Results   Component Value Date    WBC 12.3 01/27/2022     H/H:    Lab Results   Component Value Date    HGB 12.3 01/27/2022    HCT 39.0 01/27/2022     PTT:    Lab Results   Component Value Date    APTT 37.0 01/03/2022   [APTT}  PT/INR:    Lab Results   Component Value Date    PROTIME 18.1 01/04/2022    INR 1.57 01/04/2022     HgBA1c:    Lab Results   Component Value Date    LABA1C 5.5 12/25/2021       Assessment: Sacrum - wound bed is pale yellow, slimy, periwound blanches but slowly   Chad Risk Score:      Patient Active Problem List   Diagnosis Code    Hyperglycemia R73.9    Smoker's respiratory syndrome F17.200    BPH with obstruction/lower urinary tract symptoms N40.1, N13.8    Basal cell carcinoma of shoulder C44.611    Squamous cell carcinoma of skin of upper limb, including shoulder C44.621    Actinic keratosis L57.0    Colon polyp K63.5    COPD, mild (HCC) J44.9    Nicotine use disorder F17.200    Mixed hyperlipidemia E78.2    SCCS, mod diff  (squamous cell carcinoma), hand, left C44.629    Basal cell carcinoma of right side of nose C44.311    Visit for suture removal Z48.02    Visit for wound check Z51.89    Anemia D64.9    Syncope and collapse R55    Non-ST elevated myocardial infarction (non-STEMI) ([facility]) I21.4    Dehydration E86.0       Measurements:  Wound 01/27/22 Sacrum (Active)   Wound Image   01/27/22 1035   Wound Etiology Pressure Unstageable 01/27/22 1035   Dressing Status New drainage noted;New dressing applied 01/27/22 1035   Wound Cleansed Cleansed with saline 01/27/22 1035   Dressing/Treatment Alginate with Ag; Foam 01/27/22 1035   Dressing Change Due 01/28/22 01/27/22 1035   Wound Length (cm) 6.5 cm 01/27/22 1035   Wound Width (cm) 5 cm 01/27/22 1035   Wound Depth (cm) 0.2 cm 01/27/22 1035   Wound Surface Area (cm^2) 32.5 cm^2 01/27/22 1035   Wound Volume (cm^3) 6.5 cm^3 01/27/22 1035   Wound Assessment Slough 01/27/22 1035   Drainage Amount Scant 01/27/22 1035   Drainage Description Serosanguinous 01/27/22 1035   Odor None 01/27/22 1035   Velma-wound Assessment Blanchable erythema 01/27/22 1035   Margins Defined edges 01/27/22 1035   Number of days: 0   Sacrum:        Incision 01/03/22 Pretibial Proximal;Right (Active)   Dressing Status Clean;Dry; Intact 01/10/22 1200   Dressing/Treatment Surgical glue 01/10/22 1200   Closure Open to air 01/10/22 1200   Drainage Amount None 01/10/22 1200   Odor None 01/10/22 1200   Number of days: 24       Incision 01/03/22 Sternum (Active)   Dressing/Treatment Open to air 01/10/22 1200   Closure Surgical glue 01/10/22 1200   Margins Approximated 01/10/22 1200   Drainage Amount None 01/10/22 1200   Odor None 01/10/22 1200 Velma-incision Assessment Intact 01/10/22 1200   Number of days: 24     Response to treatment:  Well tolerated by patient.      Pain Assessment:  Severity:  0 / 10  Quality of pain: N/A  Wound Pain Timing/Severity: none  Premedicated: No    Plan   Plan of Care: Wound 01/27/22 Sacrum-Dressing/Treatment: Alginate with Ag,Foam   Recommendation: Sacrum - clean with NS, dry, cover wound bed with alginate ag, foam dressing, changed alginate daily, foam dressing may be changed every 3 days or prn for soilage  Reposition pt every 2 hours  Call Wound Care to deterioration [phone]    Specialty Bed Required : Yes   [x] Low Air Loss   [x] Pressure Redistribution  [] Fluid Immersion  [] Bariatric  [] Total Pressure Relief  [] Other:     Current Diet: Diet NPO  Dietician consult:  No    Discharge Plan:  Placement for patient upon discharge: skilled nursing    Patient appropriate for Outpatient [address]: Yes    Referrals:  [x]   [] [facility]  [] Supplies  [] Other    Patient/Caregiver Teaching:  Level of patient/caregiver understanding able to:   [] Indicates understanding       [] Needs reinforcement  [] Unsuccessful      [] Verbal Understanding  [] Demonstrated understanding       [] No evidence of learning  [] Refused teaching         [] N/A       Electronically signed by Deng Owens RN, CWOCN on 1/27/2022 at 10:37 AM

## 2022-01-27 NOTE — H&P
Hospital Medicine History & Physical      PCP: No primary care provider on file. Date of Admission: 1/26/2022    Date of Service: 1/26/2022    Pt seen/examined on 1/26/2022    Admitted to Inpatient with expected LOS greater than two midnights due to medical therapy. Chief Complaint:     Chief Complaint   Patient presents with    Failure To Thrive       History Of Present Illness:      70 y.o. male who presented to [facility] for failure to thrive. He is unable to provide history at this time, so this is gathered from chart review and discussion w/ other providers. He underwent CABG x3 1/3 w/ Dr. Franck Camp and after this was discharged to a SNF. He has been unhappy there and has refused to eat anything. He has become progressively weaker and has become altered recently so he is brought in today. Past Medical History:      Reviewed and non-contributory except as noted below      Diagnosis Date    BPH with obstruction/lower urinary tract symptoms 8/3/2012    Chicken pox     Colon polyp     Hyperglycemia 12/15/2010    Hyperlipemia 12/15/2010    Post herpetic neuralgia     Shingles     Squamous cell carcinoma of skin of upper limb, including shoulder 9/8/2015       Past Surgical History:      Reviewed and non-contributory except as noted below      Procedure Laterality Date    CORONARY ARTERY BYPASS GRAFT N/A 1/3/2022    VIRGIL; TCPB: EVH R thigh; CABG x 3, LIMA to LAD, SVG to OM2 and PDA performed by Do Quiles MD at [facility]      right due to mass benign    UPPER GASTROINTESTINAL ENDOSCOPY N/A 12/27/2021    [facility]       Medications Prior to Admission:      Reviewed and non-contributory except as noted below  Prior to Admission medications    Medication Sig Start Date End Date Taking?  Authorizing Provider   amoxicillin-clavulanate (AUGMENTIN) 500-125 MG per tablet Take 1 tablet by mouth 2 times daily Historical Provider, MD   montelukast (SINGULAIR) 10 MG tablet Take 1 tablet by mouth nightly 1/10/22   CAITLYN Martinez CNP   tiotropium (SPIRIVA RESPIMAT) 2.5 MCG/ACT AERS inhaler Inhale 2 puffs into the lungs daily 1/11/22   CAITLYN Martinez CNP   aspirin 325 MG EC tablet Take 1 tablet by mouth daily 1/10/22   CAITLYN Martinez CNP   amiodarone (CORDARONE) 200 MG tablet Take 1 tablet by mouth daily for 19 doses 1/10/22 1/29/22  CAITLYN Martinez CNP   albuterol (PROVENTIL) (2.5 MG/3ML) 0.083% nebulizer solution Take 3 mLs by nebulization every 6 hours as needed for Shortness of Breath 1/10/22   CAITLYN Martinez CNP   Balsam [place]-Castor Oil (VENELEX) OINT ointment Apply topically 2 times daily 1/10/22   CAITLYN Martinez CNP   Arformoterol Tartrate (BROVANA) 15 MCG/2ML NEBU Take 2 mLs by nebulization 2 times daily 1/10/22   CAITLYN Martinez CNP   potassium chloride (KLOR-CON M) 10 MEQ extended release tablet Take 1 tablet by mouth daily 1/11/22   CAITLYN Martinez CNP   gabapentin (NEURONTIN) 300 MG capsule Take 1 capsule by mouth 3 times daily for 30 days.  1/9/22 2/8/22  Reinaldo Aguirre MD   atorvastatin (LIPITOR) 40 MG tablet Take 1 tablet by mouth nightly 1/9/22   Reinaldo Aguirre MD   metoprolol tartrate (LOPRESSOR) 25 MG tablet Take 0.25 tablets by mouth 2 times daily  Patient taking differently: Take 12.5 mg by mouth 2 times daily  1/9/22   Reinaldo Aguirre MD   polyethylene glycol (GLYCOLAX) 17 g packet Take 17 g by mouth daily as needed for Constipation 1/9/22 2/8/22  Reinaldo Aguirre MD   melatonin 3 MG TABS tablet Take 1 tablet by mouth nightly as needed (sleep) 1/9/22   Reinaldo Aguirre MD   clopidogrel (PLAVIX) 75 MG tablet Take 1 tablet by mouth daily 1/10/22   Reinaldo Aguirre MD   Multiple Vitamin (MULTIVITAMIN) TABS tablet Take 1 tablet by mouth daily 1/10/22   Reinaldo Aguirre MD   pantoprazole (PROTONIX) 40 MG tablet Take 1 tablet by mouth 2 times daily (before meals) 1/9/22   Dilshad MARTINEZ MD Hector   furosemide (LASIX) 20 MG tablet Take 1 tablet by mouth daily 1/9/22   Dilshad Mullen MD   buPROPion (WELLBUTRIN XL) 300 MG extended release tablet Take 1 tablet by mouth every morning 5/8/18   Chao Starks MD   Ascorbic Acid (VITAMIN C) 500 MG tablet Take 500 mg by mouth daily. Historical Provider, MD       Allergies:     Reviewed and non-contributory except as noted below   Patient has no known allergies. Social History:      Reviewed and non-contributory except as noted below    TOBACCO:   reports that he has been smoking cigarettes. He has been smoking about 1.00 pack per day. He has never used smokeless tobacco.  ETOH:   reports current alcohol use of about 6.0 standard drinks of alcohol per week. Family History:      Reviewed and non-contributory except as noted below        Problem Relation Age of Onset    Depression Mother     Arthritis Mother     High Blood Pressure Mother     High Cholesterol Mother     Heart Disease Mother     Arthritis Father     Heart Disease Father     High Blood Pressure Father     High Cholesterol Father     Stroke Father     Cancer Sister         ovarian    High Cholesterol Sister     High Blood Pressure Sister     Arthritis Sister        REVIEW OF SYSTEMS:   Pertinent positives and negatives as noted in the HPI. All other systems reviewed and negative.     PHYSICAL EXAM PERFORMED:    BP 99/67   Pulse 96   Temp 97.9 °F (36.6 °C) (Oral)   Resp 18   Ht 5' 7\" (1.702 m)   Wt 155 lb (70.3 kg)   SpO2 100%   BMI 24.28 kg/m²     General appearance:  Asleep, will briefly open eyes to voice and mumble, possibly effort-related  Eyes: Pupils equal, round, reactive to light, conjunctiva/corneas clear  Ears/Nose/Mouth/Throat: No external lesions or scars, hearing intact to voice  Neck: Trachea midline, no masses noted, no thyromegaly  Respiratory:  Non-labored breathing, clear to auscultation bilaterally  Cardiovascular: Regular rate and rhythm, no provider on file. for the opportunity to be involved in this patient's care. If you have any questions or concerns please feel free to contact me at [phone].

## 2022-01-27 NOTE — PROGRESS NOTES
Pt has 4.2  L free water deficit - goal Na 145   Need to replace half  Of that ( 2.1 L ) in 24 hours     Can be replace IV or oral     Best options will be Corpak and free water bolus   If not then will need D5     Half saline will not be much helpful     Saline boluses as needed for hypovolemia     D/w  Ct sx in length       AMS is the cause of Hypernatremia not the reason       Ct sx to make fluid changes - recs discussed with them       Dwaine Suarez MD

## 2022-01-27 NOTE — PROGRESS NOTES
Progress Note  Hospital Day: 2    Chief Complaint: Dehydration, Depression, AMS    Mr. Arelis Robins was readmitted on 2022 following office visit at which he was dramatically withdrawn and nearly obtunded. He was profoundly dehydrated after not taking PO at his nursing facility for nearly 2 weeks. 24 hour Interval History:      Admitted to [facility] ER   Transferred up to floor, on rounds appeared very poor from a functional standpoint. Transfer to ICU    Today's plan:  Transition from D5W to D5 1/ NS K20 @ 75. Transfer to ICU. Place Hernandez catheter. VITAL SIGNS   Temperature:  Current - Temp: 98.8 °F (37.1 °C); Max - Temp  Av °F (36.7 °C)  Min: 96.6 °F (35.9 °C)  Max: 98.8 °F (37.1 °C)    Respiratory Rate : Resp  Av.1  Min: 18  Max: 27    Pulse Range: Pulse  Av.6  Min: 88  Max: 102    Blood Pressure Range:  Systolic (87OYA), QLZ:40 , Min:82 , [place]:175   ; Diastolic (04KXL), JXX:54, Min:50, Max:75    Pulse ox Range: SpO2  Av.3 %  Min: 90 %  Max: 100 %  O2 Flow Rate (L/min): 3 L/min         Admission Weight: Weight: 155 lb (70.3 kg)    Current Weight: Patient Vitals for the past 96 hrs (Last 3 readings):   Weight   22 1626 155 lb (70.3 kg)     I/O: No intake or output data in the 24 hours ending 22 4887  Urine (hernandez): Being Placed    LINES/ACCESS:      Peripheral Access: RUE Day #: 2    Diet: Diet NPO Swallow eval today, after this, may have regular diet, no salt added.     MEDICATIONS:      amiodarone  200 mg Oral Daily    Arformoterol Tartrate  15 mcg Nebulization BID    aspirin  325 mg Oral Daily    atorvastatin  40 mg Oral Nightly    buPROPion  300 mg Oral QAM    clopidogrel  75 mg Oral Daily    metoprolol tartrate  12.5 mg Oral BID    montelukast  10 mg Oral Nightly    pantoprazole  40 mg Oral BID AC    potassium chloride  10 mEq Oral Daily    tiotropium  2 puff Inhalation Daily    sodium chloride flush  10 mL IntraVENous 2 times per day    enoxaparin  40 mg SubCUTAneous Daily    sennosides-docusate sodium  1 tablet Oral BID    cefTRIAXone (ROCEPHIN) IV  1,000 mg IntraVENous Q24H       Infusions:   dextrose 5% and 0.45% NaCl with KCl 20 mEq      sodium chloride         DATA REVIEW:   CBC:   Recent Labs     01/26/22  1633 01/27/22  0634   WBC 12.2* 12.3*   HGB 13.7 12.3*   HCT 45.0 39.0*   MCV 85.6 83.8    215     BMP:   Recent Labs     01/26/22  1633 01/27/22  0633   * 162*   K 3.9 3.5   * 127*   CO2 29 25   GLUCOSE 104* 148*   BUN 70* 76*   CREATININE 1.6* 1.5*   CALCIUM 9.7 8.9   MG  --  2.70*     Accucheck Glucoses:   Recent Labs     01/27/22  0918   POCGLU 146*     Cardiac Enzymes: No results for input(s): CKTOTAL, CKMB, CKMBINDEX in the last 72 hours. Invalid input(s): TROPONIN  PT/INR: No results for input(s): PROTIME, INR in the last 72 hours. APTT: No results for input(s): APTT in the last 72 hours. LFT:   Recent Labs     01/26/22  1633   AST 12*   ALT 11   BILITOT 0.6   ALKPHOS 132*     Troponin: Invalid input(s): TROPONIN  BNP: No results for input(s): BNP in the last 72 hours. ABG:    Lab Results   Component Value Date    PHART 7.395 01/04/2022    PO2ART 91.2 01/04/2022    VHW4KWP 29.8 01/04/2022    X0FPFKIK 97 01/04/2022    YIW8YYN 19 01/04/2022    WWH1WXS 18.2 01/04/2022    BEART -7 01/04/2022    BEART -6 01/04/2022    BEART -5 01/04/2022    BEART -4 01/03/2022    BEART -1 01/03/2022     U/A:    Recent Labs     01/26/22  1633   COLORU Yellow   PHUR 5.5   WBCUA 10-20*   RBCUA 5-10*   YEAST Present*   BACTERIA 1+*   CLARITYU CLOUDY*   SPECGRAV >=1.030   LEUKOCYTESUR MODERATE*   UROBILINOGEN 0.2   BILIRUBINUR SMALL*   BLOODU SMALL*   GLUCOSEU Negative       Urine Culture:  Pending       Chest X-Ray:       Portable chest       HISTORY: Altered mental status       COMPARISON: January 6, 2022.       FINDINGS:       The cardiomediastinal contours are stable. There is a moderate left pleural effusion.  Adjacent parenchymal consolidation. EKG: NSR @ 97    ASSESSMENT:   Patient Active Problem List:     Hyperglycemia     Smoker's respiratory syndrome     BPH with obstruction/lower urinary tract symptoms     Basal cell carcinoma of shoulder     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     SCCS, mod diff  (squamous cell carcinoma), hand, left     Basal cell carcinoma of right side of nose     Visit for suture removal     Visit for wound check     Anemia     Syncope and collapse     Non-ST elevated myocardial infarction (non-STEMI) ([facility] [address]     Dehydration      Neuro: Remains nearly obtunded.   CV:   Sinus  Pulm:  Positive \"O\" sign, gentle snoring, normal work of breathing  Abd:  Scaphoid, soft, non-tender, non-distended  Diamond: Being placed  Wounds: clean, dry and intact  Ext: warm, no edema, skin tenting    PLAN:   · Neuro - pain tolerated, no medications needed  · Psych - Hypomanic delrium, possible adjustment disorder   - Needs psych eval after UTI and Dehydration treated   - Patient discussed with  Psych ER Yesterday   · CV - on Beta blocker, ace inhibitor, statin, ASA/Plavix   - PO Meds currently held due to mental status  · Pulm - acute postoperative pulmonary insuffiency as expected- wean O2 as tolerated, continue incentive spirometry  · Renal - Acute kidney failure/creatinine stable - Cr 1.6, preop Cr 0.8        - No weight yet         - keep urinary catheter for accurate I & O        - Hypernatremia and Dehydration   - D5 1/2 NS K 20 @ 75  · Heme - Acute blood loss anemia as expected- hgb 12.3 - Hemoconcentrated currently, continue to monitor         · ID - continue surgical prophylaxis antibiotics  · FEN - Swallow eval, then Regular diet no sodium added  · GI prophylaxis - Protonix 40 mg bid  · VTE prophylaxis - SCD and YUMI bowiee  · Disposition -   Transfer to ICU  Discussed patient with Dr Freida Aleman who is agreeable to assessment and plan.       Dionna Tong MD   Thoracic Surgery Resident   [phone]  01/27/22 9:37 AM

## 2022-01-27 NOTE — SIGNIFICANT EVENT
A rapid response was called this morning as pt was had tachypnea with RR in 30s, and he was also hypotensive with a SBP in th 80s. ICU team arrived and examined the pt, pt had a good oxygen saturation with a SpO2 in the 90s, and peripheral pulses. Pt set to be transferred to the ICU.

## 2022-01-27 NOTE — PROGRESS NOTES
4 Eyes Admission Assessment     I agree as the admission nurse that 2 RN's have performed a thorough Head to Toe Skin Assessment on the patient. ALL assessment sites listed below have been assessed on admission. Areas assessed by both nurses: yes  [x]   Head, Face, and Ears   [x]   Shoulders, Back, and Chest  [x]   Arms, Elbows, and Hands   [x]   Coccyx, Sacrum, and Ischium  [x]   Legs, Feet, and Heels        Does the Patient have Skin Breakdown? Yes a wound was noted on the Admission Assessment and an LDA was Initiated documentation include the Velma-wound, Wound Assessment, Measurements, Dressing Treatment, Drainage, and Color\", laceration on the sacrum, scattered bruising, tear on R leg, scattered redness        Chad Prevention initiated:  Yes   Wound Care Orders initiated:  Yes      [facility] nurse consulted for Pressure Injury (Stage 3,4, Unstageable, DTI, NWPT, and Complex wounds) or Chad score 18 or lower:  Yes      unstageable on coccyx.  Wound care nurse domitila.     Nurse 1 eSignature: Electronically signed by Shailesh Hays RN on 1/27/22 at 5:20 AM EST    **SHARE this note so that the co-signing nurse is able to place an eSignature**    Nurse 2 eSignature: Electronically signed by Sean Olivares RN on 1/27/22 at 6:36 AM EST

## 2022-01-27 NOTE — CONSULTS
Nephrology Consult Note                                                                                                                                                                                                                                                                                                                                                               Office : [phone]     Fax :[phone]              Patient's Name: Kris Huerta  9:48 AM  1/27/2022    Reason for Consult: DWIGHT  Requesting Physician:  No primary care provider on file. Chief Complaint:  Obtunded, failure to thrive    History of Present Ilness:    Kris Huerta is a 70 y.o. male s/p recent CABG earlier this month. On f/u with Dr. Lauro Up, he was noted to be depressed and refusing to eat. Sent to ER for dehydration and failure to thrive. Found to have DWIGHT and significant hypernatremia. This morning patient is significantly more obtunded. He is being transferred to ICU for closer monitoring. Current IVF D51/2NS at 75 cc/hr per CT-surg for gentle free water replacement.      Past Medical History:   Diagnosis Date    BPH with obstruction/lower urinary tract symptoms 8/3/2012    Chicken pox     Colon polyp     Hyperglycemia 12/15/2010    Hyperlipemia 12/15/2010    Post herpetic neuralgia     Shingles     Squamous cell carcinoma of skin of upper limb, including shoulder 9/8/2015       Past Surgical History:   Procedure Laterality Date    CORONARY ARTERY BYPASS GRAFT N/A 1/3/2022    VIRGIL; TCPB: EVH R thigh; CABG x 3, LIMA to LAD, SVG to OM2 and PDA performed by Mehnaz Bates MD at [address] [place] [address]      right due to mass benign    UPPER GASTROINTESTINAL ENDOSCOPY N/A 12/27/2021    EGD BIOPSY performed by Dorothea Price MD at [facility] ENDOSCOPY       Family History   Problem Relation Age of Onset    Depression Mother    [facility] Arthritis Mother     High Blood Pressure Mother     High Cholesterol [facility] Heart Disease Mother     Arthritis Father     Heart Disease Father     High Blood Pressure Father     High Cholesterol Father     Stroke Father     Cancer Sister         ovarian    High Cholesterol Sister     High Blood Pressure Sister     Arthritis Sister         reports that he has been smoking cigarettes. He has been smoking about 1.00 pack per day. He has never used smokeless tobacco. He reports current alcohol use of about 6.0 standard drinks of alcohol per week. He reports that he does not use drugs. Allergies:  Patient has no known allergies.     Current Medications:    dextrose 5 % and 0.45 % NaCl with KCl 20 mEq infusion, Continuous  amiodarone (CORDARONE) tablet 200 mg, Daily  Arformoterol Tartrate (BROVANA) nebulizer solution 15 mcg, BID  aspirin EC tablet 325 mg, Daily  atorvastatin (LIPITOR) tablet 40 mg, Nightly  buPROPion (WELLBUTRIN XL) extended release tablet 300 mg, QAM  clopidogrel (PLAVIX) tablet 75 mg, Daily  metoprolol tartrate (LOPRESSOR) tablet 12.5 mg, BID  montelukast (SINGULAIR) tablet 10 mg, Nightly  pantoprazole (PROTONIX) tablet 40 mg, BID AC  potassium chloride (KLOR-CON M) extended release tablet 10 mEq, Daily  tiotropium (SPIRIVA RESPIMAT) 2.5 MCG/ACT inhaler 2 puff, Daily  sodium chloride flush 0.9 % injection 10 mL, 2 times per day  sodium chloride flush 0.9 % injection 10 mL, PRN  0.9 % sodium chloride infusion, PRN  enoxaparin (LOVENOX) injection 40 mg, Daily  ondansetron (ZOFRAN-ODT) disintegrating tablet 4 mg, Q8H PRN   Or  ondansetron (ZOFRAN) injection 4 mg, Q6H PRN  sennosides-docusate sodium (SENOKOT-S) 8.6-50 MG tablet 1 tablet, BID  magnesium hydroxide (MILK OF MAGNESIA) 400 MG/5ML suspension 30 mL, Daily PRN  acetaminophen (TYLENOL) tablet 650 mg, Q6H PRN   Or  acetaminophen (TYLENOL) suppository 650 mg, Q6H PRN  cefTRIAXone (ROCEPHIN) 1000 mg IVPB in 50 mL D5W minibag, Q24H        Review of Systems:   Unable to obtain      Physical exam:     Vitals:  BP (!) 76/54   Pulse 98   Temp 100.9 °F (38.3 °C) (Rectal)   Resp 30   Ht 5' 7\" (1.702 m)   Wt 155 lb (70.3 kg)   SpO2 93%   BMI 24.28 kg/m²   Constitutional:  obtunded  Skin: no rash  Heent:  eomi, mmm  Neck: no bruits or jvd noted  Cardiovascular:  S1, S2 without m/r/g  Respiratory: CTA B without w/r/r  Abdomen:  +bs, soft, nt, nd  Ext: no lower extremity edema  Psychiatric: obtunded  Musculoskeletal:  Unable to examine    Data:   Labs:  CBC:   Recent Labs     01/26/22  1633 01/27/22  0634   WBC 12.2* 12.3*   HGB 13.7 12.3*    215     BMP:    Recent Labs     01/26/22  1633 01/27/22  0633   * 162*   K 3.9 3.5   * 127*   CO2 29 25   BUN 70* 76*   CREATININE 1.6* 1.5*   GLUCOSE 104* 148*     Ca/Mg/Phos:   Recent Labs     01/26/22  1633 01/27/22  0633   CALCIUM 9.7 8.9   MG  --  2.70*     Hepatic:   Recent Labs     01/26/22  1633   AST 12*   ALT 11   BILITOT 0.6   ALKPHOS 132*     Troponin: No results for input(s): TROPONINI in the last 72 hours. BNP: No results for input(s): BNP in the last 72 hours. Lipids: No results for input(s): CHOL, TRIG, HDL, LDLCALC, LABVLDL in the last 72 hours. ABGs: No results for input(s): PHART, PO2ART, IRB2KEJ in the last 72 hours. INR: No results for input(s): INR in the last 72 hours. UA:  Recent Labs     01/26/22  1633   COLORU Yellow   CLARITYU CLOUDY*   GLUCOSEU Negative   BILIRUBINUR SMALL*   KETUA TRACE*   SPECGRAV >=1.030   BLOODU SMALL*   PHUR 5.5   PROTEINU Negative   UROBILINOGEN 0.2   NITRU Negative   LEUKOCYTESUR MODERATE*   LABMICR YES   URINETYPE NotGiven      Urine Microscopic:   Recent Labs     01/26/22  1633   BACTERIA 1+*   HYALCAST 3-5*   WBCUA 10-20*   RBCUA 5-10*     Urine Culture: No results for input(s): LABURIN in the last 72 hours. Urine Chemistry: No results for input(s): Mccarty Kinds, PROTEINUR, NAUR in the last 72 hours.           IMAGING:  XR CHEST PORTABLE   Final Result      Left pleural effusion with adjacent parenchymal consolidation, similar to January 6, 2022. CT HEAD WO CONTRAST   Final Result      1. No evidence for acute intracranial abnormality. 2.  Moderate diffuse cerebral atrophy with ventricular white matter changes bilaterally consistent with chronic small vessel ischemia. Assessment/Plan   1. DWIGHT 2/2 dehydration    2. HTN    3.  Anemia    4. Acid- base/ Electrolyte imbalance - hypernatremia      Plan:  - CT-surgery preferring D51/2NS at 75 cc/hr for gentle free water replacement, reasonable  - T/F to ICU for closer monitoring  - hernandez  - q12 Na checks        Thank you for allowing us to participate in care of Leatha Horne MD   Will discuss with attending Dr. Seema Quintanilla free to contact me   [facility] [address]  Office : [phone]  Fax :[phone]    Pt seen and examined   D/w Ct sx     See plan in separate note     Wesley Fields MD

## 2022-01-27 NOTE — PROGRESS NOTES
Patient admitted to room 5319. Patient unable to answer questions, asleep with briefly open eyes in respond to voice. O2 100 on 4 L with nasal cannula. O2 weaned to 2 L on nasal cannula. Skin assessment provided. Specialty bed mattress ordered. IV fluids running per order. Q2 turn. Velma care and bath wipes provided. Will continue to monitor.

## 2022-01-27 NOTE — PROGRESS NOTES
Hospitalist Progress Note      PCP: No primary care provider on file. Date of Admission: 1/26/2022     HPI and Hospital Course:   \" [facility] was readmitted on 1/26/2022 following office visit at which he was dramatically withdrawn and nearly obtunded. He was profoundly dehydrated after not taking PO at his nursing facility for nearly 2 weeks.      24 hour Interval History:      1/26 Admitted to [facility] ER  1/27 Transferred up to floor, on rounds appeared very poor from a functional standpoint. Transfer to ICU     Today's plan:  Transition from D5W to D5 1/2 NS K20 @ 75. Transfer to ICU. Place Diamond catheter. \"    Subjective:   Pt seen in the ICU- not responding to voice. Sister at bedside. Medications:  Reviewed    Infusion Medications    dextrose 5% and 0.45% NaCl with KCl 20 mEq      sodium chloride       Scheduled Medications    amiodarone  200 mg Oral Daily    Arformoterol Tartrate  15 mcg Nebulization BID    aspirin  325 mg Oral Daily    atorvastatin  40 mg Oral Nightly    buPROPion  300 mg Oral QAM    clopidogrel  75 mg Oral Daily    metoprolol tartrate  12.5 mg Oral BID    montelukast  10 mg Oral Nightly    pantoprazole  40 mg Oral BID AC    potassium chloride  10 mEq Oral Daily    tiotropium  2 puff Inhalation Daily    sodium chloride flush  10 mL IntraVENous 2 times per day    enoxaparin  40 mg SubCUTAneous Daily    sennosides-docusate sodium  1 tablet Oral BID    cefTRIAXone (ROCEPHIN) IV  1,000 mg IntraVENous Q24H     PRN Meds: sodium chloride flush, sodium chloride, ondansetron **OR** ondansetron, magnesium hydroxide, acetaminophen **OR** acetaminophen    No intake or output data in the 24 hours ending 01/27/22 0922    Physical Exam Performed:    BP (!) 92/59   Pulse 96   Temp 98.8 °F (37.1 °C) (Axillary)   Resp 24   Ht 5' 7\" (1.702 m)   Wt 155 lb (70.3 kg)   SpO2 94%   BMI 24.28 kg/m²     General appearance: obtunded  Respiratory:  Normal respiratory effort.  Clear to auscultation  Cardiovascular: Irregular  Abdomen: Soft, non-tender, non-distended   Musculoskeletal: No clubbing, cyanosis or edema bilaterally. Skin: No rashes or lesions. Neurologic: grossly non-focal.  Psychiatric: not alert or oriented  Peripheral Pulses: +2 palpable, equal bilaterally       Labs:   Recent Labs     01/26/22  1633 01/27/22  0634   WBC 12.2* 12.3*   HGB 13.7 12.3*   HCT 45.0 39.0*    215     Recent Labs     01/26/22  1633 01/27/22  0633   * 162*   K 3.9 3.5   * 127*   CO2 29 25   BUN 70* 76*   CREATININE 1.6* 1.5*   CALCIUM 9.7 8.9     Recent Labs     01/26/22  1633   AST 12*   ALT 11   BILITOT 0.6   ALKPHOS 132*     No results for input(s): INR in the last 72 hours. No results for input(s): [place] in the last 72 hours. Urinalysis:      Lab Results   Component Value Date    NITRU Negative 01/26/2022    WBCUA 10-20 01/26/2022    BACTERIA 1+ 01/26/2022    RBCUA 5-10 01/26/2022    BLOODU SMALL 01/26/2022    SPECGRAV >=1.030 01/26/2022    GLUCOSEU Negative 01/26/2022       Radiology:  XR CHEST PORTABLE   Final Result      Left pleural effusion with adjacent parenchymal consolidation, similar to January 6, 2022. CT HEAD WO CONTRAST   Final Result      1. No evidence for acute intracranial abnormality. 2.  Moderate diffuse cerebral atrophy with ventricular white matter changes bilaterally consistent with chronic small vessel ischemia. Assessment/Plan:    Active Hospital Problems    Diagnosis     Dehydration [E86.0]    Severe hypernatremia   ARF    -cont ivf  -agree w. icu trasfer  -CC, Nephro and CTS following- appreciate consultants' help.     DVT Prophylaxis: Lovenox  Diet: Diet NPO  Code Status: Full Code    PT/OT Eval Status: Once clinically stable    Cyndie Laughlin MD

## 2022-01-27 NOTE — FLOWSHEET NOTE
01/27/22 1030   Encounter Summary   Services provided to: Patient   Continue Visiting   (1/27, ale  )   Complexity of Encounter High   Length of Encounter 30 minutes   Routine   Type Initial   Crisis   Type Rapid response   Assessment Unable to respond   Intervention Sustaining presence/ Ministry of presence   Outcome De-escalated   Staff Kate Isabel, MA

## 2022-01-27 NOTE — PROGRESS NOTES
Physical Therapy and Occupational Therapy  Discharge Note    PT/OT orders received on admission. Pt with rapid response this AM and now transferred to the ICU. Will sign off from PT/OT at this time. Please re-consult when pt is medically stable and ready for therapy.       Prachi Armando, PT #[id number]   Kenan Jennings, MOT, OTR/L

## 2022-01-27 NOTE — CONSULTS
ICU Progress Note    Admit Date: 1/26/2022  Day: 2  IV Access:Peripheral  IV Fluids:None            Antibiotics: Ceftriaxone  Diet: Diet NPO    CC: Failure to thrive    Interval history: Early this morning pt became more lethargic, and is no responding to verbal stimuli. ICU consulted to evaluate the pt this morning. On arrival pt is no responsive to verbal sitmuli, grimaces in pain to central painful stimuli. He is tachypneic, and hypotensive with a SBP in the 70s. Pt laid flat and SBP improved to 89. Pt to be admitted to the ICU for volume repletion, and a higher level of care and monitoring. HPI: 70 y.o. male who presented to [facility], [facility]. for failure to thrive.     He is unable to provide history at this time, so this is gathered from chart review and discussion w/ other providers.     He underwent CABG x3 1/3 w/ Dr. Gracia Marie and after this was discharged to a SNF. He has been unhappy there and has refused to eat anything. He has become progressively weaker and has become altered recently so he is brought in today.      Medications:     Scheduled Meds:   amiodarone  200 mg Oral Daily    Arformoterol Tartrate  15 mcg Nebulization BID    aspirin  325 mg Oral Daily    atorvastatin  40 mg Oral Nightly    buPROPion  300 mg Oral QAM    clopidogrel  75 mg Oral Daily    metoprolol tartrate  12.5 mg Oral BID    montelukast  10 mg Oral Nightly    pantoprazole  40 mg Oral BID AC    potassium chloride  10 mEq Oral Daily    tiotropium  2 puff Inhalation Daily    sodium chloride flush  10 mL IntraVENous 2 times per day    enoxaparin  40 mg SubCUTAneous Daily    sennosides-docusate sodium  1 tablet Oral BID    cefTRIAXone (ROCEPHIN) IV  1,000 mg IntraVENous Q24H     Continuous Infusions:   dextrose 5% and 0.45% NaCl with KCl 20 mEq      sodium chloride       PRN Meds:sodium chloride flush, sodium chloride, ondansetron **OR** ondansetron, magnesium hydroxide, acetaminophen **OR** acetaminophen    Objective: Vitals:   T-max:  Patient Vitals for the past 8 hrs:   BP Temp Temp src Pulse Resp SpO2   01/27/22 1009 (!) 83/59   98 25    01/27/22 1006 (!) 80/41        01/27/22 1002 104/68        01/27/22 0957 (!) 85/59        01/27/22 0956 (!) 83/59        01/27/22 0949      94 %   01/27/22 0942 (!) 76/54 100.9 °F (38.3 °C) Rectal 98 30 93 %   01/27/22 0830    96     01/27/22 0817      94 %   01/27/22 0753 (!) 92/59 98.8 °F (37.1 °C) Axillary 96 24    01/27/22 0241 95/61 98.7 °F (37.1 °C) Axillary 98 24 94 %     No intake or output data in the 24 hours ending 01/27/22 1012    Review of Systems   Unable to perform ROS: Mental status change       Physical Exam  Constitutional:       General: He is not in acute distress. Appearance: He is ill-appearing. He is not toxic-appearing or diaphoretic. HENT:      Head: Normocephalic and atraumatic. Cardiovascular:      Rate and Rhythm: Normal rate and regular rhythm. Pulses: Normal pulses. Heart sounds: No murmur heard. No friction rub. No gallop. Pulmonary:      Breath sounds: No wheezing, rhonchi or rales. Abdominal:      General: There is no distension. Musculoskeletal:      Right lower leg: No edema. Left lower leg: No edema. Neurological:      Mental Status: He is lethargic, disoriented and confused. LABS:    CBC:   Recent Labs     01/26/22  1633 01/27/22  0634   WBC 12.2* 12.3*   HGB 13.7 12.3*   HCT 45.0 39.0*    215   MCV 85.6 83.8     Renal:    Recent Labs     01/26/22  1633 01/27/22  0633   * 162*   K 3.9 3.5   * 127*   CO2 29 25   BUN 70* 76*   CREATININE 1.6* 1.5*   GLUCOSE 104* 148*   CALCIUM 9.7 8.9   MG  --  2.70*   ANIONGAP 13 10     Hepatic:   Recent Labs     01/26/22  1633   AST 12*   ALT 11   BILITOT 0.6   PROT 7.3   LABALBU 3.8   ALKPHOS 132*     Troponin: No results for input(s): TROPONINI in the last 72 hours.   BNP: No results for input(s): BNP in the last 72 hours. Lipids: No results for input(s): CHOL, HDL in the last 72 hours. Invalid input(s): LDLCALCU, TRIGLYCERIDE  ABGs:  No results for input(s): PHART, AWI9UUL, PO2ART, ONJ6FHJ, BEART, THGBART, A9ICMWLX, LLA1EQT in the last 72 hours. INR: No results for input(s): INR in the last 72 hours. Lactate: No results for input(s): LACTATE in the last 72 hours. Cultures:  -----------------------------------------------------------------  RAD:   XR CHEST PORTABLE   Final Result      Left pleural effusion with adjacent parenchymal consolidation, similar to January 6, 2022. CT HEAD WO CONTRAST   Final Result      1. No evidence for acute intracranial abnormality. 2.  Moderate diffuse cerebral atrophy with ventricular white matter changes bilaterally consistent with chronic small vessel ischemia. Assessment/Plan:   Pt is a 71 y/o M w/ a PMHx CAD s/p 3 vessel CABG (1/03), HfrEF (EF 30% to 35% on 1/2022), baseline Cr 0.6 (01/2022), HLD, BPH, and Squamous Cell CA of UE who p/w FTT. Transferred to ICU due to AMS. Acute Metabolic Encephalopathy 2/2 Electrolyte Abnormality  Early morning 1/27 pt became more lethargic, no longer responding. Na 162, pt hypotensive. AMS likely due to volume and free water depletion.    -Consider 500 mL LR Bolus  -Consider Palliative Care consult  -IVF: 1/2 NS and 1/2 D5W  -RFP and Mg daily    DWIGHT  Etiology pre-renal, pt not eating or drinking while at SNF.    -IVF  -RFP daily  -Nephrology following    Hypernatremia  Na 162, and pt is hypotensive. Pt likely both free water and     UTI, suspected  UA Nitrite negative, w/ moderate Leukocyte Esterase, WBC 10-20, 1+ Bacteria. Abx ordered to cover for UTI. -Ceftriaxone 1000 mg daily    Failure to Thrive  Pt recently d/c to SNF, per chart review, did not like it there and was refusing nutrition.     -IVF  -Consult Dietary  -Consider supplemental nutrition if pt unable to have PO intake    s/p CABG  Pt had 3 vessel CABG performed at [facility] on 1/03.     -Cardiothoracic Surgery following    Code Status: Full Code  FEN: Diet NPO  PPX: Lovenox  DISPO: TREMAINE Schultzjaswantpriscila Shaffer DO, PGY-1  01/27/22  10:12 AM    This patient has been staffed and discussed with Dr. Claude South. Pulm/CC    Patient seen and examined. I agree with Dr. Luz Double history, physical, lab findings, assessment and plan. Patient with acute encephalopathy secondary to hypernatremia. Sodium this morning was 162. D5W that was started yesterday was changed to half-normal saline this morning. His calculated free water deficit is 5.3 L. My plan was to correct half in the first 24 hours and do this by giving him 100 ml free water every hour via a CorPak. This has been changed to 200 mL every 4 hours. However his sodium has now increased to 160 indicating our free water replacement is inadequate. I will change his half-normal saline to D5W at 75 mL an hour to assist in reducing his free water deficit.  Continue q 4 hr BMP    D/W Dr. Arlen Gottron MD

## 2022-01-27 NOTE — CONSULTS
Comprehensive Nutrition Assessment    RECOMMENDATIONS:  1. Patient w/HIGH RISK of Refeeding. Start daily MV + 100 mg Thiamin for 7 days (start  1/27 - end 2/6). 2. PO Diet: NPO per MD  3. EN Day 1: Start EN formula Peptide-Based  Vital AF 1.2 @ trophic rate 20 ml/hr x 24 hours. 4. EN Day 2: If no signs of refeeding, advance Vital AF 1.2 by 10 ml every 8 hours until goal rate of 65 ml/hr. 5. Water flush 100 ml every 4 hours. Defer to MD for water flushes. 6. Daily Labs: Phos, K+ and Mg to monitor for signs of refeeding. NUTRITION ASSESSMENT:    Nutritional summary & status: RD consulted for TF recs. Rapid response noted this AM on floor and pt transferred to ICU. Per chart review pt was unhappy at nursing home and refused to eat or drink anything for ~2 weeks. RD ordered CBW as current weight is stated. Per MD pt with significant wt loss. Pt meeting ASPEN criteria for severe malnutrition. Start EN @ slow rate for refeeding and RD to monitor closely.  Admission/PMH: Failure to thrive. PMH: s/p CABG 1/3/22, HLD     MALNUTRITION ASSESSMENT  Context of Malnutrition: Acute Illness   Malnutrition Status: Severe malnutrition  Findings of the 6 clinical characteristics of malnutrition (Minimum of 2 out of 6 clinical characteristics is required to make the diagnosis of moderate or severe Protein Calorie Malnutrition based on AND/ASPEN Guidelines):  Energy Intake: Less than/equal to 50% of estimated energy requirements    Energy Intake Time: Greater than or equal to 7 days    Weight Loss %: Unable to assess    Weight loss Time: Unable to assess   Body Fat Loss: Unable to Assess   Body Fat Location: Unable to assess    Body Muscle Loss: Severe Loss   Body Muscle Loss Location: Temples  per visual  Fluid Accumulation: No significant    Fluid Accumulation Location: No significant     Strength: Not Performed;  Not Measured     NUTRITION DIAGNOSIS   Severe malnutrition related to inadequate protein-energy intake as evidenced by poor intake prior to admission,severe muscle loss    [address] and/or Nutrient Delivery:  Continue NPO,Start Tube Feeding  Nutrition Education/Counseling:  No recommendation at this time   Goals:  Pt will tolerate most appropriate form of nutrition to meet 100% of nutrition needs       Nutrition Monitoring and Evaluation:   Food/Nutrient Intake Outcomes:  Diet Advancement/Tolerance,Enteral Nutrition Intake/Tolerance  Physical Signs/Symptoms Outcomes:  Weight,Biochemical Data     OBJECTIVE DATA: Significant to nutrition assessment  · Nutrition-Focused Physical Findings: chaya lbm, cachetic appearance  · Labs: Reviewed; Na 162, Mg 2.7  · Meds: Reviewed; KCl, senokot, thiamine  · Wounds: Pressure Injury       CURRENT NUTRITION THERAPIES  Diet NPO  ADULT TUBE FEEDING; Nasogastric; Peptide Based; Continuous; 20; No; 100; Q 4 hoursPO Intake: NPO   PO Supplement Intake:NPO  Additional Sources of Calories/IVF:75 ml/hr D5 = 306 kcal     ANTHROPOMETRICS  Current Height: 5' 7\" (170.2 cm)  Current Weight: 155 lb (70.3 kg)    Admission weight: 155 lb (70.3 kg)  Ideal Body Weight (IBW): 148 lbs  (67 kg)    Usual Bodyweight   CHAYA - RD ordered actual weight  Weight Changes CHAYA       BMI: 24.3    Wt Readings from Last 50 Encounters:   01/26/22 155 lb (70.3 kg)   01/10/22 155 lb 6.8 oz (70.5 kg)     COMPARATIVE STANDARDS  Energy (kcal):  2212-8539 (25-30); Weight Used for Energy Requirements:  Ideal (67 kg)     Protein (g):  101-114 (1.5-1.7); Weight Used for Protein Requirements:  Current (67 kg)        Fluid (ml/day):  5147-5632 ; Method Used for Fluid Requirements:  1 ml/kcal      The patient will still be monitored per nutrition standards of care. Consult dietitian if nutrition interventions essential to patient care is needed.      Anjum Vasquez, [address], [address] [place] [address] Drive:  [phone]  Office:  [phone]

## 2022-01-28 ENCOUNTER — APPOINTMENT (OUTPATIENT)
Dept: GENERAL RADIOLOGY | Age: 72
DRG: 871 | End: 2022-01-28
Payer: MEDICARE

## 2022-01-28 LAB
ALBUMIN SERPL-MCNC: 2.6 G/DL (ref 3.4–5)
ALBUMIN SERPL-MCNC: 2.6 G/DL (ref 3.4–5)
ALBUMIN SERPL-MCNC: 3 G/DL (ref 3.4–5)
ALBUMIN SERPL-MCNC: 3 G/DL (ref 3.4–5)
ALBUMIN SERPL-MCNC: 3.1 G/DL (ref 3.4–5)
ALBUMIN SERPL-MCNC: 3.1 G/DL (ref 3.4–5)
ALBUMIN SERPL-MCNC: 3.6 G/DL (ref 3.4–5)
AMMONIA: 23 UMOL/L (ref 16–60)
ANION GAP SERPL CALCULATED.3IONS-SCNC: 10 MMOL/L (ref 3–16)
ANION GAP SERPL CALCULATED.3IONS-SCNC: 12 MMOL/L (ref 3–16)
ANION GAP SERPL CALCULATED.3IONS-SCNC: 14 MMOL/L (ref 3–16)
ANION GAP SERPL CALCULATED.3IONS-SCNC: 8 MMOL/L (ref 3–16)
ANION GAP SERPL CALCULATED.3IONS-SCNC: 9 MMOL/L (ref 3–16)
BASE EXCESS ARTERIAL: -2 (ref -3–3)
BUN BLDV-MCNC: 31 MG/DL (ref 7–20)
BUN BLDV-MCNC: 36 MG/DL (ref 7–20)
BUN BLDV-MCNC: 40 MG/DL (ref 7–20)
BUN BLDV-MCNC: 46 MG/DL (ref 7–20)
BUN BLDV-MCNC: 50 MG/DL (ref 7–20)
BUN BLDV-MCNC: 63 MG/DL (ref 7–20)
BUN BLDV-MCNC: 72 MG/DL (ref 7–20)
CALCIUM IONIZED: 1.15 MMOL/L (ref 1.12–1.32)
CALCIUM SERPL-MCNC: 7.7 MG/DL (ref 8.3–10.6)
CALCIUM SERPL-MCNC: 7.9 MG/DL (ref 8.3–10.6)
CALCIUM SERPL-MCNC: 8 MG/DL (ref 8.3–10.6)
CALCIUM SERPL-MCNC: 8.1 MG/DL (ref 8.3–10.6)
CALCIUM SERPL-MCNC: 8.1 MG/DL (ref 8.3–10.6)
CALCIUM SERPL-MCNC: 8.6 MG/DL (ref 8.3–10.6)
CALCIUM SERPL-MCNC: 9.1 MG/DL (ref 8.3–10.6)
CHLORIDE BLD-SCNC: 111 MMOL/L (ref 99–110)
CHLORIDE BLD-SCNC: 113 MMOL/L (ref 99–110)
CHLORIDE BLD-SCNC: 114 MMOL/L (ref 99–110)
CHLORIDE BLD-SCNC: 117 MMOL/L (ref 99–110)
CHLORIDE BLD-SCNC: 118 MMOL/L (ref 99–110)
CHLORIDE BLD-SCNC: 119 MMOL/L (ref 99–110)
CHLORIDE BLD-SCNC: 123 MMOL/L (ref 99–110)
CO2: 19 MMOL/L (ref 21–32)
CO2: 21 MMOL/L (ref 21–32)
CO2: 22 MMOL/L (ref 21–32)
CO2: 25 MMOL/L (ref 21–32)
CORTISOL TOTAL: 19.9 UG/DL
CREAT SERPL-MCNC: 0.9 MG/DL (ref 0.8–1.3)
CREAT SERPL-MCNC: 0.9 MG/DL (ref 0.8–1.3)
CREAT SERPL-MCNC: 1 MG/DL (ref 0.8–1.3)
CREAT SERPL-MCNC: 1.1 MG/DL (ref 0.8–1.3)
CREAT SERPL-MCNC: 1.1 MG/DL (ref 0.8–1.3)
CREAT SERPL-MCNC: 1.4 MG/DL (ref 0.8–1.3)
CREAT SERPL-MCNC: 1.7 MG/DL (ref 0.8–1.3)
CREATININE URINE: 160.5 MG/DL (ref 39–259)
GFR AFRICAN AMERICAN: 48
GFR AFRICAN AMERICAN: >60
GFR NON-AFRICAN AMERICAN: 40
GFR NON-AFRICAN AMERICAN: 50
GFR NON-AFRICAN AMERICAN: >60
GLUCOSE BLD-MCNC: 105 MG/DL (ref 70–99)
GLUCOSE BLD-MCNC: 107 MG/DL (ref 70–99)
GLUCOSE BLD-MCNC: 111 MG/DL (ref 70–99)
GLUCOSE BLD-MCNC: 111 MG/DL (ref 70–99)
GLUCOSE BLD-MCNC: 117 MG/DL (ref 70–99)
GLUCOSE BLD-MCNC: 128 MG/DL (ref 70–99)
GLUCOSE BLD-MCNC: 135 MG/DL (ref 70–99)
GLUCOSE BLD-MCNC: 136 MG/DL (ref 70–99)
GLUCOSE BLD-MCNC: 140 MG/DL (ref 70–99)
GLUCOSE BLD-MCNC: 155 MG/DL (ref 70–99)
GLUCOSE BLD-MCNC: 98 MG/DL (ref 70–99)
HCO3 ARTERIAL: 21.6 MMOL/L (ref 21–29)
HCT VFR BLD CALC: 37.6 % (ref 40.5–52.5)
HEMOGLOBIN: 11.7 G/DL (ref 13.5–17.5)
INR BLD: 1.85 (ref 0.88–1.12)
LACTATE: 0.98 MMOL/L (ref 0.4–2)
LACTIC ACID: 0.9 MMOL/L (ref 0.4–2)
LACTIC ACID: 1.3 MMOL/L (ref 0.4–2)
LACTIC ACID: 1.5 MMOL/L (ref 0.4–2)
LACTIC ACID: 1.6 MMOL/L (ref 0.4–2)
LACTIC ACID: 1.9 MMOL/L (ref 0.4–2)
LACTIC ACID: 2.7 MMOL/L (ref 0.4–2)
MAGNESIUM: 1.8 MG/DL (ref 1.8–2.4)
MAGNESIUM: 2 MG/DL (ref 1.8–2.4)
MAGNESIUM: 2.2 MG/DL (ref 1.8–2.4)
MAGNESIUM: 2.5 MG/DL (ref 1.8–2.4)
MAGNESIUM: 2.7 MG/DL (ref 1.8–2.4)
MCH RBC QN AUTO: 26.4 PG (ref 26–34)
MCHC RBC AUTO-ENTMCNC: 31.1 G/DL (ref 31–36)
MCV RBC AUTO: 84.9 FL (ref 80–100)
O2 SAT, ARTERIAL: 94 % (ref 93–100)
OSMOLALITY URINE: 725 MOSM/KG (ref 390–1070)
PCO2 ARTERIAL: 30.3 MM HG (ref 35–45)
PDW BLD-RTO: 21.3 % (ref 12.4–15.4)
PERFORMED ON: ABNORMAL
PH ARTERIAL: 7.46 (ref 7.35–7.45)
PHOSPHORUS: 1.7 MG/DL (ref 2.5–4.9)
PHOSPHORUS: 1.7 MG/DL (ref 2.5–4.9)
PHOSPHORUS: 2 MG/DL (ref 2.5–4.9)
PHOSPHORUS: 2.1 MG/DL (ref 2.5–4.9)
PHOSPHORUS: 2.3 MG/DL (ref 2.5–4.9)
PHOSPHORUS: 2.6 MG/DL (ref 2.5–4.9)
PHOSPHORUS: 3 MG/DL (ref 2.5–4.9)
PLATELET # BLD: 153 K/UL (ref 135–450)
PMV BLD AUTO: 9.4 FL (ref 5–10.5)
PO2 ARTERIAL: 64.7 MM HG (ref 75–108)
POC POTASSIUM: 3.1 MMOL/L (ref 3.5–5.1)
POC SAMPLE TYPE: ABNORMAL
POC SODIUM: 152 MMOL/L (ref 136–145)
POTASSIUM SERPL-SCNC: 3.4 MMOL/L (ref 3.5–5.1)
POTASSIUM SERPL-SCNC: 3.5 MMOL/L (ref 3.5–5.1)
POTASSIUM SERPL-SCNC: 3.6 MMOL/L (ref 3.5–5.1)
POTASSIUM SERPL-SCNC: 3.6 MMOL/L (ref 3.5–5.1)
POTASSIUM SERPL-SCNC: 3.9 MMOL/L (ref 3.5–5.1)
POTASSIUM SERPL-SCNC: 4.1 MMOL/L (ref 3.5–5.1)
POTASSIUM SERPL-SCNC: 4.7 MMOL/L (ref 3.5–5.1)
PROCALCITONIN: 0.11 NG/ML (ref 0–0.15)
PROTHROMBIN TIME: 21.4 SEC (ref 9.9–12.7)
RBC # BLD: 4.43 M/UL (ref 4.2–5.9)
SODIUM BLD-SCNC: 143 MMOL/L (ref 136–145)
SODIUM BLD-SCNC: 145 MMOL/L (ref 136–145)
SODIUM BLD-SCNC: 146 MMOL/L (ref 136–145)
SODIUM BLD-SCNC: 147 MMOL/L (ref 136–145)
SODIUM BLD-SCNC: 148 MMOL/L (ref 136–145)
SODIUM BLD-SCNC: 152 MMOL/L (ref 136–145)
SODIUM BLD-SCNC: 160 MMOL/L (ref 136–145)
SODIUM URINE: <20 MMOL/L
TCO2 ARTERIAL: 23 MMOL/L
WBC # BLD: 12.5 K/UL (ref 4–11)

## 2022-01-28 PROCEDURE — 82330 ASSAY OF CALCIUM: CPT

## 2022-01-28 PROCEDURE — 85610 PROTHROMBIN TIME: CPT

## 2022-01-28 PROCEDURE — 36415 COLL VENOUS BLD VENIPUNCTURE: CPT

## 2022-01-28 PROCEDURE — 2580000003 HC RX 258: Performed by: INTERNAL MEDICINE

## 2022-01-28 PROCEDURE — 84145 PROCALCITONIN (PCT): CPT

## 2022-01-28 PROCEDURE — 2500000003 HC RX 250 WO HCPCS: Performed by: STUDENT IN AN ORGANIZED HEALTH CARE EDUCATION/TRAINING PROGRAM

## 2022-01-28 PROCEDURE — 71045 X-RAY EXAM CHEST 1 VIEW: CPT

## 2022-01-28 PROCEDURE — 2580000003 HC RX 258: Performed by: STUDENT IN AN ORGANIZED HEALTH CARE EDUCATION/TRAINING PROGRAM

## 2022-01-28 PROCEDURE — 85027 COMPLETE CBC AUTOMATED: CPT

## 2022-01-28 PROCEDURE — 2000000000 HC ICU R&B

## 2022-01-28 PROCEDURE — 99233 SBSQ HOSP IP/OBS HIGH 50: CPT | Performed by: INTERNAL MEDICINE

## 2022-01-28 PROCEDURE — 84300 ASSAY OF URINE SODIUM: CPT

## 2022-01-28 PROCEDURE — 82803 BLOOD GASES ANY COMBINATION: CPT

## 2022-01-28 PROCEDURE — 82140 ASSAY OF AMMONIA: CPT

## 2022-01-28 PROCEDURE — C9113 INJ PANTOPRAZOLE SODIUM, VIA: HCPCS

## 2022-01-28 PROCEDURE — 84443 ASSAY THYROID STIM HORMONE: CPT

## 2022-01-28 PROCEDURE — 82533 TOTAL CORTISOL: CPT

## 2022-01-28 PROCEDURE — 6360000002 HC RX W HCPCS

## 2022-01-28 PROCEDURE — 05HM33Z INSERTION OF INFUSION DEVICE INTO RIGHT INTERNAL JUGULAR VEIN, PERCUTANEOUS APPROACH: ICD-10-PCS | Performed by: STUDENT IN AN ORGANIZED HEALTH CARE EDUCATION/TRAINING PROGRAM

## 2022-01-28 PROCEDURE — 6360000002 HC RX W HCPCS: Performed by: STUDENT IN AN ORGANIZED HEALTH CARE EDUCATION/TRAINING PROGRAM

## 2022-01-28 PROCEDURE — 83935 ASSAY OF URINE OSMOLALITY: CPT

## 2022-01-28 PROCEDURE — 99291 CRITICAL CARE FIRST HOUR: CPT | Performed by: INTERNAL MEDICINE

## 2022-01-28 PROCEDURE — 2500000003 HC RX 250 WO HCPCS: Performed by: INTERNAL MEDICINE

## 2022-01-28 PROCEDURE — 83735 ASSAY OF MAGNESIUM: CPT

## 2022-01-28 PROCEDURE — 6370000000 HC RX 637 (ALT 250 FOR IP): Performed by: STUDENT IN AN ORGANIZED HEALTH CARE EDUCATION/TRAINING PROGRAM

## 2022-01-28 PROCEDURE — 82947 ASSAY GLUCOSE BLOOD QUANT: CPT

## 2022-01-28 PROCEDURE — 2700000000 HC OXYGEN THERAPY PER DAY

## 2022-01-28 PROCEDURE — 6370000000 HC RX 637 (ALT 250 FOR IP): Performed by: CLINICAL NURSE SPECIALIST

## 2022-01-28 PROCEDURE — 84295 ASSAY OF SERUM SODIUM: CPT

## 2022-01-28 PROCEDURE — 83605 ASSAY OF LACTIC ACID: CPT

## 2022-01-28 PROCEDURE — 80069 RENAL FUNCTION PANEL: CPT

## 2022-01-28 PROCEDURE — 2580000003 HC RX 258

## 2022-01-28 PROCEDURE — 84132 ASSAY OF SERUM POTASSIUM: CPT

## 2022-01-28 PROCEDURE — 82570 ASSAY OF URINE CREATININE: CPT

## 2022-01-28 PROCEDURE — 94640 AIRWAY INHALATION TREATMENT: CPT

## 2022-01-28 RX ORDER — SODIUM CHLORIDE 450 MG/100ML
INJECTION, SOLUTION INTRAVENOUS CONTINUOUS
Status: DISCONTINUED | OUTPATIENT
Start: 2022-01-28 | End: 2022-01-28

## 2022-01-28 RX ORDER — SODIUM CHLORIDE, SODIUM LACTATE, POTASSIUM CHLORIDE, AND CALCIUM CHLORIDE .6; .31; .03; .02 G/100ML; G/100ML; G/100ML; G/100ML
500 INJECTION, SOLUTION INTRAVENOUS ONCE
Status: COMPLETED | OUTPATIENT
Start: 2022-01-28 | End: 2022-01-28

## 2022-01-28 RX ORDER — PANTOPRAZOLE SODIUM 40 MG/10ML
40 INJECTION, POWDER, LYOPHILIZED, FOR SOLUTION INTRAVENOUS 2 TIMES DAILY
Status: DISCONTINUED | OUTPATIENT
Start: 2022-01-28 | End: 2022-02-07 | Stop reason: HOSPADM

## 2022-01-28 RX ORDER — 0.9 % SODIUM CHLORIDE 0.9 %
500 INTRAVENOUS SOLUTION INTRAVENOUS ONCE
Status: DISCONTINUED | OUTPATIENT
Start: 2022-01-28 | End: 2022-01-28

## 2022-01-28 RX ADMIN — SODIUM CHLORIDE 500 ML: 9 INJECTION, SOLUTION INTRAVENOUS at 02:47

## 2022-01-28 RX ADMIN — ENOXAPARIN SODIUM 40 MG: 100 INJECTION SUBCUTANEOUS at 08:39

## 2022-01-28 RX ADMIN — SODIUM CHLORIDE, POTASSIUM CHLORIDE, SODIUM LACTATE AND CALCIUM CHLORIDE 500 ML: 600; 310; 30; 20 INJECTION, SOLUTION INTRAVENOUS at 08:44

## 2022-01-28 RX ADMIN — SODIUM CHLORIDE: 4.5 INJECTION, SOLUTION INTRAVENOUS at 06:48

## 2022-01-28 RX ADMIN — THERA TABS 1 TABLET: TAB at 08:40

## 2022-01-28 RX ADMIN — CLOPIDOGREL BISULFATE 75 MG: 75 TABLET ORAL at 08:40

## 2022-01-28 RX ADMIN — POTASSIUM PHOSPHATE, MONOBASIC AND POTASSIUM PHOSPHATE, DIBASIC 30 MMOL: 224; 236 INJECTION, SOLUTION, CONCENTRATE INTRAVENOUS at 13:48

## 2022-01-28 RX ADMIN — SODIUM CHLORIDE, SODIUM LACTATE, POTASSIUM CHLORIDE, AND CALCIUM CHLORIDE 500 ML: .6; .31; .03; .02 INJECTION, SOLUTION INTRAVENOUS at 17:30

## 2022-01-28 RX ADMIN — SODIUM CHLORIDE, PRESERVATIVE FREE 10 ML: 5 INJECTION INTRAVENOUS at 08:45

## 2022-01-28 RX ADMIN — POTASSIUM CHLORIDE 10 MEQ: 750 TABLET, EXTENDED RELEASE ORAL at 08:40

## 2022-01-28 RX ADMIN — PANTOPRAZOLE SODIUM 40 MG: 40 INJECTION, POWDER, FOR SOLUTION INTRAVENOUS at 08:40

## 2022-01-28 RX ADMIN — NOREPINEPHRINE BITARTRATE 8 MCG/MIN: 1 INJECTION, SOLUTION, CONCENTRATE INTRAVENOUS at 12:08

## 2022-01-28 RX ADMIN — ATORVASTATIN CALCIUM 40 MG: 40 TABLET, FILM COATED ORAL at 21:41

## 2022-01-28 RX ADMIN — MEROPENEM 2000 MG: 1 INJECTION, POWDER, FOR SOLUTION INTRAVENOUS at 10:12

## 2022-01-28 RX ADMIN — FLUCONAZOLE 200 MG: 200 TABLET ORAL at 16:30

## 2022-01-28 RX ADMIN — AMIODARONE HYDROCHLORIDE 200 MG: 200 TABLET ORAL at 08:40

## 2022-01-28 RX ADMIN — PANTOPRAZOLE SODIUM 40 MG: 40 INJECTION, POWDER, FOR SOLUTION INTRAVENOUS at 21:41

## 2022-01-28 RX ADMIN — VANCOMYCIN HYDROCHLORIDE 1500 MG: 10 INJECTION, POWDER, LYOPHILIZED, FOR SOLUTION INTRAVENOUS at 11:32

## 2022-01-28 RX ADMIN — SENNOSIDES AND DOCUSATE SODIUM 1 TABLET: 50; 8.6 TABLET ORAL at 21:41

## 2022-01-28 RX ADMIN — SODIUM CHLORIDE, PRESERVATIVE FREE 10 ML: 5 INJECTION INTRAVENOUS at 21:41

## 2022-01-28 RX ADMIN — POTASSIUM CHLORIDE: 2 INJECTION, SOLUTION, CONCENTRATE INTRAVENOUS at 06:50

## 2022-01-28 RX ADMIN — SODIUM CHLORIDE, POTASSIUM CHLORIDE, SODIUM LACTATE AND CALCIUM CHLORIDE 500 ML: 600; 310; 30; 20 INJECTION, SOLUTION INTRAVENOUS at 10:03

## 2022-01-28 RX ADMIN — ASPIRIN 325 MG: 325 TABLET, COATED ORAL at 08:39

## 2022-01-28 RX ADMIN — BUPROPION HYDROCHLORIDE 300 MG: 150 TABLET, EXTENDED RELEASE ORAL at 08:40

## 2022-01-28 RX ADMIN — THIAMINE HCL TAB 100 MG 100 MG: 100 TAB at 08:40

## 2022-01-28 RX ADMIN — SENNOSIDES AND DOCUSATE SODIUM 1 TABLET: 50; 8.6 TABLET ORAL at 08:39

## 2022-01-28 RX ADMIN — ARFORMOTEROL TARTRATE 15 MCG: 15 SOLUTION RESPIRATORY (INHALATION) at 10:17

## 2022-01-28 RX ADMIN — MEROPENEM 2000 MG: 1 INJECTION, POWDER, FOR SOLUTION INTRAVENOUS at 16:17

## 2022-01-28 RX ADMIN — MONTELUKAST 10 MG: 10 TABLET, FILM COATED ORAL at 21:41

## 2022-01-28 ASSESSMENT — PAIN SCALES - PAIN ASSESSMENT IN ADVANCED DEMENTIA (PAINAD)
TOTALSCORE: 0
NEGVOCALIZATION: 0
NEGVOCALIZATION: 0
BREATHING: 0
FACIALEXPRESSION: 0
BODYLANGUAGE: 0
BREATHING: 0
BODYLANGUAGE: 0
BREATHING: 0
FACIALEXPRESSION: 0
BODYLANGUAGE: 0
CONSOLABILITY: 0
TOTALSCORE: 0
CONSOLABILITY: 0
NEGVOCALIZATION: 0
FACIALEXPRESSION: 0
BREATHING: 0
NEGVOCALIZATION: 0
BREATHING: 0
NEGVOCALIZATION: 0
NEGVOCALIZATION: 0
BODYLANGUAGE: 0
BREATHING: 0
BREATHING: 0
FACIALEXPRESSION: 0
BREATHING: 0
BODYLANGUAGE: 0
TOTALSCORE: 0
FACIALEXPRESSION: 0
BODYLANGUAGE: 0
NEGVOCALIZATION: 0
CONSOLABILITY: 0
TOTALSCORE: 0
BODYLANGUAGE: 0
FACIALEXPRESSION: 0
TOTALSCORE: 0
FACIALEXPRESSION: 0
FACIALEXPRESSION: 0
TOTALSCORE: 0
BREATHING: 0
BODYLANGUAGE: 0
CONSOLABILITY: 0
TOTALSCORE: 0
BREATHING: 0
TOTALSCORE: 0
TOTALSCORE: 0
CONSOLABILITY: 0
FACIALEXPRESSION: 0
CONSOLABILITY: 0
BODYLANGUAGE: 0
TOTALSCORE: 0
NEGVOCALIZATION: 0
NEGVOCALIZATION: 0
CONSOLABILITY: 0
NEGVOCALIZATION: 0
FACIALEXPRESSION: 0
BODYLANGUAGE: 0

## 2022-01-28 ASSESSMENT — PAIN SCALES - WONG BAKER
WONGBAKER_NUMERICALRESPONSE: 0

## 2022-01-28 ASSESSMENT — PAIN SCALES - GENERAL: PAINLEVEL_OUTOF10: 0

## 2022-01-28 NOTE — PLAN OF CARE
Problem: Falls - Risk of:  Goal: Will remain free from falls  Description: Will remain free from falls  Outcome: Ongoing  Goal: Absence of physical injury  Description: Absence of physical injury  Outcome: Ongoing     Problem: Nutrition  Goal: Optimal nutrition therapy  1/27/2022 1944 by Zakia Jean Baptiste RN  Outcome: Ongoing  1/27/2022 1425 by Anette Duque RD, LD  Outcome: Ongoing  Note: Nutrition Problem #1: Severe malnutrition  Intervention: Food and/or Nutrient Delivery: Continue NPO,Start Tube Feeding  Nutritional Goals: Pt will tolerate most appropriate form of nutrition to meet 100% of nutrition needs       Problem: OXYGENATION/RESPIRATORY FUNCTION  Goal: Patient will maintain patent airway  Outcome: Ongoing  Goal: Patient will achieve/maintain normal respiratory rate/effort  Description: Respiratory rate and effort will be within normal limits for the patient  Outcome: Ongoing     Problem: HEMODYNAMIC STATUS  Goal: Patient has stable vital signs and fluid balance  Outcome: Ongoing     Problem: FLUID AND ELECTROLYTE IMBALANCE  Goal: Fluid and electrolyte balance are achieved/maintained  Outcome: Ongoing     Problem: ACTIVITY INTOLERANCE/IMPAIRED MOBILITY  Goal: Mobility/activity is maintained at optimum level for patient  Outcome: Ongoing     Problem: Discharge Planning:  Goal: Participates in care planning  Description: Participates in care planning  Outcome: Ongoing  Goal: Discharged to appropriate level of care  Description: Discharged to appropriate level of care  Outcome: Ongoing     Problem: Airway Clearance - Ineffective:  Goal: Ability to maintain a clear airway will improve  Description: Ability to maintain a clear airway will improve  Outcome: Ongoing     Problem: Anxiety/Stress:  Goal: Level of anxiety will decrease  Description: Level of anxiety will decrease  Outcome: Ongoing     Problem: Aspiration:  Goal: Absence of aspiration  Description: Absence of aspiration  Outcome: Ongoing Problem: Bowel Function - Altered:  Goal: Bowel elimination is within specified parameters  Description: Bowel elimination is within specified parameters  Outcome: Ongoing     Problem: Fluid Volume - Imbalance:  Goal: Absence of imbalanced fluid volume signs and symptoms  Description: Absence of imbalanced fluid volume signs and symptoms  Outcome: Ongoing     Problem: Gas Exchange - Impaired:  Goal: Levels of oxygenation will improve  Description: Levels of oxygenation will improve  Outcome: Ongoing     Problem: Mental Status - Impaired:  Goal: Mental status will be restored to baseline  Description: Mental status will be restored to baseline  Outcome: Ongoing     Problem: Nutrition Deficit:  Goal: Ability to achieve adequate nutritional intake will improve  Description: Ability to achieve adequate nutritional intake will improve  Outcome: Ongoing     Problem: Pain:  Description: Pain management should include both nonpharmacologic and pharmacologic interventions.   Goal: Pain level will decrease  Description: Pain level will decrease  Outcome: Ongoing  Goal: Recognizes and communicates pain  Description: Recognizes and communicates pain  Outcome: Ongoing  Goal: Control of acute pain  Description: Control of acute pain  Outcome: Ongoing  Goal: Control of chronic pain  Description: Control of chronic pain  Outcome: Ongoing     Problem: Serum Glucose Level - Abnormal:  Goal: Ability to maintain appropriate glucose levels will improve to within specified parameters  Description: Ability to maintain appropriate glucose levels will improve to within specified parameters  Outcome: Ongoing     Problem: Skin Integrity - Impaired:  Goal: Will show no infection signs and symptoms  Description: Will show no infection signs and symptoms  Outcome: Ongoing  Goal: Absence of new skin breakdown  Description: Absence of new skin breakdown  Outcome: Ongoing     Problem: Sleep Pattern Disturbance:  Goal: Appears well-rested  Description: Appears well-rested  Outcome: Ongoing     Problem: Tissue Perfusion, Altered:  Goal: Circulatory function within specified parameters  Description: Circulatory function within specified parameters  Outcome: Ongoing

## 2022-01-28 NOTE — PROGRESS NOTES
Speech Language Pathology  HOLD    Chart reviewed, d/w Rn who states pt is not responsive at this time. Rn will notify SLP later this date if pt becomes more alert. Linh Becerra M.S./[facility]-SLP #[id number]  Pg.  # [phone]

## 2022-01-28 NOTE — PROGRESS NOTES
New Right IJ bleeding through dressings, Resident MD aware and dressing changed x3. Bleeding still persists at  IJ site, James Juarez MD and held pressure for 15min from 1735 to 1750 and sterile dressing preformed, bleeding slowed to small red amount.

## 2022-01-28 NOTE — PROGRESS NOTES
Patient SBP 60s to 80s, MD at bedside and new orders placed. Total 1500ml of LR given with x3 500ml LR boluses  SBP still not above 80 and new orders placed for New Right IJ CVC and Levophed started after Xray and confirmed placement.

## 2022-01-28 NOTE — PLAN OF CARE
Problem: Falls - Risk of:  Goal: Will remain free from falls  Description: Will remain free from falls  Outcome: Ongoing  Goal: Absence of physical injury  Description: Absence of physical injury  Outcome: Ongoing     Problem: Nutrition  Goal: Optimal nutrition therapy  Outcome: Ongoing     Problem: OXYGENATION/RESPIRATORY FUNCTION  Goal: Patient will maintain patent airway  Outcome: Ongoing  Goal: Patient will achieve/maintain normal respiratory rate/effort  Description: Respiratory rate and effort will be within normal limits for the patient  Outcome: Ongoing     Problem: HEMODYNAMIC STATUS  Goal: Patient has stable vital signs and fluid balance  Outcome: Ongoing     Problem: FLUID AND ELECTROLYTE IMBALANCE  Goal: Fluid and electrolyte balance are achieved/maintained  Outcome: Ongoing     Problem: ACTIVITY INTOLERANCE/IMPAIRED MOBILITY  Goal: Mobility/activity is maintained at optimum level for patient  Outcome: Ongoing     Problem: Airway Clearance - Ineffective:  Goal: Ability to maintain a clear airway will improve  Description: Ability to maintain a clear airway will improve  Outcome: Ongoing     Problem: Aspiration:  Goal: Absence of aspiration  Description: Absence of aspiration  Outcome: Ongoing     Problem:  Bowel Function - Altered:  Goal: Bowel elimination is within specified parameters  Description: Bowel elimination is within specified parameters  Outcome: Ongoing     Problem: Fluid Volume - Imbalance:  Goal: Absence of imbalanced fluid volume signs and symptoms  Description: Absence of imbalanced fluid volume signs and symptoms  Outcome: Ongoing     Problem: Gas Exchange - Impaired:  Goal: Levels of oxygenation will improve  Description: Levels of oxygenation will improve  Outcome: Ongoing     Problem: Mental Status - Impaired:  Goal: Mental status will be restored to baseline  Description: Mental status will be restored to baseline  Outcome: Ongoing     Problem: Nutrition Deficit:  Goal: Ability to achieve adequate nutritional intake will improve  Description: Ability to achieve adequate nutritional intake will improve  Outcome: Ongoing     Problem: Pain:  Goal: Pain level will decrease  Description: Pain level will decrease  Outcome: Ongoing  Goal: Recognizes and communicates pain  Description: Recognizes and communicates pain  Outcome: Ongoing  Goal: Control of acute pain  Description: Control of acute pain  Outcome: Ongoing  Goal: Control of chronic pain  Description: Control of chronic pain  Outcome: Ongoing     Problem: Skin Integrity - Impaired:  Goal: Will show no infection signs and symptoms  Description: Will show no infection signs and symptoms  Outcome: Ongoing  Goal: Absence of new skin breakdown  Description: Absence of new skin breakdown  Outcome: Ongoing     Problem: Sleep Pattern Disturbance:  Goal: Appears well-rested  Description: Appears well-rested  Outcome: Ongoing     Problem: Tissue Perfusion, Altered:  Goal: Circulatory function within specified parameters  Description: Circulatory function within specified parameters  Outcome: Ongoing     Problem: Skin Integrity:  Goal: Will show no infection signs and symptoms  Description: Will show no infection signs and symptoms  Outcome: Ongoing  Goal: Absence of new skin breakdown  Description: Absence of new skin breakdown  Outcome: Ongoing

## 2022-01-28 NOTE — PROGRESS NOTES
ICU Progress Note    Admit Date: 1/26/2022  IV Access:Peripheral  IV Fluids: KCl in D5W, NS Bolus as needed                Antibiotics: Ceftriaxone  Diet: Diet NPO  ADULT TUBE FEEDING; Nasogastric; Peptide Based; Continuous; 20; No; 200; Q 4 hours    CC: FTT    Interval history: Pt SBP 94 to 71 last evening, requiring small bolus of IVF, SBP improved with fluids. Pt responds to verbal stimuli today, whispers when writer asked a question, but unable to determine what he is saying. Pt does not follow commands, his bilateral UE is stiff, and feels contracted. Pt grimaces to pain, his LE > UE on painful stimuli.      mL  Net I/O + 4.5 L  Na 152  Cr 1.4    Medications:     Scheduled Meds:   pantoprazole  40 mg IntraVENous BID    oxymetazoline  2 spray Each Nostril Once    lidocaine   Topical Once    thiamine mononitrate  100 mg Per NG tube Daily    multivitamin  1 tablet Oral Daily    fluconazole  200 mg Per NG tube Daily    amiodarone  200 mg Oral Daily    Arformoterol Tartrate  15 mcg Nebulization BID    aspirin  325 mg Oral Daily    atorvastatin  40 mg Oral Nightly    buPROPion  300 mg Oral QAM    clopidogrel  75 mg Oral Daily    [Held by provider] metoprolol tartrate  12.5 mg Oral BID    montelukast  10 mg Oral Nightly    potassium chloride  10 mEq Oral Daily    tiotropium  2 puff Inhalation Daily    sodium chloride flush  10 mL IntraVENous 2 times per day    enoxaparin  40 mg SubCUTAneous Daily    sennosides-docusate sodium  1 tablet Oral BID    cefTRIAXone (ROCEPHIN) IV  1,000 mg IntraVENous Q24H     Continuous Infusions:   sodium chloride 500 mL/hr at 01/28/22 7631    IV infusion builder 100 mL/hr at 01/28/22 0650    sodium chloride       PRN Meds:sodium chloride flush, sodium chloride, ondansetron **OR** ondansetron, magnesium hydroxide, acetaminophen **OR** acetaminophen    Objective:   Vitals:   T-max:  Patient Vitals for the past 8 hrs:   BP Temp Temp src Pulse Resp SpO2 Weight 01/28/22 0610       142 lb 13.7 oz (64.8 kg)   01/28/22 0600 (!) 71/46   85 23 96 %    01/28/22 0500 (!) 83/52   84 21 96 %    01/28/22 0430 89/61   90 23 98 %    01/28/22 0400 (!) 76/51 98.5 °F (36.9 °C) Axillary 89 22 96 %    01/28/22 0355 (!) 81/56   90 23 98 %    01/28/22 0330 (!) 82/56   91 19     01/28/22 0300 (!) 78/48   90 25     01/28/22 0230 (!) 72/47   87 22 97 %    01/28/22 0200 (!) 91/38   86 23 98 %    01/28/22 0130 (!) 84/58   84 20     01/28/22 0100 (!) 75/55   84 21 98 %    01/28/22 0030 (!) 82/56   86 22 100 %    01/28/22 0000 (!) 80/54 98.1 °F (36.7 °C) Axillary 86 19 99 %    01/27/22 2330 (!) 94/57   88 24     01/27/22 2300 (!) 87/59   88 24 95 %        Intake/Output Summary (Last 24 hours) at 1/28/2022 0654  Last data filed at 1/28/2022 0600  Gross per 24 hour   Intake 5438.87 ml   Output 910 ml   Net 4528.87 ml       Review of Systems    Physical Exam  Constitutional:       General: He is not in acute distress. Appearance: He is ill-appearing. He is not toxic-appearing or diaphoretic. HENT:      Head: Normocephalic and atraumatic. Eyes:      Pupils: Pupils are equal, round, and reactive to light. Cardiovascular:      Rate and Rhythm: Normal rate and regular rhythm. Heart sounds: No murmur heard. No friction rub. No gallop. Pulmonary:      Effort: Pulmonary effort is normal.      Breath sounds: No wheezing, rhonchi or rales. Abdominal:      General: There is no distension. Tenderness: There is no abdominal tenderness. There is no guarding or rebound. Musculoskeletal:      Right lower leg: No edema. Left lower leg: No edema. Neurological:      Mental Status: He is lethargic, disoriented and confused.            LABS:    CBC:   Recent Labs     01/26/22  1633 01/27/22  0634 01/28/22  0432   WBC 12.2* 12.3* 12.5*   HGB 13.7 12.3* 11.7*   HCT 45.0 39.0* 37.6*    215 153   MCV 85.6 83.8 84.9     Renal: Recent Labs     01/27/22  0633 01/27/22  1334 01/27/22  1752 01/27/22  2331 01/28/22  0432   *   < > 160* 160* 152*   K 3.5   < > 3.5 3.5 4.7   *   < > 123* 123* 119*   CO2 25   < > 29 25 19*   BUN 76*   < > 69* 72* 63*   CREATININE 1.5*   < > 1.5* 1.7* 1.4*   GLUCOSE 148*   < > 156* 155* 136*   CALCIUM 8.9   < > 8.9 9.1 8.6   MG 2.70*  --   --  2.70* 2.50*   PHOS  --    < > 2.3* 2.3* 2.1*   ANIONGAP 10   < > 8 12 14    < > = values in this interval not displayed. Hepatic:   Recent Labs     01/26/22  1633 01/26/22  1633 01/27/22  0633 01/27/22  1334 01/27/22  1752 01/27/22  2331 01/28/22  0432   AST 12*  --  12*  --   --   --   --    ALT 11  --  8*  --   --   --   --    BILITOT 0.6  --  0.5  --   --   --   --    BILIDIR  --   --  <0.2  --   --   --   --    PROT 7.3  --  6.5  --   --   --   --    LABALBU 3.8   < > 3.3*   < > 3.3* 3.6 3.1*   ALKPHOS 132*  --  113  --   --   --   --     < > = values in this interval not displayed. Troponin: No results for input(s): TROPONINI in the last 72 hours. BNP: No results for input(s): BNP in the last 72 hours. Lipids: No results for input(s): CHOL, HDL in the last 72 hours. Invalid input(s): LDLCALCU, TRIGLYCERIDE  ABGs:    Recent Labs     01/27/22  0952   PHART 7.449   LYY0LUW 38.4   PO2ART 74.9*   BJZ4YQD 27   BEART 2.5   G9IAMTHL 96   ESI4IPG 28       INR: No results for input(s): INR in the last 72 hours. Lactate: No results for input(s): LACTATE in the last 72 hours. Cultures:  -----------------------------------------------------------------  RAD:   XR ABDOMEN FOR NG/OG/NE TUBE PLACEMENT   Final Result      Frontal view centered at the diaphragm demonstrates tip of NG tube in the gastric fundus. Bowel gas pattern is unremarkable. Elevated left hemidiaphragm with left basilar scarring. Sternotomy wires and left atrial appendage clip noted.       XR CHEST PORTABLE   Final Result      Left pleural effusion with adjacent parenchymal consolidation, similar to January 6, 2022. CT HEAD WO CONTRAST   Final Result      1. No evidence for acute intracranial abnormality. 2.  Moderate diffuse cerebral atrophy with ventricular white matter changes bilaterally consistent with chronic small vessel ischemia. Assessment/Plan:   Pt is a 71 y/o M w/ a PMHx CAD s/p 3 vessel CABG (1/03), HfrEF (EF 30% to 35% on 1/2022), baseline Cr 0.6 (01/2022), HLD, BPH, and Squamous Cell CA of UE who p/w FTT. Transferred to ICU due to AMS.    Acute Metabolic Encephalopathy 2/2 Electrolyte Abnormality  Early morning 1/27 pt became more lethargic, no longer responding. Na 162, pt hypotensive. AMS likely due to volume and free water depletion.     -Small Bolus as needed for hypotension  -KCl in D5W   -RFP q4H  -Mg Daily  -Nephrology following     DWIGHT  Etiology pre-renal, pt not eating or drinking while at SNF.     -IVF  -RFP daily  -Nephrology following     Hypernatremia  On admission Na 162 and pt hypotensive. Pt likely both free water and volume deficit. -KCl in D5W  -RFP q4H     UTI, suspected  UA Nitrite negative, w/ moderate Leukocyte Esterase, WBC 10-20, 1+ Bacteria. Abx ordered to cover for UTI.     -Ceftriaxone 1000 mg daily  -Fluconazole 200 mg daily     Failure to Thrive  Pt recently d/c to SNF, per chart review, did not like it there and was refusing nutrition.     -IVF  -RFP, Mg, Phos q4H  -Dietary Consulted  -Consider supplemental nutrition if pt unable to tolerate PO intake     s/p CABG  Pt had 3 vessel CABG performed at [facility] on 1/03.      -Cardiothoracic Surgery following    Code Status: Full Code  FEN: Diet NPO  ADULT TUBE FEEDING; Nasogastric; Peptide Based; Continuous; 20; No; 200; Q 4 hours  PPX: Lovenox  DISPO: TREMAINE Montoya DO, PGY-1  01/28/22  6:54 AM    This patient has been staffed and discussed with Dr. Berlin Lin. Pulm/CC     Patient seen and examined.  I agree with Dr. Jenaro Treviño history, physical, lab findings, assessment and plan.     Last evening his sodium remained at 160 so half-normal saline was changed to D5W at 75 mL an hour in addition to his free water flushes 200 mg. Sodium responded and is now 152. Overnight he developed hypotension with systolic blood pressures in the 70's. He was volume responsive to multiple boluses of LR at 500 mL despite already having 5.4 L of volume resuscitation prior to this. A central line was placed and Levophed started to maintain MAP of 65. Ultrasound of IVC and IJ were relatively flat. It may have been that he was that profoundly dehydrated but cannot rule out sepsis as well. Blood and urine cultures obtained. Urine shows Candida which is likely a colonizer. He has been started on Vanco, Merrem, and Diflucan. Cortisol is 20 so he is likely not adrenally insufficient. Procalcitonin 0.11.  TSH is pending. As of mid morning his mental status has started to improve and he is now conversant with us but still lethargic    Pt has a high probability of imminent or life-threatening deterioration requiring close monitoring, and highly complex decision-making and/or interventions of high intensity to assess, manipulate, and support his critical organ systems to prevent a likely inevitable decline which could occur if left untreated. A total critical care time 33 minutes was used. This includes but not limited to examining patient, collaborating with other physicians, monitoring vital signs, telemetry, continuous pulse oximetry, and clinical response to IV medications, documentation time, review and interpretation of laboratory and radiological data, review of nursing notes and old record review. This time excludes any time that may have been spent performing procedures for life threatening organ failure.     Susannah Gonzalez MD

## 2022-01-28 NOTE — PROGRESS NOTES
Lab called concerning Renal Function Panel results for the specimen collected around 2330 1/27/22. Kari Raphael in lab stated that the machine malfunctioned and that she was running the lab now and results will be posted soon.

## 2022-01-28 NOTE — CONSULTS
Clinical Pharmacy Progress Note    Vancomycin - Management by Pharmacy    Consult Date(s): 1/28/22  Consulting Provider(s): Dr. Sandra Patterson / Plan    Sepsis of unknown etiology - Vancomycin   Concurrent Antimicrobials: Meropenem - day #1   Day of Vanc Therapy: #1   Current Dosing Method: Intermittent Dosing by Levels   Therapeutic Goal: ~15 mg/L   Current Dose / Frequency: Intermittent dosing   Plan / Rationale:   o Patient has an DWIGHT that appears to be resolving with SCr 1.4 mg/dL this AM. Will dose vancomycin intermittently via levels until renal function stabilizes. o Vancomycin 1.5g (~25 mg/kg) IV x 1 loading dose ordered for this AM. Follow-up random level ordered for tomorrow AM.     [facility] Will continue to monitor clinical condition and make adjustments to regimen as appropriate. Thank you for consulting Pharmacy! Vadim WhelanD, [facility]  Wireless: [phone]  1/28/2022 8:59 AM      Subjective/Objective: Mr. Lizzeth Goodwin is a 70 y.o. male with a PMHx significant for CAD s/p 3 vessel CABG (1/3/22), HFrEF, HLD, BPH, and squamous cell carinoma, admitted for failure to thrive. He was started on broad spectrum IV antibiotics for sepsis 2/2 unknown etiology. Pharmacy has been consulted to dose vancomycin. Height:   Ht Readings from Last 1 Encounters:   01/26/22 5' 7\" (1.702 m)     Weight:   Wt Readings from Last 1 Encounters:   01/28/22 142 lb 13.7 oz (64.8 kg)       Current & Prior Antimicrobial Regimen(s):      Fluconazole (1/27-current)   Meropenem (1/28-current)   Vancomycin (1/28-current)    Level(s) / Doses:    Date Time Dose Level / Type of Level Interpretation                 Note: Serum levels collected for AUC-based dosing may be high if collected in close proximity to the dose administered. This is not necessarily an indicator of toxicity.     Cultures & Sensitivities:    Date Site Micro Susceptibility / Result   1/26 Urine C. albicans    1/27 Blood x2 In process      Labs / Ancillary Data:    Estimated Creatinine Clearance: 44 mL/min (A) (based on SCr of 1.4 mg/dL (H)). Recent Labs     01/26/22  1633 01/27/22  1029 01/27/22  0634 01/27/22  1334 01/27/22  1752 01/27/22  2331 01/28/22  0432   CREATININE 1.6*   < >  --    < > 1.5* 1.7* 1.4*   BUN 70*   < >  --    < > 69* 72* 63*   WBC 12.2*  --  12.3*  --   --   --  12.5*    < > = values in this interval not displayed. Additional Lab Values / Findings of Note:    Procalcitonin: No results for input(s): PROCAL in the last 72 hours.

## 2022-01-28 NOTE — PROCEDURES
Pranav Farfan is a 70 y.o. male patient. 1. Hypernatremia    2. Altered mental status, unspecified altered mental status type      Past Medical History:   Diagnosis Date    BPH with obstruction/lower urinary tract symptoms 8/3/2012    Chicken pox     Colon polyp     Hyperglycemia 12/15/2010    Hyperlipemia 12/15/2010    Post herpetic neuralgia     Shingles     Squamous cell carcinoma of skin of upper limb, including shoulder 9/8/2015     Blood pressure (!) 73/50, pulse 84, temperature 98.5 °F (36.9 °C), temperature source Axillary, resp. rate 21, height 5' 7\" (1.702 m), weight 142 lb 13.7 oz (64.8 kg), SpO2 92 %. Central Line    Date/Time: 1/28/2022 11:24 AM  Performed by: Renetta Bar MD  Authorized by: Renetta Bar MD   Consent: Verbal consent obtained.   Consent given by: power of   Patient understanding: patient states understanding of the procedure being performed  Patient consent: the patient's understanding of the procedure matches consent given  Procedure consent: procedure consent matches procedure scheduled  Relevant documents: relevant documents present and verified  Test results: test results available and properly labeled  Site marked: the operative site was marked  Imaging studies: imaging studies available  Patient identity confirmed: arm band  Indications: vascular access    Anesthesia:  Local Anesthetic: lidocaine 1% with epinephrine    Sedation:  Patient sedated: no    Preparation: skin prepped with 2% chlorhexidine  Location details: right internal jugular  Patient position: Trendelenburg  Catheter type: triple lumen  Catheter size: 7 Fr  Pre-procedure: landmarks identified  Ultrasound guidance: yes  Sterile ultrasound techniques: sterile gel and sterile probe covers were used  Number of attempts: 1  Successful placement: yes  Post-procedure: line sutured and dressing applied  Assessment: blood return through all ports,  free fluid flow and placement verified by x-ray      Estimated blood loss: <5 cc    Stacey Villalta MD  1/28/2022    Pulmonary & Critical Care    I was present and supervised procedure    Macario Officer MD

## 2022-01-28 NOTE — PROGRESS NOTES
Progress Note  Hospital Day: 3    Chief Complaint: Dehydration, Depression, AMS    Mr. Reina Cox was readmitted on 2022 following office visit at which he was dramatically withdrawn and nearly obtunded. He was profoundly dehydrated after not taking PO at his nursing facility for nearly 2 weeks. 24 hour Interval History:      Admitted to [facility] ER   Transferred up to floor, on rounds appeared very poor from a functional standpoint. Transfer to ICU   Sodium and mental status improved    Today's plan:  Continue volume replacement and antibiotics. VITAL SIGNS   Temperature:  Current - Temp: 98.5 °F (36.9 °C); Max - Temp  Av °F (37.2 °C)  Min: 98.1 °F (36.7 °C)  Max: 100.9 °F (38.3 °C)    Respiratory Rate : Resp  Av.6  Min: 13  Max: 30    Pulse Range: Pulse  Av  Min: 82  Max: 100    Blood Pressure Range:  Systolic (01CQQ), XHJ:15 , Min:71 , KLW:671   ; Diastolic (92XJL), PAJ:22, Min:32, Max:69    Pulse ox Range: SpO2  Av.8 %  Min: 86 %  Max: 100 %  O2 Flow Rate (L/min): 1 L/min         Admission Weight: Weight: 155 lb (70.3 kg)    Current Weight:   Patient Vitals for the past 96 hrs (Last 3 readings):   Weight   22 0610 142 lb 13.7 oz (64.8 kg)   22 1453 149 lb 14.6 oz (68 kg)   22 1626 155 lb (70.3 kg)     I/O:     Intake/Output Summary (Last 24 hours) at 2022 0726  Last data filed at 2022 0600  Gross per 24 hour   Intake 5438.87 ml   Output 910 ml   Net 4528.87 ml     Urine (hernandez): Being Placed    LINES/ACCESS:      Peripheral Access: RUE Day #: 3    Diet: Diet NPO  ADULT TUBE FEEDING; Nasogastric; Peptide Based; Continuous; 20; No; 200; Q 4 hours Swallow eval today, after this, may have regular diet, no salt added.     MEDICATIONS:      pantoprazole  40 mg IntraVENous BID    oxymetazoline  2 spray Each Nostril Once    lidocaine   Topical Once    thiamine mononitrate  100 mg Per NG tube Daily    multivitamin  1 tablet Oral Daily    fluconazole  200 mg Per NG tube Daily    amiodarone  200 mg Oral Daily    Arformoterol Tartrate  15 mcg Nebulization BID    aspirin  325 mg Oral Daily    atorvastatin  40 mg Oral Nightly    buPROPion  300 mg Oral QAM    clopidogrel  75 mg Oral Daily    [Held by provider] metoprolol tartrate  12.5 mg Oral BID    montelukast  10 mg Oral Nightly    potassium chloride  10 mEq Oral Daily    tiotropium  2 puff Inhalation Daily    sodium chloride flush  10 mL IntraVENous 2 times per day    enoxaparin  40 mg SubCUTAneous Daily    sennosides-docusate sodium  1 tablet Oral BID    cefTRIAXone (ROCEPHIN) IV  1,000 mg IntraVENous Q24H       Infusions:   sodium chloride 500 mL/hr at 01/28/22 0648    IV infusion builder 100 mL/hr at 01/28/22 0650    sodium chloride         DATA REVIEW:   CBC:   Recent Labs     01/26/22  1633 01/27/22  0634 01/28/22  0432   WBC 12.2* 12.3* 12.5*   HGB 13.7 12.3* 11.7*   HCT 45.0 39.0* 37.6*   MCV 85.6 83.8 84.9    215 153     BMP:   Recent Labs     01/27/22  0633 01/27/22  1334 01/27/22  1752 01/27/22  2331 01/28/22  0432   * 159* 160* 160* 152*   K 3.5 3.9 3.5 3.5 4.7   * 122* 123* 123* 119*   CO2 25 25 29 25 19*   PHOS  --  3.3 2.3* 2.3* 2.1*   GLUCOSE 148* 150* 156* 155* 136*   BUN 76* 74* 69* 72* 63*   CREATININE 1.5* 2.0* 1.5* 1.7* 1.4*   CALCIUM 8.9 9.3 8.9 9.1 8.6   MG 2.70*  --   --  2.70* 2.50*     Accucheck Glucoses:   Recent Labs     01/27/22  0918 01/27/22  1804   POCGLU 146* 135*     Cardiac Enzymes: No results for input(s): CKTOTAL, CKMB, CKMBINDEX in the last 72 hours. Invalid input(s): TROPONIN  PT/INR: No results for input(s): PROTIME, INR in the last 72 hours. APTT: No results for input(s): APTT in the last 72 hours.   LFT:   Recent Labs     01/26/22  1633 01/27/22  0633   AST 12* 12*   ALT 11 8*   BILIDIR  --  <0.2   BILITOT 0.6 0.5   ALKPHOS 132* 113     Troponin: Invalid input(s): TROPONIN  BNP: No results for input(s): BNP in the last 72 hours.  ABG:    Lab Results   Component Value Date    PHART 7.449 01/27/2022    PO2ART 74.9 01/27/2022    WGS4BCU 38.4 01/27/2022    B3HGPWMN 96 01/27/2022    EHM3PZI 28 01/27/2022    DBP1ANS 27 01/27/2022    BEART 2.5 01/27/2022    BEART -7 01/04/2022    BEART -6 01/04/2022    BEART -5 01/04/2022    BEART -4 01/03/2022     U/A:    Recent Labs     01/26/22  1633   COLORU Yellow   PHUR 5.5   WBCUA 10-20*   RBCUA 5-10*   YEAST Present*   BACTERIA 1+*   CLARITYU CLOUDY*   SPECGRAV >=1.030   LEUKOCYTESUR MODERATE*   UROBILINOGEN 0.2   BILIRUBINUR SMALL*   BLOODU SMALL*   GLUCOSEU Negative       Urine Culture:  Pending       Chest X-Ray:       Portable chest       HISTORY: Altered mental status       COMPARISON: January 6, 2022.       FINDINGS:       The cardiomediastinal contours are stable. There is a moderate left pleural effusion. Adjacent parenchymal consolidation. EKG: NSR @ 84    ASSESSMENT:   Patient Active Problem List:     Hyperglycemia     Smoker's respiratory syndrome     BPH with obstruction/lower urinary tract symptoms     Basal cell carcinoma of shoulder     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     SCCS, mod diff  (squamous cell carcinoma), hand, left     Basal cell carcinoma of right side of nose     Visit for suture removal     Visit for wound check     Anemia     Syncope and collapse     Non-ST elevated myocardial infarction (non-STEMI) ([facility] [address]     Dehydration      Neuro: Somnolent, but will talk a little this morning.   CV:   Sinus  Pulm:  Normal work of breathing  Abd:  Scaphoid, soft, non-tender, non-distended  Diamond: Being placed  Wounds: clean, dry and intact  Ext: warm, no edema, skin tenting    PLAN:   · Neuro - pain tolerated, no medications needed  · Psych - Hypomanic delrium, possible adjustment disorder   - Needs psych eval after UTI and Dehydration treated   - Patient discussed with  Psych ER 1/26/2022  · CV - on Beta blocker, ace inhibitor, statin, ASA/Plavix   - PO Meds currently held due to mental status  · Pulm - acute postoperative pulmonary insuffiency as expected- wean O2 as tolerated, continue incentive spirometry  · Renal - Acute kidney failure/creatinine stable - Cr 1.4, preop Cr 0.8        - Bed weight 142 (Discharged 155)         - keep urinary catheter for accurate I & O        - Hypernatremia and Dehydration   - Rehydration per ICU team  · Heme - Acute blood loss anemia as expected- hgb 12.3 - Hemoconcentrated currently, continue to monitor         · ID - continue surgical prophylaxis antibiotics  · FEN - NGT Feeds, with free water  · GI prophylaxis - Protonix 40 mg bid  · VTE prophylaxis - SCD and YUMI hose  · Disposition -   Transfer to ICU  Discussed patient with Dr Laroy Rinne who is agreeable to assessment and plan.       Marycarmen Harrison MD   Thoracic Surgery Resident   [phone]  01/28/22 7:26 AM

## 2022-01-28 NOTE — PROGRESS NOTES
Office : [phone]     Fax :[phone]         Renal Progress Note  Subjective:   Admit Date: 1/26/2022     HPI: Wally Henley is a 70 y.o. male s/p recent CABG earlier this month. On f/u with Dr. Virginia Wayne, he was noted to be depressed and refusing to eat. Sent to ER for dehydration and failure to thrive. Found to have DWIGHT and significant hypernatremia.      Morning of 1/27 patient significantly more obtunded. He is being transferred to ICU for closer monitoring\"     Interval History: Sodium coming down with free water bolus per NG tube. Mentation still not back to baseline. Appears toxic. Cultures drawn, empiric abx initiated.       DIET Diet NPO  ADULT TUBE FEEDING; Nasogastric; Peptide Based; Continuous; 20; No; 200; Q 4 hours  Medications:   Scheduled Meds:   pantoprazole  40 mg IntraVENous BID    lactated ringers bolus  500 mL IntraVENous Once    meropenem  2,000 mg IntraVENous Once    Followed by    meropenem  2,000 mg IntraVENous Q12H    vancomycin  1,500 mg IntraVENous Once    vancomycin (VANCOCIN) intermittent dosing (placeholder)   Other RX Placeholder    oxymetazoline  2 spray Each Nostril Once    lidocaine   Topical Once    thiamine mononitrate  100 mg Per NG tube Daily    multivitamin  1 tablet Oral Daily    fluconazole  200 mg Per NG tube Daily    amiodarone  200 mg Oral Daily    Arformoterol Tartrate  15 mcg Nebulization BID    aspirin  325 mg Oral Daily    atorvastatin  40 mg Oral Nightly    buPROPion  300 mg Oral QAM    clopidogrel  75 mg Oral Daily    [Held by provider] metoprolol tartrate  12.5 mg Oral BID    montelukast  10 mg Oral Nightly    potassium chloride  10 mEq Oral Daily    tiotropium  2 puff Inhalation Daily    sodium chloride flush  10 mL IntraVENous 2 times per day    enoxaparin  40 mg SubCUTAneous Daily    sennosides-docusate sodium  1 tablet Oral BID     Continuous Infusions:   IV infusion builder 100 mL/hr at 01/28/22 0650    sodium chloride         Labs:  CBC:   Recent Labs     01/26/22  1633 01/27/22  0634 01/28/22  0432   WBC 12.2* 12.3* 12.5*   HGB 13.7 12.3* 11.7*    215 153     BMP:    Recent Labs     01/27/22  1752 01/27/22  2331 01/28/22  0432   * 160* 152*   K 3.5 3.5 4.7   * 123* 119*   CO2 29 25 19*   BUN 69* 72* 63*   CREATININE 1.5* 1.7* 1.4*   GLUCOSE 156* 155* 136*     Ca/Mg/Phos:   Recent Labs     01/27/22  0633 01/27/22  1334 01/27/22  1752 01/27/22  2331 01/28/22  0432   CALCIUM 8.9   < > 8.9 9.1 8.6   MG 2.70*  --   --  2.70* 2.50*   PHOS  --    < > 2.3* 2.3* 2.1*    < > = values in this interval not displayed. Hepatic:   Recent Labs     01/26/22  1633 01/27/22  0633   AST 12* 12*   ALT 11 8*   BILITOT 0.6 0.5   ALKPHOS 132* 113     Troponin: No results for input(s): TROPONINI in the last 72 hours. BNP: No results for input(s): BNP in the last 72 hours. Lipids: No results for input(s): CHOL, TRIG, HDL, LDLCALC, LABVLDL in the last 72 hours. ABGs:   Recent Labs     01/27/22  0952   PHART 7.449   PO2ART 74.9*   YHA4ERH 38.4     INR: No results for input(s): INR in the last 72 hours.   UA:  Recent Labs     01/26/22  1633   COLORU Yellow   CLARITYU CLOUDY*   GLUCOSEU Negative   BILIRUBINUR SMALL*   KETUA TRACE*   SPECGRAV >=1.030   BLOODU SMALL*   PHUR 5.5   PROTEINU Negative   UROBILINOGEN 0.2   NITRU Negative   LEUKOCYTESUR MODERATE*   LABMICR YES   URINETYPE NotGiven      Urine Microscopic:   Recent Labs     01/26/22  1633   BACTERIA 1+*   HYALCAST 3-5*   WBCUA 10-20*   RBCUA 5-10*     Urine Culture:   Recent Labs     01/26/22  1633   LABURIN >100,000 CFU/ml  No further workup       Urine Chemistry:   Recent Labs     01/28/22  0130   NAUR <20       Objective:   Vitals: BP (!) 85/53 Comment: MD at bedside and aware, recieving bolus of 500ml  Pulse 81   Temp 98.5 °F (36.9 °C) (Axillary)   Resp 19   Ht 5' 7\" (1.702 m)   Wt 142 lb 13.7 oz (64.8 kg)   SpO2 98%   BMI 22.37 kg/m²    Wt Readings from Last 3 Encounters:   01/28/22 142 lb 13.7 oz (64.8 kg)   01/10/22 155 lb 6.8 oz (70.5 kg)   07/12/18 179 lb (81.2 kg)      24HR INTAKE/OUTPUT:      Intake/Output Summary (Last 24 hours) at 1/28/2022 8090  Last data filed at 1/28/2022 0600  Gross per 24 hour   Intake 5438.87 ml   Output 910 ml   Net 4528.87 ml     Constitutional:  Disoriented, nonconversational  NECK: supple, no JVD  Cardiovascular:  S1, S2 without m/r/g  Respiratory:  CTA B without w/r/r  Abdomen: +bs, soft, nt  Ext: trace LE edema    Assessment and Plan:       IMAGING:  XR ABDOMEN FOR NG/OG/NE TUBE PLACEMENT   Final Result      Frontal view centered at the diaphragm demonstrates tip of NG tube in the gastric fundus. Bowel gas pattern is unremarkable. Elevated left hemidiaphragm with left basilar scarring. Sternotomy wires and left atrial appendage clip noted. XR CHEST PORTABLE   Final Result      Left pleural effusion with adjacent parenchymal consolidation, similar to January 6, 2022. CT HEAD WO CONTRAST   Final Result      1. No evidence for acute intracranial abnormality. 2.  Moderate diffuse cerebral atrophy with ventricular white matter changes bilaterally consistent with chronic small vessel ischemia. Assessment/Plan     1. DWIGHT 2/2 dehydration - resolved     2. HTN     3.  Anemia     4. Acid- base/ Electrolyte imbalance - hypernatremia        Plan:  - replace free water deficit  - CT-surg to make fluid changes, recs discussed      Bridgette Akers MD , MD  Will discuss with attending Dr. Yumiko Bone    [facility] [address] [place] [address] 18 07

## 2022-01-28 NOTE — CARE COORDINATION
Case Management Assessment           Initial Evaluation                Date / Time of Evaluation: 1/28/2022 3:49 PM                 Assessment Completed by: Gerhardt Gimenez, RN    Patient Name: Felice Castro     YOB: 1950  Diagnosis: Dehydration [E86.0]  Hypernatremia [E87.0]  Altered mental status, unspecified altered mental status type [R41.82]     Date / Time: 1/26/2022  4:05 PM    Patient Admission Status: Inpatient    If patient is discharged prior to next notation, then this note serves as note for discharge by case management. Current PCP: No primary care provider on file. Clinic Patient: No    Chart Reviewed: Yes  Patient/ Family Interviewed: Yes    Initial assessment completed at bedside with: Lowell Stack    Hospitalization in the last 30 days: Yes    Emergency Contacts:  Extended Emergency Contact Information  Primary Emergency Contact: ESTELLA Pelaez [address] [place] [address]  Mobile Phone: [phone]  Relation: Brother/Sister  Secondary Emergency Contact: Gilberto Rodney[address] Phone: [phone]  Mobile Phone: [phone]  Relation: Brother/Sister    Advance Directives:   Code Status: Full [address]: No  Agent: NA  Contact Number: NA    Copy present: No     In paper Chart: No    Scanned into EMR No    Financial  Payor: MEDICARE / Plan: MEDICARE PART A AND B / Product Type: *No Product type* /     Pre-cert required for SNF: Yes    Pharmacy    [address] [phone] [facility] [phone]  [address] [place] [address] [address] [facility] [address]  Phone: [phone] Fax: [phone]      Potential assistance Purchasing Medications:    Does Patient want to participate in local refill/ meds to beds program?:      Meds To Beds General Rules:  1. Can ONLY be done Monday- Friday between 8:30am-5pm  2. Prescription(s) must be in pharmacy by 3pm to be filled same day  3. Copy of patient's insurance/ prescription drug card and patient face sheet must be sent along with the prescription(s)  4. Cost of Rx cannot be added to hospital bill. If financial assistance is needed, please contact unit  or ;  or  CANNOT provide pharmacy voucher for patients co-pays  5. Patients can then  the prescription on their way out of the hospital at discharge, or pharmacy can deliver to the bedside if staff is available. (payment due at time of pick-up or delivery - cash, check, or card accepted)     Able to afford home medications/ co-pay costs: Yes    ADLS  Support Systems: Family Members    PT AM-PAC:   /24  OT AM-PAC:   /24    [place]: SNF  Steps: NA    Plans to RETURN to current housing: Yes  Barriers to RETURNING to current housing: NA      DISCHARGE PLAN:  Disposition: [place] (SNF): [facility] Phone: [phone] Fax: [phone]    Transportation PLAN for discharge: EMS transportation     Factors facilitating achievement of predicted outcomes: Family support    Barriers to discharge: Not medically stable    Additional Case Management Notes: Patient is from [address] [place] [address] Rd with [facility] and can return on upon discharge. Spoke with patient sister Adriana Burgos who is in agreement with patient return. The Plan for Transition of Care is related to the following treatment goals of Dehydration [E86.0]  Hypernatremia [E87.0]  Altered mental status, unspecified altered mental status type [R41.82]    The Patient and/or patient representative Sonia Wall and his family were provided with a choice of provider and agrees with the discharge plan No    Freedom of choice list was provided with basic dialogue that supports the patient's individualized plan of care/goals and shares the quality data associated with the providers.  No    Care Transition patient: No    Brandon Whitehead, RN  [facility] [employer]  Case Management Department  Ph: [phone]

## 2022-01-28 NOTE — PROGRESS NOTES
Pharmacy Note - Renal Dosing and Extended Infusion Beta-Lactam Adjustment    Meropenem ordered for treatment of sepsis of unknown etiology. Per [facility] Renal Dose Adjustment Policy and Extended Infusion Beta-Lactam Policy, meropenem will be changed to 2000 mg IV q8h EI. Estimated Creatinine Clearance: Estimated Creatinine Clearance: 56 mL/min (based on SCr of 1.1 mg/dL). Dialysis Status, DWIGHT, CKD: DWIGHT (resolving)    Rationale for Adjustment: Agent is renally eliminated and demonstrates time-dependent effect on bacterial eradication. Extended-infusion dosing strategy aims to enhance microbiologic and clinical efficacy. Pharmacy will continue to monitor renal function, cultures and sensitivities (where available) and adjust dose as necessary. Please call with any questions.     Umu Andrade, PharmD, [facility]  Wireless: [phone]  1/28/2022 2:50 PM

## 2022-01-28 NOTE — PROGRESS NOTES
Hospitalist Progress Note      PCP: No primary care provider on file. Date of Admission: 1/26/2022     HPI and Hospital Course:   \"Mr. Amarjit Young was readmitted on 1/26/2022 following office visit at which he was dramatically withdrawn and nearly obtunded. He was profoundly dehydrated after not taking PO at his nursing facility for nearly 2 weeks.      24 hour Interval History:      1/26 Admitted to [facility] ER  1/27 Transferred up to floor, on rounds appeared very poor from a functional standpoint. Transfer to ICU     Today's plan:  Transition from D5W to D5 1/2 NS K20 @ 75. Transfer to ICU. Place Diamond catheter. \"    Subjective:   BP low w/ sbp of 60s. Now s/p rt IJ CVC. Pressors to be started.   Per nursing pt did wake up earlier and said his name but I am unable to get any response from him  Medications:  Reviewed    Infusion Medications    norepinephrine 8 mcg/min (01/28/22 1208)    sodium chloride       Scheduled Medications    pantoprazole  40 mg IntraVENous BID    vancomycin (VANCOCIN) intermittent dosing (placeholder)   Other RX Placeholder    potassium phosphate IVPB  30 mmol IntraVENous Once    meropenem  2,000 mg IntraVENous Q8H    oxymetazoline  2 spray Each Nostril Once    lidocaine   Topical Once    thiamine mononitrate  100 mg Per NG tube Daily    multivitamin  1 tablet Oral Daily    fluconazole  200 mg Per NG tube Daily    amiodarone  200 mg Oral Daily    Arformoterol Tartrate  15 mcg Nebulization BID    aspirin  325 mg Oral Daily    atorvastatin  40 mg Oral Nightly    buPROPion  300 mg Oral QAM    clopidogrel  75 mg Oral Daily    [Held by provider] metoprolol tartrate  12.5 mg Oral BID    montelukast  10 mg Oral Nightly    potassium chloride  10 mEq Oral Daily    tiotropium  2 puff Inhalation Daily    sodium chloride flush  10 mL IntraVENous 2 times per day    enoxaparin  40 mg SubCUTAneous Daily    sennosides-docusate sodium  1 tablet Oral BID     PRN Meds: sodium chloride flush, sodium chloride, ondansetron **OR** ondansetron, magnesium hydroxide, acetaminophen **OR** acetaminophen      Intake/Output Summary (Last 24 hours) at 1/28/2022 1611  Last data filed at 1/28/2022 0600  Gross per 24 hour   Intake 5438.87 ml   Output 710 ml   Net 4728.87 ml       Physical Exam Performed:    BP (!) 145/91   Pulse 88   Temp 98.5 °F (36.9 °C) (Axillary)   Resp 20   Ht 5' 7\" (1.702 m)   Wt 142 lb 13.7 oz (64.8 kg)   SpO2 94%   BMI 22.37 kg/m²     General appearance: obtunded  Respiratory:  Normal respiratory effort. Clear to auscultation  Cardiovascular: Irregular  Abdomen: Soft, non-tender, non-distended   Musculoskeletal: No clubbing, cyanosis or edema bilaterally. Skin: No rashes or lesions.   Neurologic: grossly non-focal.  Psychiatric: not alert or oriented  Peripheral Pulses: +2 palpable, equal bilaterally       Labs:   Recent Labs     01/26/22  1633 01/27/22  0634 01/28/22  0432   WBC 12.2* 12.3* 12.5*   HGB 13.7 12.3* 11.7*   HCT 45.0 39.0* 37.6*    215 153     Recent Labs     01/28/22  0432 01/28/22  0813 01/28/22  1146   * 148* 147*   K 4.7 3.4* 3.6   * 117* 118*   CO2 19* 22 21   BUN 63* 50* 46*   CREATININE 1.4* 1.1 1.1   CALCIUM 8.6 7.9* 7.7*   PHOS 2.1* 1.7* 1.7*     Recent Labs     01/26/22  1633 01/27/22  0633   AST 12* 12*   ALT 11 8*   BILIDIR  --  <0.2   BILITOT 0.6 0.5   ALKPHOS 132* 113     Urinalysis:      Lab Results   Component Value Date    NITRU Negative 01/26/2022    WBCUA 10-20 01/26/2022    BACTERIA 1+ 01/26/2022    RBCUA 5-10 01/26/2022    BLOODU SMALL 01/26/2022    SPECGRAV >=1.030 01/26/2022    GLUCOSEU Negative 01/26/2022       Assessment/Plan:    Active Hospital Problems    Diagnosis     [facility] [N17.9]     Hypernatremia [E87.0]     Severe malnutrition ([facility] [E43]     Dehydration [E86.0]    Severe hypernatremia   Shock likely hypovolemic vs cardiogenic less likely septic  ARF    -cont ivf/ na correcting as is the renal fnx  -start on pressors  -CC, Nephro and CTS following- appreciate consultants' help.     DVT Prophylaxis: Lovenox  Diet: Diet NPO  ADULT TUBE FEEDING; Nasogastric; Peptide Based; Continuous; 20; No; 200; Q 4 hours  Code Status: Full Code    PT/OT Eval Status: Once clinically stable    Jimbo Sierra MD

## 2022-01-29 LAB
ALBUMIN SERPL-MCNC: 2.6 G/DL (ref 3.4–5)
ALBUMIN SERPL-MCNC: 2.7 G/DL (ref 3.4–5)
ALBUMIN SERPL-MCNC: 2.8 G/DL (ref 3.4–5)
ANION GAP SERPL CALCULATED.3IONS-SCNC: 7 MMOL/L (ref 3–16)
ANION GAP SERPL CALCULATED.3IONS-SCNC: 9 MMOL/L (ref 3–16)
ANION GAP SERPL CALCULATED.3IONS-SCNC: 9 MMOL/L (ref 3–16)
BUN BLDV-MCNC: 19 MG/DL (ref 7–20)
BUN BLDV-MCNC: 22 MG/DL (ref 7–20)
BUN BLDV-MCNC: 26 MG/DL (ref 7–20)
CALCIUM SERPL-MCNC: 7.9 MG/DL (ref 8.3–10.6)
CALCIUM SERPL-MCNC: 8 MG/DL (ref 8.3–10.6)
CALCIUM SERPL-MCNC: 8 MG/DL (ref 8.3–10.6)
CHLORIDE BLD-SCNC: 110 MMOL/L (ref 99–110)
CHLORIDE BLD-SCNC: 112 MMOL/L (ref 99–110)
CHLORIDE BLD-SCNC: 113 MMOL/L (ref 99–110)
CO2: 22 MMOL/L (ref 21–32)
CO2: 22 MMOL/L (ref 21–32)
CO2: 24 MMOL/L (ref 21–32)
CREAT SERPL-MCNC: 0.6 MG/DL (ref 0.8–1.3)
CREAT SERPL-MCNC: 0.8 MG/DL (ref 0.8–1.3)
CREAT SERPL-MCNC: 0.8 MG/DL (ref 0.8–1.3)
GFR AFRICAN AMERICAN: >60
GFR NON-AFRICAN AMERICAN: >60
GLUCOSE BLD-MCNC: 109 MG/DL (ref 70–99)
GLUCOSE BLD-MCNC: 120 MG/DL (ref 70–99)
GLUCOSE BLD-MCNC: 120 MG/DL (ref 70–99)
GLUCOSE BLD-MCNC: 127 MG/DL (ref 70–99)
GLUCOSE BLD-MCNC: 128 MG/DL (ref 70–99)
GLUCOSE BLD-MCNC: 134 MG/DL (ref 70–99)
GLUCOSE BLD-MCNC: 141 MG/DL (ref 70–99)
HCT VFR BLD CALC: 29.6 % (ref 40.5–52.5)
HEMOGLOBIN: 9.4 G/DL (ref 13.5–17.5)
MAGNESIUM: 1.8 MG/DL (ref 1.8–2.4)
MAGNESIUM: 1.8 MG/DL (ref 1.8–2.4)
MAGNESIUM: 1.9 MG/DL (ref 1.8–2.4)
MCH RBC QN AUTO: 26.3 PG (ref 26–34)
MCHC RBC AUTO-ENTMCNC: 31.7 G/DL (ref 31–36)
MCV RBC AUTO: 82.9 FL (ref 80–100)
PDW BLD-RTO: 20.6 % (ref 12.4–15.4)
PERFORMED ON: ABNORMAL
PHOSPHORUS: 1.9 MG/DL (ref 2.5–4.9)
PHOSPHORUS: 2 MG/DL (ref 2.5–4.9)
PHOSPHORUS: 2.5 MG/DL (ref 2.5–4.9)
PLATELET # BLD: 147 K/UL (ref 135–450)
PMV BLD AUTO: 9.2 FL (ref 5–10.5)
POTASSIUM SERPL-SCNC: 3.3 MMOL/L (ref 3.5–5.1)
POTASSIUM SERPL-SCNC: 3.6 MMOL/L (ref 3.5–5.1)
POTASSIUM SERPL-SCNC: 3.7 MMOL/L (ref 3.5–5.1)
RBC # BLD: 3.58 M/UL (ref 4.2–5.9)
SODIUM BLD-SCNC: 141 MMOL/L (ref 136–145)
SODIUM BLD-SCNC: 143 MMOL/L (ref 136–145)
SODIUM BLD-SCNC: 144 MMOL/L (ref 136–145)
TSH REFLEX: 2.38 UIU/ML (ref 0.27–4.2)
VANCOMYCIN RANDOM: 5.9 UG/ML
WBC # BLD: 12.1 K/UL (ref 4–11)

## 2022-01-29 PROCEDURE — 99233 SBSQ HOSP IP/OBS HIGH 50: CPT | Performed by: INTERNAL MEDICINE

## 2022-01-29 PROCEDURE — 2580000003 HC RX 258: Performed by: STUDENT IN AN ORGANIZED HEALTH CARE EDUCATION/TRAINING PROGRAM

## 2022-01-29 PROCEDURE — 83735 ASSAY OF MAGNESIUM: CPT

## 2022-01-29 PROCEDURE — 6370000000 HC RX 637 (ALT 250 FOR IP): Performed by: STUDENT IN AN ORGANIZED HEALTH CARE EDUCATION/TRAINING PROGRAM

## 2022-01-29 PROCEDURE — 87641 MR-STAPH DNA AMP PROBE: CPT

## 2022-01-29 PROCEDURE — 94640 AIRWAY INHALATION TREATMENT: CPT

## 2022-01-29 PROCEDURE — 2500000003 HC RX 250 WO HCPCS: Performed by: STUDENT IN AN ORGANIZED HEALTH CARE EDUCATION/TRAINING PROGRAM

## 2022-01-29 PROCEDURE — 6360000002 HC RX W HCPCS

## 2022-01-29 PROCEDURE — 80069 RENAL FUNCTION PANEL: CPT

## 2022-01-29 PROCEDURE — 6360000002 HC RX W HCPCS: Performed by: STUDENT IN AN ORGANIZED HEALTH CARE EDUCATION/TRAINING PROGRAM

## 2022-01-29 PROCEDURE — 2000000000 HC ICU R&B

## 2022-01-29 PROCEDURE — 85027 COMPLETE CBC AUTOMATED: CPT

## 2022-01-29 PROCEDURE — C9113 INJ PANTOPRAZOLE SODIUM, VIA: HCPCS

## 2022-01-29 PROCEDURE — 36415 COLL VENOUS BLD VENIPUNCTURE: CPT

## 2022-01-29 PROCEDURE — 99291 CRITICAL CARE FIRST HOUR: CPT | Performed by: INTERNAL MEDICINE

## 2022-01-29 PROCEDURE — 80202 ASSAY OF VANCOMYCIN: CPT

## 2022-01-29 PROCEDURE — 36592 COLLECT BLOOD FROM PICC: CPT

## 2022-01-29 RX ADMIN — TIOTROPIUM BROMIDE INHALATION SPRAY 2 PUFF: 3.12 SPRAY, METERED RESPIRATORY (INHALATION) at 09:10

## 2022-01-29 RX ADMIN — SENNOSIDES AND DOCUSATE SODIUM 1 TABLET: 50; 8.6 TABLET ORAL at 08:31

## 2022-01-29 RX ADMIN — AMIODARONE HYDROCHLORIDE 200 MG: 200 TABLET ORAL at 08:31

## 2022-01-29 RX ADMIN — POTASSIUM PHOSPHATE, MONOBASIC AND POTASSIUM PHOSPHATE, DIBASIC 15 MMOL: 224; 236 INJECTION, SOLUTION, CONCENTRATE INTRAVENOUS at 02:06

## 2022-01-29 RX ADMIN — MONTELUKAST 10 MG: 10 TABLET, FILM COATED ORAL at 20:21

## 2022-01-29 RX ADMIN — SENNOSIDES AND DOCUSATE SODIUM 1 TABLET: 50; 8.6 TABLET ORAL at 20:21

## 2022-01-29 RX ADMIN — MEROPENEM 2000 MG: 1 INJECTION, POWDER, FOR SOLUTION INTRAVENOUS at 16:15

## 2022-01-29 RX ADMIN — POTASSIUM BICARBONATE 40 MEQ: 782 TABLET, EFFERVESCENT ORAL at 20:21

## 2022-01-29 RX ADMIN — THERA TABS 1 TABLET: TAB at 08:30

## 2022-01-29 RX ADMIN — VANCOMYCIN HYDROCHLORIDE 750 MG: 10 INJECTION, POWDER, LYOPHILIZED, FOR SOLUTION INTRAVENOUS at 20:22

## 2022-01-29 RX ADMIN — THIAMINE HCL TAB 100 MG 100 MG: 100 TAB at 08:31

## 2022-01-29 RX ADMIN — SODIUM CHLORIDE, PRESERVATIVE FREE 10 ML: 5 INJECTION INTRAVENOUS at 19:59

## 2022-01-29 RX ADMIN — PANTOPRAZOLE SODIUM 40 MG: 40 INJECTION, POWDER, FOR SOLUTION INTRAVENOUS at 08:30

## 2022-01-29 RX ADMIN — CLOPIDOGREL BISULFATE 75 MG: 75 TABLET ORAL at 08:30

## 2022-01-29 RX ADMIN — POTASSIUM CHLORIDE 10 MEQ: 750 TABLET, EXTENDED RELEASE ORAL at 08:30

## 2022-01-29 RX ADMIN — ASPIRIN 325 MG: 325 TABLET, COATED ORAL at 08:30

## 2022-01-29 RX ADMIN — ARFORMOTEROL TARTRATE 15 MCG: 15 SOLUTION RESPIRATORY (INHALATION) at 20:10

## 2022-01-29 RX ADMIN — MEROPENEM 2000 MG: 1 INJECTION, POWDER, FOR SOLUTION INTRAVENOUS at 00:28

## 2022-01-29 RX ADMIN — ENOXAPARIN SODIUM 40 MG: 100 INJECTION SUBCUTANEOUS at 08:30

## 2022-01-29 RX ADMIN — ARFORMOTEROL TARTRATE 15 MCG: 15 SOLUTION RESPIRATORY (INHALATION) at 09:05

## 2022-01-29 RX ADMIN — VANCOMYCIN HYDROCHLORIDE 750 MG: 10 INJECTION, POWDER, LYOPHILIZED, FOR SOLUTION INTRAVENOUS at 08:34

## 2022-01-29 RX ADMIN — PANTOPRAZOLE SODIUM 40 MG: 40 INJECTION, POWDER, FOR SOLUTION INTRAVENOUS at 20:21

## 2022-01-29 RX ADMIN — SODIUM CHLORIDE, PRESERVATIVE FREE 10 ML: 5 INJECTION INTRAVENOUS at 08:30

## 2022-01-29 RX ADMIN — ACETAMINOPHEN 650 MG: 325 TABLET ORAL at 08:30

## 2022-01-29 RX ADMIN — ATORVASTATIN CALCIUM 40 MG: 40 TABLET, FILM COATED ORAL at 20:21

## 2022-01-29 RX ADMIN — MEROPENEM 2000 MG: 1 INJECTION, POWDER, FOR SOLUTION INTRAVENOUS at 11:15

## 2022-01-29 RX ADMIN — SODIUM CHLORIDE 25 ML: 900 INJECTION, SOLUTION INTRAVENOUS at 10:26

## 2022-01-29 ASSESSMENT — PAIN SCALES - PAIN ASSESSMENT IN ADVANCED DEMENTIA (PAINAD)
BREATHING: 0
CONSOLABILITY: 0
NEGVOCALIZATION: 0
CONSOLABILITY: 0
FACIALEXPRESSION: 0
NEGVOCALIZATION: 0
NEGVOCALIZATION: 0
BODYLANGUAGE: 0
CONSOLABILITY: 0
TOTALSCORE: 0
FACIALEXPRESSION: 2
BREATHING: 0
TOTALSCORE: 0
NEGVOCALIZATION: 0
BREATHING: 0
FACIALEXPRESSION: 0
FACIALEXPRESSION: 0
NEGVOCALIZATION: 2
FACIALEXPRESSION: 0
NEGVOCALIZATION: 0
BREATHING: 0
FACIALEXPRESSION: 0
BODYLANGUAGE: 0
TOTALSCORE: 4
BODYLANGUAGE: 0
BREATHING: 0
FACIALEXPRESSION: 0
TOTALSCORE: 0
NEGVOCALIZATION: 0
FACIALEXPRESSION: 0
CONSOLABILITY: 0
BODYLANGUAGE: 0
TOTALSCORE: 0
CONSOLABILITY: 0
BREATHING: 0
BODYLANGUAGE: 0
CONSOLABILITY: 0
BREATHING: 0
TOTALSCORE: 0
TOTALSCORE: 0
BREATHING: 0
BODYLANGUAGE: 0
TOTALSCORE: 0
NEGVOCALIZATION: 0

## 2022-01-29 ASSESSMENT — PAIN SCALES - GENERAL
PAINLEVEL_OUTOF10: 4
PAINLEVEL_OUTOF10: 0
PAINLEVEL_OUTOF10: 0

## 2022-01-29 NOTE — PROGRESS NOTES
Progress Note  Hospital Day: 4    Chief Complaint: Dehydration, Depression, AMS    Mr. Christiano Pichardo was readmitted on 2022 following office visit at which he was dramatically withdrawn and nearly obtunded. He was profoundly dehydrated after not taking PO at his nursing facility for nearly 2 weeks. 24 hour Interval History:      Admitted to [facility] ER   Transferred up to floor, on rounds appeared very poor from a functional standpoint. Transfer to ICU   Sodium and mental status improved   Central line placed, Levo started Max 9, down to 4. Bleeding from C line, Suture placed, resolved. Today's plan:  Continue volume replacement and antibiotics. Wean Levo as able    VITAL SIGNS   Temperature:  Current - Temp: 100.7 °F (38.2 °C); Max - Temp  Av.8 °F (37.1 °C)  Min: 98.2 °F (36.8 °C)  Max: 100.7 °F (38.2 °C)    Respiratory Rate : Resp  Av  Min: 15  Max: 26    Pulse Range: Pulse  Av  Min: 78  Max: 116    Blood Pressure Range:  Systolic (04ZMG), RUT:850 , Min:66 , BPV:222   ; Diastolic (66HDE), ADM:97, Min:46, Max:106    Pulse ox Range: SpO2  Av.8 %  Min: 90 %  Max: 100 %  O2 Flow Rate (L/min): 1 L/min         Admission Weight: Weight: 155 lb (70.3 kg)    Current Weight:   Patient Vitals for the past 96 hrs (Last 3 readings):   Weight   22 0600 141 lb 8.6 oz (64.2 kg)   22 0610 142 lb 13.7 oz (64.8 kg)   22 1453 149 lb 14.6 oz (68 kg)     I/O:     Intake/Output Summary (Last 24 hours) at 2022 0857  Last data filed at 2022 6393  Gross per 24 hour   Intake 3265.06 ml   Output 1475 ml   Net 1790.06 ml     Urine (hernandez): Being Placed    LINES/ACCESS:      Peripheral Access: RUE Day #: 3    Diet: Diet NPO  ADULT TUBE FEEDING; Nasogastric; Peptide Based; Continuous; 20; No; 200; Q 4 hours Swallow eval today, after this, may have regular diet, no salt added.     MEDICATIONS:      vancomycin  750 mg IntraVENous Q12H    pantoprazole  40 mg 01/26/22  1633 01/27/22  0633   AST 12* 12*   ALT 11 8*   BILIDIR  --  <0.2   BILITOT 0.6 0.5   ALKPHOS 132* 113     Troponin: Invalid input(s): TROPONIN  BNP: No results for input(s): BNP in the last 72 hours. ABG:    Lab Results   Component Value Date    PHART 7.462 01/28/2022    PO2ART 64.7 01/28/2022    DHU5DJT 30.3 01/28/2022    O7DAXIJO 94 01/28/2022    CPD2GEA 23 01/28/2022    UXF9VCC 21.6 01/28/2022    BEART -2 01/28/2022    BEART 2.5 01/27/2022    BEART -7 01/04/2022    BEART -6 01/04/2022    BEART -5 01/04/2022     U/A:    Recent Labs     01/26/22  1633   COLORU Yellow   PHUR 5.5   WBCUA 10-20*   RBCUA 5-10*   YEAST Present*   BACTERIA 1+*   CLARITYU CLOUDY*   SPECGRAV >=1.030   LEUKOCYTESUR MODERATE*   UROBILINOGEN 0.2   BILIRUBINUR SMALL*   BLOODU SMALL*   GLUCOSEU Negative       Urine Culture:  Pending       Chest X-Ray:       Portable chest       HISTORY: Altered mental status       COMPARISON: January 6, 2022.       FINDINGS:       The cardiomediastinal contours are stable. There is a moderate left pleural effusion. Adjacent parenchymal consolidation. EKG: NSR @ 84    ASSESSMENT:   Patient Active Problem List:     Hyperglycemia     Smoker's respiratory syndrome     BPH with obstruction/lower urinary tract symptoms     Basal cell carcinoma of shoulder     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     SCCS, mod diff  (squamous cell carcinoma), hand, left     Basal cell carcinoma of right side of nose     Visit for suture removal     Visit for wound check     Anemia     Syncope and collapse     Non-ST elevated myocardial infarction (non-STEMI) ([facility]     Dehydration      Neuro: Becoming more interactive. Follows commands, not very verbal still.   CV:   Sinus  Pulm:  Normal work of breathing  Abd:  Scaphoid, soft, non-tender, non-distended  Diamond: Clear yellow urine, still looks quite concentrated  Wounds: clean, dry and intact  Ext: warm, no edema, skin tenting    PLAN:   · Neuro - pain tolerated, no medications needed  · Psych - Hypomanic delrium, possible adjustment disorder   - Needs psych eval after UTI and Dehydration treated   - Patient discussed with  Psych ER 1/26/2022  · CV - on Beta blocker, ace inhibitor, statin, ASA/Plavix   - PO Meds currently held due to mental status  · Pulm - acute postoperative pulmonary insuffiency as expected- wean O2 as tolerated, continue incentive spirometry  · Renal - Acute kidney failure/creatinine stable - Cr 0.8, preop Cr 0.8        - Bed weight 141 (Discharged 155)         - keep urinary catheter for accurate I & O, Net + 1790 /24 hours, Net + 6318 since admission        - Hypernatremia and Dehydration   - Rehydration per ICU team  · Heme - Acute blood loss anemia as expected- hgb 9.4 - Hemoconcentrated currently, continue to monitor         · ID - Vanc/Meropenem for UTI, presumed sepsis  · FEN - NGT Feeds, with free water  · GI prophylaxis - Protonix 40 mg bid  · VTE prophylaxis - SCD and YUMI hose  · Disposition -   Continue ICU Care  Discussed patient with Dr Mikey Cabrera who is agreeable to assessment and plan.       Anand Leyva MD   Thoracic Surgery Resident   [phone]  01/29/22 8:57 AM

## 2022-01-29 NOTE — PROGRESS NOTES
IJ CVC site still oozing blood. Assessed at bedside with offgoing RN Melvin Strickland. Dr. Oli Chino from Sx called and asked to come re-evaluate. RN was advised to call OR for surgicel powder and a needle . Dr. Oli Chino stated he will come by and evaluate line. RN will continue to monitor.

## 2022-01-29 NOTE — PROGRESS NOTES
No further bleeding noted from Rt IJ CVC line. Patient remains on Levophed gtt for hypotension. No other events overnight. RN will continue to monitor.

## 2022-01-29 NOTE — PROGRESS NOTES
Hospitalist Progress Note      PCP: No primary care provider on file. Date of Admission: 1/26/2022     HPI and Hospital Course:   \"Mr. Daisha Pillai was readmitted on 1/26/2022 following office visit at which he was dramatically withdrawn and nearly obtunded. He was profoundly dehydrated after not taking PO at his nursing facility for nearly 2 weeks.      24 hour Interval History:      1/26 Admitted to [facility] ER  1/27 Transferred up to floor, on rounds appeared very poor from a functional standpoint. Transfer to ICU     Today's plan:  Transition from D5W to D5 1/2 NS K20 @ 75. Transfer to ICU. Place Diamond catheter. \"    Subjective:   BP low w/ sbp of 60s. Now s/p rt IJ CVC. Pressors to be started.   Per nursing pt did wake up earlier and said his name but I am unable to get any response from him  Medications:  Reviewed    Infusion Medications    norepinephrine 3 mcg/min (01/29/22 1030)    sodium chloride 20 mL/hr at 01/29/22 1030     Scheduled Medications    vancomycin  750 mg IntraVENous Q12H    pantoprazole  40 mg IntraVENous BID    meropenem  2,000 mg IntraVENous Q8H    oxymetazoline  2 spray Each Nostril Once    lidocaine   Topical Once    thiamine mononitrate  100 mg Per NG tube Daily    multivitamin  1 tablet Oral Daily    amiodarone  200 mg Oral Daily    Arformoterol Tartrate  15 mcg Nebulization BID    aspirin  325 mg Oral Daily    atorvastatin  40 mg Oral Nightly    buPROPion  300 mg Oral QAM    clopidogrel  75 mg Oral Daily    [Held by provider] metoprolol tartrate  12.5 mg Oral BID    montelukast  10 mg Oral Nightly    potassium chloride  10 mEq Oral Daily    tiotropium  2 puff Inhalation Daily    sodium chloride flush  10 mL IntraVENous 2 times per day    enoxaparin  40 mg SubCUTAneous Daily    sennosides-docusate sodium  1 tablet Oral BID     PRN Meds: sodium chloride flush, sodium chloride, ondansetron **OR** ondansetron, magnesium hydroxide, acetaminophen **OR** acetaminophen      Intake/Output Summary (Last 24 hours) at 1/29/2022 1427  Last data filed at 1/29/2022 1030  Gross per 24 hour   Intake 3803.19 ml   Output 1925 ml   Net 1878.19 ml       Physical Exam Performed:    /63   Pulse 88   Temp 98.6 °F (37 °C) (Axillary)   Resp 20   Ht 5' 7\" (1.702 m)   Wt 141 lb 8.6 oz (64.2 kg)   SpO2 93%   BMI 22.17 kg/m²     General appearance: obtunded  Respiratory:  Normal respiratory effort. Clear to auscultation  Cardiovascular: Irregular  Abdomen: Soft, non-tender, non-distended   Musculoskeletal: No clubbing, cyanosis or edema bilaterally. Skin: No rashes or lesions. Neurologic: grossly non-focal.  Psychiatric: not alert or oriented  Peripheral Pulses: +2 palpable, equal bilaterally       Labs:   Recent Labs     01/27/22  0634 01/28/22  0432 01/29/22  0611   WBC 12.3* 12.5* 12.1*   HGB 12.3* 11.7* 9.4*   HCT 39.0* 37.6* 29.6*    153 147     Recent Labs     01/28/22  2124 01/29/22  0228 01/29/22  0611    144 143   K 3.6 3.7 3.6   * 113* 112*   CO2 22 22 22   BUN 31* 26* 22*   CREATININE 0.9 0.8 0.8   CALCIUM 8.0* 7.9* 8.0*   PHOS 2.0* 2.0* 2.5     Recent Labs     01/26/22  1633 01/27/22  0633   AST 12* 12*   ALT 11 8*   BILIDIR  --  <0.2   BILITOT 0.6 0.5   ALKPHOS 132* 113     Urinalysis:      Lab Results   Component Value Date    NITRU Negative 01/26/2022    WBCUA 10-20 01/26/2022    BACTERIA 1+ 01/26/2022    RBCUA 5-10 01/26/2022    BLOODU SMALL 01/26/2022    SPECGRAV >=1.030 01/26/2022    GLUCOSEU Negative 01/26/2022       Assessment/Plan:    Active Hospital Problems    Diagnosis     DWIGHT (acute kidney injury) ([facility] [address] [N17.9]     Hypernatremia [E87.0]     Severe malnutrition ([address] [E43]     Dehydration [E86.0]    Severe hypernatremia   Shock likely hypovolemic vs cardiogenic less likely septic  ARF    -cont ivf/ na correcting as is the renal fnx  -start on pressors  -CC, Nephro and CTS following- appreciate consultants' help.     DVT Prophylaxis: Lovenox  Diet: Diet NPO  ADULT TUBE FEEDING; Nasogastric; Peptide Based; Continuous; 20; No; 200; Q 4 hours  Code Status: Full Code    PT/OT Eval Status: Once clinically stable    Pauline Cummins MD

## 2022-01-29 NOTE — CONSULTS
Clinical Pharmacy Progress Note    Vancomycin - Management by Pharmacy    Consult Date(s): 1/28/22  Consulting Provider(s): Dr. Tamra Arriola / Plan    Suspected UTI - Vancomycin   Concurrent Antimicrobials: Meropenem - day #2   Day of Vanc Therapy: #2   Current Dosing Method: Intermittent Dosing by Levels   Therapeutic Goal: ~15 mg/L   Current Dose / Frequency: Intermittent dosing   Plan / Rationale:   o DWIGHT seems to have improved. Scr 0.8mg/dL this AM.   o Vancomycin 1.5g (~25 mg/kg) IV x 1 Loading dose was given yesterday. Random level this AM of 5.9mcg/mL. o Will start 750mg IV Q12 hours as this predicts an AUC of 421mg/L*hr and trough of 13.4mg/L.  o Will switch to scheduled dosing and check a level on tomorrow at 0600 prior to 3rd dose of new regimen.  Will continue to monitor clinical condition and make adjustments to regimen as appropriate. Thank you for consulting Pharmacy! Sita Reyna, PharmD  PGY-1 Pharmacy Resident  [id number]  1/29/2022 7:50 AM      Interval History: Temp of 100.7F this AM, pt remains on norepinephrine and on room air. WBC 12.1 this AM (stable from previous)    Subjective/Objective: Mr. Solitario Gregorio is a 70 y.o. male with a PMHx significant for CAD s/p 3 vessel CABG (1/3/22), HFrEF, HLD, BPH, and squamous cell carinoma, admitted for failure to thrive. He was started on broad spectrum IV antibiotics for sepsis 2/2 unknown etiology. Pharmacy has been consulted to dose vancomycin.     Height:   Ht Readings from Last 1 Encounters:   01/26/22 5' 7\" (1.702 m)     Weight:   Wt Readings from Last 1 Encounters:   01/29/22 141 lb 8.6 oz (64.2 kg)       Current & Prior Antimicrobial Regimen(s):      Fluconazole (1/27-current)   Meropenem (1/28-current)   Vancomycin (1/28-current)    Level(s) / Doses:    Date Time Dose Level / Type of Level Interpretation   1/29 0611 1500mg IV X 1 Random=5.9 Will schedule 750mg IV Q12 hours as this predicts an AUC of 421 and trough of 13.4.   1/30  750mg IV Q12 hours Random=? Note: Serum levels collected for AUC-based dosing may be high if collected in close proximity to the dose administered. This is not necessarily an indicator of toxicity. Cultures & Sensitivities:    Date Site Micro Susceptibility / Result   1/26 Urine C. albicans    1/27 Blood x2 NGTD    1/29 MRSA nares Sent      Labs / Ancillary Data:    Estimated Creatinine Clearance: 77 mL/min (based on SCr of 0.8 mg/dL). Recent Labs     01/27/22  0634 01/27/22  1334 01/28/22  0432 01/28/22  0813 01/28/22  2124 01/29/22  0228 01/29/22  4610   CREATININE  --    < > 1.4*   < > 0.9 0.8 0.8   BUN  --    < > 63*   < > 31* 26* 22*   WBC 12.3*  --  12.5*  --   --   --  12.1*    < > = values in this interval not displayed.        Additional Lab Values / Findings of Note:    Procalcitonin:   Recent Labs     01/28/22  1032   PROCAL 0.11

## 2022-01-29 NOTE — PLAN OF CARE
Problem: Falls - Risk of:  Goal: Will remain free from falls  Description: Will remain free from falls  Outcome: Ongoing  Note: Pt is a fall risk. Fall risk protocol in place. See Kennedy Womack Fall Score. Pt bed is in low position, bed alarm is on, side rails up, fall risk bracelet applied, non-skid footwear in use. Patient/family educated on fall risk protocol, instructed to call for assistance when needed and belongings are in reach. Will continue with hourly rounds for po intake, pain needs, toileting and repositioning as needed. Will continue to monitor for needs. Problem: Nutrition  Goal: Optimal nutrition therapy  Outcome: Ongoing  Note: Currently on 'trickle' tube feeds at 20mL/hr. Will continue to collaborate with RDs. Problem: OXYGENATION/RESPIRATORY FUNCTION  Goal: Patient will maintain patent airway  Outcome: Ongoing  Goal: Patient will achieve/maintain normal respiratory rate/effort  Description: Respiratory rate and effort will be within normal limits for the patient  Outcome: Ongoing  Note: Pt lungs are rhochorous, R>L. Cough is strong, moist, non-productive. Problem: HEMODYNAMIC STATUS  Goal: Patient has stable vital signs and fluid balance  Outcome: Ongoing  Note: Pt on Levophed, titrating to MAP >65. Will continue to monitor. Problem: FLUID AND ELECTROLYTE IMBALANCE  Goal: Fluid and electrolyte balance are achieved/maintained  Outcome: Ongoing     Problem: Skin Integrity:  Goal: Will show no infection signs and symptoms  Description: Will show no infection signs and symptoms  Outcome: Ongoing  Note: Pt skin is warm, dry and fragile with scattered abrasions and bruising. R IJ CVC with dressing in place, site without s/s of infection. Healing sternal incision and pre tibial incision are open to air, no s/s of infection. Diamond catheter in place draining clear yellow urine, no urethral drainage. Sacrum with wound, WOC RN following. Wound with slough. Alginate ag, mepilex in place. Prevalong boots on bilaterally. Turning q2hr with pillow support. Offloading devices. Will continue to monitor and collaborate with Fidencio Hinson. Goal: Absence of new skin breakdown  Description: Absence of new skin breakdown  Outcome: Ongoing     Problem: Pain:  Description: Pain management should include both nonpharmacologic and pharmacologic interventions.   Goal: Pain level will decrease  Description: Pain level will decrease  Outcome: Ongoing  Goal: Control of acute pain  Description: Control of acute pain  Outcome: Ongoing  Goal: Control of chronic pain  Description: Control of chronic pain  Outcome: Ongoing

## 2022-01-29 NOTE — PROGRESS NOTES
Dr Danna Costa came to bedside and place sutures on Right IJ CVC line as well as surgicel powder. New dressing placed. Dr. Danna Costa recommends to get a CTA if continued bleeding noted and states he will discuss with ICU residents. RN will continue to monitor patient.

## 2022-01-29 NOTE — PROGRESS NOTES
ICU Progress Note    Admit Date: 1/26/2022  IV Access:Peripheral  IV Fluids: KCl in D5W, NS Bolus as needed                Antibiotics: Ceftriaxone  Diet: Diet NPO  ADULT TUBE FEEDING; Nasogastric; Peptide Based; Continuous; 20; No; 200; Q 4 hours    CC: FTT    Interval history: Did have bleeding from IJ overnight. Surgery seen and placed compression over IJ no bleeding since. Non responsive to verbal and unable to follow basic commands with me but per nursing was able to previously follow commands. At this time the patient was seen resting well in bed.     Medications:     Scheduled Meds:   pantoprazole  40 mg IntraVENous BID    vancomycin (VANCOCIN) intermittent dosing (placeholder)   Other RX Placeholder    meropenem  2,000 mg IntraVENous Q8H    oxymetazoline  2 spray Each Nostril Once    lidocaine   Topical Once    thiamine mononitrate  100 mg Per NG tube Daily    multivitamin  1 tablet Oral Daily    fluconazole  200 mg Per NG tube Daily    amiodarone  200 mg Oral Daily    Arformoterol Tartrate  15 mcg Nebulization BID    aspirin  325 mg Oral Daily    atorvastatin  40 mg Oral Nightly    buPROPion  300 mg Oral QAM    clopidogrel  75 mg Oral Daily    [Held by provider] metoprolol tartrate  12.5 mg Oral BID    montelukast  10 mg Oral Nightly    potassium chloride  10 mEq Oral Daily    tiotropium  2 puff Inhalation Daily    sodium chloride flush  10 mL IntraVENous 2 times per day    enoxaparin  40 mg SubCUTAneous Daily    sennosides-docusate sodium  1 tablet Oral BID     Continuous Infusions:   norepinephrine 5 mcg/min (01/28/22 2153)    sodium chloride       PRN Meds:sodium chloride flush, sodium chloride, ondansetron **OR** ondansetron, magnesium hydroxide, acetaminophen **OR** acetaminophen    Objective:   Vitals:   T-max:  Patient Vitals for the past 8 hrs:   BP Temp Temp src Pulse Resp SpO2   01/28/22 2230 98/66   105  93 %   01/28/22 2215 96/67   105  97 %   01/28/22 2200 104/67   106  95 %   01/28/22 2100 108/73   107  96 %   01/28/22 2045      96 %   01/28/22 2030 105/72   111  97 %   01/28/22 2015 113/79   110  92 %   01/28/22 2000 120/77 98.5 °F (36.9 °C) Axillary 109 16 93 %   01/28/22 1945 122/84   110  95 %   01/28/22 1930 118/82   111  99 %   01/28/22 1915 127/80   109  93 %   01/28/22 1900 125/82   109  90 %   01/28/22 1845 120/83   108  98 %   01/28/22 1830 86/62   108  90 %   01/28/22 1815 100/73   112 15 100 %   01/28/22 1800 130/76   107 22 94 %   01/28/22 1745 110/84   107 23 95 %   01/28/22 1730 113/78   109 18 96 %   01/28/22 1715 115/73   104 20 94 %   01/28/22 1700 126/83   105 22 93 %   01/28/22 1645 110/75   108 22 94 %   01/28/22 1630 103/71   113 23 96 %   01/28/22 1615 (!) 154/84   116  96 %   01/28/22 1600 (!) 149/84 98.2 °F (36.8 °C) Axillary 108     01/28/22 1545 (!) 77/54   107     01/28/22 1530 121/82   115 23 95 %   01/28/22 1515 137/84   109 21 94 %   01/28/22 1500 (!) 145/100   110 23 97 %       Intake/Output Summary (Last 24 hours) at 1/28/2022 2246  Last data filed at 1/28/2022 2153  Gross per 24 hour   Intake 4755.4 ml   Output 1310 ml   Net 3445.4 ml       Review of Systems   Unable to perform ROS: Mental status change       Physical Exam  Constitutional:       General: He is not in acute distress. Appearance: He is ill-appearing. He is not toxic-appearing or diaphoretic. HENT:      Head: Normocephalic and atraumatic. Eyes:      Pupils: Pupils are equal, round, and reactive to light. Cardiovascular:      Rate and Rhythm: Normal rate and regular rhythm. Heart sounds: No murmur heard. No friction rub. No gallop. Pulmonary:      Effort: Pulmonary effort is normal.      Breath sounds: No wheezing or rhonchi. Rales:  bilateral lower. Abdominal:      General: There is no distension. Tenderness: There is no abdominal tenderness. There is no guarding or rebound. Musculoskeletal:      Right lower leg: No edema. Left lower leg: No edema. Neurological:      Mental Status: He is lethargic, disoriented and confused. LABS:    CBC:   Recent Labs     01/26/22  1633 01/27/22  0634 01/28/22  0432   WBC 12.2* 12.3* 12.5*   HGB 13.7 12.3* 11.7*   HCT 45.0 39.0* 37.6*    215 153   MCV 85.6 83.8 84.9     Renal:    Recent Labs     01/28/22  1550 01/28/22  1740 01/28/22  2124   * 143 145   K 4.1 3.9 3.6   * 111* 113*   CO2 22 22 22   BUN 40* 36* 31*   CREATININE 1.0 0.9 0.9   GLUCOSE 128* 111* 111*   CALCIUM 8.1* 8.1* 8.0*   MG 2.00 2.00 1.80   PHOS 3.0 2.6 2.0*   ANIONGAP 10 10 10     Hepatic:   Recent Labs     01/26/22  1633 01/26/22  1633 01/27/22  0633 01/27/22  1334 01/28/22  1550 01/28/22  1740 01/28/22  2124   AST 12*  --  12*  --   --   --   --    ALT 11  --  8*  --   --   --   --    BILITOT 0.6  --  0.5  --   --   --   --    BILIDIR  --   --  <0.2  --   --   --   --    PROT 7.3  --  6.5  --   --   --   --    LABALBU 3.8   < > 3.3*   < > 3.0* 3.1* 3.0*   ALKPHOS 132*  --  113  --   --   --   --     < > = values in this interval not displayed. Troponin: No results for input(s): TROPONINI in the last 72 hours. BNP: No results for input(s): BNP in the last 72 hours. Lipids: No results for input(s): CHOL, HDL in the last 72 hours. Invalid input(s): LDLCALCU, TRIGLYCERIDE  ABGs:    Recent Labs     01/27/22  0952 01/28/22  1222   PHART 7.449 7.462*   OFI5JRY 38.4 30.3*   PO2ART 74.9* 64.7*   QWM6YAX 27 21.6   BEART 2.5 -2   S2NATZAY 96 94   RSN9EDL 28 23       INR:   Recent Labs     01/28/22  1146   INR 1.85*     Lactate:   Recent Labs     01/28/22  1222   LACTATE 0.98     Cultures:  -----------------------------------------------------------------  RAD:   XR CHEST PORTABLE   Final Result   1. Right IJ central line in satisfactory position in the thorax   2. Nasogastric tube in the stomach   3.  Basilar airspace disease, new from prior study time     Chronic Disease   s/p CABG ( 3 vessel CABG TJ on 1/03) - -Cardiothoracic Surgery following. Continue , lipitor 40mg, plavix 75mg)  COPD - Continue home Spiriva respimat and Brovana  Depression - Continue home bupropion  Allergies - Continue Home Montelukast  Afib (rhythem controlled) - Continue home amiodarone    Code Status: Full Code  FEN: Diet NPO  ADULT TUBE FEEDING; Nasogastric; Peptide Based; Continuous; 20; No; 200; Q 4 hours  PPX: Lovenox  DISPO: IP    Cale Sim DO, PGY-1  01/28/22  10:46 PM    This patient has been staffed and discussed with Dr. Radha Bran. Pulm/CC     Patient seen and examined. I agree with Dr. Caraballo's history, physical, lab findings, assessment and plan.     More alert  Sodium now normal as is creatinine  Levophed down to 3 from high of 9  D/C diflucan as yeats in urine likely colonizer  Cont Merrem and Vanc as suspicion for sepsis high - potential PNA including HCAP/Aspiration  Cont TF     Pt has a high probability of imminent or life-threatening deterioration requiring close monitoring, and highly complex decision-making and/or interventions of high intensity to assess, manipulate, and support his critical organ systems to prevent a likely inevitable decline which could occur if left untreated.      A total critical care time 31 minutes was used. This includes but not limited to examining patient, collaborating with other physicians, monitoring vital signs, telemetry, continuous pulse oximetry, and clinical response to IV medications, documentation time, review and interpretation of laboratory and radiological data, review of nursing notes and old record review.  This time excludes any time that may have been spent performing procedures for life threatening organ failure.     Elder Patton MD

## 2022-01-29 NOTE — PROGRESS NOTES
Office : [phone]     Fax :[phone]         Renal Progress Note  Subjective:   Admit Date: 1/26/2022     Good UO   Na better   BP low       DIET Diet NPO  ADULT TUBE FEEDING; Nasogastric; Peptide Based; Continuous; 20; No; 200; Q 4 hours  Medications:   Scheduled Meds:   vancomycin  750 mg IntraVENous Q12H    pantoprazole  40 mg IntraVENous BID    meropenem  2,000 mg IntraVENous Q8H    oxymetazoline  2 spray Each Nostril Once    lidocaine   Topical Once    thiamine mononitrate  100 mg Per NG tube Daily    multivitamin  1 tablet Oral Daily    fluconazole  200 mg Per NG tube Daily    amiodarone  200 mg Oral Daily    Arformoterol Tartrate  15 mcg Nebulization BID    aspirin  325 mg Oral Daily    atorvastatin  40 mg Oral Nightly    buPROPion  300 mg Oral QAM    clopidogrel  75 mg Oral Daily    [Held by provider] metoprolol tartrate  12.5 mg Oral BID    montelukast  10 mg Oral Nightly    potassium chloride  10 mEq Oral Daily    tiotropium  2 puff Inhalation Daily    sodium chloride flush  10 mL IntraVENous 2 times per day    enoxaparin  40 mg SubCUTAneous Daily    sennosides-docusate sodium  1 tablet Oral BID     Continuous Infusions:   norepinephrine 3 mcg/min (01/29/22 1030)    sodium chloride 20 mL/hr at 01/29/22 1030       Labs:  CBC:   Recent Labs     01/27/22  0634 01/28/22  0432 01/29/22  0611   WBC 12.3* 12.5* 12.1*   HGB 12.3* 11.7* 9.4*    153 147     BMP:    Recent Labs     01/28/22  2124 01/29/22  0228 01/29/22  0611    144 143   K 3.6 3.7 3.6   * 113* 112*   CO2 22 22 22   BUN 31* 26* 22*   CREATININE 0.9 0.8 0. 8   GLUCOSE 111* 109* 127*     Ca/Mg/Phos:   Recent Labs     01/28/22  2124 01/29/22  0228 01/29/22  0611   CALCIUM 8.0* 7.9* 8.0*   MG 1.80 1.90 1.80   PHOS 2.0* 2.0* 2.5     Hepatic:   Recent Labs     01/26/22  1633 01/27/22  0633   AST 12* 12*   ALT 11 8*   BILITOT 0.6 0.5   ALKPHOS 132* 113     Troponin: No results for input(s): TROPONINI in the last 72 hours. BNP: No results for input(s): BNP in the last 72 hours. Lipids: No results for input(s): CHOL, TRIG, HDL, LDLCALC, LABVLDL in the last 72 hours.   ABGs:   Recent Labs     01/28/22  1222   PHART 7.462*   PO2ART 64.7*   PVD1DJS 30.3*     INR:   Recent Labs     01/28/22  1146   INR 1.85*     UA:  Recent Labs     01/26/22  1633   COLORU Yellow   CLARITYU CLOUDY*   GLUCOSEU Negative   BILIRUBINUR SMALL*   KETUA TRACE*   SPECGRAV >=1.030   BLOODU SMALL*   PHUR 5.5   PROTEINU Negative   UROBILINOGEN 0.2   NITRU Negative   LEUKOCYTESUR MODERATE*   LABMICR YES   URINETYPE NotGiven      Urine Microscopic:   Recent Labs     01/26/22  1633   BACTERIA 1+*   HYALCAST 3-5*   WBCUA 10-20*   RBCUA 5-10*     Urine Culture:   Recent Labs     01/26/22  1633   LABURIN >100,000 CFU/ml  No further workup       Urine Chemistry:   Recent Labs     01/28/22  0130   LABCREA 160.5   NAUR <20       Objective:   Vitals: BP (!) 94/59   Pulse 89   Temp 100.7 °F (38.2 °C) (Axillary)   Resp 18   Ht 5' 7\" (1.702 m)   Wt 141 lb 8.6 oz (64.2 kg)   SpO2 95%   BMI 22.17 kg/m²    Wt Readings from Last 3 Encounters:   01/29/22 141 lb 8.6 oz (64.2 kg)   01/10/22 155 lb 6.8 oz (70.5 kg)   07/12/18 179 lb (81.2 kg)      24HR INTAKE/OUTPUT:      Intake/Output Summary (Last 24 hours) at 1/29/2022 1114  Last data filed at 1/29/2022 1030  Gross per 24 hour   Intake 3803.19 ml   Output 1925 ml   Net 1878.19 ml     Constitutional:  Disoriented, nonconversational  NECK: supple, no JVD  Cardiovascular:  S1, S2 without m/r/g  Respiratory:  CTA B without w/r/r  Abdomen: +bs, soft, nt  Ext: trace LE edema    Assessment and Plan:       IMAGING:  XR CHEST PORTABLE   Final Result   1. Right IJ central line in satisfactory position in the thorax   2. Nasogastric tube in the stomach   3. Basilar airspace disease, new from prior study      XR ABDOMEN FOR NG/OG/NE TUBE PLACEMENT   Final Result      Frontal view centered at the diaphragm demonstrates tip of NG tube in the gastric fundus. Bowel gas pattern is unremarkable. Elevated left hemidiaphragm with left basilar scarring. Sternotomy wires and left atrial appendage clip noted. XR CHEST PORTABLE   Final Result      Left pleural effusion with adjacent parenchymal consolidation, similar to January 6, 2022. CT HEAD WO CONTRAST   Final Result      1. No evidence for acute intracranial abnormality. 2.  Moderate diffuse cerebral atrophy with ventricular white matter changes bilaterally consistent with chronic small vessel ischemia. Assessment/Plan     1. DWIGHT 2/2 dehydration - resolved     2. HTN     3.  Anemia     4. Acid- base/ Electrolyte imbalance - hypernatremia        Plan:  - replace free water deficit  - Na better   - cont free water   - Cr better        - CT-surg to make fluid changes, recs discussed      Vibha Quintanilla MD       [facility] [address] [place] [address] 18 07

## 2022-01-30 LAB
ALBUMIN SERPL-MCNC: 2.3 G/DL (ref 3.4–5)
ALBUMIN SERPL-MCNC: 2.4 G/DL (ref 3.4–5)
ALBUMIN SERPL-MCNC: 2.4 G/DL (ref 3.4–5)
ALP BLD-CCNC: 94 U/L (ref 40–129)
ALT SERPL-CCNC: 11 U/L (ref 10–40)
ANION GAP SERPL CALCULATED.3IONS-SCNC: 6 MMOL/L (ref 3–16)
ANION GAP SERPL CALCULATED.3IONS-SCNC: 8 MMOL/L (ref 3–16)
AST SERPL-CCNC: 21 U/L (ref 15–37)
BILIRUB SERPL-MCNC: 0.6 MG/DL (ref 0–1)
BILIRUBIN DIRECT: <0.2 MG/DL (ref 0–0.3)
BILIRUBIN, INDIRECT: ABNORMAL MG/DL (ref 0–1)
BUN BLDV-MCNC: 17 MG/DL (ref 7–20)
BUN BLDV-MCNC: 19 MG/DL (ref 7–20)
CALCIUM SERPL-MCNC: 7.9 MG/DL (ref 8.3–10.6)
CALCIUM SERPL-MCNC: 8.1 MG/DL (ref 8.3–10.6)
CHLORIDE BLD-SCNC: 102 MMOL/L (ref 99–110)
CHLORIDE BLD-SCNC: 106 MMOL/L (ref 99–110)
CO2: 23 MMOL/L (ref 21–32)
CO2: 26 MMOL/L (ref 21–32)
CREAT SERPL-MCNC: 0.5 MG/DL (ref 0.8–1.3)
CREAT SERPL-MCNC: 0.5 MG/DL (ref 0.8–1.3)
FERRITIN: 1009 NG/ML (ref 30–400)
GFR AFRICAN AMERICAN: >60
GFR AFRICAN AMERICAN: >60
GFR NON-AFRICAN AMERICAN: >60
GFR NON-AFRICAN AMERICAN: >60
GLUCOSE BLD-MCNC: 107 MG/DL (ref 70–99)
GLUCOSE BLD-MCNC: 116 MG/DL (ref 70–99)
HCT VFR BLD CALC: 26.9 % (ref 40.5–52.5)
HCT VFR BLD CALC: 28.1 % (ref 40.5–52.5)
HEMOGLOBIN: 8.8 G/DL (ref 13.5–17.5)
HEMOGLOBIN: 9 G/DL (ref 13.5–17.5)
IRON SATURATION: 28 % (ref 20–50)
IRON: 28 UG/DL (ref 59–158)
MAGNESIUM: 1.7 MG/DL (ref 1.8–2.4)
MAGNESIUM: 2 MG/DL (ref 1.8–2.4)
MCH RBC QN AUTO: 27 PG (ref 26–34)
MCHC RBC AUTO-ENTMCNC: 32.8 G/DL (ref 31–36)
MCV RBC AUTO: 82.3 FL (ref 80–100)
MRSA SCREEN RT-PCR: NORMAL
PDW BLD-RTO: 20.3 % (ref 12.4–15.4)
PHOSPHORUS: 1.5 MG/DL (ref 2.5–4.9)
PHOSPHORUS: 1.9 MG/DL (ref 2.5–4.9)
PLATELET # BLD: 108 K/UL (ref 135–450)
PMV BLD AUTO: 9.4 FL (ref 5–10.5)
POTASSIUM SERPL-SCNC: 3.3 MMOL/L (ref 3.5–5.1)
POTASSIUM SERPL-SCNC: 3.6 MMOL/L (ref 3.5–5.1)
RBC # BLD: 3.27 M/UL (ref 4.2–5.9)
SODIUM BLD-SCNC: 134 MMOL/L (ref 136–145)
SODIUM BLD-SCNC: 137 MMOL/L (ref 136–145)
TOTAL IRON BINDING CAPACITY: 99 UG/DL (ref 260–445)
TOTAL PROTEIN: 4.9 G/DL (ref 6.4–8.2)
VANCOMYCIN RANDOM: 11 UG/ML
WBC # BLD: 6.7 K/UL (ref 4–11)

## 2022-01-30 PROCEDURE — 85027 COMPLETE CBC AUTOMATED: CPT

## 2022-01-30 PROCEDURE — 6360000002 HC RX W HCPCS

## 2022-01-30 PROCEDURE — 99233 SBSQ HOSP IP/OBS HIGH 50: CPT | Performed by: INTERNAL MEDICINE

## 2022-01-30 PROCEDURE — 36592 COLLECT BLOOD FROM PICC: CPT

## 2022-01-30 PROCEDURE — 80069 RENAL FUNCTION PANEL: CPT

## 2022-01-30 PROCEDURE — 94761 N-INVAS EAR/PLS OXIMETRY MLT: CPT

## 2022-01-30 PROCEDURE — 6360000002 HC RX W HCPCS: Performed by: STUDENT IN AN ORGANIZED HEALTH CARE EDUCATION/TRAINING PROGRAM

## 2022-01-30 PROCEDURE — 36415 COLL VENOUS BLD VENIPUNCTURE: CPT

## 2022-01-30 PROCEDURE — 94640 AIRWAY INHALATION TREATMENT: CPT

## 2022-01-30 PROCEDURE — 85018 HEMOGLOBIN: CPT

## 2022-01-30 PROCEDURE — 80202 ASSAY OF VANCOMYCIN: CPT

## 2022-01-30 PROCEDURE — 6370000000 HC RX 637 (ALT 250 FOR IP): Performed by: STUDENT IN AN ORGANIZED HEALTH CARE EDUCATION/TRAINING PROGRAM

## 2022-01-30 PROCEDURE — 85014 HEMATOCRIT: CPT

## 2022-01-30 PROCEDURE — 80076 HEPATIC FUNCTION PANEL: CPT

## 2022-01-30 PROCEDURE — 2000000000 HC ICU R&B

## 2022-01-30 PROCEDURE — 2580000003 HC RX 258: Performed by: STUDENT IN AN ORGANIZED HEALTH CARE EDUCATION/TRAINING PROGRAM

## 2022-01-30 PROCEDURE — 83550 IRON BINDING TEST: CPT

## 2022-01-30 PROCEDURE — 2580000003 HC RX 258

## 2022-01-30 PROCEDURE — 83540 ASSAY OF IRON: CPT

## 2022-01-30 PROCEDURE — C9113 INJ PANTOPRAZOLE SODIUM, VIA: HCPCS

## 2022-01-30 PROCEDURE — 83735 ASSAY OF MAGNESIUM: CPT

## 2022-01-30 PROCEDURE — 2500000003 HC RX 250 WO HCPCS

## 2022-01-30 PROCEDURE — 82728 ASSAY OF FERRITIN: CPT

## 2022-01-30 RX ORDER — MAGNESIUM SULFATE IN WATER 40 MG/ML
2000 INJECTION, SOLUTION INTRAVENOUS ONCE
Status: COMPLETED | OUTPATIENT
Start: 2022-01-30 | End: 2022-01-30

## 2022-01-30 RX ORDER — MAGNESIUM SULFATE 1 G/100ML
1000 INJECTION INTRAVENOUS ONCE
Status: DISCONTINUED | OUTPATIENT
Start: 2022-01-30 | End: 2022-01-30

## 2022-01-30 RX ORDER — LANOLIN ALCOHOL/MO/W.PET/CERES
400 CREAM (GRAM) TOPICAL 2 TIMES DAILY
Status: DISCONTINUED | OUTPATIENT
Start: 2022-01-30 | End: 2022-01-30

## 2022-01-30 RX ADMIN — VANCOMYCIN HYDROCHLORIDE 750 MG: 10 INJECTION, POWDER, LYOPHILIZED, FOR SOLUTION INTRAVENOUS at 08:25

## 2022-01-30 RX ADMIN — POTASSIUM CHLORIDE 10 MEQ: 750 TABLET, EXTENDED RELEASE ORAL at 08:27

## 2022-01-30 RX ADMIN — ASPIRIN 325 MG: 325 TABLET, COATED ORAL at 08:39

## 2022-01-30 RX ADMIN — MONTELUKAST 10 MG: 10 TABLET, FILM COATED ORAL at 21:24

## 2022-01-30 RX ADMIN — THERA TABS 1 TABLET: TAB at 08:39

## 2022-01-30 RX ADMIN — SODIUM CHLORIDE, PRESERVATIVE FREE 10 ML: 5 INJECTION INTRAVENOUS at 08:41

## 2022-01-30 RX ADMIN — AMIODARONE HYDROCHLORIDE 200 MG: 200 TABLET ORAL at 08:39

## 2022-01-30 RX ADMIN — MEROPENEM 2000 MG: 1 INJECTION, POWDER, FOR SOLUTION INTRAVENOUS at 19:06

## 2022-01-30 RX ADMIN — PANTOPRAZOLE SODIUM 40 MG: 40 INJECTION, POWDER, FOR SOLUTION INTRAVENOUS at 21:24

## 2022-01-30 RX ADMIN — MAGNESIUM SULFATE HEPTAHYDRATE 2000 MG: 2 INJECTION, SOLUTION INTRAVENOUS at 08:57

## 2022-01-30 RX ADMIN — ACETAMINOPHEN 650 MG: 325 TABLET ORAL at 15:12

## 2022-01-30 RX ADMIN — SODIUM CHLORIDE 25 ML: 900 INJECTION, SOLUTION INTRAVENOUS at 16:34

## 2022-01-30 RX ADMIN — SODIUM CHLORIDE, PRESERVATIVE FREE 10 ML: 5 INJECTION INTRAVENOUS at 22:11

## 2022-01-30 RX ADMIN — THIAMINE HCL TAB 100 MG 100 MG: 100 TAB at 08:41

## 2022-01-30 RX ADMIN — ENOXAPARIN SODIUM 40 MG: 100 INJECTION SUBCUTANEOUS at 08:38

## 2022-01-30 RX ADMIN — ARFORMOTEROL TARTRATE 15 MCG: 15 SOLUTION RESPIRATORY (INHALATION) at 09:36

## 2022-01-30 RX ADMIN — SODIUM CHLORIDE 25 ML: 900 INJECTION, SOLUTION INTRAVENOUS at 07:22

## 2022-01-30 RX ADMIN — CLOPIDOGREL BISULFATE 75 MG: 75 TABLET ORAL at 08:39

## 2022-01-30 RX ADMIN — PANTOPRAZOLE SODIUM 40 MG: 40 INJECTION, POWDER, FOR SOLUTION INTRAVENOUS at 08:40

## 2022-01-30 RX ADMIN — MEROPENEM 2000 MG: 1 INJECTION, POWDER, FOR SOLUTION INTRAVENOUS at 09:27

## 2022-01-30 RX ADMIN — ACETAMINOPHEN 650 MG: 325 TABLET ORAL at 08:39

## 2022-01-30 RX ADMIN — ATORVASTATIN CALCIUM 40 MG: 40 TABLET, FILM COATED ORAL at 21:24

## 2022-01-30 RX ADMIN — MEROPENEM 2000 MG: 1 INJECTION, POWDER, FOR SOLUTION INTRAVENOUS at 03:22

## 2022-01-30 RX ADMIN — POTASSIUM PHOSPHATE, MONOBASIC AND POTASSIUM PHOSPHATE, DIBASIC 15 MMOL: 224; 236 INJECTION, SOLUTION, CONCENTRATE INTRAVENOUS at 06:24

## 2022-01-30 ASSESSMENT — PAIN SCALES - PAIN ASSESSMENT IN ADVANCED DEMENTIA (PAINAD)
NEGVOCALIZATION: 0
BODYLANGUAGE: 0
BREATHING: 0
CONSOLABILITY: 0
BREATHING: 0
FACIALEXPRESSION: 0
TOTALSCORE: 0
NEGVOCALIZATION: 0
CONSOLABILITY: 0
BODYLANGUAGE: 0
TOTALSCORE: 0
FACIALEXPRESSION: 0

## 2022-01-30 ASSESSMENT — PAIN SCALES - GENERAL
PAINLEVEL_OUTOF10: 0
PAINLEVEL_OUTOF10: 1

## 2022-01-30 NOTE — PROGRESS NOTES
ICU Progress Note    Admit Date: 1/26/2022  IV Access:Peripheral  IV Fluids: KCl in D5W, NS Bolus as needed                Antibiotics: Ceftriaxone  Diet: Diet NPO  ADULT TUBE FEEDING; Nasogastric; Peptide Based; Continuous; 20; No; 200; Q 4 hours    CC: FTT    Interval history: NAOE. No increased bleeding from IJ. Does have for some response and is able to follow some basic commands. Is able to squeeze my hand but is not able to follow complex command. Is extremely weak and appears extremely lethargic as well.     Medications:     Scheduled Meds:   potassium phosphate IVPB  15 mmol IntraVENous Once    magnesium sulfate  2,000 mg IntraVENous Once    vancomycin  750 mg IntraVENous Q12H    pantoprazole  40 mg IntraVENous BID    meropenem  2,000 mg IntraVENous Q8H    oxymetazoline  2 spray Each Nostril Once    lidocaine   Topical Once    thiamine mononitrate  100 mg Per NG tube Daily    multivitamin  1 tablet Oral Daily    amiodarone  200 mg Oral Daily    Arformoterol Tartrate  15 mcg Nebulization BID    aspirin  325 mg Oral Daily    atorvastatin  40 mg Oral Nightly    buPROPion  300 mg Oral QAM    clopidogrel  75 mg Oral Daily    [Held by provider] metoprolol tartrate  12.5 mg Oral BID    montelukast  10 mg Oral Nightly    potassium chloride  10 mEq Oral Daily    tiotropium  2 puff Inhalation Daily    sodium chloride flush  10 mL IntraVENous 2 times per day    enoxaparin  40 mg SubCUTAneous Daily    sennosides-docusate sodium  1 tablet Oral BID     Continuous Infusions:   norepinephrine Stopped (01/30/22 0133)    sodium chloride 25 mL (01/30/22 0722)     PRN Meds:sodium chloride flush, sodium chloride, ondansetron **OR** ondansetron, magnesium hydroxide, acetaminophen **OR** acetaminophen    Objective:   Vitals:   T-max:  Patient Vitals for the past 8 hrs:   BP Temp Temp src Pulse Resp SpO2 Weight   01/30/22 0615    88 18 93 %    01/30/22 0600 (!) 92/58   87 21 94 % 138 lb 14.2 oz (63 kg)   01/30/22 0545 99/60   87 19 98 %    01/30/22 0530 (!) 91/59   88 25 98 %    01/30/22 0515 96/65   87 22 97 %    01/30/22 0500 (!) 98/58   86 20 (!) 89 %    01/30/22 0445 100/60   86 25 94 %    01/30/22 0430 103/61   85 23 94 %    01/30/22 0415 (!) 127/110   87 19 95 %    01/30/22 0400 89/60 98.2 °F (36.8 °C) Axillary 85 21     01/30/22 0345 92/62   84 22     01/30/22 0330 (!) 85/58   85 17     01/30/22 0315 94/69   87 24     01/30/22 0300 93/62   87 17 94 %    01/30/22 0245 94/62   85 17 93 %    01/30/22 0230 (!) 86/58   85 16 95 %    01/30/22 0215 88/60   85 23 96 %    01/30/22 0200 96/61   86 22 95 %    01/30/22 0145 95/62   89 17 97 %    01/30/22 0130 (!) 87/56   90 23 93 %    01/30/22 0115 107/63   92 23 95 %    01/30/22 0100 100/61   91 18 96 %    01/30/22 0045 108/64   90 18 94 %    01/30/22 0030 103/64   89 18 96 %    01/30/22 0015 109/66   89 21 99 %        Intake/Output Summary (Last 24 hours) at 1/30/2022 0801  Last data filed at 1/30/2022 0730  Gross per 24 hour   Intake 2510.59 ml   Output 1525 ml   Net 985.59 ml       Review of Systems   Unable to perform ROS: Mental status change       Physical Exam  Constitutional:       General: He is not in acute distress. Appearance: He is ill-appearing. He is not toxic-appearing or diaphoretic. HENT:      Head: Normocephalic and atraumatic. Eyes:      Pupils: Pupils are equal, round, and reactive to light. Cardiovascular:      Rate and Rhythm: Normal rate and regular rhythm. Heart sounds: No murmur heard. No friction rub. No gallop. Pulmonary:      Effort: Pulmonary effort is normal.      Breath sounds: No wheezing or rhonchi. Rales:  bilateral lower. Abdominal:      General: There is no distension. Tenderness: There is no abdominal tenderness. There is no guarding or rebound. Musculoskeletal:      Right lower leg: No edema. Left lower leg: No edema. Neurological:      Mental Status: He is lethargic, disoriented and confused. LABS:    CBC:   Recent Labs     01/28/22  0432 01/29/22  0611 01/30/22  0415   WBC 12.5* 12.1* 6.7   HGB 11.7* 9.4* 8.8*   HCT 37.6* 29.6* 26.9*    147 108*   MCV 84.9 82.9 82.3     Renal:    Recent Labs     01/29/22  0611 01/29/22  1735 01/30/22  0408    141 137   K 3.6 3.3* 3.6   * 110 106   CO2 22 24 23   BUN 22* 19 17   CREATININE 0.8 0.6* 0.5*   GLUCOSE 127* 120* 107*   CALCIUM 8.0* 8.0* 7.9*   MG 1.80 1.80 1.70*   PHOS 2.5 1.9* 1.5*   ANIONGAP 9 7 8     Hepatic:   Recent Labs     01/29/22  0611 01/29/22  1735 01/30/22  0408   LABALBU 2.8* 2.7* 2.4*     Troponin: No results for input(s): TROPONINI in the last 72 hours. BNP: No results for input(s): BNP in the last 72 hours. Lipids: No results for input(s): CHOL, HDL in the last 72 hours. Invalid input(s): LDLCALCU, TRIGLYCERIDE  ABGs:    Recent Labs     01/27/22  0952 01/28/22  1222   PHART 7.449 7.462*   KII6PUY 38.4 30.3*   PO2ART 74.9* 64.7*   ARU9KXU 27 21.6   BEART 2.5 -2   Q2SGNKES 96 94   JPY2GTQ 28 23       INR:   Recent Labs     01/28/22  1146   INR 1.85*     Lactate:   Recent Labs     01/28/22  1222   LACTATE 0.98     Cultures:  -----------------------------------------------------------------  RAD:   XR CHEST PORTABLE   Final Result   1. Right IJ central line in satisfactory position in the thorax   2. Nasogastric tube in the stomach   3. Basilar airspace disease, new from prior study      XR ABDOMEN FOR NG/OG/NE TUBE PLACEMENT   Final Result      Frontal view centered at the diaphragm demonstrates tip of NG tube in the gastric fundus. Bowel gas pattern is unremarkable. Elevated left hemidiaphragm with left basilar scarring. Sternotomy wires and left atrial appendage clip noted. XR CHEST PORTABLE   Final Result      Left pleural effusion with adjacent parenchymal consolidation, similar to January 6, 2022.       CT HEAD WO CONTRAST   Final Result      1. No evidence for acute intracranial abnormality. 2.  Moderate diffuse cerebral atrophy with ventricular white matter changes bilaterally consistent with chronic small vessel ischemia. Assessment/Plan:   Pt is a 71 y/o M w/ a PMHx CAD s/p 3 vessel CABG (1/03), HfrEF (EF 30% to 35% on 1/2022), baseline Cr 0.6 (01/2022), HLD, BPH, and Squamous Cell CA of UE who p/w FTT. Transferred to ICU due to AMS.    Acute Metabolic Encephalopathy 2/2 Electrolyte Abnormality with resolved hypernatremia  -Early morning 1/27 pt became more lethargic, no longer responding. Na 162, pt hypotensive. AMS likely due to volume and free water depletion.  -On admission Na 162 and pt hypotensive. Pt likely both free water and volume deficit.    -Change to RFP Q12H and mag daily  -Small Bolus as needed for hypotension  -Nephrology following     Sepsis UTI vs other etiology  UA Nitrite negative, w/ moderate Leukocyte Esterase, WBC 10-20, 1+ Bacteria. Abx ordered to cover for UTI.     -Off levophed at this time   -On Vanc and Meropenem Will consider d/c vanc today but may keep as source of infection is still unknown. Still considering HCAP or aspiration as source. -D/C Fluconazole 200 mg yesterday  -F/U blood cultures    DWIGHT  Etiology pre-renal, pt not eating or drinking while at [facility]. -Nephrology following     Anemia/thrombocytopenia unknown etiology  -Repeat H&H in the afternoon. Has been downtrending hemoglobin for several days. No obvious source of bleed  -Liver function as his platelets has been been downtrending as well.   -Iron studies but understand that likely to have some abnormalities given his infection    Failure to Thrive  Pt recently d/c to SNF, per chart review, did not like it there and was refusing nutrition.    -Continue with speech therapy  - Will transition to RFP Q12 hours  -Dietary Consulted  -Would start tube feeds at this time     Chronic Disease   s/p CABG ( 3 vessel CABG [facility] on 1/03) - -Cardiothoracic Surgery following. Continue , lipitor 40mg, plavix 75mg)  COPD - Continue home Spiriva respimat and Brovana  Depression - Continue home bupropion  Allergies - Continue Home Montelukast  Afib (rhythem controlled) - Continue home amiodarone    Code Status: Full Code  FEN: Diet NPO  ADULT TUBE FEEDING; Nasogastric; Peptide Based; Continuous; 20; No; 200; Q 4 hours  PPX: Lovenox  DISPO: TREMAINE John DO, PGY-1  01/30/22  8:01 AM    This patient has been staffed and discussed with Dr. Cheryle House. Pulm/CC     Patient seen and examined. I agree with Dr. Caraballo's history, physical, lab findings, assessment and plan.     More alert  Sodium now normal as is creatinine  Off levophed x 16 hrs  SBP 85-99, review of chart shows that is his baseline.  EF is 30%  D/C Vanc  Cont Merrem as suspicion for sepsis high - potential PNA including HCAP/Aspiration  Increase TF to goal of 65    Monitor in ICU overnight and if does well then TXF to Jayden Mullen MD No

## 2022-01-30 NOTE — CONSULTS
Clinical Pharmacy Progress Note    Vancomycin has been discontinued. Will sign off pharmacy to dose Vancomycin consult. If medication is restarted and pharmacy is to manage dosing, please re-consult at that time.     Please call with questions--  Thanks--  Jovany Sue, PharmD, [address] [facility], [place], [address] [facility])   1/30/2022 10:29 AM

## 2022-01-30 NOTE — CONSULTS
Clinical Pharmacy Progress Note    Vancomycin - Management by Pharmacy    Consult Date(s): 1/28/22  Consulting Provider(s): Dr. Ariel Green / Plan    Suspected UTI - Vancomycin   Concurrent Antimicrobials: Meropenem - day #3   Day of Vanc Therapy: #3   Current Dosing Method: AUC- Based   Therapeutic Goal: <500   Current Dose / Frequency: 750mg Q12 hours   Plan / Rationale:   o DWIGHT continues to have improved. Scr 0.5mg/dL this AM.   o Level this AM of  11mcg/mL (~8 hours after last dose was given). Predicts an AUC of 285 mg/L*hr and trough of 9.5mg/L.  o Will increase dose to 1.25g Q12 hours and check a trough level tomorrow AM (ordered).  Will continue to monitor clinical condition and make adjustments to regimen as appropriate. Thank you for consulting Pharmacy! Pepper Guerrero, PharmD  PGY-1 Pharmacy Resident  [id number]  1/30/2022 8:01 AM      Interval History: Afebrile over night, pt off norepinephrine and on room air. WBC down this AM to 6.7. Subjective/Objective: Mr. Lenny Zimmer is a 70 y.o. male with a PMHx significant for CAD s/p 3 vessel CABG (1/3/22), HFrEF, HLD, BPH, and squamous cell carinoma, admitted for failure to thrive. He was started on broad spectrum IV antibiotics for sepsis 2/2 unknown etiology. Pharmacy has been consulted to dose vancomycin. Height:   Ht Readings from Last 1 Encounters:   01/26/22 5' 7\" (1.702 m)     Weight:   Wt Readings from Last 1 Encounters:   01/30/22 138 lb 14.2 oz (63 kg)       Current & Prior Antimicrobial Regimen(s):      Fluconazole (1/27-current)   Meropenem (1/28-current)   Vancomycin (1/28-current)    Level(s) / Doses:    Date Time Dose Level / Type of Level Interpretation   1/29 0611 1500mg IV X 1 Random=5.9 Will schedule 750mg IV Q12 hours as this predicts an AUC of 421 and trough of 13.4.   1/30 0415 750mg IV Q12 hours Random=11 Predicted AUC of 285mg/L*hr.  Will increase dose to 1.25g Q12 hours.    Note: Serum levels collected for AUC-based dosing may be high if collected in close proximity to the dose administered. This is not necessarily an indicator of toxicity. Cultures & Sensitivities:    Date Site Micro Susceptibility / Result   1/26 Urine C. albicans    1/27 Blood x2 NGTD    1/29 MRSA nares Negative      Labs / Ancillary Data:    Estimated Creatinine Clearance: 121 mL/min (A) (based on SCr of 0.5 mg/dL (L)). Recent Labs     01/28/22  0432 01/28/22  0813 01/29/22  0611 01/29/22  1735 01/30/22  0408 01/30/22  0415   CREATININE 1.4*   < > 0.8 0.6* 0.5*  --    BUN 63*   < > 22* 19 17  --    WBC 12.5*  --  12.1*  --   --  6.7    < > = values in this interval not displayed.        Additional Lab Values / Findings of Note:    Procalcitonin:   Recent Labs     01/28/22  1032   PROCAL 0.11

## 2022-01-30 NOTE — PROGRESS NOTES
Speech Language Pathology  Hold    Chart reviewed, d/w Leela Elder, who states pt not able to maintain alertness at this time. Will follow up as pt appropriate. Nigel Wheeler, M.S./[facility]-SLP #[id number]  Pg.  # [phone]

## 2022-01-30 NOTE — PROGRESS NOTES
Progress Note  Hospital Day: 5    Chief Complaint: Dehydration, Depression, AMS    Mr. Lennox Moros was readmitted on 2022 following office visit at which he was dramatically withdrawn and nearly obtunded. He was profoundly dehydrated after not taking PO at his nursing facility for nearly 2 weeks. 24 hour Interval History:      Admitted to [facility] ER   Transferred up to floor, on rounds appeared very poor from a functional standpoint. Transfer to ICU   Sodium and mental status improved   Central line placed, Levo started Max 9, down to 4. Bleeding from C line, Suture placed, resolved.  Off Levo. Delirium much improved, more conversational.  Remains withdrawn otherwise. + BM    Today's plan:  Continue enteric volume replacement and antibiotics. VITAL SIGNS   Temperature:  Current - Temp: 99.8 °F (37.7 °C);  Max - Temp  Av.7 °F (37.1 °C)  Min: 98 °F (36.7 °C)  Max: 99.8 °F (37.7 °C)    Respiratory Rate : Resp  Av.6  Min: 14  Max: 32    Pulse Range: Pulse  Av.8  Min: 84  Max: 99    Blood Pressure Range:  Systolic (32GIF), LWX:81 , Min:68 , JHC:676   ; Diastolic (96KNC), EYK:82, Min:42, Max:110    Pulse ox Range: SpO2  Av.2 %  Min: 89 %  Max: 100 %  O2 Flow Rate (L/min): 1 L/min         Admission Weight: Weight: 155 lb (70.3 kg)    Current Weight:   Patient Vitals for the past 96 hrs (Last 3 readings):   Weight   22 0600 138 lb 14.2 oz (63 kg)   22 0600 141 lb 8.6 oz (64.2 kg)   22 0610 142 lb 13.7 oz (64.8 kg)     I/O:     Intake/Output Summary (Last 24 hours) at 2022 7495  Last data filed at 2022 0730  Gross per 24 hour   Intake 2510.59 ml   Output 1525 ml   Net 985.59 ml     Urine (hernandez): Being Placed    LINES/ACCESS:      Peripheral Access: RUE Day #: 4    Diet: Diet NPO  ADULT TUBE FEEDING; Nasogastric; Peptide Based; Continuous; 20; No; 100; Q 4 hours Swallow eval deferred due to somnolence, once performed, may have regular diet, no salt added. MEDICATIONS:      potassium phosphate IVPB  15 mmol IntraVENous Once    magnesium sulfate  2,000 mg IntraVENous Once    vancomycin  1,250 mg IntraVENous Q12H    pantoprazole  40 mg IntraVENous BID    meropenem  2,000 mg IntraVENous Q8H    oxymetazoline  2 spray Each Nostril Once    lidocaine   Topical Once    thiamine mononitrate  100 mg Per NG tube Daily    multivitamin  1 tablet Oral Daily    amiodarone  200 mg Oral Daily    Arformoterol Tartrate  15 mcg Nebulization BID    aspirin  325 mg Oral Daily    atorvastatin  40 mg Oral Nightly    [Held by provider] buPROPion  300 mg Oral QAM    clopidogrel  75 mg Oral Daily    [Held by provider] metoprolol tartrate  12.5 mg Oral BID    montelukast  10 mg Oral Nightly    tiotropium  2 puff Inhalation Daily    sodium chloride flush  10 mL IntraVENous 2 times per day    enoxaparin  40 mg SubCUTAneous Daily    sennosides-docusate sodium  1 tablet Oral BID       Infusions:   norepinephrine Stopped (01/30/22 0133)    sodium chloride 25 mL (01/30/22 0722)       DATA REVIEW:   CBC:   Recent Labs     01/28/22  0432 01/29/22  0611 01/30/22  0415   WBC 12.5* 12.1* 6.7   HGB 11.7* 9.4* 8.8*   HCT 37.6* 29.6* 26.9*   MCV 84.9 82.9 82.3    147 108*     BMP:   Recent Labs     01/28/22  2124 01/29/22  0228 01/29/22  0611 01/29/22  1735 01/30/22  0408    144 143 141 137   K 3.6 3.7 3.6 3.3* 3.6   * 113* 112* 110 106   CO2 22 22 22 24 23   PHOS 2.0* 2.0* 2.5 1.9* 1.5*   GLUCOSE 111* 109* 127* 120* 107*   BUN 31* 26* 22* 19 17   CREATININE 0.9 0.8 0.8 0.6* 0.5*   CALCIUM 8.0* 7.9* 8.0* 8.0* 7.9*   MG 1.80 1.90 1.80 1.80 1.70*     Accucheck Glucoses:   Recent Labs     01/28/22  1612 01/29/22  0744 01/29/22  1152 01/29/22  1602 01/29/22  2040   POCGLU 117* 120* 128* 134* 141*     Cardiac Enzymes: No results for input(s): CKTOTAL, CKMB, CKMBINDEX in the last 72 hours.     Invalid input(s): TROPONIN  PT/INR:   Recent Labs 01/28/22  1146   PROTIME 21.4*   INR 1.85*     APTT: No results for input(s): APTT in the last 72 hours. LFT:   No results for input(s): AST, ALT, ALB, BILIDIR, BILITOT, ALKPHOS in the last 72 hours. Troponin: Invalid input(s): TROPONIN  BNP: No results for input(s): BNP in the last 72 hours. ABG:    Lab Results   Component Value Date    PHART 7.462 01/28/2022    PO2ART 64.7 01/28/2022    HJL2RJP 30.3 01/28/2022    S0IBAPJL 94 01/28/2022    YIJ4YLO 23 01/28/2022    SLJ3LOM 21.6 01/28/2022    BEART -2 01/28/2022    BEART 2.5 01/27/2022    BEART -7 01/04/2022    BEART -6 01/04/2022    BEART -5 01/04/2022     U/A:    No results for input(s): NITRITE, COLORU, PHUR, LABCAST, WBCUA, RBCUA, MUCUS, TRICHOMONAS, YEAST, BACTERIA, CLARITYU, SPECGRAV, LEUKOCYTESUR, UROBILINOGEN, BILIRUBINUR, BLOODU, GLUCOSEU, AMORPHOUS in the last 72 hours. Invalid input(s): Janel Ireland    Urine Culture:  Candida >100,000       Chest X-Ray:       Portable chest       HISTORY: Altered mental status       COMPARISON: January 6, 2022.       FINDINGS:       The cardiomediastinal contours are stable. There is a moderate left pleural effusion. Adjacent parenchymal consolidation. EKG: NSR @ 81    ASSESSMENT:   Patient Active Problem List:     Hyperglycemia     Smoker's respiratory syndrome     BPH with obstruction/lower urinary tract symptoms     Basal cell carcinoma of shoulder     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     SCCS, mod diff  (squamous cell carcinoma), hand, left     Basal cell carcinoma of right side of nose     Visit for suture removal     Visit for wound check     Anemia     Syncope and collapse     Non-ST elevated myocardial infarction (non-STEMI) ([facility] [address]     Dehydration      Neuro: Becoming more interactive. Follows commands, not very verbal still.   CV:   Sinus  Pulm:  Normal work of breathing  Abd:  Scaphoid, soft, non-tender, non-distended  Diamond: Clear yellow urine, still looks quite concentrated  Wounds: clean, dry and intact  Ext: warm, no edema, skin tenting    PLAN:   · Neuro - pain tolerated, no medications needed  · Psych - Hypomanic delrium, possible adjustment disorder   - Needs psych eval after UTI and Dehydration treated   - Patient discussed with  Psych ER 1/26/2022  · CV - on Beta blocker, ace inhibitor, statin, ASA/Plavix   - PO Meds currently held due to mental status  · Pulm - acute postoperative pulmonary insuffiency as expected- wean O2 as tolerated, continue incentive spirometry  · Renal - Acute kidney failure/creatinine stable - Cr 0.5, preop Cr 0.8        - Bed weight 138 (Discharged 155)         - keep urinary catheter for accurate I & O, Net + 1790 /24 hours, Net + 6318 since admission        - Hypernatremia and Dehydration   - Rehydration per ICU team  · Heme - Acute blood loss anemia as expected- hgb 9.4 - Hemoconcentrated currently, continue to monitor         · ID - Vanc/Meropenem for UTI, presumed sepsis  · Recommend considering addition of antifungal given candidal uti  · FEN - NGT Feeds, with free water  · GI prophylaxis - Protonix 40 mg bid  · VTE prophylaxis - SCD and YUMI hose  · Disposition -   Continue ICU Care  Discussed patient with Dr Christi Snyder who is agreeable to assessment and plan.       Arlin Marinelli MD   Thoracic Surgery Resident   [phone]  01/30/22 9:17 AM

## 2022-01-30 NOTE — PROGRESS NOTES
Office : [phone]     Fax :[phone]         Renal Progress Note  Subjective:   Admit Date: 1/26/2022     Good UO   Na better   BP low       DIET Diet NPO  ADULT TUBE FEEDING; Nasogastric; Peptide Based; Continuous; 20; No; 200; Q 4 hours  Medications:   Scheduled Meds:   potassium phosphate IVPB  15 mmol IntraVENous Once    magnesium sulfate  2,000 mg IntraVENous Once    vancomycin  750 mg IntraVENous Q12H    pantoprazole  40 mg IntraVENous BID    meropenem  2,000 mg IntraVENous Q8H    oxymetazoline  2 spray Each Nostril Once    lidocaine   Topical Once    thiamine mononitrate  100 mg Per NG tube Daily    multivitamin  1 tablet Oral Daily    amiodarone  200 mg Oral Daily    Arformoterol Tartrate  15 mcg Nebulization BID    aspirin  325 mg Oral Daily    atorvastatin  40 mg Oral Nightly    buPROPion  300 mg Oral QAM    clopidogrel  75 mg Oral Daily    [Held by provider] metoprolol tartrate  12.5 mg Oral BID    montelukast  10 mg Oral Nightly    potassium chloride  10 mEq Oral Daily    tiotropium  2 puff Inhalation Daily    sodium chloride flush  10 mL IntraVENous 2 times per day    enoxaparin  40 mg SubCUTAneous Daily    sennosides-docusate sodium  1 tablet Oral BID     Continuous Infusions:   norepinephrine Stopped (01/30/22 0133)    sodium chloride 25 mL (01/30/22 0722)       Labs:  CBC:   Recent Labs     01/28/22  0432 01/29/22  0611 01/30/22  0415   WBC 12.5* 12.1* 6.7   HGB 11.7* 9.4* 8.8*    147 108*     BMP:    Recent Labs     01/29/22  0611 01/29/22  1735 01/30/22  0408    141 137   K 3.6 3.3* 3.6   * 110 106 CO2 22 24 23   BUN 22* 19 17   CREATININE 0.8 0.6* 0.5*   GLUCOSE 127* 120* 107*     Ca/Mg/Phos:   Recent Labs     01/29/22  0611 01/29/22  1735 01/30/22  0408   CALCIUM 8.0* 8.0* 7.9*   MG 1.80 1.80 1.70*   PHOS 2.5 1.9* 1.5*     Hepatic:   No results for input(s): AST, ALT, ALB, BILITOT, ALKPHOS in the last 72 hours. Troponin: No results for input(s): TROPONINI in the last 72 hours. BNP: No results for input(s): BNP in the last 72 hours. Lipids: No results for input(s): CHOL, TRIG, HDL, LDLCALC, LABVLDL in the last 72 hours. ABGs:   Recent Labs     01/28/22  1222   PHART 7.462*   PO2ART 64.7*   HZF3TRG 30.3*     INR:   Recent Labs     01/28/22  1146   INR 1.85*     UA:  No results for input(s): [place], GLUCOSEU, BILIRUBINUR, KETUA, SPECGRAV, BLOODU, PHUR, PROTEINU, UROBILINOGEN, NITRU, LEUKOCYTESUR, Christie Crigler in the last 72 hours. Urine Microscopic:   No results for input(s): LABCAST, BACTERIA, COMU, HYALCAST, WBCUA, RBCUA, EPIU in the last 72 hours. Urine Culture:   No results for input(s): LABURIN in the last 72 hours. Urine Chemistry:   Recent Labs     01/28/22  0130   LABCREA 160.5   NAUR <20       Objective:   Vitals: BP (!) 92/58   Pulse 88   Temp 98.2 °F (36.8 °C) (Axillary)   Resp 18   Ht 5' 7\" (1.702 m)   Wt 138 lb 14.2 oz (63 kg)   SpO2 93%   BMI 21.75 kg/m²    Wt Readings from Last 3 Encounters:   01/30/22 138 lb 14.2 oz (63 kg)   01/10/22 155 lb 6.8 oz (70.5 kg)   07/12/18 179 lb (81.2 kg)      24HR INTAKE/OUTPUT:      Intake/Output Summary (Last 24 hours) at 1/30/2022 7284  Last data filed at 1/30/2022 0730  Gross per 24 hour   Intake 2510.59 ml   Output 1525 ml   Net 985.59 ml     Constitutional:  Disoriented, nonconversational  NECK: supple, no JVD  Cardiovascular:  S1, S2 without m/r/g  Respiratory:  CTA B without w/r/r  Abdomen: +bs, soft, nt  Ext: trace LE edema    Assessment and Plan:       IMAGING:  XR CHEST PORTABLE   Final Result   1.  Right IJ central line

## 2022-01-30 NOTE — PROGRESS NOTES
Hospitalist Progress Note      PCP: No primary care provider on file. Date of Admission: 1/26/2022     Subjective:   Patient lethargic. Alert and oriented x2. Able to follow basic commands. BP low w/ sbp of 80s. Medications:  Reviewed    Infusion Medications    norepinephrine Stopped (01/30/22 0133)    sodium chloride 25 mL (01/30/22 0722)     Scheduled Medications    potassium phosphate IVPB  15 mmol IntraVENous Once    magnesium sulfate  2,000 mg IntraVENous Once    vancomycin  1,250 mg IntraVENous Q12H    pantoprazole  40 mg IntraVENous BID    meropenem  2,000 mg IntraVENous Q8H    oxymetazoline  2 spray Each Nostril Once    lidocaine   Topical Once    thiamine mononitrate  100 mg Per NG tube Daily    multivitamin  1 tablet Oral Daily    amiodarone  200 mg Oral Daily    Arformoterol Tartrate  15 mcg Nebulization BID    aspirin  325 mg Oral Daily    atorvastatin  40 mg Oral Nightly    [Held by provider] buPROPion  300 mg Oral QAM    clopidogrel  75 mg Oral Daily    [Held by provider] metoprolol tartrate  12.5 mg Oral BID    montelukast  10 mg Oral Nightly    tiotropium  2 puff Inhalation Daily    sodium chloride flush  10 mL IntraVENous 2 times per day    enoxaparin  40 mg SubCUTAneous Daily    sennosides-docusate sodium  1 tablet Oral BID     PRN Meds: sodium chloride flush, sodium chloride, ondansetron **OR** ondansetron, magnesium hydroxide, acetaminophen **OR** acetaminophen      Intake/Output Summary (Last 24 hours) at 1/30/2022 1008  Last data filed at 1/30/2022 0730  Gross per 24 hour   Intake 2510.59 ml   Output 1525 ml   Net 985.59 ml       Physical Exam Performed:    BP (!) 84/51   Pulse 80   Temp 99.8 °F (37.7 °C) (Axillary)   Resp 20   Ht 5' 7\" (1.702 m)   Wt 138 lb 14.2 oz (63 kg)   SpO2 94%   BMI 21.75 kg/m²     General appearance: Ill-appearing  Respiratory:  Normal respiratory effort.  Clear to auscultation  Cardiovascular: Regular rate and rhythm  Abdomen: Soft, non-tender, non-distended   Musculoskeletal: No clubbing, cyanosis or edema bilaterally. Skin: No rashes or lesions. Neurologic: Lethargic. Alert and oriented x2. Able to follow basic commands. Peripheral Pulses: +2 palpable, equal bilaterally       Labs:   Recent Labs     01/28/22  0432 01/29/22  0611 01/30/22  0415   WBC 12.5* 12.1* 6.7   HGB 11.7* 9.4* 8.8*   HCT 37.6* 29.6* 26.9*    147 108*     Recent Labs     01/29/22  0611 01/29/22  1735 01/30/22  0408    141 137   K 3.6 3.3* 3.6   * 110 106   CO2 22 24 23   BUN 22* 19 17   CREATININE 0.8 0.6* 0.5*   CALCIUM 8.0* 8.0* 7.9*   PHOS 2.5 1.9* 1.5*     Recent Labs     01/30/22  0408   AST 21   ALT 11   BILIDIR <0.2   BILITOT 0.6   ALKPHOS 94     Urinalysis:      Lab Results   Component Value Date    NITRU Negative 01/26/2022    WBCUA 10-20 01/26/2022    BACTERIA 1+ 01/26/2022    RBCUA 5-10 01/26/2022    BLOODU SMALL 01/26/2022    SPECGRAV >=1.030 01/26/2022    GLUCOSEU Negative 01/26/2022       Assessment/Plan:    Shock likely hypovolemic versus sepsis secondary to potential pneumonia including HCAP/aspiration  Status post pressors and IVF  Continue antibiotics    DWIGHT/hypernatremia secondary to dehydration from poor oral intake  Monitor creatinine and sodium, improved  Status post IV fluids  Cont free water with TF  Nephrology following, appreciate recs    CAD s/p CABG 01/03/22  Continue aspirin and Plavix and statin  Holding beta-blocker due to low blood pressure  CT surgery following, appreciate recommendation    KADE  Fib  Continue telemetry, currently sinus rhythm  Continue amiodarone   Metoprolol on hold due to low blood pressure    COPD  Continue inhalers and breathing treatment    Depression  Continue home meds    Severe malnutrition/failure to thrive:  Continue tube feeds  Follow dietary recs    DVT Prophylaxis: Lovenox  Diet: Diet NPO  ADULT TUBE FEEDING; Nasogastric; Peptide Based; Continuous; 20; No; 100; Q 4 hours  Code Status: Full Code    PT/OT Eval Status: Once clinically stable    Dispo -pending clinical course, nephrology/CT surgery    Lizandro Law MD

## 2022-01-30 NOTE — PLAN OF CARE
Problem: Falls - Risk of:  Goal: Will remain free from falls  Description: Will remain free from falls  Outcome: Ongoing  Note: Pt is a fall risk. Fall risk protocol in place. See Nancy Eubanks Fall Score. Pt bed is in low position, bed alarm is on, side rails up, fall risk bracelet applied. , non-skid footwear in use. Patient/family educated on fall risk protocol, instructed to call for assistance when needed and belongings are in reach. Will continue with hourly rounds for po intake, pain needs, toileting and repositioning as needed. Will continue to monitor for needs. Problem: Nutrition Deficit:  Goal: Ability to achieve adequate nutritional intake will improve  Description: Ability to achieve adequate nutritional intake will improve  Outcome: Ongoing  Note: Increasing TF 10mL q8hrs. Water flushes stopped x24hr per Dr Jamie Alves. Will continue to monitor for tolerance. Problem: Pain:  Description: Pain management should include both nonpharmacologic and pharmacologic interventions. Goal: Recognizes and communicates pain  Description: Recognizes and communicates pain  Outcome: Ongoing  Note: PT able to communicate pain this shift while up to chair, stating he had a \"throbbing headache\" and that pain from his sacral wound was \"intolerable\" while up to chair. Medicated with Tylenol per NGT. Will continue to encourage participation in pain control plan. Problem: Skin Integrity:  Goal: Will show no infection signs and symptoms  Description: Will show no infection signs and symptoms  Outcome: Ongoing  Note: Pt has a sacral wound, being followed by [phone] [facility] nurse. Skin is pale, warm and dry with scattered abrasions and bruising. Sternal and pre-tibial incisions from recent CABG are well approximated and without s/s of infection. CVC dressing is CDI and site without s/s of infection. Turning and repositioning every 2 hours with pillow support. Feet elevated off of bed in prevalon boots bilaterally.  Will continue to monitor.

## 2022-01-31 LAB
ALBUMIN SERPL-MCNC: 2.4 G/DL (ref 3.4–5)
ALBUMIN SERPL-MCNC: 2.6 G/DL (ref 3.4–5)
ANION GAP SERPL CALCULATED.3IONS-SCNC: 8 MMOL/L (ref 3–16)
ANION GAP SERPL CALCULATED.3IONS-SCNC: 9 MMOL/L (ref 3–16)
BLOOD CULTURE, ROUTINE: NORMAL
BUN BLDV-MCNC: 19 MG/DL (ref 7–20)
BUN BLDV-MCNC: 22 MG/DL (ref 7–20)
CALCIUM SERPL-MCNC: 8.1 MG/DL (ref 8.3–10.6)
CALCIUM SERPL-MCNC: 8.1 MG/DL (ref 8.3–10.6)
CHLORIDE BLD-SCNC: 105 MMOL/L (ref 99–110)
CHLORIDE BLD-SCNC: 105 MMOL/L (ref 99–110)
CO2: 23 MMOL/L (ref 21–32)
CO2: 24 MMOL/L (ref 21–32)
CREAT SERPL-MCNC: 0.5 MG/DL (ref 0.8–1.3)
CREAT SERPL-MCNC: <0.5 MG/DL (ref 0.8–1.3)
CULTURE, BLOOD 2: NORMAL
GFR AFRICAN AMERICAN: >60
GFR AFRICAN AMERICAN: >60
GFR NON-AFRICAN AMERICAN: >60
GFR NON-AFRICAN AMERICAN: >60
GLUCOSE BLD-MCNC: 115 MG/DL (ref 70–99)
GLUCOSE BLD-MCNC: 122 MG/DL (ref 70–99)
HCT VFR BLD CALC: 28.8 % (ref 40.5–52.5)
HEMOGLOBIN: 9.5 G/DL (ref 13.5–17.5)
MAGNESIUM: 1.9 MG/DL (ref 1.8–2.4)
MAGNESIUM: 1.9 MG/DL (ref 1.8–2.4)
MCH RBC QN AUTO: 26.8 PG (ref 26–34)
MCHC RBC AUTO-ENTMCNC: 33 G/DL (ref 31–36)
MCV RBC AUTO: 81.2 FL (ref 80–100)
PDW BLD-RTO: 20.4 % (ref 12.4–15.4)
PHOSPHORUS: 1.4 MG/DL (ref 2.5–4.9)
PHOSPHORUS: 1.8 MG/DL (ref 2.5–4.9)
PLATELET # BLD: 112 K/UL (ref 135–450)
PMV BLD AUTO: 9.6 FL (ref 5–10.5)
POTASSIUM SERPL-SCNC: 3.5 MMOL/L (ref 3.5–5.1)
POTASSIUM SERPL-SCNC: 3.6 MMOL/L (ref 3.5–5.1)
RBC # BLD: 3.54 M/UL (ref 4.2–5.9)
SODIUM BLD-SCNC: 136 MMOL/L (ref 136–145)
SODIUM BLD-SCNC: 138 MMOL/L (ref 136–145)
WBC # BLD: 7.3 K/UL (ref 4–11)

## 2022-01-31 PROCEDURE — 2500000003 HC RX 250 WO HCPCS: Performed by: STUDENT IN AN ORGANIZED HEALTH CARE EDUCATION/TRAINING PROGRAM

## 2022-01-31 PROCEDURE — 94640 AIRWAY INHALATION TREATMENT: CPT

## 2022-01-31 PROCEDURE — 2580000003 HC RX 258: Performed by: STUDENT IN AN ORGANIZED HEALTH CARE EDUCATION/TRAINING PROGRAM

## 2022-01-31 PROCEDURE — 1200000000 HC SEMI PRIVATE

## 2022-01-31 PROCEDURE — 6360000002 HC RX W HCPCS: Performed by: STUDENT IN AN ORGANIZED HEALTH CARE EDUCATION/TRAINING PROGRAM

## 2022-01-31 PROCEDURE — 36415 COLL VENOUS BLD VENIPUNCTURE: CPT

## 2022-01-31 PROCEDURE — 83735 ASSAY OF MAGNESIUM: CPT

## 2022-01-31 PROCEDURE — 6360000002 HC RX W HCPCS

## 2022-01-31 PROCEDURE — 85027 COMPLETE CBC AUTOMATED: CPT

## 2022-01-31 PROCEDURE — 92610 EVALUATE SWALLOWING FUNCTION: CPT

## 2022-01-31 PROCEDURE — 2580000003 HC RX 258: Performed by: INTERNAL MEDICINE

## 2022-01-31 PROCEDURE — 6370000000 HC RX 637 (ALT 250 FOR IP): Performed by: STUDENT IN AN ORGANIZED HEALTH CARE EDUCATION/TRAINING PROGRAM

## 2022-01-31 PROCEDURE — 99233 SBSQ HOSP IP/OBS HIGH 50: CPT | Performed by: INTERNAL MEDICINE

## 2022-01-31 PROCEDURE — C9113 INJ PANTOPRAZOLE SODIUM, VIA: HCPCS

## 2022-01-31 PROCEDURE — 2500000003 HC RX 250 WO HCPCS: Performed by: INTERNAL MEDICINE

## 2022-01-31 PROCEDURE — 94761 N-INVAS EAR/PLS OXIMETRY MLT: CPT

## 2022-01-31 PROCEDURE — 90792 PSYCH DIAG EVAL W/MED SRVCS: CPT | Performed by: NURSE PRACTITIONER

## 2022-01-31 PROCEDURE — 80069 RENAL FUNCTION PANEL: CPT

## 2022-01-31 RX ORDER — SERTRALINE HYDROCHLORIDE 25 MG/1
25 TABLET, FILM COATED ORAL DAILY
Status: DISCONTINUED | OUTPATIENT
Start: 2022-02-01 | End: 2022-02-07 | Stop reason: HOSPADM

## 2022-01-31 RX ADMIN — ASPIRIN 325 MG: 325 TABLET, COATED ORAL at 07:26

## 2022-01-31 RX ADMIN — THIAMINE HCL TAB 100 MG 100 MG: 100 TAB at 07:26

## 2022-01-31 RX ADMIN — THERA TABS 1 TABLET: TAB at 07:26

## 2022-01-31 RX ADMIN — AMIODARONE HYDROCHLORIDE 200 MG: 200 TABLET ORAL at 07:26

## 2022-01-31 RX ADMIN — ENOXAPARIN SODIUM 40 MG: 100 INJECTION SUBCUTANEOUS at 07:26

## 2022-01-31 RX ADMIN — SODIUM CHLORIDE, PRESERVATIVE FREE 10 ML: 5 INJECTION INTRAVENOUS at 21:06

## 2022-01-31 RX ADMIN — PANTOPRAZOLE SODIUM 40 MG: 40 INJECTION, POWDER, FOR SOLUTION INTRAVENOUS at 10:30

## 2022-01-31 RX ADMIN — PANTOPRAZOLE SODIUM 40 MG: 40 INJECTION, POWDER, FOR SOLUTION INTRAVENOUS at 21:05

## 2022-01-31 RX ADMIN — POTASSIUM PHOSPHATE, MONOBASIC AND POTASSIUM PHOSPHATE, DIBASIC 20 MMOL: 224; 236 INJECTION, SOLUTION, CONCENTRATE INTRAVENOUS at 07:05

## 2022-01-31 RX ADMIN — CLOPIDOGREL BISULFATE 75 MG: 75 TABLET ORAL at 07:26

## 2022-01-31 RX ADMIN — MEROPENEM 2000 MG: 1 INJECTION, POWDER, FOR SOLUTION INTRAVENOUS at 03:30

## 2022-01-31 RX ADMIN — MEROPENEM 2000 MG: 1 INJECTION, POWDER, FOR SOLUTION INTRAVENOUS at 22:54

## 2022-01-31 RX ADMIN — MONTELUKAST 10 MG: 10 TABLET, FILM COATED ORAL at 21:05

## 2022-01-31 RX ADMIN — TIOTROPIUM BROMIDE INHALATION SPRAY 2 PUFF: 3.12 SPRAY, METERED RESPIRATORY (INHALATION) at 09:46

## 2022-01-31 RX ADMIN — MEROPENEM 2000 MG: 1 INJECTION, POWDER, FOR SOLUTION INTRAVENOUS at 14:13

## 2022-01-31 RX ADMIN — ACETAMINOPHEN 650 MG: 325 TABLET ORAL at 11:55

## 2022-01-31 RX ADMIN — ATORVASTATIN CALCIUM 40 MG: 40 TABLET, FILM COATED ORAL at 21:05

## 2022-01-31 RX ADMIN — SODIUM CHLORIDE, PRESERVATIVE FREE 10 ML: 5 INJECTION INTRAVENOUS at 07:27

## 2022-01-31 RX ADMIN — POTASSIUM PHOSPHATE, MONOBASIC AND POTASSIUM PHOSPHATE, DIBASIC 30 MMOL: 224; 236 INJECTION, SOLUTION, CONCENTRATE INTRAVENOUS at 18:13

## 2022-01-31 RX ADMIN — ARFORMOTEROL TARTRATE 15 MCG: 15 SOLUTION RESPIRATORY (INHALATION) at 09:46

## 2022-01-31 ASSESSMENT — PAIN SCALES - PAIN ASSESSMENT IN ADVANCED DEMENTIA (PAINAD)
TOTALSCORE: 0
BODYLANGUAGE: 0
TOTALSCORE: 2
FACIALEXPRESSION: 0
BODYLANGUAGE: 0
CONSOLABILITY: 0
TOTALSCORE: 0
BREATHING: 0
NEGVOCALIZATION: 2
TOTALSCORE: 0
BREATHING: 0
FACIALEXPRESSION: 0
TOTALSCORE: 1
NEGVOCALIZATION: 0
BODYLANGUAGE: 0
BODYLANGUAGE: 0
CONSOLABILITY: 0
NEGVOCALIZATION: 1
FACIALEXPRESSION: 0
BODYLANGUAGE: 0
CONSOLABILITY: 0
NEGVOCALIZATION: 0
FACIALEXPRESSION: 0
TOTALSCORE: 0
TOTALSCORE: 0
BREATHING: 0
CONSOLABILITY: 0
CONSOLABILITY: 0
FACIALEXPRESSION: 0
FACIALEXPRESSION: 0
CONSOLABILITY: 0
TOTALSCORE: 0
BREATHING: 0
BODYLANGUAGE: 0
BREATHING: 0
CONSOLABILITY: 0
BREATHING: 0
BREATHING: 0
NEGVOCALIZATION: 0
NEGVOCALIZATION: 0
BODYLANGUAGE: 0
CONSOLABILITY: 0
BODYLANGUAGE: 0
NEGVOCALIZATION: 0
BREATHING: 0
BREATHING: 0
TOTALSCORE: 0
TOTALSCORE: 0
BREATHING: 0
CONSOLABILITY: 0
FACIALEXPRESSION: 0
FACIALEXPRESSION: 0
BODYLANGUAGE: 0
BODYLANGUAGE: 0
FACIALEXPRESSION: 0
CONSOLABILITY: 0
FACIALEXPRESSION: 0

## 2022-01-31 ASSESSMENT — ENCOUNTER SYMPTOMS
SHORTNESS OF BREATH: 0
ABDOMINAL PAIN: 0
COUGH: 0

## 2022-01-31 ASSESSMENT — PAIN SCALES - GENERAL: PAINLEVEL_OUTOF10: 0

## 2022-01-31 NOTE — PROGRESS NOTES
Office : [phone]     Fax :[phone]         Renal Progress Note  Subjective:   Admit Date: 1/26/2022     Good UO   Na in good range   Phos low         DIET Diet NPO  ADULT TUBE FEEDING; Nasogastric; Peptide Based; Continuous; 20; Yes; 10; Q 8 hours; 65; 0; Other (specify); hold water flushes x24hr; Protein; 1 protein shot BID  Medications:   Scheduled Meds:   potassium phosphate IVPB  30 mmol IntraVENous Once    pantoprazole  40 mg IntraVENous BID    meropenem  2,000 mg IntraVENous Q8H    lidocaine   Topical Once    thiamine mononitrate  100 mg Per NG tube Daily    multivitamin  1 tablet Oral Daily    amiodarone  200 mg Oral Daily    Arformoterol Tartrate  15 mcg Nebulization BID    aspirin  325 mg Oral Daily    atorvastatin  40 mg Oral Nightly    [Held by provider] buPROPion  300 mg Oral QAM    clopidogrel  75 mg Oral Daily    [Held by provider] metoprolol tartrate  12.5 mg Oral BID    montelukast  10 mg Oral Nightly    tiotropium  2 puff Inhalation Daily    sodium chloride flush  10 mL IntraVENous 2 times per day    enoxaparin  40 mg SubCUTAneous Daily    sennosides-docusate sodium  1 tablet Oral BID     Continuous Infusions:   sodium chloride 25 mL (01/30/22 1634)       Labs:  CBC:   Recent Labs     01/29/22  0611 01/29/22  0611 01/30/22  0415 01/30/22  1540 01/31/22  0415   WBC 12.1*  --  6.7  --  7.3   HGB 9.4*   < > 8.8* 9.0* 9.5*     --  108*  --  112*    < > = values in this interval not displayed.      BMP:    Recent Labs     01/30/22  0408 01/30/22  1540 01/31/22  0415    134* 136   K 3.6 3.3* 3.6    102 105   CO2 Right IJ central line in satisfactory position in the thorax   2. Nasogastric tube in the stomach   3. Basilar airspace disease, new from prior study      XR ABDOMEN FOR NG/OG/NE TUBE PLACEMENT   Final Result      Frontal view centered at the diaphragm demonstrates tip of NG tube in the gastric fundus. Bowel gas pattern is unremarkable. Elevated left hemidiaphragm with left basilar scarring. Sternotomy wires and left atrial appendage clip noted. XR CHEST PORTABLE   Final Result      Left pleural effusion with adjacent parenchymal consolidation, similar to January 6, 2022. CT HEAD WO CONTRAST   Final Result      1. No evidence for acute intracranial abnormality. 2.  Moderate diffuse cerebral atrophy with ventricular white matter changes bilaterally consistent with chronic small vessel ischemia. Assessment/Plan     1. DWIGHT 2/2 dehydration - resolved     2. HTN     3.  Anemia     4. Acid- base/ Electrolyte imbalance - hypernatremia        Plan:  - replace free water deficit  - Na better   - replace K/Phjos   - cont free water   - Cr better          Ani Isaacs MD       [facility] [address] [place] [address] -[phone]  [phone]

## 2022-01-31 NOTE — PROGRESS NOTES
Progress Note  Hospital Day: 6    Chief Complaint: Dehydration, Depression, AMS    Mr. Abi Estrella was readmitted on 2022 following office visit at which he was dramatically withdrawn and nearly obtunded. He was profoundly dehydrated after not taking PO at his nursing facility for nearly 2 weeks. 24 hour Interval History:      Admitted to [facility] ER   Transferred up to floor, on rounds appeared very poor from a functional standpoint. Transfer to ICU   Sodium and mental status improved   Central line placed, Levo started Max 9, down to 4. Bleeding from C line, Suture placed, resolved.  Off Levo. Delirium much improved, more conversational.  Remains withdrawn otherwise. + BM   Passed swallow for ice chips and spoons of water    Today's plan:  Continue enteric volume replacement. Psych consult    VITAL SIGNS   Temperature:  Current - Temp: 99.5 °F (37.5 °C);  Max - Temp  Av.3 °F (36.8 °C)  Min: 97.8 °F (36.6 °C)  Max: 99.5 °F (37.5 °C)    Respiratory Rate : Resp  Av.4  Min: 17  Max: 25    Pulse Range: Pulse  Av.3  Min: 73  Max: 89    Blood Pressure Range:  Systolic (96QDJ), SND:92 , Min:74 , LPV:982   ; Diastolic (81YSO), NXS:41, Min:46, Max:67    Pulse ox Range: SpO2  Av.9 %  Min: 91 %  Max: 98 %  O2 Flow Rate (L/min): 1 L/min         Admission Weight: Weight: 155 lb (70.3 kg)    Current Weight:   Patient Vitals for the past 96 hrs (Last 3 readings):   Weight   22 0600 138 lb 14.2 oz (63 kg)   22 0600 141 lb 8.6 oz (64.2 kg)   22 0610 142 lb 13.7 oz (64.8 kg)     I/O:     Intake/Output Summary (Last 24 hours) at 2022 2184  Last data filed at 2022 6651  Gross per 24 hour   Intake 2533.32 ml   Output 825 ml   Net 1708.32 ml     Urine (hernandez): Being Placed    LINES/ACCESS:      Peripheral Access: RUE Day #: 5    Diet: Diet NPO  ADULT TUBE FEEDING; Nasogastric; Peptide Based; Continuous; 20; Yes; 10; Q 8 hours; 65; 0; Other (specify); Recent Labs     01/30/22  0408   AST 21   ALT 11   BILIDIR <0.2   BILITOT 0.6   ALKPHOS 94     Troponin: Invalid input(s): TROPONIN  BNP: No results for input(s): BNP in the last 72 hours. ABG:    Lab Results   Component Value Date    PHART 7.462 01/28/2022    PO2ART 64.7 01/28/2022    YJI0UVT 30.3 01/28/2022    L4TBTHSJ 94 01/28/2022    SZU7CPG 23 01/28/2022    APR7NEH 21.6 01/28/2022    BEART -2 01/28/2022    BEART 2.5 01/27/2022    BEART -7 01/04/2022    BEART -6 01/04/2022    BEART -5 01/04/2022     U/A:    No results for input(s): NITRITE, COLORU, PHUR, LABCAST, WBCUA, RBCUA, MUCUS, TRICHOMONAS, YEAST, BACTERIA, CLARITYU, SPECGRAV, LEUKOCYTESUR, UROBILINOGEN, BILIRUBINUR, BLOODU, GLUCOSEU, AMORPHOUS in the last 72 hours. Invalid input(s): Nilam Pham    Urine Culture:  Candida >100,000       Chest X-Ray:       Portable chest       HISTORY: Altered mental status       COMPARISON: January 6, 2022.       FINDINGS:       The cardiomediastinal contours are stable. There is a moderate left pleural effusion. Adjacent parenchymal consolidation. EKG: NSR @ 74    ASSESSMENT:   Patient Active Problem List:     Hyperglycemia     Smoker's respiratory syndrome     BPH with obstruction/lower urinary tract symptoms     Basal cell carcinoma of shoulder     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     SCCS, mod diff  (squamous cell carcinoma), hand, left     Basal cell carcinoma of right side of nose     Visit for suture removal     Visit for wound check     Anemia     Syncope and collapse     Non-ST elevated myocardial infarction (non-STEMI) ([facility] [address]     Dehydration      Neuro: Becoming more interactive. Follows commands, more conversational but not making a lot of sense.   CV:   Sinus  Pulm:  Normal work of breathing  Abd:  Scaphoid, soft, non-tender, non-distended  Diamond: Clear yellow urine, still looks quite concentrated  Wounds: clean, dry and intact  Ext: warm, no edema, skin tenting    PLAN:   · Neuro - pain tolerated, no medications needed  · Psych - Hypomanic delrium, possible adjustment disorder   - Needs psych eval after UTI and Dehydration treated   - Patient discussed with  Psych ER 1/26/2022  · CV - on Beta blocker, ace inhibitor, statin, ASA/Plavix   - PO Meds currently held due to mental status  · Pulm - acute postoperative pulmonary insuffiency as expected- wean O2 as tolerated, continue incentive spirometry  · Renal - Acute kidney failure/creatinine stable - Cr 0.5, preop Cr 0.8        - Bed weight 138 (Discharged 155)         - keep urinary catheter for accurate I & O, Net + 431 /24 hours, Net + 8581 since admission        - Hypernatremia and Dehydration   - Rehydration per ICU team  · Heme - Acute blood loss anemia as expected- hgb 9.5  · ID - VANC stopped 1/30/2021, on Meropenem  · Recommend considering addition of antifungal given candidal uti  · FEN - NGT Feeds, with free water  · GI prophylaxis - Protonix 40 mg bid  · VTE prophylaxis - SCD, YUMI hose, Lovenox  · Disposition -   Continue ICU Care  Discussed patient with Dr Sabrina Bennett who is agreeable to assessment and plan.       Thao Sanches MD   Thoracic Surgery Resident   [phone]  01/31/22 9:22 AM

## 2022-01-31 NOTE — PROGRESS NOTES
Physician Progress Note      PATIENT:               Josemanuel Rosales  CSN #:                  [id number]  :                       1950  ADMIT DATE:       2022 4:05 PM  [address] [place] DATE:  RESPONDING  PROVIDER #:        Prasanna Simmons MD          QUERY TEXT:    Pt admitted with hypernatremia, UTI, DWIGHT. Pt noted to have WBC 12.2 on   admission. If possible, please document in the progress notes and discharge   summary if you are evaluating and /or treating any of the following: The medical record reflects the following:  Risk Factors: 70year old male s/p CABG, admitted with UTI, failure to thrive  Clinical Indicators: UTI, H&P notes acute encephalopathy, Creatinine  1.6   up to 2.0 from 0.8 on 1/10. GFR 43 on admission. WBC 12.2  Treatment: IV Rocephin, Merrem, Vancomycin, IV fluids, labs, monitoring,   supportive care    Thank you,  [email or web address]  Options provided:  -- Sepsis, present on admission  -- Sepsis, not present on admission  -- UTI without Sepsis  -- Other - I will add my own diagnosis  -- Disagree - Not applicable / Not valid  -- Disagree - Clinically unable to determine / Unknown  -- Refer to Clinical Documentation Reviewer    PROVIDER RESPONSE TEXT:    This patient has sepsis which was present on admission. Query created by:  Lynne Quiroz on 2022 10:26 AM      Electronically signed by:  Prasanna Simmons MD 2022 11:29 AM

## 2022-01-31 NOTE — PROGRESS NOTES
Hospitalist Progress Note      PCP: No primary care provider on file. Date of Admission: 1/26/2022     Subjective:   Patient lethargic. Alert and oriented x2. Able to follow basic commands. Medications:  Reviewed    Infusion Medications    sodium chloride 25 mL (01/30/22 1634)     Scheduled Medications    potassium phosphate IVPB  30 mmol IntraVENous Once    pantoprazole  40 mg IntraVENous BID    meropenem  2,000 mg IntraVENous Q8H    lidocaine   Topical Once    thiamine mononitrate  100 mg Per NG tube Daily    multivitamin  1 tablet Oral Daily    amiodarone  200 mg Oral Daily    Arformoterol Tartrate  15 mcg Nebulization BID    aspirin  325 mg Oral Daily    atorvastatin  40 mg Oral Nightly    [Held by provider] buPROPion  300 mg Oral QAM    clopidogrel  75 mg Oral Daily    [Held by provider] metoprolol tartrate  12.5 mg Oral BID    montelukast  10 mg Oral Nightly    tiotropium  2 puff Inhalation Daily    sodium chloride flush  10 mL IntraVENous 2 times per day    enoxaparin  40 mg SubCUTAneous Daily    sennosides-docusate sodium  1 tablet Oral BID     PRN Meds: sodium chloride flush, sodium chloride, ondansetron **OR** ondansetron, magnesium hydroxide, acetaminophen **OR** acetaminophen      Intake/Output Summary (Last 24 hours) at 1/31/2022 1500  Last data filed at 1/31/2022 1158  Gross per 24 hour   Intake 1066.1 ml   Output 690 ml   Net 376.1 ml       Physical Exam Performed:    BP (!) 93/58   Pulse 87   Temp 99.2 °F (37.3 °C) (Axillary)   Resp 24   Ht 5' 7\" (1.702 m)   Wt 139 lb 15.9 oz (63.5 kg)   SpO2 96%   BMI 21.93 kg/m²     General appearance: Ill-appearing  Respiratory:  Normal respiratory effort. Clear to auscultation  Cardiovascular: Regular rate and rhythm  Abdomen: Soft, non-tender, non-distended   Musculoskeletal: No clubbing, cyanosis or edema bilaterally. Skin: No rashes or lesions. Neurologic: Lethargic. Alert and oriented x2.   Able to follow basic commands. Peripheral Pulses: +2 palpable, equal bilaterally       Labs:   Recent Labs     01/29/22  0611 01/29/22  0611 01/30/22  0415 01/30/22  1540 01/31/22  0415   WBC 12.1*  --  6.7  --  7.3   HGB 9.4*   < > 8.8* 9.0* 9.5*   HCT 29.6*   < > 26.9* 28.1* 28.8*     --  108*  --  112*    < > = values in this interval not displayed. Recent Labs     01/30/22  0408 01/30/22  1540 01/31/22  0415    134* 136   K 3.6 3.3* 3.6    102 105   CO2 23 26 23   BUN 17 19 19   CREATININE 0.5* 0.5* 0.5*   CALCIUM 7.9* 8.1* 8.1*   PHOS 1.5* 1.9* 1.4*     Recent Labs     01/30/22  0408   AST 21   ALT 11   BILIDIR <0.2   BILITOT 0.6   ALKPHOS 94     Urinalysis:      Lab Results   Component Value Date    NITRU Negative 01/26/2022    WBCUA 10-20 01/26/2022    BACTERIA 1+ 01/26/2022    RBCUA 5-10 01/26/2022    BLOODU SMALL 01/26/2022    SPECGRAV >=1.030 01/26/2022    GLUCOSEU Negative 01/26/2022       Assessment/Plan:    Shock likely hypovolemic versus sepsis secondary to potential pneumonia including HCAP/aspiration  Status post pressors and IVF  Continue antibiotics    DWIGHT/hypernatremia secondary to dehydration from poor oral intake  Monitor creatinine and sodium, improved  Status post IV fluids  Cont free water with TF  Nephrology following, appreciate recs    CAD s/p CABG 01/03/22  Continue aspirin and Plavix and statin  Holding beta-blocker due to low blood pressure  CT surgery following, appreciate recommendation    KADE  Fib  Continue telemetry, currently sinus rhythm  Continue amiodarone   Metoprolol on hold due to low blood pressure    COPD  Continue inhalers and breathing treatment    Depression  Continue home meds  Psych consulted    Severe malnutrition/failure to thrive:  Continue tube feeds  Follow dietary recs        DVT Prophylaxis: Lovenox  Diet: Diet NPO  ADULT TUBE FEEDING; Nasogastric; Peptide Based; Continuous; 20; Yes; 10; Q 8 hours; 65; 0; Other (specify); hold water flushes x24hr; Protein; 1 protein shot BID  Code Status: Full Code    PT/OT Eval Status: Once clinically stable    Dispo -pending clinical course    Aurora Bauer MD

## 2022-01-31 NOTE — PROGRESS NOTES
Patient is alert to self only but otherwise disoriented. Moves all four extremities spontaneously but not to command. Does not appear to be in any acute distress. Vital signs are stable. On room air. Tube feeds currently infusing per NG tube. Aspiration risk. Had large, loose BM this morning. Indwelling catheter in place. Plan to get up to chair at some point today.

## 2022-01-31 NOTE — CONSULTS
Comprehensive Nutrition Assessment    RECOMMENDATIONS:  1. Patient w/HIGH RISK of Refeeding. Start daily MV + 100 mg Thiamin for 7 days (start  1/27 - end 2/6). 2. PO Diet: NPO per MD  3. Increase EN formula Peptide Based Vital AF to goal rate 65 ml/hr. 4. Recommend 30 ml water flush q4h - most minimal flush to maintain tube integrity. 5. Daily Labs: Phos, K+ and Mg to monitor for signs of refeeding. NUTRITION ASSESSMENT:    Nutritional summary & status: Per chart review pt TF running @ 50 ml/hr and pt tolerating well. Recommend increasing to goal of 65 ml/hr to meet 100% of nutrition needs. SLP continues to recommend NPO d/t lethargy. If pt becomes more alert and diet advanced recommend for pt to remain on TF until he is taking in >50% of nutrition needs through po intakes.  Admission/PMH: Failure to thrive. PMH: s/p CABG 1/3/22, HLD     MALNUTRITION ASSESSMENT  Context of Malnutrition: Acute Illness   Malnutrition Status: Severe malnutrition  Findings of the 6 clinical characteristics of malnutrition (Minimum of 2 out of 6 clinical characteristics is required to make the diagnosis of moderate or severe Protein Calorie Malnutrition based on AND/ASPEN Guidelines):  Energy Intake: Less than/equal to 50% of estimated energy requirements    Energy Intake Time: Greater than or equal to 7 days    Weight Loss %: Unable to assess    Weight loss Time: Unable to assess   Body Fat Loss: Unable to Assess   Body Fat Location: Unable to assess    Body Muscle Loss: Severe Loss   Body Muscle Loss Location: Temples  per visual  Fluid Accumulation: No significant    Fluid Accumulation Location: No significant     Strength: Not Performed;  Not Measured     NUTRITION DIAGNOSIS   Severe malnutrition related to inadequate protein-energy intake as evidenced by poor intake prior to admission,severe muscle loss    [address] and/or Nutrient Delivery:  Continue NPO,Modify Tube Feeding  Nutrition Education/Counseling:  No recommendation at this time   Goals:  Pt will tolerate most appropriate form of nutrition to meet 100% of nutrition needs       Nutrition Monitoring and Evaluation:   Food/Nutrient Intake Outcomes:  Diet Advancement/Tolerance,Enteral Nutrition Intake/Tolerance  Physical Signs/Symptoms Outcomes:  Weight,Biochemical Data     OBJECTIVE DATA: Significant to nutrition assessment  · Nutrition-Focused Physical Findings: lbm 1/31  · Labs: Reviewed; Phos 1.4  · Meds: Reviewed; KCl, senokot, thiamine  · Wounds: Pressure Injury       CURRENT NUTRITION THERAPIES  Diet NPO  ADULT TUBE FEEDING; Nasogastric; Peptide Based; Continuous; 20; Yes; 10; Q 8 hours; 65; 0; Other (specify); hold water flushes x24hr; Protein; 1 protein shot BIDPO Intake: NPO   · Current TF & Flush Orders Provides: 55 ml/hr to provide 1320 ml total volume, 1584 kcal, 99 g protein, and 939 ml free water. · Goal TF & Flush Orders Provides: 65 ml/hr to provide 1560 ml total volume, 1872 kcal (2178 kcal w/ D5), 117 g protein, and 1865 ml free water     PO Supplement Intake:NPO  Additional Sources of Calories/IVF:n/a     ANTHROPOMETRICS  Current Height: 5' 7\" (170.2 cm)  Current Weight: 139 lb 15.9 oz (63.5 kg)    Admission weight: 155 lb (70.3 kg)  Ideal Body Weight (IBW): 148 lbs  (67 kg)    Usual Bodyweight   MARY - RD ordered actual weight  Weight Changes MARY       BMI: 22    Wt Readings from Last 50 Encounters:   01/26/22 155 lb (70.3 kg)   01/10/22 155 lb 6.8 oz (70.5 kg)     COMPARATIVE STANDARDS  Energy (kcal):  3198-0100 (25-30); Weight Used for Energy Requirements:  Ideal (67 kg)     Protein (g):  101-114 (1.5-1.7); Weight Used for Protein Requirements:  Current (67 kg)        Fluid (ml/day):  2964-6868 ; Method Used for Fluid Requirements:  1 ml/kcal      The patient will still be monitored per nutrition standards of care. Consult dietitian if nutrition interventions essential to patient care is needed.      Ish Byrd RD, JAIME  [place]rett:  822-2021  Office:  [phone]

## 2022-01-31 NOTE — PROGRESS NOTES
ICU Progress Note    Admit Date: 1/26/2022  IV Access:Peripheral, CVC  IV Fluids: Nonr                Antibiotics: Merrem  Diet: Diet NPO  ADULT TUBE FEEDING; Nasogastric; Peptide Based; Continuous; 20; Yes; 10; Q 8 hours; 65; 0; Other (specify); hold water flushes x24hr; Protein; 1 protein shot BID    CC: FTT    Interval history: NAOE. Pt off vasopressors with SBP ranging from 70s to 110s during the past day. Pt still being tx with abx. Pt responds to verbal stimuli, is orientated to self only, is able to follow commands, moves 3/4 extremities, cannot move LLE but it responds to peripheral stimuli.  mL, decreased  Cr 0.5, stable    Medications:     Scheduled Meds:   pantoprazole  40 mg IntraVENous BID    meropenem  2,000 mg IntraVENous Q8H    lidocaine   Topical Once    thiamine mononitrate  100 mg Per NG tube Daily    multivitamin  1 tablet Oral Daily    amiodarone  200 mg Oral Daily    Arformoterol Tartrate  15 mcg Nebulization BID    aspirin  325 mg Oral Daily    atorvastatin  40 mg Oral Nightly    [Held by provider] buPROPion  300 mg Oral QAM    clopidogrel  75 mg Oral Daily    [Held by provider] metoprolol tartrate  12.5 mg Oral BID    montelukast  10 mg Oral Nightly    tiotropium  2 puff Inhalation Daily    sodium chloride flush  10 mL IntraVENous 2 times per day    enoxaparin  40 mg SubCUTAneous Daily    sennosides-docusate sodium  1 tablet Oral BID     Continuous Infusions:   norepinephrine Stopped (01/30/22 0133)    sodium chloride 25 mL (01/30/22 1634)     PRN Meds:sodium chloride flush, sodium chloride, ondansetron **OR** ondansetron, magnesium hydroxide, acetaminophen **OR** acetaminophen    Objective:   Vitals:   T-max:  No data found.     Intake/Output Summary (Last 24 hours) at 1/31/2022 0416  Last data filed at 1/30/2022 2313  Gross per 24 hour   Intake 3281.43 ml   Output 1220 ml   Net 2061.43 ml       Review of Systems   Respiratory: Negative for cough and shortness of breath. Cardiovascular: Negative for chest pain. Gastrointestinal: Negative for abdominal pain. Neurological: Negative for headaches. Physical Exam  Constitutional:       General: He is not in acute distress. Appearance: He is not ill-appearing, toxic-appearing or diaphoretic. HENT:      Head: Normocephalic and atraumatic. Eyes:      Extraocular Movements: Extraocular movements intact. Cardiovascular:      Rate and Rhythm: Normal rate and regular rhythm. Heart sounds: No murmur heard. No friction rub. No gallop. Pulmonary:      Effort: Pulmonary effort is normal.      Breath sounds: Rales ( bilateral lower) present. No wheezing or rhonchi. Abdominal:      General: There is no distension. Tenderness: There is no abdominal tenderness. There is no guarding or rebound. Musculoskeletal:      Right lower leg: No edema. Left lower leg: No edema. Neurological:      Mental Status: He is alert. He is disoriented and confused. LABS:    CBC:   Recent Labs     01/28/22  0432 01/28/22  0432 01/29/22  0611 01/30/22  0415 01/30/22  1540   WBC 12.5*  --  12.1* 6.7  --    HGB 11.7*   < > 9.4* 8.8* 9.0*   HCT 37.6*   < > 29.6* 26.9* 28.1*     --  147 108*  --    MCV 84.9  --  82.9 82.3  --     < > = values in this interval not displayed. Renal:    Recent Labs     01/29/22  1735 01/30/22  0408 01/30/22  1540    137 134*   K 3.3* 3.6 3.3*    106 102   CO2 24 23 26   BUN 19 17 19   CREATININE 0.6* 0.5* 0.5*   GLUCOSE 120* 107* 116*   CALCIUM 8.0* 7.9* 8.1*   MG 1.80 1.70* 2.00   PHOS 1.9* 1.5* 1.9*   ANIONGAP 7 8 6     Hepatic:   Recent Labs     01/29/22  1735 01/30/22  0408 01/30/22  1540   AST  --  21  --    ALT  --  11  --    BILITOT  --  0.6  --    BILIDIR  --  <0.2  --    PROT  --  4.9*  --    LABALBU 2.7* 2.3*  2.4* 2.4*   ALKPHOS  --  94  --      Troponin: No results for input(s): TROPONINI in the last 72 hours.   BNP: No results for input(s): BNP in the last 72 hours. Lipids: No results for input(s): CHOL, HDL in the last 72 hours. Invalid input(s): LDLCALCU, TRIGLYCERIDE  ABGs:    Recent Labs     01/28/22  1222   PHART 7.462*   VUE6IAM 30.3*   PO2ART 64.7*   ZHQ7TXN 21.6   BEART -2   G7GSGNJF 94   [id number] 23       INR:   Recent Labs     01/28/22  1146   INR 1.85*     Lactate:   Recent Labs     01/28/22  1222   LACTATE 0.98     Cultures:  -----------------------------------------------------------------  RAD:   XR CHEST PORTABLE   Final Result   1. Right IJ central line in satisfactory position in the thorax   2. Nasogastric tube in the stomach   3. Basilar airspace disease, new from prior study      XR ABDOMEN FOR NG/OG/NE TUBE PLACEMENT   Final Result      Frontal view centered at the diaphragm demonstrates tip of NG tube in the gastric fundus. Bowel gas pattern is unremarkable. Elevated left hemidiaphragm with left basilar scarring. Sternotomy wires and left atrial appendage clip noted. XR CHEST PORTABLE   Final Result      Left pleural effusion with adjacent parenchymal consolidation, similar to January 6, 2022. CT HEAD WO CONTRAST   Final Result      1. No evidence for acute intracranial abnormality. 2.  Moderate diffuse cerebral atrophy with ventricular white matter changes bilaterally consistent with chronic small vessel ischemia. Assessment/Plan:   Pt is a 69 y/o M w/ a PMHx CAD s/p 3 vessel CABG (1/03), HfrEF (EF 30% to 35% on 1/2022), baseline Cr 0.6 (01/2022), HLD, BPH, and Squamous Cell CA of UE who p/w FTT. Transferred to ICU due to AMS.    Acute Metabolic Encephalopathy  -Early morning 1/27 pt became more lethargic, no longer responding. Na 162, pt hypotensive.  AMS likely due to volume and free water depletion.    -Consider D/C to [facility] ED tomorrow  -Mental status much improved  -PT/OT consulted  -SLP consulted  -RFP,Mg, Phos daily  -Nephrology following     Sepsis UTI vs other etiology  UA Nitrite negative, w/ moderate Leukocyte Esterase, WBC 10-20, 1+ Bacteria. Abx ordered to cover for UTI. -Abx: Meropenem (1/28, D4)  -Blood cultures x2 (1/26): NGTD     Failure to Thrive  Pt recently d/c to SNF, per chart review, did not like it there and was refusing nutrition. -RFP and Mg q12H  -SLP following  -Dietary following    Anemia/thrombocytopenia unknown etiology  Hgb 9.5, and Plt 112, both are stable. Ferritin 1009.0, elevated, Fe 28, decreased, and TIBC 99, decreased. Etiology likely NELLIE.      Chronic Disease   s/p CABG ( 3 vessel CABG [facility] on 1/03) - -Cardiothoracic Surgery following. Continue , lipitor 40mg, plavix 75mg)  COPD - Continue home Spiriva respimat and Brovana  Depression - Continue home bupropion  Allergies - Continue Home Montelukast  Afib (rhythem controlled) - Continue home amiodarone    Code Status: Full Code  FEN: Diet NPO  ADULT TUBE FEEDING; Nasogastric; Peptide Based; Continuous; 20; Yes; 10; Q 8 hours; 65; 0; Other (specify); hold water flushes x24hr; Protein; 1 protein shot BID  PPX: Lovenox  DISPO: TREMAINE Dixon DO, PGY-1  01/31/22  4:16 AM    This patient has been staffed and discussed with Dr. Kristin Lopez. Patient seen, examined and discussed with the resident and I agree with the assessment and plan. Briefly, this is a 70 y.o. male with AMS 2/2 hypernatremia    Hypernatremia has held. He remains profoundly weak but is able to converse appropriately. Getting his nutrition via [employer]. Psych has been consulted because of his hoarding tendencies prior to his bypass surgery and his refusal to eat when he was at the skilled nursing facility. There is been some discussion about discharging him into the psych emergency room I do not know that he would be considered medically stable by their standards and I think the only transfer scenario that would make sense is if he was transferred to inpatient psych facility and not their emergency room.     I think it also likely that he would do room in the near future given the shortage of psychiatric facilities and beds. Patient to transfer to a telemetry floor continued medical care. He will get more medical care on the telemetry floor that he would in a psychiatric unit anyway. We can stop the meropenem Ángel Leaks as he never had a confirmed source of infection and that would complete a course.       Rica Trevino MD

## 2022-01-31 NOTE — CONSULTS
Psychiatry Initial Consultation    Patient Name: Sayda Quintero  MRN: [id number]  Admission Date: 1/26/2022    Reason for Consult: Hoarding, hypomania, possible adjustment disorder, eval for possible psychiatric admission    HPI:   Sayda Quintero is a 70 y.o. male who presented to the hospital on 01/26/2022 with a chief complaint of lethargy, poor PO intake. Per ED documentation, Patiently recently underwent CABG x 3 LIMA to LAD, SVG to OM and PDA on 1/3/2022. He was discharged from the hospital on 1/10/2022 to a nursing facility. He has reportedly refused to eat anything there and is very satisfied with his life there. He is also upset because reportedly the contents of his home were cleaned out while he was hospitalized by family member , which was upsetting to him because there are many items that he had been keeping in the house that he did not want to taken out of the house. Met with Janett Renner". He was alert and oriented to person, able to identify that we are in the hospital after he was given options [facility] we in a school, a hospital, or a [facility]? \"). He states it is December 2003. He was unable to state why he is in the hospital. He fell asleep shortly into our interview. Patient's sister was at bedside. She reports that patient came into the hospital in late-December and required blood transfusions and cardiac surgery. He was then discharged to [facility] where he did not eat or drink much. She states that he would state he was not eating because he was tired. She denied Nadja Ledezma having any previous psychiatric history and had not presented with any change in behaviors prior to his hospitalization in December. He did not verbalize any suicidal ideation. She does believe that he is experiencing some depression and anxiety 2/2 his medical issues and being in a nursing facility.  She also notes that he has been exhibiting hoarding behaviors that have been worsening for the past six years (since his parents passed away). She also notes that the house is not livable. While he has been in the hospital/nursing facility, family has removed everything from the home and had it fumigated. She states that the plan is to have him return to [facility] upon discharge for long-term care. Patient continues to have IJ and NG tube in at this time. Spoke to RN who reports no tentative discharge date at this time. Duration: 4-5 weeks   Severity: Moderate-Severe  Context: Stress, medical issues   Associated Symptoms: As above    Past Psychiatric History:    Patient's past psychiatric history is widely unknown due to his current presentation, however, sister was able to provide some collateral. Per sister, patient does not have a previous psychiatric history, no previous psychiatric hospitalizations or outpatient treatment. No previous SI/SA. He was previously on Wellbutrin to help him stop smoking but he has not taken this medication for years. Past Medical History:  Past Medical History:   Diagnosis Date    BPH with obstruction/lower urinary tract symptoms 8/3/2012    Chicken pox     Colon polyp     Hyperglycemia 12/15/2010    Hyperlipemia 12/15/2010    Post herpetic neuralgia     Shingles     Squamous cell carcinoma of skin of upper limb, including shoulder 9/8/2015     Home Medications:  Prior to Admission medications    Medication Sig Start Date End Date Taking?  Authorizing Provider   amoxicillin-clavulanate (AUGMENTIN) 500-125 MG per tablet Take 1 tablet by mouth 2 times daily    Historical Provider, MD   montelukast (SINGULAIR) 10 MG tablet Take 1 tablet by mouth nightly 1/10/22   CAITLYN Short CNP   tiotropium (SPIRIVA RESPIMAT) 2.5 MCG/ACT AERS inhaler Inhale 2 puffs into the lungs daily 1/11/22   CAITLYN Short CNP   aspirin 325 MG EC tablet Take 1 tablet by mouth daily 1/10/22   CAITLYN Short CNP   amiodarone (CORDARONE) 200 MG tablet Take 1 tablet by mouth daily for 19 doses 1/10/22 1/29/22  CAITLYN Caro CNP   albuterol (PROVENTIL) (2.5 MG/3ML) 0.083% nebulizer solution Take 3 mLs by nebulization every 6 hours as needed for Shortness of Breath 1/10/22   CAITLYN Caro CNP   Balsam [place]-Castor Oil (VENELEX) OINT ointment Apply topically 2 times daily 1/10/22   CAITLYN Caro CNP   Arformoterol Tartrate (BROVANA) 15 MCG/2ML NEBU Take 2 mLs by nebulization 2 times daily 1/10/22   CAITLYN Caro CNP   potassium chloride (KLOR-CON M) 10 MEQ extended release tablet Take 1 tablet by mouth daily 1/11/22   CAITLYN Caro CNP   gabapentin (NEURONTIN) 300 MG capsule Take 1 capsule by mouth 3 times daily for 30 days. 1/9/22 2/8/22  Dilshad Young MD   atorvastatin (LIPITOR) 40 MG tablet Take 1 tablet by mouth nightly 1/9/22   Reanna Segovia MD   metoprolol tartrate (LOPRESSOR) 25 MG tablet Take 0.25 tablets by mouth 2 times daily  Patient taking differently: Take 12.5 mg by mouth 2 times daily  1/9/22   Reanna Segovia MD   polyethylene glycol (GLYCOLAX) 17 g packet Take 17 g by mouth daily as needed for Constipation 1/9/22 2/8/22  Reanna Segovia MD   melatonin 3 MG TABS tablet Take 1 tablet by mouth nightly as needed (sleep) 1/9/22   Reanna Segovia MD   clopidogrel (PLAVIX) 75 MG tablet Take 1 tablet by mouth daily 1/10/22   Reanna Segovia MD   Multiple Vitamin (MULTIVITAMIN) TABS tablet Take 1 tablet by mouth daily 1/10/22   Reanna Segovia MD   pantoprazole (PROTONIX) 40 MG tablet Take 1 tablet by mouth 2 times daily (before meals) 1/9/22   Reanna Segovia MD   furosemide (LASIX) 20 MG tablet Take 1 tablet by mouth daily 1/9/22   Reanna Segovia MD   buPROPion (WELLBUTRIN XL) 300 MG extended release tablet Take 1 tablet by mouth every morning 5/8/18   Shaista Solis MD   Ascorbic Acid (VITAMIN C) 500 MG tablet Take 500 mg by mouth daily.     Historical Provider, MD     Chemical Dependency History:   Tobacco: Per Epic, current everyday smoker, 1 pk/day  Alcohol: Per Epic, 1-2 drinks/day  Illicit: Per Epic, no    Family Hx:    Family History   Problem Relation Age of Onset    Depression Mother     Arthritis Mother     High Blood Pressure Mother     High Cholesterol Mother     Heart Disease Mother     Arthritis Father     Heart Disease Father     High Blood Pressure Father     High Cholesterol Father     Stroke Father     Cancer Sister         ovarian    High Cholesterol Sister     High Blood Pressure Sister     Arthritis Sister       Social Hx:   Patient's social history is widely unknown due to current presentation. Chart review indicates he is . No children listed in Epic. Was living at home prior to his hospitalization in December but sister notes house is not livable and belongings had to be moved out and home had to be fumigated. He was previously working as a  prior to his hospitalization in December. No reported history of abuse or trauma. No reported legal issues.      Current Medications Ordered:   potassium phosphate IVPB  30 mmol IntraVENous Once    pantoprazole  40 mg IntraVENous BID    meropenem  2,000 mg IntraVENous Q8H    lidocaine   Topical Once    thiamine mononitrate  100 mg Per NG tube Daily    multivitamin  1 tablet Oral Daily    amiodarone  200 mg Oral Daily    Arformoterol Tartrate  15 mcg Nebulization BID    aspirin  325 mg Oral Daily    atorvastatin  40 mg Oral Nightly    [Held by provider] buPROPion  300 mg Oral QAM    clopidogrel  75 mg Oral Daily    [Held by provider] metoprolol tartrate  12.5 mg Oral BID    montelukast  10 mg Oral Nightly    tiotropium  2 puff Inhalation Daily    sodium chloride flush  10 mL IntraVENous 2 times per day    enoxaparin  40 mg SubCUTAneous Daily    sennosides-docusate sodium  1 tablet Oral BID      PRN Meds: sodium chloride flush, sodium chloride, ondansetron **OR** ondansetron, magnesium hydroxide, acetaminophen **OR** acetaminophen     ROS: See Medical H&PE     PE: BP (!) 93/58   Pulse 87   Temp 99.2 °F (37.3 °C) (Axillary)   Resp 24   Ht 5' 7\" (1.702 m)   Wt 139 lb 15.9 oz (63.5 kg)   SpO2 96%   BMI 21.93 kg/m²       Motor / Gait: Observed seated in chair, gait deferred  AIMS: 0    Mental Status Examination:    Appearance: WM, appears stated age, seated in chair, wearing hospital attire, fair grooming and hygiene   Behavior/Attitude Toward Examiner: Difficult to engage - fell asleep shortly after interview began  Speech: Decreased volume, mumbled, difficult to understand  Mood: Did not verbalize mood when asked  Affect: Blunted  Thought Processes: Difficult to assess - fell asleep shortly after interview began  Thought Content: Unable to assess - fell asleep shortly after interview began  Perceptions: Unable to assess - fell asleep shortly after interview began; did not appear to be RTIS  Attention: Impaired  Cognition: Alert and oriented to person, place (after given options); disoriented to time and situation  Insight: Difficult to assess - fell asleep shortly after interview began  Judgment: Difficult to assess - fell asleep shortly after interview began    LAB: Reviewed all labs obtained during admission to date. Dx:   Primary Psychiatric (DSM V) Diagnosis: Delirium  Secondary Psychiatric (DSM V) Diagnoses: None   Chemical Dependency Diagnoses: None     Assessment  Reviewed nursing and ancillary staff notes since arrival to hospital, reviewed previous records and records available through [facility]. Evaluated medications and assessed for side effects and effectiveness. Assessed patient's educational needs including reviewing plan of care, medications, and diagnosis. Recommendations:    1. Unable to make a recommendation for disposition as he is not medically cleared at this time and still has IJ and NG tube in place.  Based upon my conversation with his sister, I do not suspect that he will require inpatient psychiatry but can reassess as he becomes more interactive and closer to pending discharge. 2. Difficult to determine at this time if decreased appetite/PO intake is secondary to hypoactive delirium presentation versus possible depression. 3. Patient's sister states that Wellbutrin is not a current medication. Will discontinue Wellbutrin and start Zoloft 25 mg daily for mood and anxiety. This medication can affect sodium so recommend that this continue to be monitored. 4. Recommend that he follow up with outpatient psychiatry for further medication management and therapy. Therapy will be especially helpful for his hoarding behaviors. If [facility] has psychiatric providers available to come in and see residents, recommend this. If not, a list of referrals was placed in his room for sister and also placed in his discharge instructions. Spent >80 minutes face to face with patient of which >50% was spent counseling and providing education regarding diagnosis, treatment options, and prognosis. Thank you for consult. Please do not hesitate to contact provider if there are additional questions regarding patient.     Ellen Woods, MPH, PMHNP-BC  01/31/22

## 2022-01-31 NOTE — PLAN OF CARE
Problem: Nutrition  Goal: Optimal nutrition therapy  1/31/2022 1407 by Santiago Flowers RD, LD  Outcome: Ongoing  Note: Nutrition Problem #1: Severe malnutrition  Intervention: Food and/or Nutrient Delivery: Continue NPO,Modify Tube Feeding  Nutritional Goals: Pt will tolerate most appropriate form of nutrition to meet 100% of nutrition needs    1/31/2022 0912 by Mell Kaur RN  Outcome: Ongoing  Note: Tube feeds infusing

## 2022-01-31 NOTE — PLAN OF CARE
Problem: Falls - Risk of:  Goal: Will remain free from falls  Description: Will remain free from falls  Outcome: Ongoing  Note: Patient will remain free of falls throughout the duration of the shift. Bed locked in lowest position. Non skid socks on. Bed and chair alarm activated. Call light and belongings within reach. Patient calls out approprietly. Room door open at all times. Patient rounded on frequently. 3/4 side rails up. Will continue to monitor. Problem: HEMODYNAMIC STATUS  Goal: Patient has stable vital signs and fluid balance  Outcome: Ongoing     Problem: FLUID AND ELECTROLYTE IMBALANCE  Goal: Fluid and electrolyte balance are achieved/maintained  Outcome: Ongoing     Problem: ACTIVITY INTOLERANCE/IMPAIRED MOBILITY  Goal: Mobility/activity is maintained at optimum level for patient  Outcome: Ongoing     Problem: Airway Clearance - Ineffective:  Goal: Ability to maintain a clear airway will improve  Description: Ability to maintain a clear airway will improve  Outcome: Ongoing     Problem: Aspiration:  Goal: Absence of aspiration  Description: Absence of aspiration  Outcome: Ongoing     Problem: Urinary Elimination:  Goal: Complications related to the disease process, condition or treatment will be avoided or minimized  Description: Complications related to the disease process, condition or treatment will be avoided or minimized  Outcome: Ongoing  Note: Complications related to the disease process, condition or treatment will be avoided or minimized. Velma care and hernandez care performed routinely with CHG wipes. Urine assessed. Vitals monitored. Will continue to monitor and reassess. Problem: Infection - Central Venous Catheter-Associated Bloodstream Infection:  Goal: Will show no infection signs and symptoms  Description: Will show no infection signs and symptoms  Outcome: Ongoing  Note: Will show no infection signs and symptoms. Central line site assessed routinely.  All lumens checked for proper function and blood return. Dressing integrity maintained. Alcohol caps applied. Will continue to monitor and reassess.

## 2022-01-31 NOTE — CARE COORDINATION
Case Management Assessment           Daily Note                 Date/ Time of Note: 1/31/2022 1:55 PM         Note completed by: Carmella Landry RN    Patient Name: Pepper Leos  YOB: 1950    Diagnosis:Dehydration [E86.0]  Hypernatremia [E87.0]  Altered mental status, unspecified altered mental status type [R41.82]  Patient Admission Status: Inpatient    Date of Admission:1/26/2022  4:05 PM Length of Stay: 5 GLOS: GMLOS: 4.34    Current Plan of Care: Tube feeding, PT/OT, IV antibiotics  ________________________________________________________________________________________  PT AM-PAC:   / 24 per last evaluation on: tbd    OT AM-PAC:   / 24 per last evaluation on: tbd    DME Needs for discharge: tbd  ________________________________________________________________________________________  Discharge Plan: [facility]    Tentative discharge date: 02/01/2022    Current barriers to discharge: tolerating tube feeding, complete course of antibiotics    Referrals completed: Not Applicable    Resources/ information provided: Not indicated at this time  ________________________________________________________________________________________  Case Management Notes:  continues to follow patient for discharge planning. Patient is from skilled nursing with [facility] and can return upon discharge. Patient sister Rita Tucker is in agreement with discharge plan. Per notes from Dr Nic Ayala, consider discharge to [facility] ED tomorrow. Secure message sent to Dr Nic Ayala to clarify. Patient remains on tube feeding, PT/OT to eval, complete course of IV antibiotics. Gladys Stovall and his family were provided with choice of provider; he and his family are in agreement with the discharge plan.     Care Transition Patient: Jamia Landry RN  [facility]  Case Management Department  Ph: [phone]

## 2022-01-31 NOTE — PROGRESS NOTES
Speech Language Pathology  Facility/Department: [facility] ICU   CLINICAL BEDSIDE SWALLOW EVALUATION    NAME: Magdalena Mclaughlin  : 1950  MRN: [id number]    ADMISSION DATE: 2022  ADMITTING DIAGNOSIS: has Hyperglycemia; Smoker's respiratory syndrome; BPH with obstruction/lower urinary tract symptoms; Basal cell carcinoma of shoulder; Squamous cell carcinoma of skin of upper limb, including shoulder; Actinic keratosis; Colon polyp; COPD, mild ([address] Nicotine use disorder; Mixed hyperlipidemia; SCCS, mod diff  (squamous cell carcinoma), hand, left; Basal cell carcinoma of right side of nose; Visit for suture removal; Visit for wound check; Anemia; Syncope and collapse; Non-ST elevated myocardial infarction (non-STEMI) ([address] Dehydration; Severe malnutrition ([address] DWIGHT (acute kidney injury) ([address] and Hypernatremia on their problem list.  ONSET DATE:     Recent Chest Xray 22  1. Right IJ central line in satisfactory position in the thorax   2. Nasogastric tube in the stomach   3. Basilar airspace disease, new from prior study       CT of head 22  1.  No evidence for acute intracranial abnormality.       2.  Moderate diffuse cerebral atrophy with ventricular white matter changes bilaterally consistent with chronic small vessel ischemia. Date of Eval: 2022  Evaluating Therapist: ALLIE Schwarz    Current Diet level:  Current Diet : NPO  Current Liquid Diet : NPO    Primary Complaint  Patient Complaint: asked for lemonade    Pain:  Pain Assessment  Pain Assessment: none reported    Reason for Referral  Magdalena Mclaughlin was referred for a bedside swallow evaluation to assess the efficiency of his swallow function, identify signs and symptoms of aspiration and make recommendations regarding safe dietary consistencies, effective compensatory strategies, and safe eating environment.     History Of Present Illness:    Per MD notes:   \"76 y.o. male who [facility]. for failure to thrive.   He is unable to provide history at this time, so this is gathered from chart review and discussion w/ other providers. He underwent CABG x3 1/3 w/ Dr. Mikey Cabrera and after this was discharged to a SNF. He has been unhappy there and has refused to eat anything. He has become progressively weaker and has become altered recently so he is brought in today. \"      Impression  Pt somewhat more alert today, okay to re-assess per RN. Pt kept eyes closed throughout session, did state name, but was not oriented to time or place. Pt did not follow commands. Pt with positive acceptance of small amounts of PO. Pt demonstrated adequate labial seal around spoon, with no anterior loss. Pt did not exhibit overt signs of aspiration with ice chips and tsps of water. Not able to maintain labial seal around cup or straw, therefore not able to assess. Pt presented with 2 1/2 tsp of puree, with immediate reflexive cough occurring. Pt shaking head and saying no, indicating he did not want more. Did present additional ice chips /tsp water, with no cough/throat clear. Recommend ice chips/tsps of water with aggressive oral care today. Plan to re-assess bedside 2/1/22 to determine ability to advance PO. Dysphagia Diagnosis: Suspected needs further assessment  Dysphagia Outcome Severity Scale: Level 2: Moderate Severe dysphagia- Maximum assistance or maximum use of strategies with partial PO only     Treatment Plan  Requires SLP Intervention: Yes  Duration/Frequency of Treatment: 3-5 x  D/C Recommendations: To be determined     Recommended Diet and Intervention  Diet Solids Recommendation: NPO  Liquid Consistency Recommendation: NPO  Recommended Form of Meds: Via alternative means of nutrition     Therapeutic Interventions: Oral care; Patient/Family education    Compensatory Swallowing Strategies  Upright 90 with ice chips/tsp water    Treatment/Goals  1-The patient will tolerate repeat BSE when able. General  Chart Reviewed: Yes  Behavior/Cognition: Cooperative (alert, however required cues to maintain alertness. Pt kept eyes closed throughout session)  Respiratory Status: O2 via nasual cannula  Communication Observation: Functional  Follows Directions: None  Dentition: Adequate  Patient Positioning: Upright in bed  Baseline Vocal Quality: Normal  Volitional Cough:  (did not follow commands to assess)  Prior Dysphagia History: none  Consistencies Administered: Dysphagia Pureed (Dysphagia I); Thin - cup; Thin - straw; Ice Chips; Thin - teaspoon    Vision/Hearing  Vision  Vision:  (kept eyes closed throughout assessment)  Hearing  Hearing: Within functional limits    Oral Motor Deficits  Oral/Motor  Oral Motor: Within functional limits    Prognosis  Fair due to mental status    Education  Patient Education: Pt educated to purpose of visit  Patient Education Response: Needs reinforcement        Therapy Time  SLP Individual Minutes  Time In: 6781  Time Out: 0825  Minutes: 15     Plan:  · Recommended diet: NPO (has tube feeds)  · Oral care / small amounts of ice chips/tsps of water,  if alert enough for the comfort of pt, health and hydration of oropharyngeal mucosa and swallow stimulation  · Re-assessment 2/1/22, as pt appropriate  · Dc recommendation: ongoing treatment indicated  Pt therapy goal: nto able to state  Pt dc goal: not able to state  Needs met prior to leaving room, call light within reach, d/w PRIYANKA Will and DAVE Moeller M.S./[facility]-SLP #[id number]  Pg.  # [phone]  This document will serve as a dc summary if pt dc prior to next visit  1/31/2022 8:34 AM

## 2022-02-01 LAB
ALBUMIN SERPL-MCNC: 2.5 G/DL (ref 3.4–5)
ANION GAP SERPL CALCULATED.3IONS-SCNC: 9 MMOL/L (ref 3–16)
BUN BLDV-MCNC: 21 MG/DL (ref 7–20)
CALCIUM SERPL-MCNC: 8 MG/DL (ref 8.3–10.6)
CHLORIDE BLD-SCNC: 104 MMOL/L (ref 99–110)
CO2: 22 MMOL/L (ref 21–32)
CREAT SERPL-MCNC: <0.5 MG/DL (ref 0.8–1.3)
GFR AFRICAN AMERICAN: >60
GFR NON-AFRICAN AMERICAN: >60
GLUCOSE BLD-MCNC: 113 MG/DL (ref 70–99)
GLUCOSE BLD-MCNC: 123 MG/DL (ref 70–99)
GLUCOSE BLD-MCNC: 127 MG/DL (ref 70–99)
GLUCOSE BLD-MCNC: 139 MG/DL (ref 70–99)
GLUCOSE BLD-MCNC: 98 MG/DL (ref 70–99)
HCT VFR BLD CALC: 27.4 % (ref 40.5–52.5)
HEMOGLOBIN: 8.9 G/DL (ref 13.5–17.5)
MAGNESIUM: 1.7 MG/DL (ref 1.8–2.4)
MCH RBC QN AUTO: 26.3 PG (ref 26–34)
MCHC RBC AUTO-ENTMCNC: 32.4 G/DL (ref 31–36)
MCV RBC AUTO: 81.3 FL (ref 80–100)
PDW BLD-RTO: 20.7 % (ref 12.4–15.4)
PERFORMED ON: ABNORMAL
PERFORMED ON: NORMAL
PHOSPHORUS: 1.2 MG/DL (ref 2.5–4.9)
PLATELET # BLD: 131 K/UL (ref 135–450)
PMV BLD AUTO: 9.5 FL (ref 5–10.5)
POTASSIUM SERPL-SCNC: 3.5 MMOL/L (ref 3.5–5.1)
RBC # BLD: 3.37 M/UL (ref 4.2–5.9)
SODIUM BLD-SCNC: 135 MMOL/L (ref 136–145)
WBC # BLD: 7 K/UL (ref 4–11)

## 2022-02-01 PROCEDURE — 94640 AIRWAY INHALATION TREATMENT: CPT

## 2022-02-01 PROCEDURE — 83735 ASSAY OF MAGNESIUM: CPT

## 2022-02-01 PROCEDURE — 1200000000 HC SEMI PRIVATE

## 2022-02-01 PROCEDURE — C9113 INJ PANTOPRAZOLE SODIUM, VIA: HCPCS | Performed by: STUDENT IN AN ORGANIZED HEALTH CARE EDUCATION/TRAINING PROGRAM

## 2022-02-01 PROCEDURE — 97166 OT EVAL MOD COMPLEX 45 MIN: CPT

## 2022-02-01 PROCEDURE — 85027 COMPLETE CBC AUTOMATED: CPT

## 2022-02-01 PROCEDURE — 97530 THERAPEUTIC ACTIVITIES: CPT

## 2022-02-01 PROCEDURE — 6370000000 HC RX 637 (ALT 250 FOR IP): Performed by: INTERNAL MEDICINE

## 2022-02-01 PROCEDURE — 99233 SBSQ HOSP IP/OBS HIGH 50: CPT | Performed by: INTERNAL MEDICINE

## 2022-02-01 PROCEDURE — 99222 1ST HOSP IP/OBS MODERATE 55: CPT | Performed by: NURSE PRACTITIONER

## 2022-02-01 PROCEDURE — 80069 RENAL FUNCTION PANEL: CPT

## 2022-02-01 PROCEDURE — 2580000003 HC RX 258: Performed by: STUDENT IN AN ORGANIZED HEALTH CARE EDUCATION/TRAINING PROGRAM

## 2022-02-01 PROCEDURE — 97163 PT EVAL HIGH COMPLEX 45 MIN: CPT

## 2022-02-01 PROCEDURE — 6360000002 HC RX W HCPCS: Performed by: STUDENT IN AN ORGANIZED HEALTH CARE EDUCATION/TRAINING PROGRAM

## 2022-02-01 PROCEDURE — 94761 N-INVAS EAR/PLS OXIMETRY MLT: CPT

## 2022-02-01 PROCEDURE — 6370000000 HC RX 637 (ALT 250 FOR IP): Performed by: STUDENT IN AN ORGANIZED HEALTH CARE EDUCATION/TRAINING PROGRAM

## 2022-02-01 PROCEDURE — 36415 COLL VENOUS BLD VENIPUNCTURE: CPT

## 2022-02-01 PROCEDURE — 87040 BLOOD CULTURE FOR BACTERIA: CPT

## 2022-02-01 PROCEDURE — 6370000000 HC RX 637 (ALT 250 FOR IP): Performed by: NURSE PRACTITIONER

## 2022-02-01 RX ORDER — FLUCONAZOLE 200 MG/1
200 TABLET ORAL DAILY
Status: DISCONTINUED | OUTPATIENT
Start: 2022-02-01 | End: 2022-02-05

## 2022-02-01 RX ADMIN — MONTELUKAST 10 MG: 10 TABLET, FILM COATED ORAL at 22:22

## 2022-02-01 RX ADMIN — MEROPENEM 2000 MG: 1 INJECTION, POWDER, FOR SOLUTION INTRAVENOUS at 15:42

## 2022-02-01 RX ADMIN — SODIUM CHLORIDE, PRESERVATIVE FREE 10 ML: 5 INJECTION INTRAVENOUS at 22:22

## 2022-02-01 RX ADMIN — THIAMINE HCL TAB 100 MG 100 MG: 100 TAB at 09:46

## 2022-02-01 RX ADMIN — MEROPENEM 2000 MG: 1 INJECTION, POWDER, FOR SOLUTION INTRAVENOUS at 06:24

## 2022-02-01 RX ADMIN — ARFORMOTEROL TARTRATE 15 MCG: 15 SOLUTION RESPIRATORY (INHALATION) at 09:56

## 2022-02-01 RX ADMIN — SODIUM CHLORIDE 25 ML: 900 INJECTION, SOLUTION INTRAVENOUS at 06:23

## 2022-02-01 RX ADMIN — FLUCONAZOLE 200 MG: 200 TABLET ORAL at 17:53

## 2022-02-01 RX ADMIN — ATORVASTATIN CALCIUM 40 MG: 40 TABLET, FILM COATED ORAL at 22:22

## 2022-02-01 RX ADMIN — ACETAMINOPHEN 650 MG: 325 TABLET ORAL at 09:46

## 2022-02-01 RX ADMIN — PANTOPRAZOLE SODIUM 40 MG: 40 INJECTION, POWDER, FOR SOLUTION INTRAVENOUS at 22:23

## 2022-02-01 RX ADMIN — SODIUM CHLORIDE, PRESERVATIVE FREE 10 ML: 5 INJECTION INTRAVENOUS at 10:08

## 2022-02-01 RX ADMIN — AMIODARONE HYDROCHLORIDE 200 MG: 200 TABLET ORAL at 09:46

## 2022-02-01 RX ADMIN — PANTOPRAZOLE SODIUM 40 MG: 40 INJECTION, POWDER, FOR SOLUTION INTRAVENOUS at 09:46

## 2022-02-01 RX ADMIN — ARFORMOTEROL TARTRATE 15 MCG: 15 SOLUTION RESPIRATORY (INHALATION) at 21:05

## 2022-02-01 RX ADMIN — CLOPIDOGREL BISULFATE 75 MG: 75 TABLET ORAL at 09:46

## 2022-02-01 RX ADMIN — THERA TABS 1 TABLET: TAB at 09:46

## 2022-02-01 RX ADMIN — ENOXAPARIN SODIUM 40 MG: 100 INJECTION SUBCUTANEOUS at 09:46

## 2022-02-01 RX ADMIN — TIOTROPIUM BROMIDE INHALATION SPRAY 2 PUFF: 3.12 SPRAY, METERED RESPIRATORY (INHALATION) at 09:48

## 2022-02-01 RX ADMIN — MEROPENEM 2000 MG: 1 INJECTION, POWDER, FOR SOLUTION INTRAVENOUS at 23:19

## 2022-02-01 RX ADMIN — SERTRALINE HYDROCHLORIDE 25 MG: 25 TABLET ORAL at 09:46

## 2022-02-01 ASSESSMENT — PAIN SCALES - PAIN ASSESSMENT IN ADVANCED DEMENTIA (PAINAD)
CONSOLABILITY: 0
CONSOLABILITY: 0
BREATHING: 0
TOTALSCORE: 0
TOTALSCORE: 0
BREATHING: 0
FACIALEXPRESSION: 0
TOTALSCORE: 0
NEGVOCALIZATION: 0
BODYLANGUAGE: 0
BODYLANGUAGE: 0
FACIALEXPRESSION: 0
BODYLANGUAGE: 0
CONSOLABILITY: 0
CONSOLABILITY: 0
FACIALEXPRESSION: 0
TOTALSCORE: 0
BODYLANGUAGE: 0
FACIALEXPRESSION: 0
FACIALEXPRESSION: 0
TOTALSCORE: 0
CONSOLABILITY: 0
CONSOLABILITY: 0
BREATHING: 0
NEGVOCALIZATION: 0
CONSOLABILITY: 0
NEGVOCALIZATION: 0
FACIALEXPRESSION: 0
BREATHING: 0
NEGVOCALIZATION: 0
FACIALEXPRESSION: 0
BREATHING: 0
BODYLANGUAGE: 0
TOTALSCORE: 0
NEGVOCALIZATION: 0
NEGVOCALIZATION: 0
BODYLANGUAGE: 0
BREATHING: 0
BREATHING: 0
NEGVOCALIZATION: 0
BODYLANGUAGE: 0
TOTALSCORE: 0

## 2022-02-01 ASSESSMENT — PAIN SCALES - GENERAL
PAINLEVEL_OUTOF10: 0

## 2022-02-01 NOTE — PROGRESS NOTES
Pt transferred to [facility]. Pt is oriented to self. NG tube in place with TF running at goal rate. VSS. Pt placed tele with NSR. IV abx infusing per orders. Will cont to monitor.

## 2022-02-01 NOTE — PLAN OF CARE
Problem: Falls - Risk of:  Goal: Will remain free from falls  Description: Will remain free from falls  Outcome: Ongoing  Patient is a high risk for falls. Patient has made no attempts to get out of bed. Bed alarm on. Lock on bed on for head of bed to stay above 30 degrees because of his tube feedings.   Door open

## 2022-02-01 NOTE — PROGRESS NOTES
Progress Note  Hospital Day: 7    Chief Complaint: Dehydration, Depression, AMS    Mr. Lorri Leyva was readmitted on 2022 following office visit at which he was dramatically withdrawn and nearly obtunded. He was profoundly dehydrated after not taking PO at his nursing facility for nearly 2 weeks. 24 hour Interval History:      Admitted to [facility] ER   Transferred up to floor, on rounds appeared very poor from a functional standpoint. Transfer to ICU   Sodium and mental status improved   Central line placed, Levo started Max 9, down to 4. Bleeding from C line, Suture placed, resolved.  Off Levo. Delirium much improved, more conversational.  Remains withdrawn otherwise. + BM   Passed swallow for ice chips and spoons of water    Today's plan:  Continue TFs, Await mental status to be adequate for psych eval.    VITAL SIGNS   Temperature:  Current - Temp: 98.8 °F (37.1 °C);  Max - Temp  Av.7 °F (37.1 °C)  Min: 97.8 °F (36.6 °C)  Max: 99.5 °F (37.5 °C)    Respiratory Rate : Resp  Av  Min: 18  Max: 27    Pulse Range: Pulse  Av  Min: 80  Max: 94    Blood Pressure Range:  Systolic (22WDY), BFK:74 , Min:90 , BYL:196   ; Diastolic (89BSK), QTN:75, Min:55, Max:72    Pulse ox Range: SpO2  Av %  Min: 93 %  Max: 98 %  O2 Flow Rate (L/min): 1 L/min         Admission Weight: Weight: 155 lb (70.3 kg)    Current Weight:   Patient Vitals for the past 96 hrs (Last 3 readings):   Weight   22 0600 133 lb 9.6 oz (60.6 kg)   22 1107 139 lb 15.9 oz (63.5 kg)   22 0600 138 lb 14.2 oz (63 kg)     I/O:     Intake/Output Summary (Last 24 hours) at 2022 0715  Last data filed at 2022 3430  Gross per 24 hour   Intake 3466.71 ml   Output 1080 ml   Net 2386.71 ml     Urine (hernandez): Being Placed    LINES/ACCESS:      Peripheral Access: RUE Day #: 6    Diet: Diet NPO  ADULT TUBE FEEDING; Nasogastric; Peptide Based; Continuous; 20; Yes; 10; Q 8 hours; 65; 0; Other (specify); hold water flushes x24hr; Protein; 1 protein shot BID     MEDICATIONS:      sertraline  25 mg Oral Daily    pantoprazole  40 mg IntraVENous BID    meropenem  2,000 mg IntraVENous Q8H    lidocaine   Topical Once    thiamine mononitrate  100 mg Per NG tube Daily    multivitamin  1 tablet Oral Daily    amiodarone  200 mg Oral Daily    Arformoterol Tartrate  15 mcg Nebulization BID    aspirin  325 mg Oral Daily    atorvastatin  40 mg Oral Nightly    clopidogrel  75 mg Oral Daily    [Held by provider] metoprolol tartrate  12.5 mg Oral BID    montelukast  10 mg Oral Nightly    tiotropium  2 puff Inhalation Daily    sodium chloride flush  10 mL IntraVENous 2 times per day    enoxaparin  40 mg SubCUTAneous Daily    sennosides-docusate sodium  1 tablet Oral BID       Infusions:   sodium chloride 25 mL (02/01/22 0623)       DATA REVIEW:   CBC:   Recent Labs     01/30/22  0415 01/30/22  1540 01/31/22  0415   WBC 6.7  --  7.3   HGB 8.8* 9.0* 9.5*   HCT 26.9* 28.1* 28.8*   MCV 82.3  --  81.2   *  --  112*     BMP:   Recent Labs     01/29/22  1735 01/30/22  0408 01/30/22  1540 01/31/22  0415 01/31/22  1819    137 134* 136 138   K 3.3* 3.6 3.3* 3.6 3.5    106 102 105 105   CO2 24 23 26 23 24   PHOS 1.9* 1.5* 1.9* 1.4* 1.8*   GLUCOSE 120* 107* 116* 115* 122*   BUN 19 17 19 19 22*   CREATININE 0.6* 0.5* 0.5* 0.5* <0.5*   CALCIUM 8.0* 7.9* 8.1* 8.1* 8.1*   MG 1.80 1.70* 2.00 1.90 1.90     Accucheck Glucoses:   Recent Labs     01/29/22  1152 01/29/22  1602 01/29/22  2040 02/01/22  0303 02/01/22  0628   POCGLU 128* 134* 141* 98 113*     Cardiac Enzymes: No results for input(s): CKTOTAL, CKMB, CKMBINDEX in the last 72 hours. Invalid input(s): TROPONIN  PT/INR:   No results for input(s): PROTIME, INR in the last 72 hours. APTT: No results for input(s): APTT in the last 72 hours.   LFT:   Recent Labs     01/30/22  0408   AST 21   ALT 11   BILIDIR <0.2   BILITOT 0.6   ALKPHOS 94 Troponin: Invalid input(s): TROPONIN  BNP: No results for input(s): BNP in the last 72 hours. ABG:    Lab Results   Component Value Date    PHART 7.462 01/28/2022    PO2ART 64.7 01/28/2022    KTG6YHW 30.3 01/28/2022    A1STISDW 94 01/28/2022    YLW0AYR 23 01/28/2022    JXW7JST 21.6 01/28/2022    BEART -2 01/28/2022    BEART 2.5 01/27/2022    BEART -7 01/04/2022    BEART -6 01/04/2022    BEART -5 01/04/2022     U/A:    No results for input(s): NITRITE, COLORU, PHUR, LABCAST, WBCUA, RBCUA, MUCUS, TRICHOMONAS, YEAST, BACTERIA, CLARITYU, SPECGRAV, LEUKOCYTESUR, UROBILINOGEN, BILIRUBINUR, BLOODU, GLUCOSEU, AMORPHOUS in the last 72 hours. Invalid input(s): Marla Ivanoff    Urine Culture:  Candida >100,000       Chest X-Ray:       Portable chest       HISTORY: Altered mental status       COMPARISON: January 6, 2022.       FINDINGS:       The cardiomediastinal contours are stable. There is a moderate left pleural effusion. Adjacent parenchymal consolidation. EKG: NSR    ASSESSMENT:   Patient Active Problem List:     Hyperglycemia     Smoker's respiratory syndrome     BPH with obstruction/lower urinary tract symptoms     Basal cell carcinoma of shoulder     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     SCCS, mod diff  (squamous cell carcinoma), hand, left     Basal cell carcinoma of right side of nose     Visit for suture removal     Visit for wound check     Anemia     Syncope and collapse     Non-ST elevated myocardial infarction (non-STEMI) ([facility]     Dehydration      Neuro: Becoming more interactive. Follows commands, more conversational but not making a lot of sense.   CV:   Sinus  Pulm:  Normal work of breathing  Abd:  Scaphoid, soft, non-tender, non-distended  Diamond: Clear yellow urine, still looks quite concentrated  Wounds: clean, dry and intact  Ext: warm, no edema, skin

## 2022-02-01 NOTE — CONSULTS
[facility]  Palliative Medicine Consultation Note      Date Of Admission:1/26/2022  Date of consult: 02/01/22  Seen by [employer] in the past:  No    Recommendations:        Saw pt at the bedside. He awakens to voice however is extremely lethargic and it is difficult to keep him awake for any length of time. Spoke with pt's sister/JOSE M Marte over the phone. Discussed her understanding the pt's condition. She reports concerns that pt has just \"given up\". He was initially on board with placement, but his mental state declined in the nursing facility. We discussed possible need for PEG tube given pt's altered mentation. She would be agreeable to PEG tube placement, reports that she will continue to evaluate pt's status in the future. If he does not have improvement or continues to have complications she would consider stopping tube feeds and enrolling him with hospice at that time. She reports that she wants to give him a chance to recover, but also wants to make sure that he is comfortable. She is open to an outpatient palliative care referral at discharge. D/w Dr. Sancho Gomez. 1. Goals of Care/Advanced Care planning/Code status: Full Code, did not discuss today. Continue with current management. Pt's sister is hopeful for recovery, and is open to PEG tube placement. We have discussed the possibility of hospice if pt does not improve, as she reports comfort is a primary goal for the pt. 2. Pain: Pt unable to state  3. SOB: Pt unable to state  4. Shock likely hypovolemic versus sepsis secondary to potential pneumonia: resolving, Status post pressors and IVF  5. Poor PO intake: currently on TF via NGT, pt may require PEG tube prior to discharge  6. Depression: Psych following, pt started on zoloft.    7. Disposition: Planning to return to [facility] when medically ready for discharge with outpatient palliative care referral.    Reason for Consult:         [x]  Goals of Care  []  Code Status Discussion/Advanced Care Planning   []  Psychosocial/Family Support  []  Symptom Management  []  Other (Specify)    Requesting Physician:  [address]:  Failure to thrive    History Obtained From:  patient, electronic medical record    History of Present Illness:         Magdalena Mclaughlin is a 70 y.o. male with PMH of CAD s/p CABG x3 1/3 who presented with decreased PO intake from his nursing facility. Pt recently underwent CABG x3 and reportedly has been unhappy there and refusing to eat anything. He was found to have DWIGHT due to profound dehydration.      Subjective:         Past Medical History:        Diagnosis Date    BPH with obstruction/lower urinary tract symptoms 8/3/2012    Chicken pox     Colon polyp     Hyperglycemia 12/15/2010    Hyperlipemia 12/15/2010    Post herpetic neuralgia     Shingles     Squamous cell carcinoma of skin of upper limb, including shoulder 9/8/2015       Past Surgical History:        Procedure Laterality Date    CORONARY ARTERY BYPASS GRAFT N/A 1/3/2022    VIRGIL; TCPB: EVH R thigh; CABG x 3, LIMA to LAD, SVG to OM2 and PDA performed by Cale Junior MD at [address] [place] [address]      right due to mass benign    UPPER GASTROINTESTINAL ENDOSCOPY N/A 12/27/2021    EGD BIOPSY performed by Ajit Lopez MD at [facility] ENDOSCOPY       Current Medications:    Medications Prior to Admission: amoxicillin-clavulanate (AUGMENTIN) 500-125 MG per tablet, Take 1 tablet by mouth 2 times daily  montelukast (SINGULAIR) 10 MG tablet, Take 1 tablet by mouth nightly  tiotropium (SPIRIVA RESPIMAT) 2.5 MCG/ACT AERS inhaler, Inhale 2 puffs into the lungs daily  aspirin 325 MG EC tablet, Take 1 tablet by mouth daily  amiodarone (CORDARONE) 200 MG tablet, Take 1 tablet by mouth daily for 19 doses  albuterol (PROVENTIL) (2.5 MG/3ML) 0.083% nebulizer solution, Take 3 mLs by nebulization every 6 hours as needed for Shortness of Breath  Balsam [place]-Castor Oil (VENELEX) OINT ointment, Apply topically 2 times daily  Arformoterol Tartrate (BROVANA) 15 MCG/2ML NEBU, Take 2 mLs by nebulization 2 times daily  potassium chloride (KLOR-CON M) 10 MEQ extended release tablet, Take 1 tablet by mouth daily  gabapentin (NEURONTIN) 300 MG capsule, Take 1 capsule by mouth 3 times daily for 30 days. atorvastatin (LIPITOR) 40 MG tablet, Take 1 tablet by mouth nightly  metoprolol tartrate (LOPRESSOR) 25 MG tablet, Take 0.25 tablets by mouth 2 times daily (Patient taking differently: Take 12.5 mg by mouth 2 times daily )  polyethylene glycol (GLYCOLAX) 17 g packet, Take 17 g by mouth daily as needed for Constipation  melatonin 3 MG TABS tablet, Take 1 tablet by mouth nightly as needed (sleep)  clopidogrel (PLAVIX) 75 MG tablet, Take 1 tablet by mouth daily  Multiple Vitamin (MULTIVITAMIN) TABS tablet, Take 1 tablet by mouth daily  pantoprazole (PROTONIX) 40 MG tablet, Take 1 tablet by mouth 2 times daily (before meals)  furosemide (LASIX) 20 MG tablet, Take 1 tablet by mouth daily  buPROPion (WELLBUTRIN XL) 300 MG extended release tablet, Take 1 tablet by mouth every morning  Ascorbic Acid (VITAMIN C) 500 MG tablet, Take 500 mg by mouth daily. Allergies:  Patient has no known allergies. Social History:    · TOBACCO: reports that he has been smoking cigarettes. He has been smoking about 1.00 pack per day. He has never used smokeless tobacco.  · ETOH:   reports current alcohol use of about 6.0 standard drinks of alcohol per week. · Patient currently lives in a nursing home    Review of Systems -   Review of Systems   Unable to perform ROS: Mental status change       Objective:          Physical Exam  Constitutional:       Appearance: He is ill-appearing. Cardiovascular:      Rate and Rhythm: Normal rate and regular rhythm. Heart sounds: Normal heart sounds. Pulmonary:      Breath sounds: Normal breath sounds.    Abdominal:      General: Bowel sounds are normal.      Palpations: Abdomen is soft.   Musculoskeletal:      Right lower leg: No edema. Left lower leg: No edema. Skin:     General: Skin is warm and dry. Neurological:      Comments: obtunded          Palliative Performance Scale:  [] 60% Ambulation reduced; Significant disease; Can't do hobbies/housework; intake normal or reduced; occasional assist; LOC full/confusion  [] 50% Mainly sit/lie; Extensive disease; Can't do any work; Considerable assist; intake normal  Or reduced; LOC full/confusion  [] 40% Mainly in bed; Extensive disease; Mainly assist; intake normal or reduced; occasional assist; LOC full/confusion  [x] 30% Bed Bound; Extensive disease; Total care; intake reduced; LOC full/confusion  [] 20% Bed Bound; Extensive disease; Total care; intake minimal; Drowsy/coma  [] 10% Bed Bound; Extensive disease; Total care; Mouth care only; Drowsy/coma  [] 0% Death    PPS: 30    Vitals:    /66   Pulse 96   Temp 100.2 °F (37.9 °C) (Axillary)   Resp 20   Ht 5' 7\" (1.702 m)   Wt 133 lb 9.6 oz (60.6 kg)   SpO2 97%   BMI 20.92 kg/m²     Labs:    BMP:   Recent Labs     01/31/22  0415 01/31/22  1819 02/01/22  1110    138 135*   K 3.6 3.5 3.5    105 104   CO2 23 24 22   BUN 19 22* 21*   CREATININE 0.5* <0.5* <0.5*   GLUCOSE 115* 122* 127*     CBC:   Recent Labs     01/30/22  0415 01/30/22  0415 01/30/22  1540 01/31/22  0415 02/01/22  1110   WBC 6.7  --   --  7.3 7.0   HGB 8.8*   < > 9.0* 9.5* 8.9*   HCT 26.9*   < > 28.1* 28.8* 27.4*   *  --   --  112* 131*    < > = values in this interval not displayed. LFT's:   Recent Labs     01/30/22  0408   AST 21   ALT 11   BILITOT 0.6   ALKPHOS 94     Troponin: No results for input(s): TROPONINI in the last 72 hours. BNP: No results for input(s): BNP in the last 72 hours. ABGs: No results for input(s): PHART, RLY1HKY, PO2ART in the last 72 hours. INR: No results for input(s): INR in the last 72 hours.     U/A:No results for input(s): NITRITE, COLORU, PHUR, LABCAST, Vamsi Campbell, MUCUS, TRICHOMONAS, YEAST, BACTERIA, CLARITYU, SPECGRAV, LEUKOCYTESUR, UROBILINOGEN, BILIRUBINUR, BLOODU, GLUCOSEU, AMORPHOUS in the last 72 hours. Invalid input(s): KETONESU    XR CHEST PORTABLE   Final Result   1. Right IJ central line in satisfactory position in the thorax   2. Nasogastric tube in the stomach   3. Basilar airspace disease, new from prior study      XR ABDOMEN FOR NG/OG/NE TUBE PLACEMENT   Final Result      Frontal view centered at the diaphragm demonstrates tip of NG tube in the gastric fundus. Bowel gas pattern is unremarkable. Elevated left hemidiaphragm with left basilar scarring. Sternotomy wires and left atrial appendage clip noted. XR CHEST PORTABLE   Final Result      Left pleural effusion with adjacent parenchymal consolidation, similar to January 6, 2022. CT HEAD WO CONTRAST   Final Result      1. No evidence for acute intracranial abnormality. 2.  Moderate diffuse cerebral atrophy with ventricular white matter changes bilaterally consistent with chronic small vessel ischemia. Conclusion/Time spent:         Recommendations see above    Time spent with patient and/or family: 40  Time reviewing records: 10 min   Time communicating with staff: 5 min     A total of 55 minutes spent with the patient and family on unit greater than 50% in counseling regarding palliative care and in goals of care for the patient. Thank you to Dr. Roark Leventhal for this consultation. We will continue to follow Mr. Couch's care as needed.       [address] [facility]  Inpatient Palliative Care  [phone]

## 2022-02-01 NOTE — PROGRESS NOTES
Office : [phone]     Fax :[phone]         Renal Progress Note  Subjective:   Admit Date: 1/26/2022     Good UO   Na in good range   Phos replaced       DIET Diet NPO  ADULT TUBE FEEDING; Nasogastric; Peptide Based; Continuous; 20; Yes; 10; Q 8 hours; 65; 0; Other (specify); hold water flushes x24hr; Protein; 1 protein shot BID  Medications:   Scheduled Meds:   sertraline  25 mg Oral Daily    pantoprazole  40 mg IntraVENous BID    meropenem  2,000 mg IntraVENous Q8H    lidocaine   Topical Once    thiamine mononitrate  100 mg Per NG tube Daily    multivitamin  1 tablet Oral Daily    amiodarone  200 mg Oral Daily    Arformoterol Tartrate  15 mcg Nebulization BID    aspirin  325 mg Oral Daily    atorvastatin  40 mg Oral Nightly    clopidogrel  75 mg Oral Daily    [Held by provider] metoprolol tartrate  12.5 mg Oral BID    montelukast  10 mg Oral Nightly    tiotropium  2 puff Inhalation Daily    sodium chloride flush  10 mL IntraVENous 2 times per day    enoxaparin  40 mg SubCUTAneous Daily    sennosides-docusate sodium  1 tablet Oral BID     Continuous Infusions:   sodium chloride 25 mL (02/01/22 0623)       Labs:  CBC:   Recent Labs     01/30/22  0415 01/30/22  1540 01/31/22  0415   WBC 6.7  --  7.3   HGB 8.8* 9.0* 9.5*   *  --  112*     BMP:    Recent Labs     01/30/22  1540 01/31/22  0415 01/31/22  1819   * 136 138   K 3.3* 3.6 3.5    105 105   CO2 26 23 24   BUN 19 19 22*   CREATININE 0.5* 0.5* <0.5*   GLUCOSE 116* 115* 122*     Ca/Mg/Phos:   Recent Labs     01/30/22  1540 01/31/22  0415 01/31/22  1819   CALCIUM 8.1* 8.1* 8.1*   MG 2.00 1.90 1.90   PHOS 1.9* 1.4* 1.8*     Hepatic:   Recent Labs     01/30/22  0408   AST 21   ALT 11   BILITOT 0.6   ALKPHOS 94     Troponin: No results for input(s): TROPONINI in the last 72 hours. BNP: No results for input(s): BNP in the last 72 hours. Lipids: No results for input(s): CHOL, TRIG, HDL, LDLCALC, LABVLDL in the last 72 hours. ABGs:   No results for input(s): PHART, PO2ART, LXY4SBO in the last 72 hours. INR:   No results for input(s): INR in the last 72 hours. UA:  No results for input(s): [place] , GLUCOSEU, BILIRUBINUR, KETUA, SPECGRAV, BLOODU, PHUR, PROTEINU, UROBILINOGEN, NITRU, LEUKOCYTESUR, Russ Oz in the last 72 hours. Urine Microscopic:   No results for input(s): LABCAST, BACTERIA, COMU, HYALCAST, WBCUA, RBCUA, EPIU in the last 72 hours. Urine Culture:   No results for input(s): LABURIN in the last 72 hours. Urine Chemistry:   No results for input(s): Roxanne Hun, PROTEINUR, NAUR in the last 72 hours. Objective:   Vitals: /72   Pulse 94   Temp 98.8 °F (37.1 °C) (Axillary)   Resp 18   Ht 5' 7\" (1.702 m)   Wt 133 lb 9.6 oz (60.6 kg)   SpO2 93%   BMI 20.92 kg/m²    Wt Readings from Last 3 Encounters:   02/01/22 133 lb 9.6 oz (60.6 kg)   01/10/22 155 lb 6.8 oz (70.5 kg)   07/12/18 179 lb (81.2 kg)      24HR INTAKE/OUTPUT:      Intake/Output Summary (Last 24 hours) at 2/1/2022 2416  Last data filed at 2/1/2022 0523  Gross per 24 hour   Intake 2885.71 ml   Output 800 ml   Net 2085.71 ml     Constitutional:  Disoriented, nonconversational  NECK: supple, no JVD  Cardiovascular:  S1, S2 without m/r/g  Respiratory:  CTA B without w/r/r  Abdomen: +bs, soft, nt  Ext: trace LE edema    Assessment and Plan:       IMAGING:  XR CHEST PORTABLE   Final Result   1. Right IJ central line in satisfactory position in the thorax   2. Nasogastric tube in the stomach   3.  Basilar airspace disease, new from prior study      XR ABDOMEN FOR NG/OG/NE TUBE PLACEMENT

## 2022-02-01 NOTE — PROGRESS NOTES
Speech Language Pathology      Chart reviewed and d/w RN. Pt not alert enough at this time for safe PO intake - will hold dysphagia f/u until pt able to participate and as schedule permits.     Shyann Mcdonald MA CCC-SLP; [id number]  Speech-Language Pathologist  Pager # [phone]  Phone # [phone]  Office # [phone]

## 2022-02-01 NOTE — PROGRESS NOTES
Physical Therapy    Facility/Department: [address]  Initial Assessment and Treatment    NAME: Lizzeth Goodwin  : 1950  MRN: [id number]    Date of Service: 2022    Discharge Recommendations:    Lizzeth Goodwin scored a 6/24 on the AM-PAC short mobility form. Current research shows that an AM-PAC score of 17 or less is typically not associated with a discharge to the patient's home setting. Based on the patient's AM-PAC score and their current functional mobility deficits, it is recommended that the patient have 3-5 sessions per week of Physical Therapy at d/c to increase the patient's independence. Please see assessment section for further patient specific details. If patient discharges prior to next session this note will serve as a discharge summary. Please see below for the latest assessment towards goals. PT Equipment Recommendations  Equipment Needed:  (defer)    Assessment   Body structures, Functions, Activity limitations: Decreased functional mobility ; Decreased ROM; Decreased strength;Decreased cognition  Assessment: Pt unable to provide any information regarding recent functional mobility. Per chart, pt was independent prior to recent CABG in January. Currently pt not opening eyes, not following commands. Dependent x 2 assist for rolling in bed. Rec trial PT to maximize mobility and independence  Treatment Diagnosis: impaired functional mobility 2/2 decreased ROM/strength  Decision Making: High Complexity  PT Education: PT Role  Patient Education: Pt did not demonstrate or verbalize any understanding - will need reteaching  REQUIRES PT FOLLOW UP: Yes       Patient Diagnosis(es): The primary encounter diagnosis was Hypernatremia. A diagnosis of Altered mental status, unspecified altered mental status type was also pertinent to this visit.      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 has a past surgical history that includes hemicolectomy; Upper gastrointestinal endoscopy (N/A, 12/27/2021); and Coronary artery bypass graft (N/A, 1/3/2022). Restrictions  Position Activity Restriction  Other position/activity restrictions: up with assist, contact isolation, sternal precautions (CABG 1/3/22)  Vision/Hearing  Vision:  (kept eyes closed throughout assessment)  Hearing: Within functional limits     Subjective  General  Chart Reviewed: Yes  Additional Pertinent Hx: Pt is a 70 y.o. male adm 1/26 with dehydration. Pt presented to ED with failure to thrive, refusing to eat and progressive weakness. Head CT: neg. PMH: CABG 1/3/2022, hyperlipidemia  Referring Practitioner: Krunal Hinton MD  Referral Date : 01/31/22  Subjective  Subjective: Pt found supine. Moans. Not opening eyes on command or spontaneously  Pain Screening  Patient Currently in Pain:  (difficult to assess - moans)  Vital Signs  Patient Currently in Pain:  (difficult to assess - moans)       Orientation  Orientation  Overall Orientation Status:  (unable to assess - did state first name but otherwise not answering questions)  Social/Functional History  Social/Functional History  Type of Home: Facility (most recently at [facility])  ADL Assistance: Needs assistance  Ambulation Assistance: Needs assistance  Transfer Assistance: Needs assistance  Additional Comments: Prior to recent hospitalizations for CABG, pt lived at home alone, but per chart review pt was developing hoarding tendencies and home was unlivable. Cognition        Objective          PROM RLE (degrees)  RLE PROM:  (DF to neutral, knee flexion to approximately 40 degrees.   Difficult to assess whether pt has tone or resisting movement)  AROM RLE (degrees)  RLE AROM:  (no AROM on command or spontaneously)  PROM LLE (degrees)  LLE PROM:  (DF to neutral, knee flexion to approximately 20 degrees. Difficult to assess whether pt has tone or resisting movement)  AROM LLE (degrees)  LLE AROM :  (no AROM on command or spontaneously)         Pt incontinent of bowel in supine. Performed bed mobility and provided kelby-care/clean up.   RN informed  Bed mobility  Rolling to Left: 2 Person assistance;Dependent/Total  Rolling to Right: 2 Person assistance;Dependent/Total  Scootin Person assistance;Dependent/Total          Treatment included: bed mobility pt education             Plan   Plan  Times per week: 2-5  Safety Devices  Type of devices: Call light within reach,Nurse notified,Bed alarm in place,Left in bed    G-Code       OutComes Score                                                  AM-PAC Score  AM-PAC Inpatient Mobility Raw Score : 6 (22 1037)  AM-PAC Inpatient T-Scale Score : 23.55 (22 1037)  Mobility Inpatient CMS 0-100% Score: 100 (22 1037)  Mobility Inpatient CMS G-Code Modifier : CN (22 1037)          Goals  Short term goals  Time Frame for Short term goals: By discharge  Short term goal 1: Pt will follow commands 50% of session  Short term goal 2: Pt will roll to each side with mod assist x 1       Therapy Time   Individual Concurrent Group Co-treatment   Time In 828         Time Out 0900         Minutes 32              Timed Code Treatment Minutes: 17      Total Treatment Minutes:  [id number] [facility],

## 2022-02-01 NOTE — PROGRESS NOTES
commands   Peripheral Pulses: +2 palpable, equal bilaterally       Labs:   Recent Labs     01/30/22  0415 01/30/22  0415 01/30/22  1540 01/31/22  0415 02/01/22  1110   WBC 6.7  --   --  7.3 7.0   HGB 8.8*   < > 9.0* 9.5* 8.9*   HCT 26.9*   < > 28.1* 28.8* 27.4*   *  --   --  112* 131*    < > = values in this interval not displayed. Recent Labs     01/31/22  0415 01/31/22  1819 02/01/22  1110    138 135*   K 3.6 3.5 3.5    105 104   CO2 23 24 22   BUN 19 22* 21*   CREATININE 0.5* <0.5* <0.5*   CALCIUM 8.1* 8.1* 8.0*   PHOS 1.4* 1.8* 1.2*     Recent Labs     01/30/22  0408   AST 21   ALT 11   BILIDIR <0.2   BILITOT 0.6   ALKPHOS 94     Urinalysis:      Lab Results   Component Value Date    NITRU Negative 01/26/2022    WBCUA 10-20 01/26/2022    BACTERIA 1+ 01/26/2022    RBCUA 5-10 01/26/2022    BLOODU SMALL 01/26/2022    SPECGRAV >=1.030 01/26/2022    GLUCOSEU Negative 01/26/2022       Assessment/Plan:    Shock likely hypovolemic versus sepsis secondary to potential pneumonia including HCAP/aspiration  Status post pressors and IVF  Continue antibiotics    DWIGHT/hypernatremia secondary to dehydration from poor oral intake  Monitor creatinine and sodium, improved  Status post IV fluids  Cont free water with TF  Nephrology following, appreciate recs    CAD s/p CABG 01/03/22  Continue aspirin and Plavix and statin  Holding beta-blocker due to low blood pressure  CT surgery following, appreciate recommendation    KADE  Fib  Continue telemetry, currently sinus rhythm  Continue amiodarone   Metoprolol on hold due to low blood pressure    COPD  Continue inhalers and breathing treatment    Depression  Continue home meds  Psych consulted, started on Zoloft    Severe malnutrition/failure to thrive:  Continue tube feeds  Follow dietary recs        DVT Prophylaxis: Lovenox  Diet: Diet NPO  ADULT TUBE FEEDING; Nasogastric; Peptide Based; Continuous; 20; Yes; 10; Q 8 hours; 65; 0; Other (specify); hold water flushes x24hr; Protein; 1 protein shot BID  Code Status: Full Code    PT/OT Eval Status: Once clinically stable    Dispo -pending clinical course    Jose Ramon Oneal MD

## 2022-02-01 NOTE — PROGRESS NOTES
Occupational Therapy   Occupational Therapy Initial Assessment/Tx Note  Date: 2022   Patient Name: Brittny Dominguez  MRN: [id number]     : 1950    Date of Service: 2022     Assessment: Functional independence is decreased from baseline. Pt is dependent for all mobility and self care. Pt's cognition limits his ability to participate as he is not alert and is unable to follow commands. Will trial OT during admit. Recommend 24 hr nursing care and trial OT at d/c. Discharge Recommendations: Brittny Dominguez scored a  on the AM-PAC ADL Inpatient form. Current research shows that an AM-PAC score of 17 or less is typically not associated with a discharge to the patient's home setting. Based on the patient's AM-PAC score and their current ADL deficits, it is recommended that the patient have 3-5 sessions per week of Occupational Therapy at d/c to increase the patient's independence. Please see assessment section for further patient specific details. OT Equipment Recommendations  Equipment Needed: No    Assessment   Performance deficits / Impairments: Decreased functional mobility ; Decreased ADL status; Decreased cognition;Decreased endurance;Decreased coordination  Treatment Diagnosis: Decreased activity tolerance, impaired ADLs and mobility  Decision Making: Medium Complexity  REQUIRES OT FOLLOW UP: Yes  Activity Tolerance  Activity Tolerance: Treatment limited secondary to decreased cognition  Safety Devices  Safety Devices in place: Yes  Type of devices: Call light within reach; Left in bed;Bed alarm in place;Nurse notified         Treatment Diagnosis: Decreased activity tolerance, impaired ADLs and mobility      Restrictions  Position Activity Restriction  Other position/activity restrictions: up with assist, contact isolation, sternal precautions (CABG 1/3/22)    Subjective   General  Chart Reviewed:  Yes  Additional Pertinent Hx: Patiently recently underwent CABG x 3 LIMA to LAD, SVG to OM and PDA on 1/3/2022. He was discharged from the hospital on 1/10/2022 to a nursing facility. He has reportedly refused to eat anything there. He is also upset because reportedly the contents of his home were cleaned out while he was hospitalized by family member. Pt is a hoarder, home cleaned out and fumigated for bed bugs by family.  transfered to ICU. Family / Caregiver Present: No  Referring Practitioner: Yobani Guajardo MD  Diagnosis: dehydration  Subjective  Subjective: Pt in bed on entry. Moaning off and an. Occasionally opens eyes slightly. Attempts to answer some questions but in a mumble. General Comment  Comments: pt moans with certain movements but is unable to report on pain    Social/Functional History  Social/Functional History  Type of Home: Facility (most recently at [facility])  ADL Assistance: Needs assistance  Ambulation Assistance: Needs assistance  Transfer Assistance: Needs assistance  Additional Comments: Prior to recent hospitalizations for CABG, pt lived at home alone, but per chart review pt was developing hoarding tendencies and home was unlivable. Objective        Orientation  Overall Orientation Status: Impaired  Orientation Level: Oriented to person;Disoriented to situation;Disoriented to time;Disoriented to place (when asked if he goes by [place] or Lv Jones, pt responded faintly \"Pavel\")        ADL  Grooming: Dependent/Total  Toileting: Dependent/Total (incontinent of stool)  Tone RUE  RUE Tone: Hypertonic  Tone LUE  LUE Tone: Hypertonic  Coordination  Movements Are Fluid And Coordinated: No (no purposeful movements, increased tone L>R)     Bed mobility  Rolling to Left: 2 Person assistance;Dependent/Total  Rolling to Right: 2 Person assistance;Dependent/Total  Scootin Person assistance;Dependent/Total  Transfers  Transfer Comments: Pt unable to tolerate sitting or standing today. Currently needs zoie lift for transfers.      Cognition  Overall Cognitive Status: Exceptions  Cognition Comment: Lethargic, no command following, answered only one questions re: his own name, disoriented       UE Strength  UE Strength Comment: MARY - does not follow commands for MMT, hypertonic grossly. Tolerated passive ROM and gentle stretching at end range of all UE joints. Plan  If pt discharges prior to next tx, this note will serve as d/c summary.  Continue per POC if pt does not d/c.     Plan  Times per week: 2-5x (trial)  Times per day: Daily  Current Treatment Recommendations: Strengthening,ROM,Balance Training,Functional Mobility Training,Patient/Caregiver Education & Training,Equipment Evaluation, Education, & procurement,Self-Care / ADL,Safety Education & Training,Cognitive Reorientation    AM-PAC Score     AM-PAC Inpatient Daily Activity Raw Score: 6 (02/01/22 1033)  AM-PAC Inpatient ADL T-Scale Score : 17.07 (02/01/22 1033)  ADL Inpatient CMS 0-100% Score: 100 (02/01/22 1033)  ADL Inpatient CMS G-Code Modifier : CN (02/01/22 1033)    Goals  Short term goals  Time Frame for Short term goals: by D/C  Short term goal 1: Participate in AAROM to [id number] - Not met  Short term goal 2: Max assist x1 for rolling - Not met  Short term goal 3: wash face with mod assist - Not met       Therapy Time   Individual Concurrent Group Co-treatment   Time In 0830         Time Out 0900         Minutes 30          Timed Code Tx Min: 15  Total Tx Time: [address], OT

## 2022-02-02 ENCOUNTER — TELEPHONE (OUTPATIENT)
Dept: CARDIOLOGY CLINIC | Age: 72
End: 2022-02-02

## 2022-02-02 LAB
ALBUMIN SERPL-MCNC: 2.3 G/DL (ref 3.4–5)
ALBUMIN SERPL-MCNC: 2.4 G/DL (ref 3.4–5)
ANION GAP SERPL CALCULATED.3IONS-SCNC: 6 MMOL/L (ref 3–16)
ANION GAP SERPL CALCULATED.3IONS-SCNC: 7 MMOL/L (ref 3–16)
BUN BLDV-MCNC: 23 MG/DL (ref 7–20)
BUN BLDV-MCNC: 25 MG/DL (ref 7–20)
CALCIUM SERPL-MCNC: 8.1 MG/DL (ref 8.3–10.6)
CALCIUM SERPL-MCNC: 8.2 MG/DL (ref 8.3–10.6)
CHLORIDE BLD-SCNC: 104 MMOL/L (ref 99–110)
CHLORIDE BLD-SCNC: 106 MMOL/L (ref 99–110)
CO2: 25 MMOL/L (ref 21–32)
CO2: 25 MMOL/L (ref 21–32)
CREAT SERPL-MCNC: <0.5 MG/DL (ref 0.8–1.3)
CREAT SERPL-MCNC: <0.5 MG/DL (ref 0.8–1.3)
GFR AFRICAN AMERICAN: >60
GFR AFRICAN AMERICAN: >60
GFR NON-AFRICAN AMERICAN: >60
GFR NON-AFRICAN AMERICAN: >60
GLUCOSE BLD-MCNC: 124 MG/DL (ref 70–99)
GLUCOSE BLD-MCNC: 124 MG/DL (ref 70–99)
GLUCOSE BLD-MCNC: 137 MG/DL (ref 70–99)
GLUCOSE BLD-MCNC: 139 MG/DL (ref 70–99)
GLUCOSE BLD-MCNC: 140 MG/DL (ref 70–99)
GLUCOSE BLD-MCNC: 151 MG/DL (ref 70–99)
GLUCOSE BLD-MCNC: 157 MG/DL (ref 70–99)
HCT VFR BLD CALC: 25.6 % (ref 40.5–52.5)
HEMOGLOBIN: 8.4 G/DL (ref 13.5–17.5)
MAGNESIUM: 1.7 MG/DL (ref 1.8–2.4)
MAGNESIUM: 1.7 MG/DL (ref 1.8–2.4)
MCH RBC QN AUTO: 26.5 PG (ref 26–34)
MCHC RBC AUTO-ENTMCNC: 32.7 G/DL (ref 31–36)
MCV RBC AUTO: 81 FL (ref 80–100)
PDW BLD-RTO: 20.8 % (ref 12.4–15.4)
PERFORMED ON: ABNORMAL
PHOSPHORUS: 1.2 MG/DL (ref 2.5–4.9)
PHOSPHORUS: 1.3 MG/DL (ref 2.5–4.9)
PLATELET # BLD: 138 K/UL (ref 135–450)
PMV BLD AUTO: 9.3 FL (ref 5–10.5)
POTASSIUM SERPL-SCNC: 3.5 MMOL/L (ref 3.5–5.1)
POTASSIUM SERPL-SCNC: 3.6 MMOL/L (ref 3.5–5.1)
RBC # BLD: 3.16 M/UL (ref 4.2–5.9)
SODIUM BLD-SCNC: 135 MMOL/L (ref 136–145)
SODIUM BLD-SCNC: 138 MMOL/L (ref 136–145)
WBC # BLD: 7.2 K/UL (ref 4–11)

## 2022-02-02 PROCEDURE — 6360000002 HC RX W HCPCS: Performed by: STUDENT IN AN ORGANIZED HEALTH CARE EDUCATION/TRAINING PROGRAM

## 2022-02-02 PROCEDURE — 6360000002 HC RX W HCPCS: Performed by: INTERNAL MEDICINE

## 2022-02-02 PROCEDURE — 2500000003 HC RX 250 WO HCPCS: Performed by: INTERNAL MEDICINE

## 2022-02-02 PROCEDURE — 83735 ASSAY OF MAGNESIUM: CPT

## 2022-02-02 PROCEDURE — 2580000003 HC RX 258: Performed by: STUDENT IN AN ORGANIZED HEALTH CARE EDUCATION/TRAINING PROGRAM

## 2022-02-02 PROCEDURE — 6370000000 HC RX 637 (ALT 250 FOR IP): Performed by: NURSE PRACTITIONER

## 2022-02-02 PROCEDURE — 94640 AIRWAY INHALATION TREATMENT: CPT

## 2022-02-02 PROCEDURE — 85027 COMPLETE CBC AUTOMATED: CPT

## 2022-02-02 PROCEDURE — 6370000000 HC RX 637 (ALT 250 FOR IP): Performed by: INTERNAL MEDICINE

## 2022-02-02 PROCEDURE — 99233 SBSQ HOSP IP/OBS HIGH 50: CPT | Performed by: INTERNAL MEDICINE

## 2022-02-02 PROCEDURE — C9113 INJ PANTOPRAZOLE SODIUM, VIA: HCPCS | Performed by: STUDENT IN AN ORGANIZED HEALTH CARE EDUCATION/TRAINING PROGRAM

## 2022-02-02 PROCEDURE — 99233 SBSQ HOSP IP/OBS HIGH 50: CPT | Performed by: NURSE PRACTITIONER

## 2022-02-02 PROCEDURE — 2580000003 HC RX 258: Performed by: INTERNAL MEDICINE

## 2022-02-02 PROCEDURE — 94761 N-INVAS EAR/PLS OXIMETRY MLT: CPT

## 2022-02-02 PROCEDURE — 6370000000 HC RX 637 (ALT 250 FOR IP): Performed by: STUDENT IN AN ORGANIZED HEALTH CARE EDUCATION/TRAINING PROGRAM

## 2022-02-02 PROCEDURE — 80069 RENAL FUNCTION PANEL: CPT

## 2022-02-02 PROCEDURE — 1200000000 HC SEMI PRIVATE

## 2022-02-02 PROCEDURE — 36415 COLL VENOUS BLD VENIPUNCTURE: CPT

## 2022-02-02 RX ORDER — MAGNESIUM SULFATE 1 G/100ML
1000 INJECTION INTRAVENOUS PRN
Status: DISCONTINUED | OUTPATIENT
Start: 2022-02-02 | End: 2022-02-07 | Stop reason: HOSPADM

## 2022-02-02 RX ORDER — MAGNESIUM SULFATE IN WATER 40 MG/ML
2000 INJECTION, SOLUTION INTRAVENOUS ONCE
Status: COMPLETED | OUTPATIENT
Start: 2022-02-02 | End: 2022-02-02

## 2022-02-02 RX ORDER — ASPIRIN 81 MG/1
324 TABLET, CHEWABLE ORAL DAILY
Status: DISCONTINUED | OUTPATIENT
Start: 2022-02-02 | End: 2022-02-07 | Stop reason: HOSPADM

## 2022-02-02 RX ORDER — BACLOFEN 10 MG/1
10 TABLET ORAL 3 TIMES DAILY
Status: COMPLETED | OUTPATIENT
Start: 2022-02-02 | End: 2022-02-03

## 2022-02-02 RX ADMIN — THIAMINE HCL TAB 100 MG 100 MG: 100 TAB at 09:19

## 2022-02-02 RX ADMIN — BACLOFEN 10 MG: 10 TABLET ORAL at 20:39

## 2022-02-02 RX ADMIN — TIOTROPIUM BROMIDE INHALATION SPRAY 2 PUFF: 3.12 SPRAY, METERED RESPIRATORY (INHALATION) at 09:11

## 2022-02-02 RX ADMIN — ARFORMOTEROL TARTRATE 15 MCG: 15 SOLUTION RESPIRATORY (INHALATION) at 09:11

## 2022-02-02 RX ADMIN — ASPIRIN 324 MG: 81 TABLET, CHEWABLE ORAL at 10:13

## 2022-02-02 RX ADMIN — POTASSIUM PHOSPHATE, MONOBASIC AND POTASSIUM PHOSPHATE, DIBASIC 10 MMOL: 224; 236 INJECTION, SOLUTION, CONCENTRATE INTRAVENOUS at 20:34

## 2022-02-02 RX ADMIN — PANTOPRAZOLE SODIUM 40 MG: 40 INJECTION, POWDER, FOR SOLUTION INTRAVENOUS at 09:20

## 2022-02-02 RX ADMIN — ARFORMOTEROL TARTRATE 15 MCG: 15 SOLUTION RESPIRATORY (INHALATION) at 21:48

## 2022-02-02 RX ADMIN — MONTELUKAST 10 MG: 10 TABLET, FILM COATED ORAL at 20:39

## 2022-02-02 RX ADMIN — POTASSIUM PHOSPHATE, MONOBASIC AND POTASSIUM PHOSPHATE, DIBASIC 10 MMOL: 224; 236 INJECTION, SOLUTION, CONCENTRATE INTRAVENOUS at 10:20

## 2022-02-02 RX ADMIN — SODIUM CHLORIDE, PRESERVATIVE FREE 10 ML: 5 INJECTION INTRAVENOUS at 20:40

## 2022-02-02 RX ADMIN — SODIUM CHLORIDE 25 ML: 900 INJECTION, SOLUTION INTRAVENOUS at 20:33

## 2022-02-02 RX ADMIN — SENNOSIDES AND DOCUSATE SODIUM 1 TABLET: 50; 8.6 TABLET ORAL at 09:19

## 2022-02-02 RX ADMIN — ENOXAPARIN SODIUM 40 MG: 100 INJECTION SUBCUTANEOUS at 09:19

## 2022-02-02 RX ADMIN — ACETAMINOPHEN 650 MG: 325 TABLET ORAL at 09:19

## 2022-02-02 RX ADMIN — MAGNESIUM SULFATE HEPTAHYDRATE 2000 MG: 2 INJECTION, SOLUTION INTRAVENOUS at 20:39

## 2022-02-02 RX ADMIN — SERTRALINE HYDROCHLORIDE 25 MG: 25 TABLET ORAL at 09:19

## 2022-02-02 RX ADMIN — SENNOSIDES AND DOCUSATE SODIUM 1 TABLET: 50; 8.6 TABLET ORAL at 20:39

## 2022-02-02 RX ADMIN — MEROPENEM 2000 MG: 1 INJECTION, POWDER, FOR SOLUTION INTRAVENOUS at 20:19

## 2022-02-02 RX ADMIN — ATORVASTATIN CALCIUM 40 MG: 40 TABLET, FILM COATED ORAL at 20:39

## 2022-02-02 RX ADMIN — SODIUM CHLORIDE 25 ML: 900 INJECTION, SOLUTION INTRAVENOUS at 20:19

## 2022-02-02 RX ADMIN — CLOPIDOGREL BISULFATE 75 MG: 75 TABLET ORAL at 09:19

## 2022-02-02 RX ADMIN — THERA TABS 1 TABLET: TAB at 09:19

## 2022-02-02 RX ADMIN — AMIODARONE HYDROCHLORIDE 200 MG: 200 TABLET ORAL at 09:19

## 2022-02-02 RX ADMIN — SODIUM CHLORIDE, PRESERVATIVE FREE 10 ML: 5 INJECTION INTRAVENOUS at 09:20

## 2022-02-02 RX ADMIN — FLUCONAZOLE 200 MG: 200 TABLET ORAL at 09:19

## 2022-02-02 RX ADMIN — PANTOPRAZOLE SODIUM 40 MG: 40 INJECTION, POWDER, FOR SOLUTION INTRAVENOUS at 20:39

## 2022-02-02 RX ADMIN — BACLOFEN 10 MG: 10 TABLET ORAL at 13:01

## 2022-02-02 ASSESSMENT — PAIN SCALES - PAIN ASSESSMENT IN ADVANCED DEMENTIA (PAINAD)
BODYLANGUAGE: 0
BODYLANGUAGE: 0
TOTALSCORE: 0
TOTALSCORE: 0
CONSOLABILITY: 0
NEGVOCALIZATION: 0
BODYLANGUAGE: 0
NEGVOCALIZATION: 0
NEGVOCALIZATION: 0
CONSOLABILITY: 0
TOTALSCORE: 0
BREATHING: 0
BREATHING: 0
BODYLANGUAGE: 0
FACIALEXPRESSION: 0
FACIALEXPRESSION: 0
CONSOLABILITY: 0
TOTALSCORE: 0
BODYLANGUAGE: 0
FACIALEXPRESSION: 0
TOTALSCORE: 0
BREATHING: 0
FACIALEXPRESSION: 0
TOTALSCORE: 0
NEGVOCALIZATION: 0
BREATHING: 0
BREATHING: 0
NEGVOCALIZATION: 0
BODYLANGUAGE: 0
CONSOLABILITY: 0
TOTALSCORE: 0
NEGVOCALIZATION: 0
FACIALEXPRESSION: 0
BREATHING: 0
CONSOLABILITY: 0
BREATHING: 0
NEGVOCALIZATION: 0
NEGVOCALIZATION: 0
BODYLANGUAGE: 0
CONSOLABILITY: 0
CONSOLABILITY: 0
BODYLANGUAGE: 0
FACIALEXPRESSION: 0
BREATHING: 0
CONSOLABILITY: 0
TOTALSCORE: 0

## 2022-02-02 ASSESSMENT — PAIN SCALES - GENERAL
PAINLEVEL_OUTOF10: 0
PAINLEVEL_OUTOF10: 3
PAINLEVEL_OUTOF10: 0

## 2022-02-02 NOTE — PROGRESS NOTES
Hospitalist Progress Note      PCP: No primary care provider on file. Date of Admission: 1/26/2022     Subjective:   Patient more awake today  But with hiccups  Barely following commands     -Palliative care consulted for goals of care, family wants PEG tube placed  -Febrile today, blood cx resent     Medications:  Reviewed    Infusion Medications    sodium chloride 25 mL (02/01/22 0623)     Scheduled Medications    potassium phosphate IVPB  10 mmol IntraVENous Once    aspirin  324 mg Oral Daily    fluconazole  200 mg Oral Daily    sertraline  25 mg Oral Daily    pantoprazole  40 mg IntraVENous BID    lidocaine   Topical Once    thiamine mononitrate  100 mg Per NG tube Daily    amiodarone  200 mg Oral Daily    Arformoterol Tartrate  15 mcg Nebulization BID    atorvastatin  40 mg Oral Nightly    clopidogrel  75 mg Oral Daily    [Held by provider] metoprolol tartrate  12.5 mg Oral BID    montelukast  10 mg Oral Nightly    tiotropium  2 puff Inhalation Daily    sodium chloride flush  10 mL IntraVENous 2 times per day    enoxaparin  40 mg SubCUTAneous Daily    sennosides-docusate sodium  1 tablet Oral BID     PRN Meds: magnesium sulfate, sodium chloride flush, sodium chloride, ondansetron **OR** ondansetron, magnesium hydroxide, acetaminophen **OR** acetaminophen      Intake/Output Summary (Last 24 hours) at 2/2/2022 1013  Last data filed at 2/2/2022 0450  Gross per 24 hour   Intake 892 ml   Output 1050 ml   Net -158 ml       Physical Exam Performed:    /64   Pulse 94   Temp 100.4 °F (38 °C) (Axillary)   Resp 22   Ht 5' 7\" (1.702 m)   Wt 133 lb 9.6 oz (60.6 kg)   SpO2 90%   BMI 20.92 kg/m²     General appearance: Ill-appearing  Respiratory:  Normal respiratory effort. Clear to auscultation  Cardiovascular: Regular rate and rhythm  Abdomen: Soft, non-tender, non-distended   Musculoskeletal: No clubbing, cyanosis or edema bilaterally. Skin: No rashes or lesions.   Neurologic: Lethargic. Not following commands   Peripheral Pulses: +2 palpable, equal bilaterally       Labs:   Recent Labs     01/31/22  0415 02/01/22  1110 02/02/22  0511   WBC 7.3 7.0 7.2   HGB 9.5* 8.9* 8.4*   HCT 28.8* 27.4* 25.6*   * 131* 138     Recent Labs     01/31/22  1819 02/01/22  1110 02/02/22  0511    135* 138   K 3.5 3.5 3.5    104 106   CO2 24 22 25   BUN 22* 21* 23*   CREATININE <0.5* <0.5* <0.5*   CALCIUM 8.1* 8.0* 8.2*   PHOS 1.8* 1.2* 1.2*     No results for input(s): AST, ALT, BILIDIR, BILITOT, ALKPHOS in the last 72 hours. Urinalysis:      Lab Results   Component Value Date    NITRU Negative 01/26/2022    WBCUA 10-20 01/26/2022    BACTERIA 1+ 01/26/2022    RBCUA 5-10 01/26/2022    BLOODU SMALL 01/26/2022    SPECGRAV >=1.030 01/26/2022    GLUCOSEU Negative 01/26/2022       Assessment/Plan:    Shock likely hypovolemic versus sepsis secondary to potential pneumonia including HCAP/aspiration  Status post pressors and IVF  Off abx    DWIGHT/hypernatremia secondary to dehydration from poor oral intake  Monitor creatinine and sodium, improved  Status post IV fluids  Cont free water with TF  Nephrology following, appreciate recs    CAD s/p CABG 01/03/22  Continue aspirin and Plavix and statin  Holding beta-blocker due to low blood pressure  CT surgery following, appreciate recommendation    A.  Fib  Continue telemetry, currently sinus rhythm  Continue amiodarone   Metoprolol on hold due to low blood pressure    COPD  Continue inhalers and breathing treatment    Depression  Continue home meds  Psych consulted, started on Zoloft    Severe malnutrition/failure to thrive:  Continue tube feeds  Follow dietary recs        DVT Prophylaxis: Lovenox  Diet: Diet NPO  ADULT TUBE FEEDING; Nasogastric; Peptide Based; Continuous; 20; Yes; 10; Q 8 hours; 65; 0; Other (specify); hold water flushes x24hr; Protein; 1 protein shot BID  Code Status: Full Code    PT/OT Eval Status: Once clinically stable    Dispo -pending clinical course    Gianluca Morales MD

## 2022-02-02 NOTE — BH NOTE
Psychiatry Follow-Up Consultation    Patient Name: Rush Banuelos  MRN: [id number]  Admission Date: 2022    Reason for Consult: Hoarding, hypomania, possible adjustment disorder, eval for possible psychiatric admission    Patient's chart was reviewed, case was discussed with nursing/OT/RT staff, and collaborated with  about the treatment plan. Attempted to meet with patient for follow-up. He was asleep and briefly woke to touch, but quickly fell back asleep. Did not respond to any questions asked. Chart reviewed and indicate the followin/01: Seen by palliative care who noted extreme lethargy, difficult to keep him awake for any length of time. Palliative care followed up with patient's sister. MD note also indicated lethargy, not following commands. RN note in the evening indicates AxO to person and place, conversing. : Plan to place PEG tube.  CM note indicated waxing and waning mentation, lethargic at times    ROS: Patient has new complaints: No    Current Medications Ordered:   potassium phosphate IVPB  10 mmol IntraVENous Once    aspirin  324 mg Oral Daily    baclofen  10 mg Oral TID    fluconazole  200 mg Oral Daily    sertraline  25 mg Oral Daily    pantoprazole  40 mg IntraVENous BID    lidocaine   Topical Once    thiamine mononitrate  100 mg Per NG tube Daily    amiodarone  200 mg Oral Daily    Arformoterol Tartrate  15 mcg Nebulization BID    atorvastatin  40 mg Oral Nightly    clopidogrel  75 mg Oral Daily    [Held by provider] metoprolol tartrate  12.5 mg Oral BID    montelukast  10 mg Oral Nightly    tiotropium  2 puff Inhalation Daily    sodium chloride flush  10 mL IntraVENous 2 times per day    enoxaparin  40 mg SubCUTAneous Daily    sennosides-docusate sodium  1 tablet Oral BID      PRN Meds: magnesium sulfate, sodium chloride flush, sodium chloride, ondansetron **OR** ondansetron, magnesium hydroxide, acetaminophen **OR** acetaminophen Objective:     PE:    /64   Pulse 94   Temp 100.4 °F (38 °C) (Axillary)   Resp 22   Ht 5' 7\" (1.702 m)   Wt 133 lb 9.6 oz (60.6 kg)   SpO2 90%   BMI 20.92 kg/m²       Motor / Gait: Observed laying in bed, gait deferred    Mental Status Examination:    Appearance: WM, appears stated age, laying in bed, wearing hospital attire, disheveled  Behavior/Attitude Toward Examiner: MARY - briefly awoke to touch but fell back asleep immediately  Speech: MARY - briefly awoke to touch but fell back asleep immediately  Mood: MARY - briefly awoke to touch but fell back asleep immediately  Affect: MARY - briefly awoke to touch but fell back asleep immediately  Thought Processes: MRAY - briefly awoke to touch but fell back asleep immediately  Thought Content: MARY - briefly awoke to touch but fell back asleep immediately  Perceptions: MARY - briefly awoke to touch but fell back asleep immediately  Attention: MARY - briefly awoke to touch but fell back asleep immediately  Cognition: MARY - briefly awoke to touch but fell back asleep immediately  Insight: MARY - briefly awoke to touch but fell back asleep immediately  Judgment: MARY - briefly awoke to touch but fell back asleep immediately    LAB: Reviewed labs from last 24 hours      Dx:   Primary Psychiatric (DSM V) Diagnosis: Delirium  Secondary Psychiatric (DSM V) Diagnoses: None   Chemical Dependency Diagnoses: None     Assessment  Reviewed nursing and ancillary staff notes since arrival to hospital, reviewed previous records and records available through Care Everywhere. Evaluated medications and assessed for side effects and effectiveness. Assessed patient's educational needs including reviewing plan of care, medications, and diagnosis. Recommendations:    1. Patient does not require admission to inpatient psychiatry. Recommend discharge back to SNF upon medical clearance; agree with outpatient referral to palliative care. 2. Continue Zoloft.  Most recent sodium drawn today was 138.  3. Recommend that he follow up with psychiatry upon discharge for further medication management and therapy. Therapy will be especially helpful for his hoarding behaviors. If [facility] has psychiatric providers available to come in and see residents, I would recommend this. If not, a list of referrals was placed in his room for sister and also placed in his discharge instructions. 4. Psychiatry will sign off at this time. Please reconsult in the future if necessary. Total face to face time with patient was 35 minutes and more than 50% of that time was spent counseling the patient on their symptoms, treatment and expected goals.     Kim Nick, MPH, PMHNP-BC  02/02/22

## 2022-02-02 NOTE — CARE COORDINATION
Patient waxing and waning in mentation, lethargic at this time, IV abx and fluids. Considering PEG tube. Sister, Frank Freire, is [address] and will be relied upon for decisions. [facility] Palliative care is following. Patient can return to SNF on discharge. SLP unable to assess at this time due to mentation. Patient has spiked a temp this morning. CM will continue to follow patient until discharge.  Electronically signed by Armand Ying RN on 2/2/2022 at 11:10 AM

## 2022-02-02 NOTE — PLAN OF CARE
Problem: Falls - Risk of:  Goal: Will remain free from falls  Description: Will remain free from falls  Outcome: Ongoing  Note: Discussed with pt importance to keep bed low and locked with alarm activated, nonskid socks on when out of bed, call light and belongings within reach- will monitor. Problem: Falls - Risk of:  Goal: Absence of physical injury  Description: Absence of physical injury  Outcome: Ongoing  Note: No new physical injury noted.

## 2022-02-02 NOTE — CONSULTS
[address] [place] [address]  GI Consultation      Patient: Tyler Hernández  : 1950       Date:  2022    Subjective:       History of Present Illness  Patient is a 70 y.o.  male admitted with Dehydration [E86.0]  Hypernatremia [E87.0]  Altered mental status, unspecified altered mental status type [R41.82] who is seen in consult for PEG tube placement    Pt is lethargic  Refuses to eat  FTT, malnutrition  Dehydration  On tube feeds, refusing to eat at [facility] with patients wife. She understood the pros and cons of the PEG tube procedure and consented as the patient's medical POA    HPI:  \"76 y.o. male who presented to [facility]. for failure to thrive. He underwent CABG x3 1/3 w/ Dr. Nikki Hayes and after this was discharged to a SNF. He has been unhappy there and has refused to eat anything. He has become progressively weaker and has become altered recently so he is brought in today. \"      Past Medical History:   Diagnosis Date    BPH with obstruction/lower urinary tract symptoms 8/3/2012    Chicken pox     Colon polyp     Hyperglycemia 12/15/2010    Hyperlipemia 12/15/2010    Post herpetic neuralgia     Shingles     Squamous cell carcinoma of skin of upper limb, including shoulder 2015      Past Surgical History:   Procedure Laterality Date    CORONARY ARTERY BYPASS GRAFT N/A 1/3/2022    VIRGIL; TCPB: EVH R thigh; CABG x 3, LIMA to LAD, SVG to OM2 and PDA performed by Jessy Krishnamurthy MD at [address] [place] [address]      right due to mass benign    UPPER GASTROINTESTINAL ENDOSCOPY N/A 2021    EGD BIOPSY performed by Rema Baca MD at [place] [address]      Past Endoscopic History   EGD 2021: with no overt signs of UGIB. Path result:  A: Small bowel biopsy:      - Small intestinal mucosa with intact villous architecture. The        morphologic findings are not diagnostic of celiac disease.      B: Gastric biopsy:      - Reactive gastropathy.      - No active inflammation, no dysplasia, and no malignancy.      - H pylori microorganisms are not observed on routine H&E stain. Admission Meds  No current facility-administered medications on file prior to encounter. Current Outpatient Medications on File Prior to Encounter   Medication Sig Dispense Refill    amoxicillin-clavulanate (AUGMENTIN) 500-125 MG per tablet Take 1 tablet by mouth 2 times daily      montelukast (SINGULAIR) 10 MG tablet Take 1 tablet by mouth nightly 30 tablet 3    tiotropium (SPIRIVA RESPIMAT) 2.5 MCG/ACT AERS inhaler Inhale 2 puffs into the lungs daily      aspirin 325 MG EC tablet Take 1 tablet by mouth daily 30 tablet 3    amiodarone (CORDARONE) 200 MG tablet Take 1 tablet by mouth daily for 19 doses 19 tablet 0    albuterol (PROVENTIL) (2.5 MG/3ML) 0.083% nebulizer solution Take 3 mLs by nebulization every 6 hours as needed for Shortness of Breath 120 each 3    Balsam [place]-Castor Oil (VENELEX) OINT ointment Apply topically 2 times daily      Arformoterol Tartrate (BROVANA) 15 MCG/2ML NEBU Take 2 mLs by nebulization 2 times daily 120 mL 3    potassium chloride (KLOR-CON M) 10 MEQ extended release tablet Take 1 tablet by mouth daily      gabapentin (NEURONTIN) 300 MG capsule Take 1 capsule by mouth 3 times daily for 30 days.  90 capsule 3    atorvastatin (LIPITOR) 40 MG tablet Take 1 tablet by mouth nightly 30 tablet 3    metoprolol tartrate (LOPRESSOR) 25 MG tablet Take 0.25 tablets by mouth 2 times daily (Patient taking differently: Take 12.5 mg by mouth 2 times daily ) 60 tablet 3    polyethylene glycol (GLYCOLAX) 17 g packet Take 17 g by mouth daily as needed for Constipation 527 g 1    melatonin 3 MG TABS tablet Take 1 tablet by mouth nightly as needed (sleep) 30 tablet 3    clopidogrel (PLAVIX) 75 MG tablet Take 1 tablet by mouth daily 30 tablet 3    Multiple Vitamin (MULTIVITAMIN) TABS tablet Take 1 tablet by mouth daily 30 tablet 0    pantoprazole (PROTONIX) 40 MG tablet Take 1 tablet by mouth 2 times daily (before meals) 30 tablet 3    furosemide (LASIX) 20 MG tablet Take 1 tablet by mouth daily 60 tablet 3    buPROPion (WELLBUTRIN XL) 300 MG extended release tablet Take 1 tablet by mouth every morning 90 tablet 1    Ascorbic Acid (VITAMIN C) 500 MG tablet Take 500 mg by mouth daily. Patient's wife denies ASA, NSAID use. Allergies  No Known Allergies   Social   Social History     Tobacco Use    Smoking status: Current Every Day Smoker     Packs/day: 1.00     Types: Cigarettes    Smokeless tobacco: Never Used    Tobacco comment: encouraged patient on smoking cessation   Substance Use Topics    Alcohol use: Yes     Alcohol/week: 6.0 standard drinks     Types: 2 Glasses of wine, 2 Cans of beer, 2 Shots of liquor per week     Comment: drinks 1-2 a day        Family History   Problem Relation Age of Onset    Depression Mother     Arthritis Mother     High Blood Pressure Mother     High Cholesterol Mother     Heart Disease Mother     Arthritis Father     Heart Disease Father     High Blood Pressure Father     High Cholesterol Father     Stroke Father     Cancer Sister         ovarian    High Cholesterol Sister     High Blood Pressure Sister     Arthritis Sister       No family history of colon cancer, Crohn's disease, or ulcerative colitis. Fam Hx of ovarian cancer    Review of Systems  Review of systems not obtained due to patient factors.    AMS       Physical Exam    /64   Pulse 94   Temp 100.4 °F (38 °C) (Axillary)   Resp 22   Ht 5' 7\" (1.702 m)   Wt 133 lb 9.6 oz (60.6 kg)   SpO2 90%   BMI 20.92 kg/m²   General appearance: lethargic, no distress, appears stated age  Anicteric, No Jaundice  Head: Normocephalic, without obvious abnormality  Lungs: clear to auscultation bilaterally, Normal Effort  Heart: regular rate and rhythm, normal S1 and S2, no murmurs or rubs  Abdomen: soft, non-tender; bowel sounds normal; no masses,  no organomegaly  Extremities: atraumatic, no cyanosis or edema  Skin: warm and dry  Neuro: patient is lethargic not answering questions or following commands. Spontaneously moving all extremities      Data Review:    Recent Labs     01/31/22  0415 02/01/22  1110 02/02/22  0511   WBC 7.3 7.0 7.2   HGB 9.5* 8.9* 8.4*   HCT 28.8* 27.4* 25.6*   MCV 81.2 81.3 81.0   * 131* 138     Recent Labs     01/31/22  1819 02/01/22  1110 02/02/22  0511    135* 138   K 3.5 3.5 3.5    104 106   CO2 24 22 25   PHOS 1.8* 1.2* 1.2*   BUN 22* 21* 23*   CREATININE <0.5* <0.5* <0.5*     No results for input(s): AST, ALT, ALB, BILIDIR, BILITOT, ALKPHOS in the last 72 hours. No results for input(s): LIPASE, AMYLASE in the last 72 hours. No results for input(s): PROTIME, INR in the last 72 hours. No results for input(s): PTT in the last 72 hours. No results for input(s): OCCULTBLD in the last 72 hours. Imaging Studies:                            Ultrasound:  NA              CT-scan of abdomen and pelvis: NA                 Assessment:     Active Problems:    Dehydration    Severe malnutrition (HCC)    DWIGHT (acute kidney injury) ([facility] [address]    Hypernatremia  Resolved Problems:    * No resolved hospital problems. *      FTT  Dehydration  On tube feeds  Lethargic intermittently  Poor PO intake  No prior abdominal surgeries and no adverse reactions to anesthesia    Recommendations:     Plan for PEG tube, possibly tomorrow  NPO at midnight, hold tube feeds  Hold morning anticoag prior to procedure    Thank you for the opportunity to participate in [facility].

## 2022-02-02 NOTE — PROGRESS NOTES
Progress Note  Hospital Day: 8    Chief Complaint: Dehydration, Depression, AMS    Mr. Dustin Rogers was readmitted on 2022 following office visit at which he was dramatically withdrawn and nearly obtunded. He was profoundly dehydrated after not taking PO at his nursing facility for nearly 2 weeks. 24 hour Interval History:      Admitted to [facility] ER   Transferred up to floor, on rounds appeared very poor from a functional standpoint. Transfer to ICU   Sodium and mental status improved   Central line placed, Levo started Max 9, down to 4. Bleeding from C line, Suture placed, resolved.  Off Levo. Delirium much improved, more conversational.  Remains withdrawn otherwise. + BM   Passed swallow for ice chips and spoons of water   Transferred to floor    Today's plan:  Continue TFs, PEG in next 24 hours likely. VITAL SIGNS   Temperature:  Current - Temp: 98.2 °F (36.8 °C);  Max - Temp  Av.5 °F (36.9 °C)  Min: 97.7 °F (36.5 °C)  Max: 100.4 °F (38 °C)    Respiratory Rate : Resp  Av.1  Min: 18  Max: 22    Pulse Range: Pulse  Av.8  Min: 84  Max: 94    Blood Pressure Range:  Systolic (65WZO), QEH:616 , Min:99 , TQF:198   ; Diastolic (60IQO), OPD:63, Min:57, Max:72    Pulse ox Range: SpO2  Av.8 %  Min: 90 %  Max: 96 %  O2 Flow Rate (L/min): 1 L/min         Admission Weight: Weight: 155 lb (70.3 kg)    Current Weight:   Patient Vitals for the past 96 hrs (Last 3 readings):   Weight   22 1236 145 lb 8.1 oz (66 kg)   22 0600 133 lb 9.6 oz (60.6 kg)   22 1107 139 lb 15.9 oz (63.5 kg)     I/O:     Intake/Output Summary (Last 24 hours) at 2022 1435  Last data filed at 2022 0450  Gross per 24 hour   Intake 892 ml   Output 750 ml   Net 142 ml     Urine (hernandez): Being Placed    LINES/ACCESS:      Peripheral Access: RUE Day #: 6    Diet: Diet NPO  ADULT TUBE FEEDING; Nasogastric; Peptide Based; Continuous; 20; Yes; 10; Q 8 hours; 65; 0; Other (specify); hold water flushes x24hr; Protein; 1 protein shot BID     MEDICATIONS:      aspirin  324 mg Oral Daily    baclofen  10 mg Oral TID    fluconazole  200 mg Oral Daily    sertraline  25 mg Oral Daily    pantoprazole  40 mg IntraVENous BID    lidocaine   Topical Once    thiamine mononitrate  100 mg Per NG tube Daily    amiodarone  200 mg Oral Daily    Arformoterol Tartrate  15 mcg Nebulization BID    atorvastatin  40 mg Oral Nightly    clopidogrel  75 mg Oral Daily    [Held by provider] metoprolol tartrate  12.5 mg Oral BID    montelukast  10 mg Oral Nightly    tiotropium  2 puff Inhalation Daily    sodium chloride flush  10 mL IntraVENous 2 times per day    enoxaparin  40 mg SubCUTAneous Daily    sennosides-docusate sodium  1 tablet Oral BID       Infusions:   sodium chloride 25 mL (02/01/22 0623)       DATA REVIEW:   CBC:   Recent Labs     01/30/22  1540 01/31/22  0415 02/01/22  1110 02/02/22  0511   WBC  --  7.3 7.0 7.2   HGB 9.0* 9.5* 8.9* 8.4*   HCT 28.1* 28.8* 27.4* 25.6*   MCV  --  81.2 81.3 81.0   PLT  --  112* 131* 138     BMP:   Recent Labs     01/30/22  1540 01/31/22  0415 01/31/22  1819 02/01/22  1110 02/02/22  0511   * 136 138 135* 138   K 3.3* 3.6 3.5 3.5 3.5    105 105 104 106   CO2 26 23 24 22 25   PHOS 1.9* 1.4* 1.8* 1.2* 1.2*   GLUCOSE 116* 115* 122* 127* 124*   BUN 19 19 22* 21* 23*   CREATININE 0.5* 0.5* <0.5* <0.5* <0.5*   CALCIUM 8.1* 8.1* 8.1* 8.0* 8.2*   MG 2.00 1.90 1.90 1.70* 1.70*     Accucheck Glucoses:   Recent Labs     02/01/22  0628 02/01/22  1545 02/01/22  2127 02/02/22  0720 02/02/22  1139   POCGLU 113* 139* 123* 137* 157*     Cardiac Enzymes: No results for input(s): CKTOTAL, CKMB, CKMBINDEX in the last 72 hours. Invalid input(s): TROPONIN  PT/INR:   No results for input(s): PROTIME, INR in the last 72 hours. APTT: No results for input(s): APTT in the last 72 hours.   LFT:   No results for input(s): AST, ALT, ALB, BILIDIR, BILITOT, ALKPHOS in the last 72 hours. Troponin: Invalid input(s): TROPONIN  BNP: No results for input(s): BNP in the last 72 hours. ABG:    Lab Results   Component Value Date    PHART 7.462 01/28/2022    PO2ART 64.7 01/28/2022    KZX7GTO 30.3 01/28/2022    A6NMOZJF 94 01/28/2022    UWM9PIP 23 01/28/2022    UNU5DEE 21.6 01/28/2022    BEART -2 01/28/2022    BEART 2.5 01/27/2022    BEART -7 01/04/2022    BEART -6 01/04/2022    BEART -5 01/04/2022     U/A:    No results for input(s): NITRITE, COLORU, PHUR, LABCAST, WBCUA, RBCUA, MUCUS, TRICHOMONAS, YEAST, BACTERIA, CLARITYU, SPECGRAV, LEUKOCYTESUR, UROBILINOGEN, BILIRUBINUR, BLOODU, GLUCOSEU, AMORPHOUS in the last 72 hours. Invalid input(s): Delia Trisha    Urine Culture:  Candida >100,000       Chest X-Ray:       Portable chest       HISTORY: Altered mental status       COMPARISON: January 6, 2022.       FINDINGS:       The cardiomediastinal contours are stable. There is a moderate left pleural effusion. Adjacent parenchymal consolidation. EKG: NSR    ASSESSMENT:   Patient Active Problem List:     Hyperglycemia     Smoker's respiratory syndrome     BPH with obstruction/lower urinary tract symptoms     Basal cell carcinoma of shoulder     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     SCCS, mod diff  (squamous cell carcinoma), hand, left     Basal cell carcinoma of right side of nose     Visit for suture removal     Visit for wound check     Anemia     Syncope and collapse     Non-ST elevated myocardial infarction (non-STEMI) ([facility] [address]     Dehydration      Neuro: Becoming more interactive. Follows commands, more conversational but not making a lot of sense.   CV:   Sinus  Pulm:  Normal work of breathing  Abd:  Scaphoid, soft, non-tender, non-distended  Diamond: Clear yellow urine, still looks quite concentrated  Wounds: clean, dry and intact  Ext: warm, no edema, skin tenting (improving)    PLAN:   · Neuro - pain tolerated, no medications needed  · Psych - Hypomanic delrium, possible adjustment disorder   - Needs psych eval after UTI and Dehydration treated   - Patient discussed with  Psych ER 1/26/2022  · CV - on Beta blocker, ace inhibitor, statin, ASA/Plavix   - PO Meds currently held due to mental status  · Pulm - acute postoperative pulmonary insuffiency as expected- wean O2 as tolerated, continue incentive spirometry  · Renal - Acute kidney failure/creatinine stable - Cr 0.5, preop Cr 0.8        - Bed weight 138 (Discharged 155)         - keep urinary catheter for accurate I & O, Net + 2.5L /24 hours, Net + 11L since admission        - Hypernatremia and Dehydration  · Heme - Acute blood loss anemia as expected- hgb 9.5  · ID - VANC stopped 1/30/2021, on Meropenem  · Recommend considering addition of antifungal given candidal uti  · FEN - NGT Feeds, with free water  · PEG per GI  · GI prophylaxis - Protonix 40 mg bid  · VTE prophylaxis - SCD, YUMI hose, Lovenox  · Disposition -   Continue floor status pending ability to participate in formal psych eval  Discussed patient with Dr Laroy Rinne who is agreeable to assessment and plan.       Marycarmen Harrison MD   Thoracic Surgery Resident   [phone]  02/02/22 2:35 PM

## 2022-02-02 NOTE — PROGRESS NOTES
Physical Therapy  Attempt Note/No Treatment  Pt sleeping. Opened eyes briefly when called by name with sternal rub, though pt non-verbal & not following commands. Attempted AAROM to LEs, though pt did not participate. Significant extensor tone noted in bilat LEs. RN informed.     Kvng Qiu

## 2022-02-03 LAB
ALBUMIN SERPL-MCNC: 2.3 G/DL (ref 3.4–5)
ANION GAP SERPL CALCULATED.3IONS-SCNC: 10 MMOL/L (ref 3–16)
BUN BLDV-MCNC: 31 MG/DL (ref 7–20)
CALCIUM SERPL-MCNC: 8.1 MG/DL (ref 8.3–10.6)
CHLORIDE BLD-SCNC: 105 MMOL/L (ref 99–110)
CO2: 22 MMOL/L (ref 21–32)
CREAT SERPL-MCNC: 0.5 MG/DL (ref 0.8–1.3)
GFR AFRICAN AMERICAN: >60
GFR NON-AFRICAN AMERICAN: >60
GLUCOSE BLD-MCNC: 125 MG/DL (ref 70–99)
GLUCOSE BLD-MCNC: 128 MG/DL (ref 70–99)
GLUCOSE BLD-MCNC: 132 MG/DL (ref 70–99)
GLUCOSE BLD-MCNC: 143 MG/DL (ref 70–99)
MAGNESIUM: 1.8 MG/DL (ref 1.8–2.4)
MAGNESIUM: 2 MG/DL (ref 1.8–2.4)
PERFORMED ON: ABNORMAL
PHOSPHORUS: 1.2 MG/DL (ref 2.5–4.9)
POTASSIUM SERPL-SCNC: 3.9 MMOL/L (ref 3.5–5.1)
SODIUM BLD-SCNC: 137 MMOL/L (ref 136–145)

## 2022-02-03 PROCEDURE — 6360000002 HC RX W HCPCS: Performed by: STUDENT IN AN ORGANIZED HEALTH CARE EDUCATION/TRAINING PROGRAM

## 2022-02-03 PROCEDURE — 1200000000 HC SEMI PRIVATE

## 2022-02-03 PROCEDURE — 83735 ASSAY OF MAGNESIUM: CPT

## 2022-02-03 PROCEDURE — 36415 COLL VENOUS BLD VENIPUNCTURE: CPT

## 2022-02-03 PROCEDURE — 2580000003 HC RX 258: Performed by: INTERNAL MEDICINE

## 2022-02-03 PROCEDURE — 6360000002 HC RX W HCPCS: Performed by: INTERNAL MEDICINE

## 2022-02-03 PROCEDURE — 99232 SBSQ HOSP IP/OBS MODERATE 35: CPT | Performed by: THORACIC SURGERY (CARDIOTHORACIC VASCULAR SURGERY)

## 2022-02-03 PROCEDURE — 6370000000 HC RX 637 (ALT 250 FOR IP): Performed by: STUDENT IN AN ORGANIZED HEALTH CARE EDUCATION/TRAINING PROGRAM

## 2022-02-03 PROCEDURE — 80069 RENAL FUNCTION PANEL: CPT

## 2022-02-03 PROCEDURE — 2580000003 HC RX 258: Performed by: STUDENT IN AN ORGANIZED HEALTH CARE EDUCATION/TRAINING PROGRAM

## 2022-02-03 PROCEDURE — C9113 INJ PANTOPRAZOLE SODIUM, VIA: HCPCS | Performed by: STUDENT IN AN ORGANIZED HEALTH CARE EDUCATION/TRAINING PROGRAM

## 2022-02-03 PROCEDURE — 99233 SBSQ HOSP IP/OBS HIGH 50: CPT | Performed by: INTERNAL MEDICINE

## 2022-02-03 PROCEDURE — 6370000000 HC RX 637 (ALT 250 FOR IP): Performed by: NURSE PRACTITIONER

## 2022-02-03 PROCEDURE — 6370000000 HC RX 637 (ALT 250 FOR IP): Performed by: INTERNAL MEDICINE

## 2022-02-03 RX ADMIN — PANTOPRAZOLE SODIUM 40 MG: 40 INJECTION, POWDER, FOR SOLUTION INTRAVENOUS at 09:29

## 2022-02-03 RX ADMIN — SODIUM CHLORIDE 25 ML: 900 INJECTION, SOLUTION INTRAVENOUS at 05:00

## 2022-02-03 RX ADMIN — MEROPENEM 2000 MG: 1 INJECTION, POWDER, FOR SOLUTION INTRAVENOUS at 05:00

## 2022-02-03 RX ADMIN — SODIUM CHLORIDE, PRESERVATIVE FREE 10 ML: 5 INJECTION INTRAVENOUS at 20:32

## 2022-02-03 RX ADMIN — ACETAMINOPHEN 650 MG: 650 SUPPOSITORY RECTAL at 04:27

## 2022-02-03 RX ADMIN — THIAMINE HCL TAB 100 MG 100 MG: 100 TAB at 09:30

## 2022-02-03 RX ADMIN — FLUCONAZOLE 200 MG: 200 TABLET ORAL at 09:30

## 2022-02-03 RX ADMIN — MONTELUKAST 10 MG: 10 TABLET, FILM COATED ORAL at 20:32

## 2022-02-03 RX ADMIN — ENOXAPARIN SODIUM 40 MG: 100 INJECTION SUBCUTANEOUS at 18:11

## 2022-02-03 RX ADMIN — SERTRALINE HYDROCHLORIDE 25 MG: 25 TABLET ORAL at 09:30

## 2022-02-03 RX ADMIN — AMIODARONE HYDROCHLORIDE 200 MG: 200 TABLET ORAL at 09:30

## 2022-02-03 RX ADMIN — MEROPENEM 2000 MG: 1 INJECTION, POWDER, FOR SOLUTION INTRAVENOUS at 20:35

## 2022-02-03 RX ADMIN — BACLOFEN 10 MG: 10 TABLET ORAL at 09:30

## 2022-02-03 RX ADMIN — SODIUM CHLORIDE, PRESERVATIVE FREE 10 ML: 5 INJECTION INTRAVENOUS at 09:29

## 2022-02-03 RX ADMIN — PANTOPRAZOLE SODIUM 40 MG: 40 INJECTION, POWDER, FOR SOLUTION INTRAVENOUS at 20:32

## 2022-02-03 RX ADMIN — MEROPENEM 2000 MG: 1 INJECTION, POWDER, FOR SOLUTION INTRAVENOUS at 11:44

## 2022-02-03 RX ADMIN — ATORVASTATIN CALCIUM 40 MG: 40 TABLET, FILM COATED ORAL at 20:32

## 2022-02-03 ASSESSMENT — PAIN SCALES - PAIN ASSESSMENT IN ADVANCED DEMENTIA (PAINAD)
FACIALEXPRESSION: 0
NEGVOCALIZATION: 0
TOTALSCORE: 0
FACIALEXPRESSION: 0
CONSOLABILITY: 0
BREATHING: 0
NEGVOCALIZATION: 0
BREATHING: 0
FACIALEXPRESSION: 0
NEGVOCALIZATION: 0
TOTALSCORE: 0
CONSOLABILITY: 0
TOTALSCORE: 0
CONSOLABILITY: 0
NEGVOCALIZATION: 0
BODYLANGUAGE: 0
BODYLANGUAGE: 0
BREATHING: 0
CONSOLABILITY: 0
NEGVOCALIZATION: 0
BODYLANGUAGE: 0
FACIALEXPRESSION: 0
BREATHING: 0
NEGVOCALIZATION: 0
TOTALSCORE: 0
FACIALEXPRESSION: 0
CONSOLABILITY: 0
NEGVOCALIZATION: 0
CONSOLABILITY: 0
CONSOLABILITY: 0
BREATHING: 0
NEGVOCALIZATION: 0
BREATHING: 0
BODYLANGUAGE: 0
FACIALEXPRESSION: 0
BODYLANGUAGE: 0
TOTALSCORE: 0
BODYLANGUAGE: 0
FACIALEXPRESSION: 0
CONSOLABILITY: 0
FACIALEXPRESSION: 0
TOTALSCORE: 0
FACIALEXPRESSION: 0
NEGVOCALIZATION: 0
TOTALSCORE: 0
CONSOLABILITY: 0
BREATHING: 0
TOTALSCORE: 0
BODYLANGUAGE: 0
BODYLANGUAGE: 0
TOTALSCORE: 0
BODYLANGUAGE: 0

## 2022-02-03 ASSESSMENT — PAIN SCALES - GENERAL
PAINLEVEL_OUTOF10: 0

## 2022-02-03 NOTE — CARE COORDINATION
[facility] liaison  Here at  Hospital to meet with pt and sister at bedside  . She will update  CM  Of decision and disposition :   CM spoke with Saul Carranza at  [facility] [address] and they cannot  accept  O2  L  HF > 10  L HF  ,  Patient  currently on 15 L  HF  ,   Stephanie Caba will discuss with Yannick Gutiérrez  . And  May need to d/c to  [address] [place] [address] cannot accommodate. Electronically signed by Sunshine Spencer RN on 2/7/2022 at 11:57 AM     ++++++++++++++++++++++++++++++++++++++++++++++++        CM spoke with Onelia Sosa w/ PC  Who states she spoke with  Pt sister Renetta Wall:  Who is  Agreeable to Hospice requested   CM to call with patient  Choice of Agency for referral.     Patient plan to return to SNF  W/ Hospice services: CM spoke with Saul Carranza :  [phone]  In admission at  [facility] [address] and she  States they are  Contracted  With [facility] and  [place] . Patient  Sister requested  [facility] referral,  CM faxed referral and  CM spoke with Stephanie Caba  HOC liaison  Who will reach out to patient sister  and  Arrange  Meeting today at  Bedside. Electronically signed by Sunshine Spencer RN on 2/7/2022 at 11:49 AM       ++++++++++++++++++++++++++++++++++++++++++++++++      CM cont to follow for  D/c planning:    Patient from SNF  :    [facility] [address]              [address] [facility] [place] [address]       Phone: [phone]       Fax: [phone]        - Patient's [facility],  [facility]ick [facility] and will be relied upon for decisions. - [facility] Palliative care is following.   - Patient can return to SNF on discharge. ( psych consulted  And  Cleared  , does not need  inpt psych )     - SLP unable to assess at this time due to mentation. Per SLP documention:  Pt remains insufficiently alert to safely participate in swallow re-assessment x3 days. - GI consulted:   Cannot do PEG for at least 5 and ideally 7 days off of plavix. Consider calorie counts in the interim since more alert now.  Please reconsult next Wednesday 2/09 if still needs PEG and plavix has been held. CM will continue to follow patient until discharge      . Electronically signed by Zaire Ruth RN on 2/3/2022 at 10:44 AM         Zaire Ruth RN Case Manager  [facility]  [address] [place] [address].   [facility] [address] [phone]  Fax [phone]

## 2022-02-03 NOTE — CONSULTS
Comprehensive Nutrition Assessment    RECOMMENDATIONS:  1. PO Diet: NPO per MD  2. Nutrition Support: When able, recommend restarting TF   · EN formula Peptide Based Vital AF to goal rate 65 ml/hr. · 30 ml water flush q4h - most minimal flush to maintain tube integrity. NUTRITION ASSESSMENT:    Nutritional summary & status: TF held since 4 am. Possible PEG placement to occur. Noted SLP reports pt not alert enough for swallow eval. Will contniue to monitor for GOC and nutrition adequacy throughout adm.  Admission/PMH: Failure to thrive. PMH: s/p CABG 1/3/22, HLD     MALNUTRITION ASSESSMENT  Context of Malnutrition: Acute Illness   Malnutrition Status: Severe malnutrition  Findings of the 6 clinical characteristics of malnutrition (Minimum of 2 out of 6 clinical characteristics is required to make the diagnosis of moderate or severe Protein Calorie Malnutrition based on AND/ASPEN Guidelines):  Energy Intake: Less than/equal to 50% of estimated energy requirements    Energy Intake Time: Greater than or equal to 7 days    Weight Loss %: Unable to assess    Weight loss Time: Unable to assess   Body Fat Loss: Unable to Assess   Body Fat Location: Unable to assess    Body Muscle Loss: Severe Loss   Body Muscle Loss Location: Temples  per visual  Fluid Accumulation: No significant    Fluid Accumulation Location: No significant     Strength: Not Performed;  Not Measured     NUTRITION DIAGNOSIS   Severe malnutrition related to inadequate protein-energy intake as evidenced by poor intake prior to admission,severe muscle loss    [address] and/or Nutrient Delivery:  Continue NPO,Start Tube Feeding  Nutrition Education/Counseling:  No recommendation at this time   Goals:  Pt will tolerate most appropriate form of nutrition to meet 100% of nutrition needs       Nutrition Monitoring and Evaluation:   Food/Nutrient Intake Outcomes:  Diet Advancement/Tolerance,Enteral Nutrition Intake/Tolerance  Physical

## 2022-02-03 NOTE — PROGRESS NOTES
Office : [phone]     Fax :[phone]         Renal Progress Note  Subjective:   Admit Date: 1/26/2022     Good UO   Na in good range   On TF   Phos low       DIET Diet NPO  ADULT TUBE FEEDING; Nasogastric; Peptide Based; Continuous; 20; Yes; 10; Q 8 hours; 65; 0; Other (specify); hold water flushes x24hr; Protein; 1 protein shot BID  Medications:   Scheduled Meds:   [Held by provider] aspirin  324 mg Oral Daily    meropenem  2,000 mg IntraVENous Q8H    fluconazole  200 mg Oral Daily    sertraline  25 mg Oral Daily    pantoprazole  40 mg IntraVENous BID    lidocaine   Topical Once    amiodarone  200 mg Oral Daily    Arformoterol Tartrate  15 mcg Nebulization BID    atorvastatin  40 mg Oral Nightly    [Held by provider] clopidogrel  75 mg Oral Daily    [Held by provider] metoprolol tartrate  12.5 mg Oral BID    montelukast  10 mg Oral Nightly    tiotropium  2 puff Inhalation Daily    sodium chloride flush  10 mL IntraVENous 2 times per day    enoxaparin  40 mg SubCUTAneous Daily    sennosides-docusate sodium  1 tablet Oral BID     Continuous Infusions:   sodium chloride 25 mL (02/03/22 0500)       Labs:  CBC:   Recent Labs     02/01/22  1110 02/02/22  0511   WBC 7.0 7.2   HGB 8.9* 8.4*   * 138     BMP:    Recent Labs     02/02/22  0511 02/02/22  1721 02/03/22  0654    135* 137   K 3.5 3.6 3.9    104 105   CO2 25 25 22   BUN 23* 25* 31*   CREATININE <0.5* <0.5* 0.5*   GLUCOSE 124* 124* 125*     Ca/Mg/Phos:   Recent Labs     02/02/22  0511 02/02/22  1721 02/03/22  0654   CALCIUM 8.2* 8.1* 8.1*   MG 1.70* 1.70* 1.80   PHOS 1.2* 1. 3* 1.2*     Hepatic:   No results for input(s): AST, ALT, ALB, BILITOT, ALKPHOS in the last 72 hours. Troponin: No results for input(s): TROPONINI in the last 72 hours. BNP: No results for input(s): BNP in the last 72 hours. Lipids: No results for input(s): CHOL, TRIG, HDL, LDLCALC, LABVLDL in the last 72 hours. ABGs:   No results for input(s): PHART, PO2ART, ZBU5HIV in the last 72 hours. INR:   No results for input(s): INR in the last 72 hours. UA:  No results for input(s): Gib Songster, GLUCOSEU, BILIRUBINUR, KETUA, SPECGRAV, BLOODU, PHUR, PROTEINU, UROBILINOGEN, NITRU, LEUKOCYTESUR, Maralee Dyke in the last 72 hours. Urine Microscopic:   No results for input(s): LABCAST, BACTERIA, COMU, HYALCAST, WBCUA, RBCUA, EPIU in the last 72 hours. Urine Culture:   No results for input(s): LABURIN in the last 72 hours. Urine Chemistry:   No results for input(s): Brock Duffel, PROTEINUR, NAUR in the last 72 hours. Objective:   Vitals: BP (!) 110/57   Pulse 81   Temp 98.9 °F (37.2 °C) (Axillary)   Resp 20   Ht 5' 7\" (1.702 m)   Wt 154 lb 5.2 oz (70 kg)   SpO2 93%   BMI 24.17 kg/m²    Wt Readings from Last 3 Encounters:   02/03/22 154 lb 5.2 oz (70 kg)   01/10/22 155 lb 6.8 oz (70.5 kg)   07/12/18 179 lb (81.2 kg)      24HR INTAKE/OUTPUT:      Intake/Output Summary (Last 24 hours) at 2/3/2022 1254  Last data filed at 2/3/2022 4850  Gross per 24 hour   Intake 2044.65 ml   Output 1275 ml   Net 769.65 ml     Constitutional:  Disoriented, nonconversational  NECK: supple, no JVD  Cardiovascular:  S1, S2 without m/r/g  Respiratory:  CTA B without w/r/r  Abdomen: +bs, soft, nt  Ext: trace LE edema    Assessment and Plan:       IMAGING:  XR CHEST PORTABLE   Final Result   1. Right IJ central line in satisfactory position in the thorax   2. Nasogastric tube in the stomach   3.  Basilar airspace disease, new from prior study      XR ABDOMEN FOR NG/OG/NE TUBE PLACEMENT   Final Result      Frontal view centered at the diaphragm demonstrates tip of NG tube in the gastric fundus. Bowel gas pattern is unremarkable. Elevated left hemidiaphragm with left basilar scarring. Sternotomy wires and left atrial appendage clip noted. XR CHEST PORTABLE   Final Result      Left pleural effusion with adjacent parenchymal consolidation, similar to January 6, 2022. CT HEAD WO CONTRAST   Final Result      1. No evidence for acute intracranial abnormality. 2.  Moderate diffuse cerebral atrophy with ventricular white matter changes bilaterally consistent with chronic small vessel ischemia. Assessment/Plan     1. DWIGHT 2/2 dehydration - resolved     2. HTN     3.  Anemia     4. Acid- base/ Electrolyte imbalance - hypernatremia        Plan:  - replaced free water deficit  - Na better   - replace K/Phos   - cont free water   - Cr better  - Hypernatremia not the cause but result of AMS and Poor po intake           Dior Moya MD       [facility] -[phone]

## 2022-02-03 NOTE — PROGRESS NOTES
Office : [phone]     Fax :[phone]         Renal Progress Note  Subjective:   Admit Date: 1/26/2022     Good UO   Na in good range   On TF       DIET Diet NPO  ADULT TUBE FEEDING; Nasogastric; Peptide Based; Continuous; 20; Yes; 10; Q 8 hours; 65; 0; Other (specify); hold water flushes x24hr; Protein; 1 protein shot BID  Medications:   Scheduled Meds:   aspirin  324 mg Oral Daily    baclofen  10 mg Oral TID    meropenem  2,000 mg IntraVENous Q8H    magnesium sulfate  2,000 mg IntraVENous Once    potassium phosphate IVPB  10 mmol IntraVENous Once    fluconazole  200 mg Oral Daily    sertraline  25 mg Oral Daily    pantoprazole  40 mg IntraVENous BID    lidocaine   Topical Once    thiamine mononitrate  100 mg Per NG tube Daily    amiodarone  200 mg Oral Daily    Arformoterol Tartrate  15 mcg Nebulization BID    atorvastatin  40 mg Oral Nightly    clopidogrel  75 mg Oral Daily    [Held by provider] metoprolol tartrate  12.5 mg Oral BID    montelukast  10 mg Oral Nightly    tiotropium  2 puff Inhalation Daily    sodium chloride flush  10 mL IntraVENous 2 times per day    enoxaparin  40 mg SubCUTAneous Daily    sennosides-docusate sodium  1 tablet Oral BID     Continuous Infusions:   sodium chloride 25 mL (02/02/22 2033)       Labs:  CBC:   Recent Labs     01/31/22  0415 02/01/22  1110 02/02/22  0511   WBC 7.3 7.0 7.2   HGB 9.5* 8.9* 8.4*   * 131* 138     BMP:    Recent Labs     02/01/22  1110 02/02/22  0511 02/02/22  1721   * 138 135*   K 3.5 3.5 3.6    106 104   CO2 22 25 25   BUN 21* 23* 25*   CREATININE <0.5* <0.5* <0.5* tube in the stomach   3. Basilar airspace disease, new from prior study      XR ABDOMEN FOR NG/OG/NE TUBE PLACEMENT   Final Result      Frontal view centered at the diaphragm demonstrates tip of NG tube in the gastric fundus. Bowel gas pattern is unremarkable. Elevated left hemidiaphragm with left basilar scarring. Sternotomy wires and left atrial appendage clip noted. XR CHEST PORTABLE   Final Result      Left pleural effusion with adjacent parenchymal consolidation, similar to January 6, 2022. CT HEAD WO CONTRAST   Final Result      1. No evidence for acute intracranial abnormality. 2.  Moderate diffuse cerebral atrophy with ventricular white matter changes bilaterally consistent with chronic small vessel ischemia. Assessment/Plan     1. DWIGHT 2/2 dehydration - resolved     2. HTN     3.  Anemia     4. Acid- base/ Electrolyte imbalance - hypernatremia        Plan:  - replaced free water deficit  - Na better   - replace K/Phos   - cont free water   - Cr better  - Hypernatremia not the cause but result of AMS and Poor po intake           Yvonne Morales MD       [facility] [address] [place], [place] [address] 18 07

## 2022-02-03 NOTE — PROGRESS NOTES
Hospitalist Progress Note      PCP: No primary care provider on file. Date of Admission: 1/26/2022     Subjective:   Patient lethargic today,   Barely following commands     -Palliative care consulted for goals of care, family wants PEG tube placed, cant do before next week due to antiplatelets  -Febrile today, blood cx resent     Medications:  Reviewed    Infusion Medications    sodium chloride 25 mL (02/03/22 0500)     Scheduled Medications    [Held by provider] aspirin  324 mg Oral Daily    meropenem  2,000 mg IntraVENous Q8H    fluconazole  200 mg Oral Daily    sertraline  25 mg Oral Daily    pantoprazole  40 mg IntraVENous BID    lidocaine   Topical Once    amiodarone  200 mg Oral Daily    Arformoterol Tartrate  15 mcg Nebulization BID    atorvastatin  40 mg Oral Nightly    [Held by provider] clopidogrel  75 mg Oral Daily    [Held by provider] metoprolol tartrate  12.5 mg Oral BID    montelukast  10 mg Oral Nightly    tiotropium  2 puff Inhalation Daily    sodium chloride flush  10 mL IntraVENous 2 times per day    enoxaparin  40 mg SubCUTAneous Daily    sennosides-docusate sodium  1 tablet Oral BID     PRN Meds: magnesium sulfate, sodium chloride flush, sodium chloride, ondansetron **OR** ondansetron, magnesium hydroxide, acetaminophen **OR** acetaminophen      Intake/Output Summary (Last 24 hours) at 2/3/2022 1735  Last data filed at 2/3/2022 1722  Gross per 24 hour   Intake 1479.65 ml   Output 1475 ml   Net 4.65 ml       Physical Exam Performed:    BP (!) 147/68   Pulse 89   Temp 98.7 °F (37.1 °C) (Axillary)   Resp 20   Ht 5' 7\" (1.702 m)   Wt 154 lb 5.2 oz (70 kg)   SpO2 92%   BMI 24.17 kg/m²     General appearance: Ill-appearing  Respiratory:  Normal respiratory effort. Clear to auscultation  Cardiovascular: Regular rate and rhythm  Abdomen: Soft, non-tender, non-distended   Musculoskeletal: No clubbing, cyanosis or edema bilaterally.   Skin: No rashes or lesions. Neurologic: Lethargic. Not following commands   Peripheral Pulses: +2 palpable, equal bilaterally       Labs:   Recent Labs     02/01/22  1110 02/02/22  0511   WBC 7.0 7.2   HGB 8.9* 8.4*   HCT 27.4* 25.6*   * 138     Recent Labs     02/02/22  0511 02/02/22  1721 02/03/22  0654    135* 137   K 3.5 3.6 3.9    104 105   CO2 25 25 22   BUN 23* 25* 31*   CREATININE <0.5* <0.5* 0.5*   CALCIUM 8.2* 8.1* 8.1*   PHOS 1.2* 1.3* 1.2*     No results for input(s): AST, ALT, BILIDIR, BILITOT, ALKPHOS in the last 72 hours. Urinalysis:      Lab Results   Component Value Date    NITRU Negative 01/26/2022    WBCUA 10-20 01/26/2022    BACTERIA 1+ 01/26/2022    RBCUA 5-10 01/26/2022    BLOODU SMALL 01/26/2022    SPECGRAV >=1.030 01/26/2022    GLUCOSEU Negative 01/26/2022       Assessment/Plan:    Shock likely hypovolemic versus sepsis secondary to potential pneumonia including HCAP/aspiration  Status post pressors and IVF  Off abx    DWIGHT/hypernatremia secondary to dehydration from poor oral intake  Monitor creatinine and sodium, improved  Status post IV fluids  Cont free water with TF  Nephrology following, appreciate recs    CAD s/p CABG 01/03/22  Continue aspirin and Plavix and statin  Holding beta-blocker due to low blood pressure  CT surgery following, appreciate recommendation    KADE Gutierrez  Continue telemetry, currently sinus rhythm  Continue amiodarone   Metoprolol on hold due to low blood pressure    COPD  Continue inhalers and breathing treatment    Depression  Continue home meds  Psych consulted, started on Zoloft    Severe malnutrition/failure to thrive:  Continue tube feeds  Follow dietary recs  GI consulted for peg tube , need to be off plavix and asa for 5 days, I.e till 2/5      DVT Prophylaxis: Lovenox  Diet: Diet NPO  ADULT TUBE FEEDING; Nasogastric; Peptide Based; Continuous; 20; Yes; 10; Q 8 hours; 65; 0; Other (specify); hold water flushes x24hr; Protein; 1 protein shot BID  Code Status: Full Code    PT/OT Eval Status: Once clinically stable    Dispo -pending clinical course    Temo Blair MD

## 2022-02-03 NOTE — PLAN OF CARE
Problem: Airway Clearance - Ineffective:  Goal: Ability to maintain a clear airway will improve  Description: Ability to maintain a clear airway will improve  Outcome: Ongoing     Problem: Mental Status - Impaired:  Goal: Mental status will be restored to baseline  Description: Mental status will be restored to baseline  Outcome: Ongoing     Problem: Nutrition Deficit:  Goal: Ability to achieve adequate nutritional intake will improve  Description: Ability to achieve adequate nutritional intake will improve  Outcome: Ongoing  Note: Tube feed iinfusing through ng tube at goal rate of 65 ml/hr. Pt tolerating well. Protein supplement given per orders. Problem: Skin Integrity - Impaired:  Goal: Will show no infection signs and symptoms  Description: Will show no infection signs and symptoms  Outcome: Ongoing  Note: Pt at risk for skin breakdown. Pt on specialty bed, turned and repositioned with pillow support. Sacral heart to coccyx. Heels elevated off bed to prevent breakdown. Will continue to monitor.

## 2022-02-03 NOTE — PROGRESS NOTES
Speech Language Pathology    Re-attempted to see pt for dysphagia therapy f/u. Reviewed chart. D/w Rn. Pt remains insufficiently alert to safely participate in swallow re-assessment x3 days. Will sign off at this time. If pt alertness/mental status improve, please consider re-referal.    Santi Yu, [address] [facility], .[phone]  Pg.  # [id number]

## 2022-02-03 NOTE — PLAN OF CARE
Problem: Nutrition  Goal: Optimal nutrition therapy  Outcome: Ongoing   Nutrition Problem #1: Severe malnutrition  Intervention: Food and/or Nutrient Delivery: Continue NPO,Start Tube Feeding  Nutritional Goals: Pt will tolerate most appropriate form of nutrition to meet 100% of nutrition needs

## 2022-02-04 ENCOUNTER — APPOINTMENT (OUTPATIENT)
Dept: GENERAL RADIOLOGY | Age: 72
DRG: 871 | End: 2022-02-04
Payer: MEDICARE

## 2022-02-04 LAB
ALBUMIN SERPL-MCNC: 2.2 G/DL (ref 3.4–5)
ANION GAP SERPL CALCULATED.3IONS-SCNC: 8 MMOL/L (ref 3–16)
BUN BLDV-MCNC: 31 MG/DL (ref 7–20)
CALCIUM SERPL-MCNC: 8.5 MG/DL (ref 8.3–10.6)
CHLORIDE BLD-SCNC: 105 MMOL/L (ref 99–110)
CO2: 24 MMOL/L (ref 21–32)
CREAT SERPL-MCNC: <0.5 MG/DL (ref 0.8–1.3)
GFR AFRICAN AMERICAN: >60
GFR NON-AFRICAN AMERICAN: >60
GLUCOSE BLD-MCNC: 119 MG/DL (ref 70–99)
GLUCOSE BLD-MCNC: 123 MG/DL (ref 70–99)
GLUCOSE BLD-MCNC: 130 MG/DL (ref 70–99)
GLUCOSE BLD-MCNC: 138 MG/DL (ref 70–99)
GLUCOSE BLD-MCNC: 141 MG/DL (ref 70–99)
GLUCOSE BLD-MCNC: 145 MG/DL (ref 70–99)
MAGNESIUM: 2 MG/DL (ref 1.8–2.4)
MAGNESIUM: 2.1 MG/DL (ref 1.8–2.4)
PERFORMED ON: ABNORMAL
PHOSPHORUS: 1.6 MG/DL (ref 2.5–4.9)
POTASSIUM SERPL-SCNC: 4 MMOL/L (ref 3.5–5.1)
PREALBUMIN: 8.6 MG/DL (ref 20–40)
SODIUM BLD-SCNC: 137 MMOL/L (ref 136–145)

## 2022-02-04 PROCEDURE — 2580000003 HC RX 258: Performed by: INTERNAL MEDICINE

## 2022-02-04 PROCEDURE — 1200000000 HC SEMI PRIVATE

## 2022-02-04 PROCEDURE — 84134 ASSAY OF PREALBUMIN: CPT

## 2022-02-04 PROCEDURE — 6370000000 HC RX 637 (ALT 250 FOR IP): Performed by: INTERNAL MEDICINE

## 2022-02-04 PROCEDURE — 36415 COLL VENOUS BLD VENIPUNCTURE: CPT

## 2022-02-04 PROCEDURE — 71045 X-RAY EXAM CHEST 1 VIEW: CPT

## 2022-02-04 PROCEDURE — 2500000003 HC RX 250 WO HCPCS: Performed by: INTERNAL MEDICINE

## 2022-02-04 PROCEDURE — 99233 SBSQ HOSP IP/OBS HIGH 50: CPT | Performed by: INTERNAL MEDICINE

## 2022-02-04 PROCEDURE — 80069 RENAL FUNCTION PANEL: CPT

## 2022-02-04 PROCEDURE — 99233 SBSQ HOSP IP/OBS HIGH 50: CPT | Performed by: NURSE PRACTITIONER

## 2022-02-04 PROCEDURE — 6360000002 HC RX W HCPCS: Performed by: STUDENT IN AN ORGANIZED HEALTH CARE EDUCATION/TRAINING PROGRAM

## 2022-02-04 PROCEDURE — 6370000000 HC RX 637 (ALT 250 FOR IP): Performed by: STUDENT IN AN ORGANIZED HEALTH CARE EDUCATION/TRAINING PROGRAM

## 2022-02-04 PROCEDURE — 2700000000 HC OXYGEN THERAPY PER DAY

## 2022-02-04 PROCEDURE — 2580000003 HC RX 258: Performed by: STUDENT IN AN ORGANIZED HEALTH CARE EDUCATION/TRAINING PROGRAM

## 2022-02-04 PROCEDURE — 6360000002 HC RX W HCPCS: Performed by: INTERNAL MEDICINE

## 2022-02-04 PROCEDURE — C9113 INJ PANTOPRAZOLE SODIUM, VIA: HCPCS | Performed by: STUDENT IN AN ORGANIZED HEALTH CARE EDUCATION/TRAINING PROGRAM

## 2022-02-04 PROCEDURE — 94640 AIRWAY INHALATION TREATMENT: CPT

## 2022-02-04 PROCEDURE — 6370000000 HC RX 637 (ALT 250 FOR IP): Performed by: NURSE PRACTITIONER

## 2022-02-04 PROCEDURE — 83735 ASSAY OF MAGNESIUM: CPT

## 2022-02-04 PROCEDURE — 94761 N-INVAS EAR/PLS OXIMETRY MLT: CPT

## 2022-02-04 RX ORDER — LORAZEPAM 1 MG/1
1 TABLET ORAL ONCE
Status: COMPLETED | OUTPATIENT
Start: 2022-02-04 | End: 2022-02-04

## 2022-02-04 RX ORDER — LORAZEPAM 0.5 MG/1
0.5 TABLET ORAL 2 TIMES DAILY
Status: DISCONTINUED | OUTPATIENT
Start: 2022-02-05 | End: 2022-02-07 | Stop reason: HOSPADM

## 2022-02-04 RX ORDER — FUROSEMIDE 10 MG/ML
20 INJECTION INTRAMUSCULAR; INTRAVENOUS ONCE
Status: COMPLETED | OUTPATIENT
Start: 2022-02-04 | End: 2022-02-04

## 2022-02-04 RX ADMIN — MONTELUKAST 10 MG: 10 TABLET, FILM COATED ORAL at 19:39

## 2022-02-04 RX ADMIN — SENNOSIDES AND DOCUSATE SODIUM 1 TABLET: 50; 8.6 TABLET ORAL at 08:51

## 2022-02-04 RX ADMIN — AMIODARONE HYDROCHLORIDE 200 MG: 200 TABLET ORAL at 08:51

## 2022-02-04 RX ADMIN — ARFORMOTEROL TARTRATE 15 MCG: 15 SOLUTION RESPIRATORY (INHALATION) at 08:04

## 2022-02-04 RX ADMIN — SERTRALINE HYDROCHLORIDE 25 MG: 25 TABLET ORAL at 08:51

## 2022-02-04 RX ADMIN — SODIUM CHLORIDE, PRESERVATIVE FREE 10 ML: 5 INJECTION INTRAVENOUS at 08:51

## 2022-02-04 RX ADMIN — LORAZEPAM 1 MG: 1 TABLET ORAL at 13:03

## 2022-02-04 RX ADMIN — ENOXAPARIN SODIUM 40 MG: 100 INJECTION SUBCUTANEOUS at 08:51

## 2022-02-04 RX ADMIN — FUROSEMIDE 20 MG: 10 INJECTION, SOLUTION INTRAMUSCULAR; INTRAVENOUS at 16:58

## 2022-02-04 RX ADMIN — LORAZEPAM 1 MG: 1 TABLET ORAL at 12:25

## 2022-02-04 RX ADMIN — ARFORMOTEROL TARTRATE 15 MCG: 15 SOLUTION RESPIRATORY (INHALATION) at 22:38

## 2022-02-04 RX ADMIN — SODIUM CHLORIDE, PRESERVATIVE FREE 10 ML: 5 INJECTION INTRAVENOUS at 19:49

## 2022-02-04 RX ADMIN — ACETAMINOPHEN 650 MG: 650 SUPPOSITORY RECTAL at 00:21

## 2022-02-04 RX ADMIN — FLUCONAZOLE 200 MG: 200 TABLET ORAL at 08:51

## 2022-02-04 RX ADMIN — POTASSIUM PHOSPHATE, MONOBASIC AND POTASSIUM PHOSPHATE, DIBASIC 30 MMOL: 224; 236 INJECTION, SOLUTION, CONCENTRATE INTRAVENOUS at 13:37

## 2022-02-04 RX ADMIN — PANTOPRAZOLE SODIUM 40 MG: 40 INJECTION, POWDER, FOR SOLUTION INTRAVENOUS at 08:51

## 2022-02-04 RX ADMIN — MEROPENEM 2000 MG: 1 INJECTION, POWDER, FOR SOLUTION INTRAVENOUS at 13:34

## 2022-02-04 RX ADMIN — MEROPENEM 2000 MG: 1 INJECTION, POWDER, FOR SOLUTION INTRAVENOUS at 21:17

## 2022-02-04 RX ADMIN — PANTOPRAZOLE SODIUM 40 MG: 40 INJECTION, POWDER, FOR SOLUTION INTRAVENOUS at 19:40

## 2022-02-04 RX ADMIN — ATORVASTATIN CALCIUM 40 MG: 40 TABLET, FILM COATED ORAL at 19:39

## 2022-02-04 RX ADMIN — MEROPENEM 2000 MG: 1 INJECTION, POWDER, FOR SOLUTION INTRAVENOUS at 05:31

## 2022-02-04 ASSESSMENT — PAIN SCALES - PAIN ASSESSMENT IN ADVANCED DEMENTIA (PAINAD)
BODYLANGUAGE: 0
BODYLANGUAGE: 0
CONSOLABILITY: 0
NEGVOCALIZATION: 0
FACIALEXPRESSION: 0
BREATHING: 0
NEGVOCALIZATION: 0
CONSOLABILITY: 0
TOTALSCORE: 0
TOTALSCORE: 0
FACIALEXPRESSION: 0
BREATHING: 0

## 2022-02-04 ASSESSMENT — PAIN SCALES - GENERAL: PAINLEVEL_OUTOF10: 1

## 2022-02-04 NOTE — PROGRESS NOTES
I'm at a loss here as to the plan of care for this patient after he leaves the hospital. A few things need to be clear. He came to the hospital originally because he couldn't hold on one day longer living like he was. His house was massively infested with bedbugs and was unlivable. He was malnourished and anemic when he arrived and required considerable preparation to get him ready for bypass surgery. Post coronary bypass surgery, he would eat adequately but was always withdrawn and marginally participatory in his physical rehabilitation. Upon discharge to a SNF he basically stopped eating or drinking to any meaningful degree and came to my office on a stretcher for his first post op visit severely dehydrated and malnourished to the point of being perhaps a few days away from being dead. He went from my office to the ICU. Now that he has been medically stabilized, all this seems to have been forgotten. His mental status caused his lack of desire to eat or drink to the point where it threatened his very existence. I thought inpatient psychiatry was in large part for people whose mental state posed a threat to their own safety and well-being. And that doesn't even bring into the discussion how he was existing before he came to the hospital in the first place. This man needs serious psychiatric help. 25 mg. of Zoloft through a PEG tube is hardly adequate treatment for how he presented to the hospital both initially and again the second time around. I need psychiatry to lean into this patient's care and provide a more robust and meaningful care plan for him that is commeasurrate with the illness he is clearly exhibiting.

## 2022-02-04 NOTE — PROGRESS NOTES
Office : [phone]     Fax :[phone]         Renal Progress Note  Subjective:   Admit Date: 1/26/2022     Good UO   Na in good range   On TF   Phos low       DIET Diet NPO  ADULT TUBE FEEDING; Nasogastric; Peptide Based; Continuous; 20; Yes; 10; Q 8 hours; 65; 0; Other (specify); hold water flushes x24hr; Protein; 1 protein shot BID  Medications:   Scheduled Meds:   potassium phosphate IVPB  30 mmol IntraVENous Once    [Held by provider] aspirin  324 mg Oral Daily    meropenem  2,000 mg IntraVENous Q8H    fluconazole  200 mg Oral Daily    sertraline  25 mg Oral Daily    pantoprazole  40 mg IntraVENous BID    lidocaine   Topical Once    amiodarone  200 mg Oral Daily    Arformoterol Tartrate  15 mcg Nebulization BID    atorvastatin  40 mg Oral Nightly    [Held by provider] clopidogrel  75 mg Oral Daily    [Held by provider] metoprolol tartrate  12.5 mg Oral BID    montelukast  10 mg Oral Nightly    tiotropium  2 puff Inhalation Daily    sodium chloride flush  10 mL IntraVENous 2 times per day    enoxaparin  40 mg SubCUTAneous Daily    sennosides-docusate sodium  1 tablet Oral BID     Continuous Infusions:   sodium chloride 25 mL (02/03/22 0500)       Labs:  CBC:   Recent Labs     02/02/22  0511   WBC 7.2   HGB 8.4*        BMP:    Recent Labs     02/02/22  1721 02/03/22  0654 02/04/22  0632   * 137 137   K 3.6 3.9 4.0    105 105   CO2 25 22 24   BUN 25* 31* 31*   CREATININE <0.5* 0.5* <0.5*   GLUCOSE 124* 125* 123*     Ca/Mg/Phos:   Recent Labs     02/02/22  1721 02/02/22  1721 02/03/22  0654 02/03/22  1826 02/04/22  1953 CALCIUM 8.1*  --  8.1*  --  8.5   MG 1.70*   < > 1.80 2.00 2.00   PHOS 1.3*  --  1.2*  --  1.6*    < > = values in this interval not displayed. Hepatic:   No results for input(s): AST, ALT, ALB, BILITOT, ALKPHOS in the last 72 hours. Troponin: No results for input(s): TROPONINI in the last 72 hours. BNP: No results for input(s): BNP in the last 72 hours. Lipids: No results for input(s): CHOL, TRIG, HDL, LDLCALC, LABVLDL in the last 72 hours. ABGs:   No results for input(s): PHART, PO2ART, DWW2ZFG in the last 72 hours. INR:   No results for input(s): INR in the last 72 hours. UA:  No results for input(s): Tracee Rhein, GLUCOSEU, BILIRUBINUR, KETUA, SPECGRAV, BLOODU, PHUR, PROTEINU, UROBILINOGEN, NITRU, LEUKOCYTESUR, Shaina Loud in the last 72 hours. Urine Microscopic:   No results for input(s): LABCAST, BACTERIA, COMU, HYALCAST, WBCUA, RBCUA, EPIU in the last 72 hours. Urine Culture:   No results for input(s): LABURIN in the last 72 hours. Urine Chemistry:   No results for input(s): Mare Spotted, PROTEINUR, NAUR in the last 72 hours. Objective:   Vitals: /69   Pulse 87   Temp 99.5 °F (37.5 °C) (Axillary)   Resp 18   Ht 5' 7\" (1.702 m)   Wt 154 lb 5.2 oz (70 kg)   SpO2 92%   BMI 24.17 kg/m²    Wt Readings from Last 3 Encounters:   02/04/22 154 lb 5.2 oz (70 kg)   01/10/22 155 lb 6.8 oz (70.5 kg)   07/12/18 179 lb (81.2 kg)      24HR INTAKE/OUTPUT:      Intake/Output Summary (Last 24 hours) at 2/4/2022 1202  Last data filed at 2/4/2022 2653  Gross per 24 hour   Intake    Output 1625 ml   Net -1625 ml     Constitutional:  Disoriented, nonconversational  NECK: supple, no JVD  Cardiovascular:  S1, S2 without m/r/g  Respiratory:  CTA B without w/r/r  Abdomen: +bs, soft, nt  Ext: trace LE edema    Assessment and Plan:       IMAGING:  XR CHEST PORTABLE   Final Result   1. Right IJ central line in satisfactory position in the thorax   2. Nasogastric tube in the stomach   3.  Basilar airspace disease, new from prior study      XR ABDOMEN FOR NG/OG/NE TUBE PLACEMENT   Final Result      Frontal view centered at the diaphragm demonstrates tip of NG tube in the gastric fundus. Bowel gas pattern is unremarkable. Elevated left hemidiaphragm with left basilar scarring. Sternotomy wires and left atrial appendage clip noted. XR CHEST PORTABLE   Final Result      Left pleural effusion with adjacent parenchymal consolidation, similar to January 6, 2022. CT HEAD WO CONTRAST   Final Result      1. No evidence for acute intracranial abnormality. 2.  Moderate diffuse cerebral atrophy with ventricular white matter changes bilaterally consistent with chronic small vessel ischemia. Assessment/Plan     1. DWIGHT 2/2 dehydration - resolved     2. HTN     3.  Anemia     4. Acid- base/ Electrolyte imbalance - hypernatremia        Plan:  - replaced free water deficit  - Na better   - replace K/Phos   - cont free water   - Cr better  - Hypernatremia not the cause but result of AMS and Poor po intake           Dawson Lima MD       [facility] of [address] [place] [address] - [address]

## 2022-02-04 NOTE — PROGRESS NOTES
Hospitalist Progress Note      PCP: No primary care provider on file. Date of Admission: 1/26/2022     Subjective:   Patient lethargic today  Barely following commands     -Palliative care consulted for goals of care, family wants PEG tube placed, cant do before next week due to plavix  -Febrile today, blood cultures sent   Looks SOB, given one dose of lasix as worse pleural effusion    Medications:  Reviewed    Infusion Medications    sodium chloride 25 mL (02/03/22 0500)     Scheduled Medications    potassium phosphate IVPB  30 mmol IntraVENous Once    [Held by provider] aspirin  324 mg Oral Daily    meropenem  2,000 mg IntraVENous Q8H    fluconazole  200 mg Oral Daily    sertraline  25 mg Oral Daily    pantoprazole  40 mg IntraVENous BID    lidocaine   Topical Once    amiodarone  200 mg Oral Daily    Arformoterol Tartrate  15 mcg Nebulization BID    atorvastatin  40 mg Oral Nightly    [Held by provider] clopidogrel  75 mg Oral Daily    [Held by provider] metoprolol tartrate  12.5 mg Oral BID    montelukast  10 mg Oral Nightly    tiotropium  2 puff Inhalation Daily    sodium chloride flush  10 mL IntraVENous 2 times per day    enoxaparin  40 mg SubCUTAneous Daily    sennosides-docusate sodium  1 tablet Oral BID     PRN Meds: magnesium sulfate, sodium chloride flush, sodium chloride, ondansetron **OR** ondansetron, magnesium hydroxide, acetaminophen **OR** acetaminophen      Intake/Output Summary (Last 24 hours) at 2/4/2022 1212  Last data filed at 2/4/2022 0907  Gross per 24 hour   Intake    Output 1625 ml   Net -1625 ml       Physical Exam Performed:    /69   Pulse 87   Temp 99.5 °F (37.5 °C) (Axillary)   Resp 18   Ht 5' 7\" (1.702 m)   Wt 154 lb 5.2 oz (70 kg)   SpO2 92%   BMI 24.17 kg/m²     General appearance: Ill-appearing  Respiratory:  Normal respiratory effort.  Clear to auscultation  Cardiovascular: Regular rate and rhythm  Abdomen: Soft, non-tender, non-distended Musculoskeletal: No clubbing, cyanosis or edema bilaterally. Skin: No rashes or lesions. Neurologic: Lethargic. Not following commands   Peripheral Pulses: +2 palpable, equal bilaterally       Labs:   Recent Labs     02/02/22  0511   WBC 7.2   HGB 8.4*   HCT 25.6*        Recent Labs     02/02/22  1721 02/03/22  0654 02/04/22  0632   * 137 137   K 3.6 3.9 4.0    105 105   CO2 25 22 24   BUN 25* 31* 31*   CREATININE <0.5* 0.5* <0.5*   CALCIUM 8.1* 8.1* 8.5   PHOS 1.3* 1.2* 1.6*     No results for input(s): AST, ALT, BILIDIR, BILITOT, ALKPHOS in the last 72 hours. Urinalysis:      Lab Results   Component Value Date    NITRU Negative 01/26/2022    WBCUA 10-20 01/26/2022    BACTERIA 1+ 01/26/2022    RBCUA 5-10 01/26/2022    BLOODU SMALL 01/26/2022    SPECGRAV >=1.030 01/26/2022    GLUCOSEU Negative 01/26/2022       Assessment/Plan:    Shock likely hypovolemic versus sepsis secondary to potential pneumonia including HCAP/aspiration  Status post pressors and IVF  Off abx    DWIGHT/hypernatremia secondary to dehydration from poor oral intake  Monitor creatinine and sodium, improved  Status post IV fluids  Cont free water with TF  Nephrology following, appreciate recs    CAD s/p CABG 01/03/22  Continue aspirin and Plavix and statin  Holding beta-blocker due to low blood pressure  CT surgery following, appreciate recommendation    ABeltran  Fib  Continue telemetry, currently sinus rhythm  Continue amiodarone   Metoprolol on hold due to low blood pressure    COPD  Continue inhalers and breathing treatment    Depression  Continue home meds  Psych consulted, started on Zoloft    Severe malnutrition/failure to thrive:  Continue tube feeds  Follow dietary recs  GI consulted for peg tube , need to be off plavix and asa for 5 days, I.e till 2/5      DVT Prophylaxis: Lovenox  Diet: Diet NPO  ADULT TUBE FEEDING; Nasogastric; Peptide Based; Continuous; 20; Yes; 10; Q 8 hours; 65; 0; Other (specify); hold water flushes x24hr; Protein; 1 protein shot BID  Code Status: Full Code    PT/OT Eval Status: Once clinically stable    Dispo -pending clinical course    Herlinda Zee MD

## 2022-02-04 NOTE — CONSULTS
Psychiatry Follow-Up Consultation    Patient Name: Denny Croft  MRN: [id number]  Admission Date: 1/26/2022    Reason for Consult: Repeat consult for life threatening depression; I do not agree with his post discharge treatment recommendation    Patient's chart was reviewed, case was discussed with nursing/OT/RT staff, and collaborated with  about the treatment plan. Reconsulted by Dr. Ander Sawant. Per Dr. Ander Sawant note 02/03:  I'm at a loss here as to the plan of care for this patient after he leaves the hospital. A few things need to be clear. He came to the hospital originally because he couldn't hold on one day longer living like he was. His house was massively infested with bedbugs and was unlivable. He was malnourished and anemic when he arrived and required considerable preparation to get him ready for bypass surgery. Post coronary bypass surgery, he would eat adequately but was always withdrawn and marginally participatory in his physical rehabilitation. Upon discharge to a SNF he basically stopped eating or drinking to any meaningful degree and came to my office on a stretcher for his first post op visit severely dehydrated and malnourished to the point of being perhaps a few days away from being dead. He went from my office to the ICU. Now that he has been medically stabilized, all this seems to have been forgotten. His mental status caused his lack of desire to eat or drink to the point where it threatened his very existence. I thought inpatient psychiatry was in large part for people whose mental state posed a threat to their own safety and well-being. And that doesn't even bring into the discussion how he was existing before he came to the hospital in the first place. This man needs serious psychiatric help. 25 mg. of Zoloft through a PEG tube is hardly adequate treatment for how he presented to the hospital both initially and again the second time around.  I need psychiatry to lean into this patient's care and provide a more robust and meaningful care plan for him that is commeasurrate with the illness he is clearly exhibiting. Psychiatry was initially consulted on 01/30/2022 and followed up 02/02/2022. When I met with him, reviewed his chart, and discussed case with his sister, he was noted with acute onset of change in presentation (post-coronary bypass surgery) with fluctuating levels of orientation and, with the addition of his other symptoms, were consistent with hypoactive delirium. Chart review indicated that on 01/09, prior to his discharge to SNF, he had bouts of confusion and varying levels of orientation. While he did present with some decreased interactions with his environment when I initially met with him, he would at times talk and did not present with any posturing, negativism, or wavy flexibility at that time and I did not suspect catatonia. Sister did state that he does not have a psychiatric history but she believes he has some depression and anxiety 2/2 current medical issues. She states he did not present with any change in behaviors or verbalize any passive or active suicidal ideations. She noted that he has been exhibiting hoarding behaviors for the past 6+ years and we discussed that these will need to be addressed with outpatient therapy and she verbalized understanding. He was started on Zoloft 25 mg daily, which is a usual starting dose for someone of this age with depression and/or anxiety, however, it can take 4-6 weeks for this medication to become effective. Met with Saundra Brown. He appeared somnolent but did respond to his name. Was able to identify that he is in the hospital but did not respond to other orientation questions. He did squeeze my fingers when asked. His upper bilateral extremities were stiff, which is different than when I saw him on the 01/30/2022. Will try Ativan challenge to rule out possible catatonia.      Ativan challenge:  · First dose of Ativan stated age, in bed, wearing hospital attire, disheveled  Behavior/Attitude Toward Examiner: Difficult to assess due to somnolence  Speech: Poverty of speech, difficult to understand at times due to mumbling   Mood: Did not verbalize mood when asked  Affect: Flat  Thought Processes: Difficult to assess due to somnolence  Thought Content: Difficult to assess due to somnolence  Perceptions: Difficult to assess due to somnolence  Attention: Difficult to assess due to somnolence  Cognition: Responded to name and able to identify he is at [facility]. but did not respond to other orientation questions   Insight: Difficult to assess due to somnolence  Judgment: Difficult to assess due to somnolence    LAB: Reviewed labs from last 24 hours      Dx:   Primary Psychiatric (DSM V) Diagnosis: Altered mental status (r/o hypoactive delirium)  Secondary Psychiatric (DSM V) Diagnoses: None   Chemical Dependency Diagnoses: None     Assessment  Reviewed nursing and ancillary staff notes since arrival to hospital, reviewed previous records and records available through [facility]. Evaluated medications and assessed for side effects and effectiveness. Assessed patient's educational needs including reviewing plan of care, medications, and diagnosis. Recommendations:    1. Spoke to collaborating physician regarding case. 2. Chest x-ray today indicates worsening bilateral airspace opacities, suspected L pleural effusion. He has been febrile. 3. Continue to suspect that current presentation is influenced by hypoactive delirium as he has the following symptoms during this admission: disturbance in attention, awareness, cognition, varying levels of orientation. Chart review also indicated bouts of confusion and varying levels of orientation prior to his discharge in January, after his surgery.   4. Ativan trial negative so the likelihood for catatonia is less, however, will try low-dose scheduled Ativan over the weekend to see if this has any improvement in his symptoms. 5. Discussed with Dr. Mikey Cabrera who noted concerns for underlying psychiatric issues that impact his ability to care for self outside of the hospital. History has been limited from patient; please see note from 01/31 for collateral obtained from sister. 6. Spoke to Dr. Ceci Solitario regarding case. 7. Will follow case. I am not here over the weekend but will follow up with him on Monday. Total face to face time with patient was 45 minutes and more than 50% of that time was spent counseling the patient on their symptoms, treatment and expected goals.     Rica Smith, MPH, PMHNP-BC  02/04/22

## 2022-02-04 NOTE — PLAN OF CARE
Problem: Falls - Risk of:  Goal: Will remain free from falls  Description: Will remain free from falls  Outcome: Ongoing  Note: Patient at risk for falls. Patient resting quietly in bed. Side rails up x 3/4. Bed locked in lowest position. Bed alarm on. Bedside table and call light within reach. Patient instructed to call for assistance. Patient verbalized understanding. Will continue to monitor. Problem: OXYGENATION/RESPIRATORY FUNCTION  Goal: Patient will maintain patent airway  Outcome: Ongoing  Note: Pt 92% on 1L NC. Crackles to R base. Nonproductive, moist cough. Problem: Mental Status - Impaired:  Goal: Mental status will be restored to baseline  Description: Mental status will be restored to baseline  Outcome: Ongoing  Note: Pt lethargic and mostly nonverbal. Pt nods appropriately at times. Problem: Skin Integrity - Impaired:  Goal: Will show no infection signs and symptoms  Description: Will show no infection signs and symptoms  Outcome: Ongoing  Note: Pt has unstageable ulcer to sacrum. Dressing changed this morning with alginate and mepilex. Assist with repositioning. Incontinence care provided as needed. Diamond in place.

## 2022-02-05 LAB
ALBUMIN SERPL-MCNC: 2.4 G/DL (ref 3.4–5)
ALBUMIN SERPL-MCNC: 2.6 G/DL (ref 3.4–5)
ALP BLD-CCNC: 209 U/L (ref 40–129)
ALT SERPL-CCNC: 222 U/L (ref 10–40)
ANION GAP SERPL CALCULATED.3IONS-SCNC: 7 MMOL/L (ref 3–16)
AST SERPL-CCNC: 131 U/L (ref 15–37)
BILIRUB SERPL-MCNC: <0.2 MG/DL (ref 0–1)
BILIRUBIN DIRECT: <0.2 MG/DL (ref 0–0.3)
BILIRUBIN, INDIRECT: ABNORMAL MG/DL (ref 0–1)
BLOOD CULTURE, ROUTINE: NORMAL
BUN BLDV-MCNC: 31 MG/DL (ref 7–20)
C-REACTIVE PROTEIN: 173 MG/L (ref 0–5.1)
CALCIUM SERPL-MCNC: 8.4 MG/DL (ref 8.3–10.6)
CHLORIDE BLD-SCNC: 104 MMOL/L (ref 99–110)
CO2: 27 MMOL/L (ref 21–32)
CREAT SERPL-MCNC: <0.5 MG/DL (ref 0.8–1.3)
CULTURE, BLOOD 2: NORMAL
GFR AFRICAN AMERICAN: >60
GFR NON-AFRICAN AMERICAN: >60
GLUCOSE BLD-MCNC: 122 MG/DL (ref 70–99)
GLUCOSE BLD-MCNC: 127 MG/DL (ref 70–99)
GLUCOSE BLD-MCNC: 131 MG/DL (ref 70–99)
GLUCOSE BLD-MCNC: 132 MG/DL (ref 70–99)
GLUCOSE BLD-MCNC: 142 MG/DL (ref 70–99)
MAGNESIUM: 1.8 MG/DL (ref 1.8–2.4)
PERFORMED ON: ABNORMAL
PHOSPHORUS: 1.7 MG/DL (ref 2.5–4.9)
POTASSIUM SERPL-SCNC: 4.1 MMOL/L (ref 3.5–5.1)
PRO-BNP: 5079 PG/ML (ref 0–124)
PROCALCITONIN: 0.11 NG/ML (ref 0–0.15)
SEDIMENTATION RATE, ERYTHROCYTE: 112 MM/HR (ref 0–20)
SODIUM BLD-SCNC: 138 MMOL/L (ref 136–145)
TOTAL PROTEIN: 5.7 G/DL (ref 6.4–8.2)

## 2022-02-05 PROCEDURE — 80069 RENAL FUNCTION PANEL: CPT

## 2022-02-05 PROCEDURE — 2580000003 HC RX 258: Performed by: HOSPITALIST

## 2022-02-05 PROCEDURE — U0003 INFECTIOUS AGENT DETECTION BY NUCLEIC ACID (DNA OR RNA); SEVERE ACUTE RESPIRATORY SYNDROME CORONAVIRUS 2 (SARS-COV-2) (CORONAVIRUS DISEASE [COVID-19]), AMPLIFIED PROBE TECHNIQUE, MAKING USE OF HIGH THROUGHPUT TECHNOLOGIES AS DESCRIBED BY CMS-2020-01-R: HCPCS

## 2022-02-05 PROCEDURE — 6370000000 HC RX 637 (ALT 250 FOR IP)

## 2022-02-05 PROCEDURE — 6370000000 HC RX 637 (ALT 250 FOR IP): Performed by: INTERNAL MEDICINE

## 2022-02-05 PROCEDURE — 1200000000 HC SEMI PRIVATE

## 2022-02-05 PROCEDURE — 2580000003 HC RX 258: Performed by: INTERNAL MEDICINE

## 2022-02-05 PROCEDURE — 6360000002 HC RX W HCPCS: Performed by: STUDENT IN AN ORGANIZED HEALTH CARE EDUCATION/TRAINING PROGRAM

## 2022-02-05 PROCEDURE — 36415 COLL VENOUS BLD VENIPUNCTURE: CPT

## 2022-02-05 PROCEDURE — 6360000002 HC RX W HCPCS: Performed by: INTERNAL MEDICINE

## 2022-02-05 PROCEDURE — 2700000000 HC OXYGEN THERAPY PER DAY

## 2022-02-05 PROCEDURE — 2500000003 HC RX 250 WO HCPCS: Performed by: HOSPITALIST

## 2022-02-05 PROCEDURE — 99232 SBSQ HOSP IP/OBS MODERATE 35: CPT | Performed by: HOSPITALIST

## 2022-02-05 PROCEDURE — 93005 ELECTROCARDIOGRAM TRACING: CPT | Performed by: INTERNAL MEDICINE

## 2022-02-05 PROCEDURE — 2580000003 HC RX 258: Performed by: STUDENT IN AN ORGANIZED HEALTH CARE EDUCATION/TRAINING PROGRAM

## 2022-02-05 PROCEDURE — 94761 N-INVAS EAR/PLS OXIMETRY MLT: CPT

## 2022-02-05 PROCEDURE — 84145 PROCALCITONIN (PCT): CPT

## 2022-02-05 PROCEDURE — 80076 HEPATIC FUNCTION PANEL: CPT

## 2022-02-05 PROCEDURE — 83880 ASSAY OF NATRIURETIC PEPTIDE: CPT

## 2022-02-05 PROCEDURE — 83735 ASSAY OF MAGNESIUM: CPT

## 2022-02-05 PROCEDURE — 6370000000 HC RX 637 (ALT 250 FOR IP): Performed by: NURSE PRACTITIONER

## 2022-02-05 PROCEDURE — C9113 INJ PANTOPRAZOLE SODIUM, VIA: HCPCS | Performed by: STUDENT IN AN ORGANIZED HEALTH CARE EDUCATION/TRAINING PROGRAM

## 2022-02-05 PROCEDURE — 86140 C-REACTIVE PROTEIN: CPT

## 2022-02-05 PROCEDURE — 6370000000 HC RX 637 (ALT 250 FOR IP): Performed by: STUDENT IN AN ORGANIZED HEALTH CARE EDUCATION/TRAINING PROGRAM

## 2022-02-05 PROCEDURE — U0005 INFEC AGEN DETEC AMPLI PROBE: HCPCS

## 2022-02-05 PROCEDURE — 85652 RBC SED RATE AUTOMATED: CPT

## 2022-02-05 RX ORDER — FUROSEMIDE 10 MG/ML
20 INJECTION INTRAMUSCULAR; INTRAVENOUS ONCE
Status: COMPLETED | OUTPATIENT
Start: 2022-02-05 | End: 2022-02-05

## 2022-02-05 RX ORDER — FUROSEMIDE 10 MG/ML
20 INJECTION INTRAMUSCULAR; INTRAVENOUS 2 TIMES DAILY
Status: DISCONTINUED | OUTPATIENT
Start: 2022-02-05 | End: 2022-02-07 | Stop reason: HOSPADM

## 2022-02-05 RX ADMIN — SERTRALINE HYDROCHLORIDE 25 MG: 25 TABLET ORAL at 09:33

## 2022-02-05 RX ADMIN — ASPIRIN 324 MG: 81 TABLET, CHEWABLE ORAL at 09:35

## 2022-02-05 RX ADMIN — SODIUM CHLORIDE, PRESERVATIVE FREE 10 ML: 5 INJECTION INTRAVENOUS at 21:12

## 2022-02-05 RX ADMIN — FLUCONAZOLE 200 MG: 200 TABLET ORAL at 09:33

## 2022-02-05 RX ADMIN — POTASSIUM PHOSPHATE, MONOBASIC AND POTASSIUM PHOSPHATE, DIBASIC 10 MMOL: 224; 236 INJECTION, SOLUTION, CONCENTRATE INTRAVENOUS at 14:19

## 2022-02-05 RX ADMIN — SODIUM CHLORIDE, PRESERVATIVE FREE 10 ML: 5 INJECTION INTRAVENOUS at 09:34

## 2022-02-05 RX ADMIN — ENOXAPARIN SODIUM 40 MG: 100 INJECTION SUBCUTANEOUS at 09:33

## 2022-02-05 RX ADMIN — SENNOSIDES AND DOCUSATE SODIUM 1 TABLET: 50; 8.6 TABLET ORAL at 21:11

## 2022-02-05 RX ADMIN — PANTOPRAZOLE SODIUM 40 MG: 40 INJECTION, POWDER, FOR SOLUTION INTRAVENOUS at 21:12

## 2022-02-05 RX ADMIN — FUROSEMIDE 20 MG: 10 INJECTION, SOLUTION INTRAMUSCULAR; INTRAVENOUS at 18:26

## 2022-02-05 RX ADMIN — AMIODARONE HYDROCHLORIDE 200 MG: 200 TABLET ORAL at 09:33

## 2022-02-05 RX ADMIN — ATORVASTATIN CALCIUM 40 MG: 40 TABLET, FILM COATED ORAL at 21:11

## 2022-02-05 RX ADMIN — FUROSEMIDE 20 MG: 10 INJECTION, SOLUTION INTRAMUSCULAR; INTRAVENOUS at 14:25

## 2022-02-05 RX ADMIN — PANTOPRAZOLE SODIUM 40 MG: 40 INJECTION, POWDER, FOR SOLUTION INTRAVENOUS at 09:33

## 2022-02-05 RX ADMIN — MEROPENEM 2000 MG: 1 INJECTION, POWDER, FOR SOLUTION INTRAVENOUS at 05:56

## 2022-02-05 RX ADMIN — MONTELUKAST 10 MG: 10 TABLET, FILM COATED ORAL at 21:11

## 2022-02-05 RX ADMIN — LORAZEPAM 0.5 MG: 0.5 TABLET ORAL at 09:33

## 2022-02-05 RX ADMIN — METOPROLOL TARTRATE 12.5 MG: 25 TABLET, FILM COATED ORAL at 21:11

## 2022-02-05 ASSESSMENT — PAIN SCALES - PAIN ASSESSMENT IN ADVANCED DEMENTIA (PAINAD)
NEGVOCALIZATION: 0
NEGVOCALIZATION: 0
TOTALSCORE: 0
BODYLANGUAGE: 0
FACIALEXPRESSION: 0
TOTALSCORE: 0
CONSOLABILITY: 0
BODYLANGUAGE: 0
BREATHING: 0
TOTALSCORE: 0
FACIALEXPRESSION: 0
BREATHING: 0
BREATHING: 0
FACIALEXPRESSION: 0
BODYLANGUAGE: 0
NEGVOCALIZATION: 0

## 2022-02-05 NOTE — PROGRESS NOTES
Hospitalist Progress Note      PCP: No primary care provider on file. Date of Admission: 1/26/2022     Subjective:   Patient lethargic today  Following any commands  Sister at bedside  Patient is febrile, he is having some rhythmic jerking after every few breaths  CRP is elevated  Procalcitonin again came low, will stop antibiotics  Liver enzymes are elevated, will stop antifungal    Had a long discussion with sister, she is open to considering hospice as patient is not improving.   She needs more time to discuss CODE STATUS    Chest x-ray yesterday with worsening airspace disease, send Covid    Medications:  Reviewed    Infusion Medications    sodium chloride 25 mL (02/03/22 0500)     Scheduled Medications    LORazepam  0.5 mg Oral BID    aspirin  324 mg Oral Daily    meropenem  2,000 mg IntraVENous Q8H    fluconazole  200 mg Oral Daily    sertraline  25 mg Oral Daily    pantoprazole  40 mg IntraVENous BID    lidocaine   Topical Once    amiodarone  200 mg Oral Daily    Arformoterol Tartrate  15 mcg Nebulization BID    atorvastatin  40 mg Oral Nightly    [Held by provider] clopidogrel  75 mg Oral Daily    [Held by provider] metoprolol tartrate  12.5 mg Oral BID    montelukast  10 mg Oral Nightly    tiotropium  2 puff Inhalation Daily    sodium chloride flush  10 mL IntraVENous 2 times per day    enoxaparin  40 mg SubCUTAneous Daily    sennosides-docusate sodium  1 tablet Oral BID     PRN Meds: magnesium sulfate, sodium chloride flush, sodium chloride, ondansetron **OR** ondansetron, magnesium hydroxide, acetaminophen **OR** acetaminophen      Intake/Output Summary (Last 24 hours) at 2/5/2022 1031  Last data filed at 2/5/2022 0601  Gross per 24 hour   Intake 1337 ml   Output 1950 ml   Net -613 ml       Physical Exam Performed:    /74   Pulse 92   Temp 100.8 °F (38.2 °C) (Axillary)   Resp 24   Ht 5' 7\" (1.702 m)   Wt 151 lb 7.3 oz (68.7 kg)   SpO2 90%   BMI 23.72 kg/m² General appearance: Ill-appearing  Respiratory:  Normal respiratory effort. Clear to auscultation  Cardiovascular: Regular rate and rhythm  Abdomen: Soft, non-tender, non-distended   Musculoskeletal: No clubbing, cyanosis or edema bilaterally. Skin: No rashes or lesions. Neurologic: Lethargic. Not following commands   Peripheral Pulses: +2 palpable, equal bilaterally       Labs:   No results for input(s): WBC, HGB, HCT, PLT in the last 72 hours. Recent Labs     02/03/22  0654 02/04/22  0632 02/05/22  0749    137 138   K 3.9 4.0 4.1    105 104   CO2 22 24 27   BUN 31* 31* 31*   CREATININE 0.5* <0.5* <0.5*   CALCIUM 8.1* 8.5 8.4   PHOS 1.2* 1.6* 1.7*     No results for input(s): AST, ALT, BILIDIR, BILITOT, ALKPHOS in the last 72 hours. Urinalysis:      Lab Results   Component Value Date    NITRU Negative 01/26/2022    WBCUA 10-20 01/26/2022    BACTERIA 1+ 01/26/2022    RBCUA 5-10 01/26/2022    BLOODU SMALL 01/26/2022    SPECGRAV >=1.030 01/26/2022    GLUCOSEU Negative 01/26/2022       Assessment/Plan:    1. Shock likely hypovolemic versus sepsis secondary to potential pneumonia including HCAP/aspiration  Status post pressors and IVF  Off abx now    2. DWIGHT/hypernatremia secondary to dehydration from poor oral intake  Monitor creatinine and sodium, improved  Status post IV fluids  Cont free water with TF  Nephrology following, appreciate recs    3. CAD s/p CABG 01/03/22  Continue aspirin and Plavix and statin  Holding beta-blocker due to low blood pressure  CT surgery following, appreciate recommendation    4. A. Fib  Continue telemetry, currently sinus rhythm  Continue amiodarone   Metoprolol on hold due to low blood pressure    5.  COPD  Continue inhalers and breathing treatment    Depression  Continue home meds  Psych consulted, started on Zoloft  Please refer to psych note from 2/4, patient was given a Ativan challenge to rule out catatonic depression, negative for catatonia, rather patient became drowsy    Severe malnutrition/failure to thrive:  Continue tube feeds  Follow dietary recs  GI consulted for peg tube , need to be off plavix and asa for 5 days, I.e till 2/5    Respiratory infiltrates:-Pulmonary edema versus Covid infection? Breathing treatment  Labs as above  Wean oxygen as tolerated    Fever, continuous, etiology unclear  Blood cultures repeat negative  High inflammatory marker CRP, trial of steroid?,  Chronic inflammatory state? Polypharmacy?,  SSRI?   Stop antibiotics    Elevated LFTs:-Likely multifactorial,  hepatic congestion versus meds I.e  antifungal , SSRI , statin  Stop Diflucan  Cautious use of Tylenol  Trend daily        DVT Prophylaxis: Lovenox  Diet: Diet NPO  ADULT TUBE FEEDING; Nasogastric; Peptide Based; Continuous; 20; Yes; 10; Q 8 hours; 65; 0; Other (specify); hold water flushes x24hr; Protein; 1 protein shot BID  Code Status: Full Code    PT/OT Eval Status: Once clinically stable    Dispo -guarded prognosis, worsening respiratory status    Deepa Jiménez MD

## 2022-02-05 NOTE — PROGRESS NOTES
Office : [phone]     Fax :[phone]         Renal Progress Note  Subjective:   Admit Date: 1/26/2022     Sleepy  Good UO   No shortness of breath           DIET Diet NPO  ADULT TUBE FEEDING; Nasogastric; Peptide Based; Continuous; 20; Yes; 10; Q 8 hours; 65; 0; Other (specify); hold water flushes x24hr; Protein; 1 protein shot BID  Medications:   Scheduled Meds:   LORazepam  0.5 mg Oral BID    aspirin  324 mg Oral Daily    meropenem  2,000 mg IntraVENous Q8H    fluconazole  200 mg Oral Daily    sertraline  25 mg Oral Daily    pantoprazole  40 mg IntraVENous BID    lidocaine   Topical Once    amiodarone  200 mg Oral Daily    Arformoterol Tartrate  15 mcg Nebulization BID    atorvastatin  40 mg Oral Nightly    [Held by provider] clopidogrel  75 mg Oral Daily    [Held by provider] metoprolol tartrate  12.5 mg Oral BID    montelukast  10 mg Oral Nightly    tiotropium  2 puff Inhalation Daily    sodium chloride flush  10 mL IntraVENous 2 times per day    enoxaparin  40 mg SubCUTAneous Daily    sennosides-docusate sodium  1 tablet Oral BID     Continuous Infusions:   sodium chloride 25 mL (02/03/22 0500)       Labs:  CBC:   No results for input(s): WBC, HGB, PLT in the last 72 hours.   BMP:    Recent Labs     02/02/22  1721 02/03/22  0654 02/04/22  0632   * 137 137   K 3.6 3.9 4.0    105 105   CO2 25 22 24   BUN 25* 31* 31*   CREATININE <0.5* 0.5* <0.5*   GLUCOSE 124* 125* 123*     Ca/Mg/Phos:   Recent Labs     02/02/22  1721 02/02/22  1721 02/03/22  0654 02/03/22  0654 02/03/22  1826 02/04/22  0632 02/04/22  1726   CALCIUM 8.1*  --  8.1* --   --  8.5  --    MG 1.70*   < > 1.80   < > 2.00 2.00 2.10   PHOS 1.3*  --  1.2*  --   --  1.6*  --     < > = values in this interval not displayed. Hepatic:   No results for input(s): AST, ALT, ALB, BILITOT, ALKPHOS in the last 72 hours. Troponin: No results for input(s): TROPONINI in the last 72 hours. BNP: No results for input(s): BNP in the last 72 hours. Lipids: No results for input(s): CHOL, TRIG, HDL, LDLCALC, LABVLDL in the last 72 hours. ABGs:   No results for input(s): PHART, PO2ART, USW4VGU in the last 72 hours. INR:   No results for input(s): INR in the last 72 hours. UA:  No results for input(s): Mykel Ding, GLUCOSEU, BILIRUBINUR, KETUA, SPECGRAV, BLOODU, PHUR, PROTEINU, UROBILINOGEN, NITRU, LEUKOCYTESUR, Ernesto Laity in the last 72 hours. Urine Microscopic:   No results for input(s): LABCAST, BACTERIA, COMU, HYALCAST, WBCUA, RBCUA, EPIU in the last 72 hours. Urine Culture:   No results for input(s): LABURIN in the last 72 hours. Urine Chemistry:   No results for input(s): Mccarty Kinds, PROTEINUR, NAUR in the last 72 hours. Objective:   Vitals: /74   Pulse 92   Temp 100.8 °F (38.2 °C) (Axillary)   Resp 24   Ht 5' 7\" (1.702 m)   Wt 151 lb 7.3 oz (68.7 kg)   SpO2 90%   BMI 23.72 kg/m²    Wt Readings from Last 3 Encounters:   02/05/22 151 lb 7.3 oz (68.7 kg)   01/10/22 155 lb 6.8 oz (70.5 kg)   07/12/18 179 lb (81.2 kg)      24HR INTAKE/OUTPUT:      Intake/Output Summary (Last 24 hours) at 2/5/2022 9916  Last data filed at 2/5/2022 0601  Gross per 24 hour   Intake 1337 ml   Output 2200 ml   Net -863 ml     Constitutional:  resting  NECK: supple, no JVD  Cardiovascular:  S1, S2 without m/r/g  Respiratory:  CTA B without w/r/r  Abdomen: +bs, soft, nt  Ext: trace LE edema    Assessment and Plan:       IMAGING:  XR CHEST PORTABLE   Final Result   1. Worsening bilateral airspace opacities. 2.  Suspected left pleural effusion.       XR CHEST PORTABLE   Final Result   1. Right IJ central line in satisfactory position in the thorax   2. Nasogastric tube in the stomach   3. Basilar airspace disease, new from prior study      XR ABDOMEN FOR NG/OG/NE TUBE PLACEMENT   Final Result      Frontal view centered at the diaphragm demonstrates tip of NG tube in the gastric fundus. Bowel gas pattern is unremarkable. Elevated left hemidiaphragm with left basilar scarring. Sternotomy wires and left atrial appendage clip noted. XR CHEST PORTABLE   Final Result      Left pleural effusion with adjacent parenchymal consolidation, similar to January 6, 2022. CT HEAD WO CONTRAST   Final Result      1. No evidence for acute intracranial abnormality. 2.  Moderate diffuse cerebral atrophy with ventricular white matter changes bilaterally consistent with chronic small vessel ischemia. Assessment/Plan     1. DWIGHT 2/2 dehydration - resolved     2. HTN     3.  Anemia     4. Acid- base/ Electrolyte imbalance - hypernatremia        Plan:  - replace free water deficit as needed  - Na better   - replace K/Phos    - Cr, Na better        Gabriel Mckee MD       [facility] [address] [place] [address] 18 07

## 2022-02-05 NOTE — PROGRESS NOTES
Discussed care plan with pt's sister and also discussed pt to be swabbed for covid and cannot have a visitor till negative test result- she verbalized understanding.

## 2022-02-06 LAB
ALBUMIN SERPL-MCNC: 2.3 G/DL (ref 3.4–5)
ALP BLD-CCNC: 227 U/L (ref 40–129)
ALT SERPL-CCNC: 242 U/L (ref 10–40)
ANION GAP SERPL CALCULATED.3IONS-SCNC: 7 MMOL/L (ref 3–16)
AST SERPL-CCNC: 120 U/L (ref 15–37)
BILIRUB SERPL-MCNC: 0.4 MG/DL (ref 0–1)
BILIRUBIN DIRECT: <0.2 MG/DL (ref 0–0.3)
BILIRUBIN, INDIRECT: ABNORMAL MG/DL (ref 0–1)
BUN BLDV-MCNC: 37 MG/DL (ref 7–20)
CALCIUM SERPL-MCNC: 8.5 MG/DL (ref 8.3–10.6)
CHLORIDE BLD-SCNC: 103 MMOL/L (ref 99–110)
CO2: 29 MMOL/L (ref 21–32)
CREAT SERPL-MCNC: 0.5 MG/DL (ref 0.8–1.3)
EKG ATRIAL RATE: 82 BPM
EKG DIAGNOSIS: NORMAL
EKG P AXIS: -7 DEGREES
EKG P-R INTERVAL: 126 MS
EKG Q-T INTERVAL: 422 MS
EKG QRS DURATION: 144 MS
EKG QTC CALCULATION (BAZETT): 493 MS
EKG R AXIS: 36 DEGREES
EKG T AXIS: 43 DEGREES
EKG VENTRICULAR RATE: 82 BPM
GFR AFRICAN AMERICAN: >60
GFR NON-AFRICAN AMERICAN: >60
GLUCOSE BLD-MCNC: 122 MG/DL (ref 70–99)
GLUCOSE BLD-MCNC: 126 MG/DL (ref 70–99)
GLUCOSE BLD-MCNC: 132 MG/DL (ref 70–99)
GLUCOSE BLD-MCNC: 143 MG/DL (ref 70–99)
GLUCOSE BLD-MCNC: 154 MG/DL (ref 70–99)
PERFORMED ON: ABNORMAL
PHOSPHORUS: 2.1 MG/DL (ref 2.5–4.9)
POTASSIUM SERPL-SCNC: 3.9 MMOL/L (ref 3.5–5.1)
SODIUM BLD-SCNC: 139 MMOL/L (ref 136–145)
TOTAL PROTEIN: 5.4 G/DL (ref 6.4–8.2)

## 2022-02-06 PROCEDURE — 6370000000 HC RX 637 (ALT 250 FOR IP): Performed by: NURSE PRACTITIONER

## 2022-02-06 PROCEDURE — 2580000003 HC RX 258: Performed by: STUDENT IN AN ORGANIZED HEALTH CARE EDUCATION/TRAINING PROGRAM

## 2022-02-06 PROCEDURE — 6360000002 HC RX W HCPCS: Performed by: STUDENT IN AN ORGANIZED HEALTH CARE EDUCATION/TRAINING PROGRAM

## 2022-02-06 PROCEDURE — 36415 COLL VENOUS BLD VENIPUNCTURE: CPT

## 2022-02-06 PROCEDURE — 2700000000 HC OXYGEN THERAPY PER DAY

## 2022-02-06 PROCEDURE — 94640 AIRWAY INHALATION TREATMENT: CPT

## 2022-02-06 PROCEDURE — 80076 HEPATIC FUNCTION PANEL: CPT

## 2022-02-06 PROCEDURE — 99232 SBSQ HOSP IP/OBS MODERATE 35: CPT | Performed by: HOSPITALIST

## 2022-02-06 PROCEDURE — 6360000002 HC RX W HCPCS: Performed by: INTERNAL MEDICINE

## 2022-02-06 PROCEDURE — 94760 N-INVAS EAR/PLS OXIMETRY 1: CPT

## 2022-02-06 PROCEDURE — 6370000000 HC RX 637 (ALT 250 FOR IP): Performed by: STUDENT IN AN ORGANIZED HEALTH CARE EDUCATION/TRAINING PROGRAM

## 2022-02-06 PROCEDURE — 1200000000 HC SEMI PRIVATE

## 2022-02-06 PROCEDURE — 80069 RENAL FUNCTION PANEL: CPT

## 2022-02-06 PROCEDURE — C9113 INJ PANTOPRAZOLE SODIUM, VIA: HCPCS | Performed by: STUDENT IN AN ORGANIZED HEALTH CARE EDUCATION/TRAINING PROGRAM

## 2022-02-06 PROCEDURE — 93010 ELECTROCARDIOGRAM REPORT: CPT | Performed by: INTERNAL MEDICINE

## 2022-02-06 PROCEDURE — 6370000000 HC RX 637 (ALT 250 FOR IP): Performed by: INTERNAL MEDICINE

## 2022-02-06 RX ADMIN — FUROSEMIDE 20 MG: 10 INJECTION, SOLUTION INTRAMUSCULAR; INTRAVENOUS at 17:21

## 2022-02-06 RX ADMIN — SERTRALINE HYDROCHLORIDE 25 MG: 25 TABLET ORAL at 09:46

## 2022-02-06 RX ADMIN — ATORVASTATIN CALCIUM 40 MG: 40 TABLET, FILM COATED ORAL at 21:58

## 2022-02-06 RX ADMIN — ARFORMOTEROL TARTRATE 15 MCG: 15 SOLUTION RESPIRATORY (INHALATION) at 08:03

## 2022-02-06 RX ADMIN — SODIUM CHLORIDE, PRESERVATIVE FREE 10 ML: 5 INJECTION INTRAVENOUS at 09:47

## 2022-02-06 RX ADMIN — SENNOSIDES AND DOCUSATE SODIUM 1 TABLET: 50; 8.6 TABLET ORAL at 21:58

## 2022-02-06 RX ADMIN — MONTELUKAST 10 MG: 10 TABLET, FILM COATED ORAL at 21:58

## 2022-02-06 RX ADMIN — ENOXAPARIN SODIUM 40 MG: 100 INJECTION SUBCUTANEOUS at 09:46

## 2022-02-06 RX ADMIN — ASPIRIN 324 MG: 81 TABLET, CHEWABLE ORAL at 09:46

## 2022-02-06 RX ADMIN — PANTOPRAZOLE SODIUM 40 MG: 40 INJECTION, POWDER, FOR SOLUTION INTRAVENOUS at 21:58

## 2022-02-06 RX ADMIN — ACETAMINOPHEN 650 MG: 325 TABLET ORAL at 16:45

## 2022-02-06 RX ADMIN — PANTOPRAZOLE SODIUM 40 MG: 40 INJECTION, POWDER, FOR SOLUTION INTRAVENOUS at 09:46

## 2022-02-06 RX ADMIN — AMIODARONE HYDROCHLORIDE 200 MG: 200 TABLET ORAL at 09:46

## 2022-02-06 RX ADMIN — FUROSEMIDE 20 MG: 10 INJECTION, SOLUTION INTRAMUSCULAR; INTRAVENOUS at 09:46

## 2022-02-06 RX ADMIN — SODIUM CHLORIDE, PRESERVATIVE FREE 10 ML: 5 INJECTION INTRAVENOUS at 22:03

## 2022-02-06 ASSESSMENT — PAIN SCALES - PAIN ASSESSMENT IN ADVANCED DEMENTIA (PAINAD)
TOTALSCORE: 0
BREATHING: 0
FACIALEXPRESSION: 0
TOTALSCORE: 0
NEGVOCALIZATION: 0
BREATHING: 0
NEGVOCALIZATION: 0
BODYLANGUAGE: 0
TOTALSCORE: 0
BREATHING: 0
BODYLANGUAGE: 0
NEGVOCALIZATION: 0
BREATHING: 0
BODYLANGUAGE: 0
CONSOLABILITY: 0
BODYLANGUAGE: 0
BODYLANGUAGE: 0
BREATHING: 0
NEGVOCALIZATION: 0
CONSOLABILITY: 0
FACIALEXPRESSION: 0
FACIALEXPRESSION: 0
BODYLANGUAGE: 0
TOTALSCORE: 0
NEGVOCALIZATION: 0
NEGVOCALIZATION: 0
CONSOLABILITY: 0
FACIALEXPRESSION: 0
FACIALEXPRESSION: 0
CONSOLABILITY: 0
FACIALEXPRESSION: 0
BREATHING: 0
CONSOLABILITY: 0
BODYLANGUAGE: 0
CONSOLABILITY: 0
CONSOLABILITY: 0
FACIALEXPRESSION: 0
BREATHING: 0
TOTALSCORE: 0
BODYLANGUAGE: 0
TOTALSCORE: 0
NEGVOCALIZATION: 0
TOTALSCORE: 0
FACIALEXPRESSION: 0
NEGVOCALIZATION: 0
BREATHING: 0
TOTALSCORE: 0
CONSOLABILITY: 0

## 2022-02-06 ASSESSMENT — PAIN SCALES - GENERAL
PAINLEVEL_OUTOF10: 3
PAINLEVEL_OUTOF10: 0
PAINLEVEL_OUTOF10: 0

## 2022-02-06 ASSESSMENT — PAIN SCALES - WONG BAKER: WONGBAKER_NUMERICALRESPONSE: 0

## 2022-02-06 NOTE — PROGRESS NOTES
Hospitalist Progress Note      PCP: No primary care provider on file. Date of Admission: 1/26/2022     Subjective:   Patient lethargic today, not following any commands  Afebrile today, not having the jerking movements today that he was having yesterday  CRP/  ESR is elevated, chest x-ray with worsening airspace disease, edema versus infectious versus inflammation    Procalcitonin again came low, stop antibiotics  Liver enzymes are elevated    -Consulting cardiology today due to elevated BNP and requiring oxygen now    Had a long discussion with sister, she is open to considering hospice as patient is not improving.   She needs more time to discuss CODE STATUS    Medications:  Reviewed    Infusion Medications    sodium chloride 25 mL (02/03/22 0500)     Scheduled Medications    furosemide  20 mg IntraVENous BID    [Held by provider] LORazepam  0.5 mg Oral BID    aspirin  324 mg Oral Daily    sertraline  25 mg Oral Daily    pantoprazole  40 mg IntraVENous BID    lidocaine   Topical Once    amiodarone  200 mg Oral Daily    Arformoterol Tartrate  15 mcg Nebulization BID    atorvastatin  40 mg Oral Nightly    [Held by provider] clopidogrel  75 mg Oral Daily    metoprolol tartrate  12.5 mg Oral BID    montelukast  10 mg Oral Nightly    tiotropium  2 puff Inhalation Daily    sodium chloride flush  10 mL IntraVENous 2 times per day    enoxaparin  40 mg SubCUTAneous Daily    sennosides-docusate sodium  1 tablet Oral BID     PRN Meds: magnesium sulfate, sodium chloride flush, sodium chloride, ondansetron **OR** ondansetron, magnesium hydroxide, acetaminophen **OR** acetaminophen      Intake/Output Summary (Last 24 hours) at 2/6/2022 1111  Last data filed at 2/6/2022 0525  Gross per 24 hour   Intake 1693 ml   Output 2600 ml   Net -907 ml       Physical Exam Performed:    BP (!) 96/51   Pulse 78   Temp 98.1 °F (36.7 °C) (Axillary)   Resp 20   Ht 5' 7\" (1.702 m)   Wt 144 lb 13.5 oz (65.7 kg) SpO2 93%   BMI 22.69 kg/m²     General appearance: Ill-appearing  Respiratory:  Normal respiratory effort. Clear to auscultation  Cardiovascular: Regular rate and rhythm  Abdomen: Soft, non-tender, non-distended   Musculoskeletal: No clubbing, cyanosis or edema bilaterally. Skin: No rashes or lesions. Neurologic: Lethargic. Not following commands   Peripheral Pulses: +2 palpable, equal bilaterally       Labs:   No results for input(s): WBC, HGB, HCT, PLT in the last 72 hours. Recent Labs     02/04/22  0632 02/05/22  0749 02/06/22  0728    138 139   K 4.0 4.1 3.9    104 103   CO2 24 27 29   BUN 31* 31* 37*   CREATININE <0.5* <0.5* 0.5*   CALCIUM 8.5 8.4 8.5   PHOS 1.6* 1.7* 2.1*     Recent Labs     02/05/22  0749 02/06/22  0728   * 120*   * 242*   BILIDIR <0.2 <0.2   BILITOT <0.2 0.4   ALKPHOS 209* 227*     Urinalysis:      Lab Results   Component Value Date    NITRU Negative 01/26/2022    WBCUA 10-20 01/26/2022    BACTERIA 1+ 01/26/2022    RBCUA 5-10 01/26/2022    BLOODU SMALL 01/26/2022    SPECGRAV >=1.030 01/26/2022    GLUCOSEU Negative 01/26/2022       Assessment/Plan:    1. Shock likely hypovolemic versus sepsis secondary to potential pneumonia including HCAP/aspiration  Status post pressors and IVF  Off abx now  He had fever till yesterday but procalcitonin low    2. DWIGHT/hypernatremia secondary to dehydration from poor oral intake  Monitor creatinine and sodium, improved  Status post IV fluids  Nephrology following, appreciate recs    3. CAD s/p CABG 01/03/22  Continue aspirin and Plavix (Lasix held for PEG tube likely Wednesday )and statin  On beta-blocker with hold parameters  CT surgery following, appreciate recommendation  Consult cardiology for elevated BNP, and low blood pressure    4. A. Fib  Continue telemetry, currently sinus rhythm  Continue amiodarone   On metoprolol with hold parameters for low blood pressure    5.  COPD  Continue inhalers and breathing treatment    Depression  Continue home meds  Psych consulted, started on Zoloft  Please refer to psych note from 2/4, patient was given a [place] challenge \"to rule out catatonic depression, negative for catatonia, rather patient became drowsy. Holding further Ativan and more drowsy    Severe malnutrition/failure to thrive:  Continue tube feeds  Follow dietary recs  GI consulted for peg tube , need to be off plavix and asa for 5 days, I.e till 2/5    Respiratory infiltrates:-Pulmonary edema versus Covid infection? Elevated inflammatory markers, he has been checked for Covid infection twice weekly at his nursing facility  Breathing treatment  Labs as above  Wean oxygen as tolerated    Fever,etiology unclear, no today  Blood cultures repeat negative  High inflammatory marker CRP, trial of steroid?,  Chronic inflammatory state? Polypharmacy?,  SSRI?   Stop antibiotics    Elevated LFTs:-Likely multifactorial,  hepatic congestion versus meds I.e  antifungal , SSRI , statin  Stop Diflucan  Cautious use of Tylenol  Trend daily        DVT Prophylaxis: Lovenox  Diet: Diet NPO  ADULT TUBE FEEDING; Nasogastric; Peptide Based; Continuous; 20; Yes; 10; Q 8 hours; 65; 0; Other (specify); hold water flushes x24hr; Protein; 1 protein shot BID  Code Status: Full Code    PT/OT Eval Status: Once clinically stable    Dispo -guarded prognosis, cardiology consulted for CHF, sister is open to hospice referral, will discuss with palliative care tomorrow, no improvement in mental status     Saul Traore MD

## 2022-02-06 NOTE — PROGRESS NOTES
Office : [phone]     Fax :[phone]         Renal Progress Note  Subjective:   Admit Date: 1/26/2022     Sleepy  Good UO   No shortness of breath           DIET Diet NPO  ADULT TUBE FEEDING; Nasogastric; Peptide Based; Continuous; 20; Yes; 10; Q 8 hours; 65; 0; Other (specify); hold water flushes x24hr; Protein; 1 protein shot BID  Medications:   Scheduled Meds:   furosemide  20 mg IntraVENous BID    [Held by provider] LORazepam  0.5 mg Oral BID    aspirin  324 mg Oral Daily    sertraline  25 mg Oral Daily    pantoprazole  40 mg IntraVENous BID    lidocaine   Topical Once    amiodarone  200 mg Oral Daily    Arformoterol Tartrate  15 mcg Nebulization BID    atorvastatin  40 mg Oral Nightly    [Held by provider] clopidogrel  75 mg Oral Daily    metoprolol tartrate  12.5 mg Oral BID    montelukast  10 mg Oral Nightly    tiotropium  2 puff Inhalation Daily    sodium chloride flush  10 mL IntraVENous 2 times per day    enoxaparin  40 mg SubCUTAneous Daily    sennosides-docusate sodium  1 tablet Oral BID     Continuous Infusions:   sodium chloride 25 mL (02/03/22 0500)       Labs:  CBC:   No results for input(s): WBC, HGB, PLT in the last 72 hours.   BMP:    Recent Labs     02/04/22  0632 02/05/22  0749 02/06/22  0728    138 139   K 4.0 4.1 3.9    104 103   CO2 24 27 29   BUN 31* 31* 37*   CREATININE <0.5* <0.5* 0.5*   GLUCOSE 123* 122* 122*     Ca/Mg/Phos:   Recent Labs     02/04/22  0632 02/04/22  1726 02/05/22  0749 02/06/22  0728   CALCIUM 8.5  --  8.4 8.5   MG 2.00 2.10 1.80  --    PHOS 1.6*  --  1.7* 2.1*     Hepatic:   Recent Labs 02/05/22  0749 02/06/22  0728   * 120*   * 242*   BILITOT <0.2 0.4   ALKPHOS 209* 227*     Troponin: No results for input(s): TROPONINI in the last 72 hours. BNP: No results for input(s): BNP in the last 72 hours. Lipids: No results for input(s): CHOL, TRIG, HDL, LDLCALC, LABVLDL in the last 72 hours. ABGs:   No results for input(s): PHART, PO2ART, CBI3GYS in the last 72 hours. INR:   No results for input(s): INR in the last 72 hours. UA:  No results for input(s): [place], GLUCOSEU, BILIRUBINUR, KETUA, SPECGRAV, BLOODU, PHUR, PROTEINU, UROBILINOGEN, NITRU, LEUKOCYTESUR, [place] in the last 72 hours. Urine Microscopic:   No results for input(s): LABCAST, BACTERIA, COMU, HYALCAST, WBCUA, RBCUA, EPIU in the last 72 hours. Urine Culture:   No results for input(s): LABURIN in the last 72 hours. Urine Chemistry:   No results for input(s): [place], PROTEINUR, NAUR in the last 72 hours. Objective:   Vitals: /61   Pulse 79   Temp 98.1 °F (36.7 °C) (Axillary)   Resp 20   Ht 5' 7\" (1.702 m)   Wt 144 lb 13.5 oz (65.7 kg)   SpO2 93%   BMI 22.69 kg/m²    Wt Readings from Last 3 Encounters:   02/06/22 144 lb 13.5 oz (65.7 kg)   01/10/22 155 lb 6.8 oz (70.5 kg)   07/12/18 179 lb (81.2 kg)      24HR INTAKE/OUTPUT:      Intake/Output Summary (Last 24 hours) at 2/6/2022 0901  Last data filed at 2/6/2022 2471  Gross per 24 hour   Intake 1693 ml   Output 2600 ml   Net -907 ml     Constitutional:  resting  NECK: supple, no JVD  Cardiovascular:  S1, S2 without m/r/g  Respiratory:  CTA B without w/r/r  Abdomen: +bs, soft, nt  Ext: trace LE edema    Assessment and Plan:       IMAGING:  XR CHEST PORTABLE   Final Result   1. Worsening bilateral airspace opacities. 2.  Suspected left pleural effusion. XR CHEST PORTABLE   Final Result   1. Right IJ central line in satisfactory position in the thorax   2. Nasogastric tube in the stomach   3.  Basilar airspace disease, new from prior study      XR ABDOMEN FOR NG/OG/NE TUBE PLACEMENT   Final Result      Frontal view centered at the diaphragm demonstrates tip of NG tube in the gastric fundus. Bowel gas pattern is unremarkable. Elevated left hemidiaphragm with left basilar scarring. Sternotomy wires and left atrial appendage clip noted. XR CHEST PORTABLE   Final Result      Left pleural effusion with adjacent parenchymal consolidation, similar to January 6, 2022. CT HEAD WO CONTRAST   Final Result      1. No evidence for acute intracranial abnormality. 2.  Moderate diffuse cerebral atrophy with ventricular white matter changes bilaterally consistent with chronic small vessel ischemia. Assessment/Plan     1. DWIGHT 2/2 dehydration - resolved     2. HTN     3.  Anemia     4. Acid- base/ Electrolyte imbalance - hypernatremia        Plan:  - replace free water deficit as needed  - Na better and stable  - replace K/Phos    - Cr, Na better        Lazarus Kempf, MD       [facility] [place] [address] 18 [phone]

## 2022-02-06 NOTE — PLAN OF CARE
Problem: Falls - Risk of:  Goal: Will remain free from falls  Description: Will remain free from falls  Outcome: Ongoing  Note: Discussed with pt importance to keep bed low and locked with alarm activated, nonskid socks on when out of bed, call light and belongings within reach- will monitor. Problem: Nutrition  Goal: Optimal nutrition therapy  Outcome: Ongoing  Note: Continue tube feeding as ordered. Problem: Aspiration:  Goal: Absence of aspiration  Description: Absence of aspiration  Outcome: Ongoing  Note: Continue to monitor for signs of aspiration. Problem: Bowel Function - Altered:  Goal: Bowel elimination is within specified parameters  Description: Bowel elimination is within specified parameters  Outcome: Ongoing  Note: It was reported that pt had bm last night. Problem: Mental Status - Impaired:  Goal: Mental status will be restored to baseline  Description: Mental status will be restored to baseline  Outcome: Ongoing  Note: Pt still not speaking and not following directions. Continue to reorient.

## 2022-02-07 ENCOUNTER — APPOINTMENT (OUTPATIENT)
Dept: GENERAL RADIOLOGY | Age: 72
DRG: 871 | End: 2022-02-07
Payer: MEDICARE

## 2022-02-07 ENCOUNTER — APPOINTMENT (OUTPATIENT)
Dept: CT IMAGING | Age: 72
DRG: 871 | End: 2022-02-07
Payer: MEDICARE

## 2022-02-07 VITALS
SYSTOLIC BLOOD PRESSURE: 108 MMHG | DIASTOLIC BLOOD PRESSURE: 66 MMHG | HEIGHT: 67 IN | HEART RATE: 78 BPM | RESPIRATION RATE: 22 BRPM | BODY MASS INDEX: 22.43 KG/M2 | WEIGHT: 142.88 LBS | OXYGEN SATURATION: 100 % | TEMPERATURE: 98.7 F

## 2022-02-07 LAB
A/G RATIO: 0.8 (ref 1.1–2.2)
ALBUMIN SERPL-MCNC: 2.5 G/DL (ref 3.4–5)
ALBUMIN SERPL-MCNC: 2.7 G/DL (ref 3.4–5)
ALP BLD-CCNC: 238 U/L (ref 40–129)
ALP BLD-CCNC: 258 U/L (ref 40–129)
ALT SERPL-CCNC: 225 U/L (ref 10–40)
ALT SERPL-CCNC: 251 U/L (ref 10–40)
AMMONIA: 37 UMOL/L (ref 16–60)
ANION GAP SERPL CALCULATED.3IONS-SCNC: 10 MMOL/L (ref 3–16)
ANION GAP SERPL CALCULATED.3IONS-SCNC: 10 MMOL/L (ref 3–16)
AST SERPL-CCNC: 102 U/L (ref 15–37)
AST SERPL-CCNC: 90 U/L (ref 15–37)
BASE EXCESS ARTERIAL: 4 (ref -3–3)
BASOPHILS ABSOLUTE: 0 K/UL (ref 0–0.2)
BASOPHILS ABSOLUTE: 0 K/UL (ref 0–0.2)
BASOPHILS RELATIVE PERCENT: 0.3 %
BASOPHILS RELATIVE PERCENT: 0.4 %
BILIRUB SERPL-MCNC: 0.3 MG/DL (ref 0–1)
BILIRUB SERPL-MCNC: 0.3 MG/DL (ref 0–1)
BILIRUBIN DIRECT: <0.2 MG/DL (ref 0–0.3)
BILIRUBIN, INDIRECT: ABNORMAL MG/DL (ref 0–1)
BUN BLDV-MCNC: 38 MG/DL (ref 7–20)
BUN BLDV-MCNC: 39 MG/DL (ref 7–20)
CALCIUM SERPL-MCNC: 8.3 MG/DL (ref 8.3–10.6)
CALCIUM SERPL-MCNC: 8.4 MG/DL (ref 8.3–10.6)
CHLORIDE BLD-SCNC: 105 MMOL/L (ref 99–110)
CHLORIDE BLD-SCNC: 106 MMOL/L (ref 99–110)
CO2: 25 MMOL/L (ref 21–32)
CO2: 25 MMOL/L (ref 21–32)
CREAT SERPL-MCNC: 0.6 MG/DL (ref 0.8–1.3)
CREAT SERPL-MCNC: 0.6 MG/DL (ref 0.8–1.3)
EKG ATRIAL RATE: 86 BPM
EKG DIAGNOSIS: NORMAL
EKG P AXIS: 58 DEGREES
EKG P-R INTERVAL: 148 MS
EKG Q-T INTERVAL: 420 MS
EKG QRS DURATION: 148 MS
EKG QTC CALCULATION (BAZETT): 502 MS
EKG R AXIS: 15 DEGREES
EKG T AXIS: 0 DEGREES
EKG VENTRICULAR RATE: 86 BPM
EOSINOPHILS ABSOLUTE: 0.1 K/UL (ref 0–0.6)
EOSINOPHILS ABSOLUTE: 0.2 K/UL (ref 0–0.6)
EOSINOPHILS RELATIVE PERCENT: 1.8 %
EOSINOPHILS RELATIVE PERCENT: 2.1 %
GFR AFRICAN AMERICAN: >60
GFR AFRICAN AMERICAN: >60
GFR NON-AFRICAN AMERICAN: >60
GFR NON-AFRICAN AMERICAN: >60
GLUCOSE BLD-MCNC: 128 MG/DL (ref 70–99)
GLUCOSE BLD-MCNC: 128 MG/DL (ref 70–99)
GLUCOSE BLD-MCNC: 133 MG/DL (ref 70–99)
GLUCOSE BLD-MCNC: 141 MG/DL (ref 70–99)
GLUCOSE BLD-MCNC: 147 MG/DL (ref 70–99)
GLUCOSE BLD-MCNC: 174 MG/DL (ref 70–99)
HCO3 ARTERIAL: 27.3 MMOL/L (ref 21–29)
HCT VFR BLD CALC: 24.8 % (ref 40.5–52.5)
HCT VFR BLD CALC: 25 % (ref 40.5–52.5)
HCT VFR BLD CALC: 26 % (ref 40.5–52.5)
HEMOGLOBIN: 8 G/DL (ref 13.5–17.5)
HEMOGLOBIN: 8.1 G/DL (ref 13.5–17.5)
HEMOGLOBIN: 8.4 G/DL (ref 13.5–17.5)
LACTATE: 0.67 MMOL/L (ref 0.4–2)
LACTIC ACID: 0.8 MMOL/L (ref 0.4–2)
LYMPHOCYTES ABSOLUTE: 1.5 K/UL (ref 1–5.1)
LYMPHOCYTES ABSOLUTE: 1.5 K/UL (ref 1–5.1)
LYMPHOCYTES RELATIVE PERCENT: 19.1 %
LYMPHOCYTES RELATIVE PERCENT: 21.2 %
MCH RBC QN AUTO: 26 PG (ref 26–34)
MCH RBC QN AUTO: 26.2 PG (ref 26–34)
MCH RBC QN AUTO: 26.4 PG (ref 26–34)
MCHC RBC AUTO-ENTMCNC: 32.2 G/DL (ref 31–36)
MCHC RBC AUTO-ENTMCNC: 32.2 G/DL (ref 31–36)
MCHC RBC AUTO-ENTMCNC: 32.5 G/DL (ref 31–36)
MCV RBC AUTO: 80.8 FL (ref 80–100)
MCV RBC AUTO: 81.3 FL (ref 80–100)
MCV RBC AUTO: 81.5 FL (ref 80–100)
MONOCYTES ABSOLUTE: 0.3 K/UL (ref 0–1.3)
MONOCYTES ABSOLUTE: 0.4 K/UL (ref 0–1.3)
MONOCYTES RELATIVE PERCENT: 4.5 %
MONOCYTES RELATIVE PERCENT: 4.9 %
NEUTROPHILS ABSOLUTE: 5.2 K/UL (ref 1.7–7.7)
NEUTROPHILS ABSOLUTE: 5.8 K/UL (ref 1.7–7.7)
NEUTROPHILS RELATIVE PERCENT: 71.8 %
NEUTROPHILS RELATIVE PERCENT: 73.9 %
O2 SAT, ARTERIAL: 99 % (ref 93–100)
PCO2 ARTERIAL: 37.2 MM HG (ref 35–45)
PDW BLD-RTO: 21.3 % (ref 12.4–15.4)
PDW BLD-RTO: 21.6 % (ref 12.4–15.4)
PDW BLD-RTO: 21.6 % (ref 12.4–15.4)
PERFORMED ON: ABNORMAL
PH ARTERIAL: 7.47 (ref 7.35–7.45)
PHOSPHORUS: 2.4 MG/DL (ref 2.5–4.9)
PHOSPHORUS: 2.6 MG/DL (ref 2.5–4.9)
PLATELET # BLD: 305 K/UL (ref 135–450)
PLATELET # BLD: 309 K/UL (ref 135–450)
PLATELET # BLD: 319 K/UL (ref 135–450)
PLATELET SLIDE REVIEW: ADEQUATE
PMV BLD AUTO: 8.6 FL (ref 5–10.5)
PMV BLD AUTO: 8.7 FL (ref 5–10.5)
PMV BLD AUTO: 8.7 FL (ref 5–10.5)
PO2 ARTERIAL: 132.9 MM HG (ref 75–108)
POC POTASSIUM: 3.7 MMOL/L (ref 3.5–5.1)
POC SAMPLE TYPE: ABNORMAL
POC SODIUM: 143 MMOL/L (ref 136–145)
POTASSIUM REFLEX MAGNESIUM: 3.7 MMOL/L (ref 3.5–5.1)
POTASSIUM SERPL-SCNC: 4.2 MMOL/L (ref 3.5–5.1)
PROCALCITONIN: 0.13 NG/ML (ref 0–0.15)
RBC # BLD: 3.05 M/UL (ref 4.2–5.9)
RBC # BLD: 3.09 M/UL (ref 4.2–5.9)
RBC # BLD: 3.19 M/UL (ref 4.2–5.9)
SARS-COV-2, PCR: NOT DETECTED
SODIUM BLD-SCNC: 140 MMOL/L (ref 136–145)
SODIUM BLD-SCNC: 141 MMOL/L (ref 136–145)
TCO2 ARTERIAL: 29 MMOL/L
TOTAL CK: 48 U/L (ref 39–308)
TOTAL PROTEIN: 5.3 G/DL (ref 6.4–8.2)
TOTAL PROTEIN: 5.6 G/DL (ref 6.4–8.2)
WBC # BLD: 7.2 K/UL (ref 4–11)
WBC # BLD: 7.3 K/UL (ref 4–11)
WBC # BLD: 7.8 K/UL (ref 4–11)

## 2022-02-07 PROCEDURE — 82140 ASSAY OF AMMONIA: CPT

## 2022-02-07 PROCEDURE — 84295 ASSAY OF SERUM SODIUM: CPT

## 2022-02-07 PROCEDURE — 83605 ASSAY OF LACTIC ACID: CPT

## 2022-02-07 PROCEDURE — 2700000000 HC OXYGEN THERAPY PER DAY

## 2022-02-07 PROCEDURE — C9113 INJ PANTOPRAZOLE SODIUM, VIA: HCPCS | Performed by: STUDENT IN AN ORGANIZED HEALTH CARE EDUCATION/TRAINING PROGRAM

## 2022-02-07 PROCEDURE — 87040 BLOOD CULTURE FOR BACTERIA: CPT

## 2022-02-07 PROCEDURE — 6360000002 HC RX W HCPCS: Performed by: STUDENT IN AN ORGANIZED HEALTH CARE EDUCATION/TRAINING PROGRAM

## 2022-02-07 PROCEDURE — 84132 ASSAY OF SERUM POTASSIUM: CPT

## 2022-02-07 PROCEDURE — 6360000002 HC RX W HCPCS: Performed by: INTERNAL MEDICINE

## 2022-02-07 PROCEDURE — 93010 ELECTROCARDIOGRAM REPORT: CPT | Performed by: INTERNAL MEDICINE

## 2022-02-07 PROCEDURE — 93005 ELECTROCARDIOGRAM TRACING: CPT

## 2022-02-07 PROCEDURE — 82947 ASSAY GLUCOSE BLOOD QUANT: CPT

## 2022-02-07 PROCEDURE — 87641 MR-STAPH DNA AMP PROBE: CPT

## 2022-02-07 PROCEDURE — 85027 COMPLETE CBC AUTOMATED: CPT

## 2022-02-07 PROCEDURE — 36415 COLL VENOUS BLD VENIPUNCTURE: CPT

## 2022-02-07 PROCEDURE — 84100 ASSAY OF PHOSPHORUS: CPT

## 2022-02-07 PROCEDURE — 70450 CT HEAD/BRAIN W/O DYE: CPT

## 2022-02-07 PROCEDURE — 2580000003 HC RX 258: Performed by: INTERNAL MEDICINE

## 2022-02-07 PROCEDURE — 82550 ASSAY OF CK (CPK): CPT

## 2022-02-07 PROCEDURE — 6370000000 HC RX 637 (ALT 250 FOR IP): Performed by: STUDENT IN AN ORGANIZED HEALTH CARE EDUCATION/TRAINING PROGRAM

## 2022-02-07 PROCEDURE — 99232 SBSQ HOSP IP/OBS MODERATE 35: CPT | Performed by: INTERNAL MEDICINE

## 2022-02-07 PROCEDURE — 82803 BLOOD GASES ANY COMBINATION: CPT

## 2022-02-07 PROCEDURE — 36600 WITHDRAWAL OF ARTERIAL BLOOD: CPT

## 2022-02-07 PROCEDURE — 71045 X-RAY EXAM CHEST 1 VIEW: CPT

## 2022-02-07 PROCEDURE — 85025 COMPLETE CBC W/AUTO DIFF WBC: CPT

## 2022-02-07 PROCEDURE — 6370000000 HC RX 637 (ALT 250 FOR IP): Performed by: INTERNAL MEDICINE

## 2022-02-07 PROCEDURE — 2580000003 HC RX 258: Performed by: STUDENT IN AN ORGANIZED HEALTH CARE EDUCATION/TRAINING PROGRAM

## 2022-02-07 PROCEDURE — 80053 COMPREHEN METABOLIC PANEL: CPT

## 2022-02-07 PROCEDURE — 94761 N-INVAS EAR/PLS OXIMETRY MLT: CPT

## 2022-02-07 PROCEDURE — 84145 PROCALCITONIN (PCT): CPT

## 2022-02-07 RX ORDER — LORAZEPAM 2 MG/ML
1 INJECTION INTRAMUSCULAR
Status: DISCONTINUED | OUTPATIENT
Start: 2022-02-07 | End: 2022-02-07 | Stop reason: HOSPADM

## 2022-02-07 RX ORDER — LINEZOLID 2 MG/ML
600 INJECTION, SOLUTION INTRAVENOUS EVERY 12 HOURS
Status: DISCONTINUED | OUTPATIENT
Start: 2022-02-07 | End: 2022-02-07 | Stop reason: HOSPADM

## 2022-02-07 RX ORDER — MORPHINE SULFATE 4 MG/ML
4 INJECTION, SOLUTION INTRAMUSCULAR; INTRAVENOUS
Status: DISCONTINUED | OUTPATIENT
Start: 2022-02-07 | End: 2022-02-07 | Stop reason: HOSPADM

## 2022-02-07 RX ORDER — MORPHINE SULFATE 2 MG/ML
2 INJECTION, SOLUTION INTRAMUSCULAR; INTRAVENOUS
Status: DISCONTINUED | OUTPATIENT
Start: 2022-02-07 | End: 2022-02-07 | Stop reason: HOSPADM

## 2022-02-07 RX ORDER — LORAZEPAM 2 MG/ML
1 CONCENTRATE ORAL
Status: DISCONTINUED | OUTPATIENT
Start: 2022-02-07 | End: 2022-02-07 | Stop reason: HOSPADM

## 2022-02-07 RX ADMIN — PIPERACILLIN SODIUM AND TAZOBACTAM SODIUM 4500 MG: 4; .5 INJECTION, POWDER, LYOPHILIZED, FOR SOLUTION INTRAVENOUS at 14:02

## 2022-02-07 RX ADMIN — MORPHINE SULFATE 2 MG: 2 INJECTION, SOLUTION INTRAMUSCULAR; INTRAVENOUS at 16:42

## 2022-02-07 RX ADMIN — SENNOSIDES AND DOCUSATE SODIUM 1 TABLET: 50; 8.6 TABLET ORAL at 10:23

## 2022-02-07 RX ADMIN — MORPHINE SULFATE 2 MG: 2 INJECTION, SOLUTION INTRAMUSCULAR; INTRAVENOUS at 13:56

## 2022-02-07 RX ADMIN — THIAMINE HYDROCHLORIDE 100 MG: 100 INJECTION, SOLUTION INTRAMUSCULAR; INTRAVENOUS at 12:50

## 2022-02-07 RX ADMIN — PANTOPRAZOLE SODIUM 40 MG: 40 INJECTION, POWDER, FOR SOLUTION INTRAVENOUS at 10:23

## 2022-02-07 RX ADMIN — LINEZOLID 600 MG: 600 INJECTION, SOLUTION INTRAVENOUS at 12:55

## 2022-02-07 RX ADMIN — ASPIRIN 324 MG: 81 TABLET, CHEWABLE ORAL at 10:22

## 2022-02-07 RX ADMIN — ENOXAPARIN SODIUM 40 MG: 100 INJECTION SUBCUTANEOUS at 10:23

## 2022-02-07 RX ADMIN — AMIODARONE HYDROCHLORIDE 200 MG: 200 TABLET ORAL at 10:22

## 2022-02-07 RX ADMIN — SODIUM CHLORIDE, PRESERVATIVE FREE 10 ML: 5 INJECTION INTRAVENOUS at 10:23

## 2022-02-07 ASSESSMENT — PAIN SCALES - PAIN ASSESSMENT IN ADVANCED DEMENTIA (PAINAD)
BODYLANGUAGE: 0
CONSOLABILITY: 0
NEGVOCALIZATION: 0
BREATHING: 0
NEGVOCALIZATION: 0
NEGVOCALIZATION: 0
FACIALEXPRESSION: 0
TOTALSCORE: 0
FACIALEXPRESSION: 0
FACIALEXPRESSION: 0
TOTALSCORE: 0
CONSOLABILITY: 0
BODYLANGUAGE: 0
BODYLANGUAGE: 0
BREATHING: 0
BREATHING: 0
TOTALSCORE: 0
CONSOLABILITY: 0

## 2022-02-07 ASSESSMENT — PAIN SCALES - GENERAL
PAINLEVEL_OUTOF10: 0
PAINLEVEL_OUTOF10: 0

## 2022-02-07 NOTE — SIGNIFICANT EVENT
Spoke with patient's sister, Mirella Flood, to update on current change in mental status (unresponsiveness) and hypoxia. Sister states that he has had a decline in mental status over the last few months and has ACP paperwork that state he is a DNR/DNI. Code status updated on chart. Sister states that the plan was for patient to be discharged to hospice with his facility upon discharge. Touched base with palliative to reach out to family again to discuss goals of care.     Edelmira Roberts MD

## 2022-02-07 NOTE — SIGNIFICANT EVENT
Significant Event:    Rapid response called at 7:57 a.m. Briefly this is a 71 yo M with PMHx recent CABG, Afib, COPD, depression who initially presented from SNF after CABGx3 1/3 for poor oral intake. Since admission he has been increasingly lethargic and slow to respond. Of note he has been evaluated by Psych in the past with concern for hypoactive delirium. On arrival to room, patient unresponsive. VS significant for tachypnea RR 36, he was initially on 2L with sats in 70's however he was transitioned to NRB just prior to our arrival with saturations in the mid 90's. On physical exam he is breathing heavily. Not alert or oriented. Very weakly responsive to pain in b/l upper and lower extremities. STAT ABG: pH 7.4757, pCO2 37.2, pO2 132.9. Labs Ordered: STAT BMP, CBC, Lactate, CK, Ammonia  Imaging Ordered: STAT CT head, CXR, EKG     Per patient's sister (POA), at baseline he waxes and wanes with his mentation. She was called and informed that she would like patient to be DNR/DNI and mentioned there are plans for hospice admission on discharge from [facility]. Code status was updated in the chart to reflect the POA's wishes.      Dixon Ochoa, PGY-1

## 2022-02-07 NOTE — PROGRESS NOTES
[facility]: Pedro Dunn in [place] had spoken to Wilian at [facility] to update on pt's oxygen requirement  And was told that they can only take him back on 7 liters even if HOC provided the concentrators. Met with the pt's sister Cesla Gutierrez at the bedside to review hospice services, philosophy and levels of care. Updated Celsa Matt on the oxygen level he can return to the ECF on. Celsa Gutierrez would like pt to go to the [address] [place] [address]. Transportation set up with [address] for today 5-530 pm.  Hospital team updated. Rn to request discharge order and MD signature on DNR CC form.      Thank you,  Kylie Masters RN [address] [place] [address] [phone]

## 2022-02-07 NOTE — PROGRESS NOTES
Occupational Therapy/Physical Therapy  Chart reviewed, spoke with RN, pt is being placed on hospice and no longer requiring OT/PT services as family has requested focus on comfort care.  Therapy will sign off per RN request.    Thank you,    Deisi Griffith OTR/MELECIO Montesinos [facility], [address] [place] [address]

## 2022-02-07 NOTE — PROGRESS NOTES
Hospitalist Progress Note      PCP: No primary care provider on file. Date of Admission: 1/26/2022       Subjective:     Took over care of pxt 2/7/2022    61-year-old male with COPD, hyperlipidemia , BPH recent admission 12/24/2021 -   After presenting with episode of syncope and chest pressure while doing work around the house including fumigation for bedbugs and went to [employer] to get supplies, where he collapsed. Was diagnosed with CAD/NSTEMI. Had Cath 12/28/2021: Severe CAD with left main ostial calcification and stenosis severe. Right coronary artery is completely occluded. There is cardiomyopathy with reduced ejection fraction (25 to 30% ). He also had anemia, hg 5.6. EGD 12/27: no source for bleed. Planned colonoscopy later after CABG. Had CABG 1/3/2022, he was discharged to a SNF on POD 7.    ------------------------------------------------------------------------------------------------------------  1/26 Admitted to [facility] ER about 2 weeks post discharge. Notes state \". ..unhappy there and has refused to eat anything. He has become progressively weaker and has become altered,\" hence was brought to the ED    He had hypernatremia, UTI, DWIGHT. Pt noted to have WBC 12.2 on admission. 1/27 \"Transferred up to floor, on rounds appeared very poor from a functional standpoint.  Transfered to ICU. \"    Notes from 1/27: \"Just barely arousable. Mouth desiccated\" with hypernatremia. Noted hypotensive, tachypnic  Notes since 1/28 at he very least show \"Non responsive to verbal and unable to follow basic commands\"    Notes from 2/5/2022: \"some rhythmic jerking after every few breaths\"   CXR: \"worsening airspace disease\"        Took over care of pxt 2/7/2022    RR called earlier this AM for hypoxia: sat 71% on 2L tachypnea, unresponsiveness  Patient lethargic today, not following any commands    Of note, Since admission he has been \"increasingly lethargic and slow to respond\".     Chart and data reviewed at length    Pxt is somnolent. On my evaluation, he opens eyes to call,and mouths fine, when I ask how he is doing    He looks chronically ill, thin, with scaphoid abdomnen    His mouth is caked with desicated secretions. He has increased muscle tone in his extremities. Sister at bedside. Medications:  Reviewed    Infusion Medications    sodium chloride 25 mL (02/03/22 0500)     Scheduled Medications    [Held by provider] furosemide  20 mg IntraVENous BID    [Held by provider] LORazepam  0.5 mg Oral BID    aspirin  324 mg Oral Daily    [Held by provider] sertraline  25 mg Oral Daily    pantoprazole  40 mg IntraVENous BID    lidocaine   Topical Once    amiodarone  200 mg Oral Daily    Arformoterol Tartrate  15 mcg Nebulization BID    [Held by provider] atorvastatin  40 mg Oral Nightly    [Held by provider] clopidogrel  75 mg Oral Daily    metoprolol tartrate  12.5 mg Oral BID    montelukast  10 mg Oral Nightly    tiotropium  2 puff Inhalation Daily    sodium chloride flush  10 mL IntraVENous 2 times per day    enoxaparin  40 mg SubCUTAneous Daily    sennosides-docusate sodium  1 tablet Oral BID     PRN Meds: magnesium sulfate, sodium chloride flush, sodium chloride, ondansetron **OR** ondansetron, magnesium hydroxide, acetaminophen **OR** acetaminophen      Intake/Output Summary (Last 24 hours) at 2/7/2022 1022  Last data filed at 2/7/2022 0459  Gross per 24 hour   Intake 1686 ml   Output 1950 ml   Net -264 ml       Physical Exam Performed:    /68   Pulse 90   Temp 99.3 °F (37.4 °C) (Axillary)   Resp (!) 36   Ht 5' 7\" (1.702 m)   Wt 142 lb 14 oz (64.8 kg)   SpO2 95%   BMI 22.38 kg/m²     General appearance: acute on chronically  Ill-appearing  Respiratory:  Normal respiratory effort. Transmitted sounds, otherwise CTAB  Cardiovascular: No JVD; Regular rate and rhythm;  No murmurs  Abdomen: Scaphoid, soft, non-tender, non-distended   Musculoskeletal: No clubbing, cyanosis or edema bilaterally. Skin: No rashes or lesions. Neurologic: Lethargic. Limited exam. ? Increased muscle tone bilaterally. Moves extremities. Peripheral Pulses: +2 palpable, equal bilaterally        Labs:   Recent Labs     02/07/22  0645 02/07/22  0835   WBC 7.2 7.3   HGB 8.4* 8.0*   HCT 26.0* 25.0*    305     Recent Labs     02/05/22  0749 02/05/22  0749 02/06/22  0728 02/07/22  0645 02/07/22  0835      < > 139 141 140   K 4.1   < > 3.9 4.2 3.7      < > 103 106 105   CO2 27   < > 29 25 25   BUN 31*   < > 37* 38* 39*   CREATININE <0.5*   < > 0.5* 0.6* 0.6*   CALCIUM 8.4   < > 8.5 8.4 8.3   PHOS 1.7*  --  2.1* 2.4*  --     < > = values in this interval not displayed. Recent Labs     02/05/22  0749 02/05/22  0749 02/06/22  0728 02/07/22  0645 02/07/22  0835   *   < > 120* 102* 90*   *   < > 242* 251* 225*   BILIDIR <0.2  --  <0.2 <0.2  --    BILITOT <0.2   < > 0.4 0.3 0.3   ALKPHOS 209*   < > 227* 258* 238*    < > = values in this interval not displayed. Urinalysis:      Lab Results   Component Value Date    NITRU Negative 01/26/2022    WBCUA 10-20 01/26/2022    BACTERIA 1+ 01/26/2022    RBCUA 5-10 01/26/2022    BLOODU SMALL 01/26/2022    SPECGRAV >=1.030 01/26/2022    GLUCOSEU Negative 01/26/2022             Assessment/Plan:    Took over care of pxt 2/7/2022      Acute metabolic encephalopathy: POA  - Sec to hypernatremia, volume and free water depletion,  infection  - Appears mental status improved with correction of hypernatremia, volume status  - Still has ongoing infection, UTI; prob pneumonia  - Recent B12, TSH and NH3 reviewed. - Will manage infection  - NPO for now  - Aspiration precautions  - Monitor mental status  - Appears was being planned for D/C to  Psych   - Monitor mental status   - Reportedly has had some \"jerky movts\": Will obtain routine EEG. May need cVEEG monitoring if mental status does not improve.    - Head CT and repeat reviewed  - He has some increased muscle tone, will continue neuro-checks and repeat exam on current mgt  -Consider neurology consult: may need cEEG, MRI if does not rapidly improve on current mgt        Sepsis: POA   UTI, probable pneumonia: POA  - Pxt had Septic shock, status post pressors and IVF in the ICU from 1/27-?1/31  - UA Nitrite negative, w/ moderate Leukocyte Esterase, WBC 10-20, 1+ Bacteria. Urine culture: Candida  - appears was treated with diflucan 2 doses 1/27 and 1/28  - Was also on BSA for few days with ceftriaxone 2 doses --> Vanc/Merem btn 1/28-1/30  - Pxt still running fevers, and ongoing evidence of infection with hypotenion, tachypnia  -Blood cultures x2 (1/26): Neg  - COVID negative  - Will repeat blood cultures; repeat urinalysis  - Will check PCT; MRSA screen   - Will suspend tube feeds for now, concern for aspiration. Aspiration precautions  - Resume Vanc, Merrem and Diflucan for now  - Likely will need hydration: not volume overloaded at this time. Complicated UTI: POA  -  UA Nitrite negative, w/ moderate Leukocyte Esterase, WBC 10-20, 1+ Bacteria. - Urine culture: Candida  - Appears was treated with diflucan 2 doses 1/27 and 1/28  - Was also on BSA for few days with ceftriaxone 2 doses --> Vanc/Merem btn 1/28-1/30  - Pxt still running fevers, and ongoing evidence of infection with hypotenion, tachypnia  -Blood cultures x2 (1/26): Neg  - Repeat UA  - Resume Diflucan  - Has hx of BPH: has Diamond        Acute hypoxic respiratory failure  Respiratory infiltrates: prob aspiration pneumonia in setting of mental status changes  Pneumonia  - COVID: negative  - Aspiration precautions  -  Pxt still running fevers, and ongoing evidence of infection with hypotenion, tachypnia  -Blood cultures x2 (1/26): Neg  - COVID negative  - Will repeat blood cultures; repeat urinalysis  - Will check PCT; MRSA screen   - Will suspend tube feeds for now, concern for aspiration.  Aspiration precautions  - Resume Vanc, Mac Fat and Diflucan for now  - Wean oxygen as tolerated        DWIGHT/hypernatremia secondary to dehydration from poor oral intake  Monitor creatinine and sodium, improved  Status post IV fluids  Hypernatremia resolved, but remains at risk  Monitor fluid status        CAD s/p CABG 01/03/22  Continue aspirin and Plavix (Plavix held for PEG tube placement )and statin  On beta-blocker with hold parameters  CT surgery following      A. Fib  - I am not sure when this was diagnosed: appears during last hospitalization, as appears that is when amio was started: will review documentation   - On amiodarone : Note elevation in liver enzymes: Repeat. Re-eval if liver enzymes acutely elevated, and no alternative explanation  More plausible: appears liver enzymes improving. Monitor.  - On metoprolol with hold parameters for low blood pressure  - Continue telemetry, currently sinus rhythm        COPD  Bronchodilators      Depression  Psych consulted, started on Zoloft: Continue if taking orally: Hold of rnow  Please refer to psych note from 2/4, patient was given a [place] challenge \"to rule out catatonic depression, negative for catatonia, rather patient became drowsy. Further ativan discontinued. Severe malnutrition/failure to thrive: On tube feeds  GI consulted for peg tube , need to be off plavix and asa for 5 days, I.e till 2/5: For perc feeding tube if/when ok with GI  Monitor phosphorus  Start thiamine for few days      Elevated LFTs:- appears hepatocellular injury pattern  - Concern may be sec to new meds, josey Amiodarone  - Other possibilities: statin  - Trend daily      Acute Anemia: POA  - Likely sec to recent CABG  - Anemia w/u reviewed.    - EGD: nil findings to explain anemia  - Was planned for colonoscopy at some point  - GI following  - Monitor hgb: appears satisfactory          DVT Prophylaxis: Lovenox  Diet: Diet NPO  Code Status: Limited    PT/OT Eval Status: Once clinically stable    Disposition: Remains in pxt pending progress  Pall Med following.  Code status changed today to [facility] [place]      Kaita Rader MD

## 2022-02-07 NOTE — PROGRESS NOTES
Palliative Care Chart Review  and Check in Note:     NAME:  Bentley Rojas Date: 1/26/2022  Hospital Day:  Hospital Day: 13   Current Code status: Limited    Palliative care is continuing to following Mr. Mariela Chester for symptom management,  and goals of care discussion as needed. Patient's chart reviewed today 2/7/22. Received a call from resident this morning. Pt had a rapid response called from unresponsiveness and concern for respiratory distress. Pt's sister was called and requested a referral to [address] [place] [address]. Called pt's sister Gloria Parikh and confirmed this. She does not want to proceed with PEG tube placement later this week. She would like the NGT removed prior to discharge and plans to allow pt to eat for comfort. She does not want to put pt through any further medical procedures. She wants to focus on comfort care. The following are the currently established goals/code status, and Symptom management. Goals of care: Goals are comfort directed. Code status: Limited - DNR/DNI    Discharge plan: Return to [address] [place] [address],[address] with hospice, d/w CAITLYN Lowe RN - CNP  02/07/22  11:01 AM

## 2022-02-07 NOTE — PROGRESS NOTES
Pt transported by stretcher to Hospice at [facility]. All belongings sent with pt. Tele removed. IV, NG and hernandez in place per hospice. Pt premedicated with morphine for transport.

## 2022-02-07 NOTE — PROGRESS NOTES
Rapid response called d/t hypoxia, 71% on 2L NC and respirations 30. Pt placed on non-rebreather, O2 sats increased to 94%. Pt nonresponsive. Blood sugar 141. Stat ABG, labs, CXR, CT and EKG ordered. Resident spoke with sister and code status changed. Pt now on 15L HFNC, satting 97%.

## 2022-02-07 NOTE — DISCHARGE INSTR - COC
Continuity of Care Form    Patient Name: Gregoria Keyes   :  1950  MRN:  [id number]    Admit date:  2022  Discharge date:  ***    Code Status Order: DNR-CC   Advance Directives:      Admitting Physician:  Lourdes Christianson MD  PCP: No primary care provider on file.     Discharging Nurse: [facility] Unit/Room#: 5759/3883-58  Discharging Unit Phone Number: [phone]    Emergency Contact:   Extended Emergency Contact Information  Primary Emergency Contact: Carola Valverde   [facility]  Mobile Phone: [phone]  Relation: Brother/Sister  Secondary Emergency Contact: [address] Phone: [phone]  Mobile Phone: [phone]  Relation: Brother/Sister    Past Surgical History:  Past Surgical History:   Procedure Laterality Date    CORONARY ARTERY BYPASS GRAFT N/A 1/3/2022    VIRGIL; TCPB: EVH R thigh; CABG x 3, LIMA to LAD, SVG to OM2 and PDA performed by Leeroy Haile MD at [email or web address] 32      right due to mass benign    UPPER GASTROINTESTINAL ENDOSCOPY N/A 2021    EGD BIOPSY performed by Marissa Craig MD at [address] ENDOSCOPY       Immunization History:   Immunization History   Administered Date(s) Administered    COVID-19, Pfizer Purple top, DILUTE for use, 12+ yrs, 30mcg/0.3mL dose 2021, 2021    Pneumococcal Conjugate 13-valent ([id number]) 2018    Pneumococcal Polysaccharide (Maihdaetj19) 2017    Tdap (Boostrix, Adacel) 2012    Zoster Live (Zostavax) 2015       Active Problems:  Patient Active Problem List   Diagnosis Code    Hyperglycemia R73.9    Smoker's respiratory syndrome F17.200    BPH with obstruction/lower urinary tract symptoms N40.1, N13.8    Basal cell carcinoma of shoulder C44.611    Squamous cell carcinoma of skin of upper limb, including shoulder C44.621    Actinic keratosis L57.0    Colon polyp K63.5    COPD, mild (HCC) J44.9    Nicotine use disorder F17.200    Mixed hyperlipidemia E78.2    SCCS, mod diff  (squamous cell carcinoma), hand, left C44.629    Basal cell carcinoma of right side of nose C44.311    Visit for suture removal Z48.02    Visit for wound check Z51.89    Anemia D64.9    Syncope and collapse R55    Non-ST elevated myocardial infarction (non-STEMI) (HCC) I21.4    Dehydration E86.0    Severe malnutrition (HCC) E43    DWIGHT (acute kidney injury) ([facility] [address] N17.9    Hypernatremia E87.0    Delirium R41.0       Isolation/Infection:   Isolation            Contact          Patient Infection Status       Infection Onset Added Last Indicated Last Indicated By Review Planned Expiration Resolved Resolved By    MRSA 12/24/21 12/26/21 12/30/21 MRSA Screening Culture Only        Resolved    COVID-19 (Rule Out) 02/05/22 02/05/22 02/05/22 COVID-19 (Ordered)   02/07/22 Rule-Out Test Resulted            Nurse Assessment:  Last Vital Signs: /61   Pulse 80   Temp 98.9 °F (37.2 °C) (Axillary)   Resp 23   Ht 5' 7\" (1.702 m)   Wt 142 lb 14 oz (64.8 kg)   SpO2 99%   BMI 22.38 kg/m²     Last documented pain score (0-10 scale): Pain Level: 0  Last Weight:   Wt Readings from Last 1 Encounters:   02/07/22 142 lb 14 oz (64.8 kg)     Mental Status:   lethargic, responds to voice at times    IV Access:  Peripheral IV R wrist and LAC    Nursing Mobility/ADLs:  Walking   Dependent  Transfer  Dependent  Bathing  Dependent  Dressing  Dependent  Toileting  Dependent  Feeding  Dependent  Med Admin  Dependent  Med Delivery   crushed and through NG    Wound Care Documentation and Therapy:  Wound 01/27/22 Sacrum (Active)   Wound Image   01/27/22 1035   Wound Etiology Pressure Unstageable 02/04/22 1530   Dressing Status Clean;Dry; Intact 02/07/22 1206   Wound Cleansed Cleansed with saline 02/04/22 1530   Dressing/Treatment Alginate; Foam 02/07/22 1206   Dressing Change Due 02/02/22 02/01/22 2129   Wound Length (cm) 6.5 cm 01/27/22 1035   Wound Width (cm) 5 cm 01/27/22 1035   Wound Depth (cm) 0.2 cm 01/27/22 1035   Wound Surface Area (cm^2) 32.5 cm^2 01/27/22 1035   Wound Volume (cm^3) 6.5 cm^3 01/27/22 1035   Wound Assessment Pink/red 02/03/22 0426   Drainage Amount None 02/07/22 1206   Drainage Description Serosanguinous 02/03/22 0426   Odor None 02/01/22 0619   Velma-wound Assessment Blanchable erythema 02/03/22 0426   Margins Defined edges 02/01/22 3962   Number of days: 11        Elimination:  Continence: Bowel: No  Bladder: Diamond  Urinary Catheter: Insertion Date: 1/27/22    Colostomy/Ileostomy/Ileal Conduit: No       Date of Last BM: 2/7/2022    Intake/Output Summary (Last 24 hours) at 2/7/2022 1357  Last data filed at 2/7/2022 1047  Gross per 24 hour   Intake 1736 ml   Output 1200 ml   Net 536 ml     I/O last 3 completed shifts: In: 2766 [NG/GT:2766]  Out: 3200 [Urine:3200]    Safety Concerns:     Aspiration Risk    Impairments/Disabilities:      None    Nutrition Therapy:  Current Nutrition Therapy:   Peptide based tube feed goal 65mL/hr, protein shot BID    Routes of Feeding: Nasogastric  Liquids: NPO  Daily Fluid Restriction: no  Last Modified Barium Swallow with Video (Video Swallowing Test): not done    Treatments at the Time of Hospital Discharge:   Respiratory Treatments: ***  Oxygen Therapy:  is on oxygen at 15 L/min per nasal cannula.   Ventilator:    - No ventilator support    Rehab Therapies: ***  Weight Bearing Status/Restrictions: No weight bearing restirctions  Other Medical Equipment (for information only, NOT a DME order):  hospital bed  Other Treatments: ***    Patient's personal belongings (please select all that are sent with patient):  None    RN SIGNATURE:  Electronically signed by Garbiel Ferrer RN on 2/7/22 at 2:00 PM EST    CASE MANAGEMENT/SOCIAL WORK SECTION    Inpatient Status Date: ***    Readmission Risk Assessment Score:  Readmission Risk              Risk of Unplanned Readmission:  29           Discharging to Facility/ Agency   Name: Address:  Phone:  Fax:    Dialysis Facility (if applicable)   Name:  Address:  Dialysis Schedule:  Phone:  Fax:    / signature: {Esignature:[id number]}    PHYSICIAN SECTION    Prognosis: {Prognosis:9315613203}    Condition at Discharge: Lourdes Cheney Patient Condition:517924741}    Rehab Potential (if transferring to Rehab): {Prognosis:7532476195}    Recommended Labs or Other Treatments After Discharge: ***    Physician Certification: I certify the above information and transfer of Brittny Dominguez  is necessary for the continuing treatment of the diagnosis listed and that he requires {Admit to Appropriate Level of Care:62151} for {GREATER/LESS:638449798} 30 days.      Update Admission H&P: {CHP DME Changes in LVTDX:182614065}    PHYSICIAN SIGNATURE:  {Esignature:139143771}

## 2022-02-07 NOTE — PROGRESS NOTES
responded to rapid response. Pt is not responsive, RR 36bpm, 96% on NRB mask. ABG completed. No BLS required.

## 2022-02-07 NOTE — PROGRESS NOTES
Nutrition Note    Patient's chart reviewed today and RD will hold nutrition follow-up d/t change in status. Nutrition comfort care to be provided unless aggressive tx is desired. No additional nutrition intervention will be initiated at this time. Consult dietitian if additional nutrition intervention desired.       Eduardo Hannon MS, RD, LD:    [place]: [phone]  Office:  [phone]

## 2022-02-07 NOTE — DISCHARGE SUMMARY
Hospital Discharge Summary    Patient's PCP: No primary care provider on file. Admit Date: 1/26/2022   Discharge Date: 2/7/2022    Admitting Physician: Dr. Feliz Sutherland MD  Discharge Physician: Dr. Brianda Ball MD   Consults: cardiology, pulmonary/intensive care, GI and Thoracic surgery    HPI: 40-year-old male with COPD, hyperlipidemia , BPH recent admission 12/24/2021 -   After presenting with episode of syncope and chest pressure while doing work around the house including fumigation for bedbugs and went to [employer] to get supplies, where he collapsed. Was diagnosed with CAD/NSTEMI.      Had Cath 12/28/2021: Severe CAD with left main ostial calcification and stenosis severe.  Right coronary artery is completely occluded.  There is cardiomyopathy with reduced ejection fraction (25 to 30% ).    He also had anemia, hg 5.6. EGD 12/27: no source for bleed. Planned colonoscopy later after CABG.      Had CABG 1/3/2022, he was discharged to a SNF on POD 7.     ------------------------------------------------------------------------------------------------------------  1/26 Admitted to [facility] ER about 2 weeks post discharge. Notes state \". ..unhappy there and has refused to eat anything. He has become progressively weaker and has become altered,\" hence was brought to the ED     He had hypernatremia, UTI, DWIGHT. Pt noted to have WBC 12.2 on admission.      1/27 \"Transferred up to floor, on rounds appeared very poor from a functional standpoint.  Transfered to ICU. \"     Notes from 1/27: \"Just barely arousable.  Mouth desiccated\" with hypernatremia.      Noted hypotensive, tachypnic  Notes since 1/28 at he very least show \"Non responsive to verbal and unable to follow basic commands\"     Notes from 2/5/2022: \"some rhythmic jerking after every few breaths\"   CXR: \"worsening airspace disease\"           Brief hospital course:  Given the concern of the patients presentation and the concern of the possible multi-factorial etiology contributing to patients symptomatology. Patient was admitted and evaluated and found to have:        Discharge Diagnoses:     Acute metabolic encephalopathy: POA  Sepsis: POA   UTI, probable pneumonia: POA   Complicated UTI: POA   Acute hypoxic respiratory failure  Respiratory infiltrates: prob aspiration pneumonia in setting of mental status changes  Pneumonia   DWIGHT/hypernatremia secondary to dehydration from poor oral intake  Hypernatremia resolved, but remains at risk  CAD s/p CABG 01/03/22  A. Fib  COPD  Depression  Severe malnutrition/failure to thrive  Elevated LFTs  Acute Anemia: POA    Took over care of pxt 2/7/2022     RR called earlier this AM for hypoxia: sat 71% on 2L tachypnea, unresponsiveness  Patient lethargic today, not following any commands     Of note, Since admission he has been \"increasingly lethargic and slow to respond\".     Chart and data reviewed at length     Pxt is somnolent. On my evaluation, he opens eyes to call,and mouths fine, when I ask how he is doing     He looks chronically ill, thin, with scaphoid abdomnen     His mouth is caked with desicated secretions.      He has increased muscle tone in his extremities.         Sister at bedside, met with Hospice, and requesting care be redirected to emphasis on comfort. Physical Exam: /61   Pulse 80   Temp 98.9 °F (37.2 °C) (Axillary)   Resp 23   Ht 5' 7\" (1.702 m)   Wt 142 lb 14 oz (64.8 kg)   SpO2 99%   BMI 22.38 kg/m²     Recent Labs     02/06/22  1907 02/06/22  2158 02/07/22  0620 02/07/22  0800 02/07/22  0804   POCGLU 154* 143* 147* 141* 133*         General appearance: acute on chronically  Ill-appearing  Respiratory:  Normal respiratory effort. Transmitted sounds, otherwise CTAB  Cardiovascular: No JVD; Regular rate and rhythm; No murmurs  Abdomen: Scaphoid, soft, non-tender, non-distended   Musculoskeletal: No clubbing, cyanosis or edema bilaterally.   Skin: No rashes or lesions. Neurologic: Lethargic. Limited exam. ? Increased muscle tone bilaterally. Moves extremities. Peripheral Pulses: +2 palpable, equal bilaterally          LABS:  Recent Labs     02/07/22  1025   WBC 7.8   HGB 8.1*         Recent Labs     02/07/22  0835      K 3.7      CO2 25   BUN 39*   CREATININE 0.6*   GLUCOSE 128*     No results for input(s): INR in the last 72 hours. Discharge Medications:  Current Discharge Medication List        Activity: activity as tolerated  Diet: regular diet as tolerated  Wound Care: keep wound clean and dry    Disposition: Hospice Care  Discharged Condition: stable to Hospice Care  Follow Up: Primary Care Physician in one week as needed    Total time spent on discharge, finalizing medications, referrals and arranging outpatient follow up was more than 30 minutes    Thank you Dr. Blandon primary care provider on file. for the opportunity to be involved in this patients care. If you have any questions or concerns please feel free to contact me at [phone].

## 2022-02-08 LAB
MRSA SCREEN RT-PCR: ABNORMAL
ORGANISM: ABNORMAL

## 2022-02-08 NOTE — PROGRESS NOTES
Office : [phone]     Fax :[phone]         Renal Progress Note  Subjective:   Admit Date: 1/26/2022       Going with hospice   Lytes in good range             DIET No diet orders on file  Medications:   Scheduled Meds:    Continuous Infusions:      Labs:  CBC:   Recent Labs     02/07/22  0645 02/07/22  0835 02/07/22  1025   WBC 7.2 7.3 7.8   HGB 8.4* 8.0* 8.1*    305 309     BMP:    Recent Labs     02/06/22  0728 02/07/22  0645 02/07/22  0835    141 140   K 3.9 4.2 3.7    106 105   CO2 29 25 25   BUN 37* 38* 39*   CREATININE 0.5* 0.6* 0.6*   GLUCOSE 122* 128* 128*     Ca/Mg/Phos:   Recent Labs     02/05/22  0749 02/05/22  0749 02/06/22  0728 02/07/22  0645 02/07/22  0835 02/07/22  1055   CALCIUM 8.4   < > 8.5 8.4 8.3  --    MG 1.80  --   --   --   --   --    PHOS 1.7*   < > 2.1* 2.4*  --  2.6    < > = values in this interval not displayed. Hepatic:   Recent Labs     02/06/22  0728 02/07/22  0645 02/07/22  0835   * 102* 90*   * 251* 225*   BILITOT 0.4 0.3 0.3   ALKPHOS 227* 258* 238*     Troponin: No results for input(s): TROPONINI in the last 72 hours. BNP: No results for input(s): BNP in the last 72 hours. Lipids: No results for input(s): CHOL, TRIG, HDL, LDLCALC, LABVLDL in the last 72 hours. ABGs:   Recent Labs     02/07/22  0804   PHART 7.474*   PO2ART 132.9*   [id number] 37.2     INR:   No results for input(s): INR in the last 72 hours.   UA:  No results for input(s): Tracee Rhein, GLUCOSEU, BILIRUBINUR, Jerolyn Retort, PROTEINU, UROBILINOGEN, Tommas Ayesha, Shaina Loud in the last 67 hours.   Urine Microscopic:   No results for input(s): LABCAST, BACTERIA, COMU, HYALCAST, WBCUA, RBCUA, EPIU in the last 72 hours. Urine Culture:   No results for input(s): LABURIN in the last 72 hours. Urine Chemistry:   No results for input(s): Charlynne Restrepo, PROTEINUR, NAUR in the last 72 hours. Objective:   Vitals: /66   Pulse 78   Temp 98.7 °F (37.1 °C) (Axillary)   Resp 22   Ht 5' 7\" (1.702 m)   Wt 142 lb 14 oz (64.8 kg)   SpO2 100%   BMI 22.38 kg/m²    Wt Readings from Last 3 Encounters:   02/07/22 142 lb 14 oz (64.8 kg)   01/10/22 155 lb 6.8 oz (70.5 kg)   07/12/18 179 lb (81.2 kg)      24HR INTAKE/OUTPUT:      Intake/Output Summary (Last 24 hours) at 2/7/2022 2325  Last data filed at 2/7/2022 1047  Gross per 24 hour   Intake 408 ml   Output    Net 408 ml     Constitutional:  resting  NECK: supple, no JVD  Cardiovascular:  S1, S2 without m/r/g  Respiratory:  CTA B without w/r/r  Abdomen: +bs, soft, nt  Ext: trace LE edema    Assessment and Plan:       IMAGING:  CT HEAD WO CONTRAST   Final Result      1. Stable exam with no acute intracranial abnormality. 2. Extensive atrophy with sulcal and ventricular prominence. 3. Periventricular white matter hypodensity compatible with chronic small vessel ischemic disease and/or age related degenerative change. XR CHEST PORTABLE   Final Result   Impression: Persistent multifocal bilateral pulmonary opacities, although, these are slightly improved in the interval.      Stable left pleural effusion. XR CHEST PORTABLE   Final Result   1. Worsening bilateral airspace opacities. 2.  Suspected left pleural effusion. XR CHEST PORTABLE   Final Result   1. Right IJ central line in satisfactory position in the thorax   2. Nasogastric tube in the stomach   3.  Basilar airspace disease, new from prior study      XR ABDOMEN FOR NG/OG/NE TUBE PLACEMENT   Final Result      Frontal view centered at the diaphragm demonstrates tip of NG tube in

## 2022-02-11 LAB
BLOOD CULTURE, ROUTINE: NORMAL
CULTURE, BLOOD 2: NORMAL

## 2022-02-25 PROBLEM — E86.0 DEHYDRATION: Status: RESOLVED | Noted: 2022-01-26 | Resolved: 2022-02-25

## 2022-04-06 PROBLEM — Z51.5 HOSPICE CARE: Status: ACTIVE | Noted: 2022-02-07

## 2022-04-14 NOTE — CONSULTS
[facility]    Cardiology Consult/H&P  Consulting Cardiologist Addison Tejeda MD, MYUKI., [facility]  Referring Provider:  No primary care provider on file. 12/25/2021, 8:34 AM           Primary Cardiologist:  Asked by Steven Felder MD/No primary care provider on file. to evaluate and assess this patient's elevated troponin levels    History of Present Illness:   Marion Abdalla is a 70 y.o. male admitted on 12/24/2021 through the emergency department with lightheadedness and a syncopal episode. Also had chest pressure. Was found to be severely anemic. Troponin level did go to 0.35. States that he had been fumigating his work space for bedbugs then while ambulating to the store for more supplies became lightheaded and had a syncopal episode. Hemoglobin was 5.6 and with a hematocrit of 17.3. The anemia is mildly improved after 2 units of transfusion. No known coronary or heart disease but he smokes. No clear or obvious reasons for his profound anemia. The troponin levels are likely related to some type of demand ischemia and will be evaluated further. COPD and is on home inhalers. 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 Surgical History:   has a past surgical history that includes hemicolectomy. Social History:   reports that he has been smoking cigarettes. He has been smoking about 1.00 pack per day. He has never used smokeless tobacco. He reports current alcohol use of about 6.0 standard drinks of alcohol per week. He reports that he does not use drugs.      Family History:  family history includes Arthritis in his father, mother, and sister; Cancer in his sister; Depression in his mother; Heart Disease in his father and mother; High Blood Pressure in his father, mother, and sister; High Cholesterol in his father, mother, and sister; Stroke in his father. Home Medications:  Prior to Admission medications    Medication Sig Start Date End Date Taking? Authorizing Provider   acetaminophen (TYLENOL) 325 MG tablet Take 650 mg by mouth daily Pt states he takes to \"clean his sinuses\"   Yes Historical Provider, MD   Flaxseed, Linseed, (FLAX SEED OIL) 1000 MG CAPS Take 2,000 mg by mouth 2 times daily. Yes Historical Provider, MD   Cyanocobalamin (VITAMIN B 12) 500 MCG LOZG Take 1 tablet by mouth. Yes Historical Provider, MD   fish oil-omega-3 fatty acids 1000 MG capsule Take 1 capsule by mouth daily. Yes Historical Provider, MD   mupirocin [facility]) 2 % ointment Apply to affected area once daily 6/6/18   Sara Ybarra MD   tiotropium (SPIRIVA RESPIMAT) 2.5 MCG/ACT AERS inhaler Inhale 2 puffs into the lungs daily 5/31/18   Judy Arzate MD   zoster recombinant adjuvanted vaccine [facility]) 50 MCG SUSR injection Give vaccine as directed 5/31/18   Judy Arzate MD   buPROPion (WELLBUTRIN XL) 300 MG extended release tablet Take 1 tablet by mouth every morning 5/8/18   Judy Arzate MD   fluorouracil (EFUDEX) 5 % cream Apply twice daily to the upper forehead for 14 days. 4/4/18   Tom Amaro MD   hydrocortisone 2.5 % cream Apply to red scaly areas on the chin twice daily for several days or until improved.  4/4/18   Tom Amaro MD   zoster recombinant adjuvanted vaccine [facility]) 50 MCG SUSR injection Give vaccine as directed 3/20/18   Judy Arzate MD   montelukast (SINGULAIR) 10 MG tablet Take 1 tablet by mouth nightly 2/2/18   Judy Arzate MD   umeclidinium-vilanterol [facility] ELLIPTA) 62.5-25 MCG/INH AEPB inhaler Inhale 1 puff into the lungs daily 2/1/18   Judy Arzate MD   albuterol sulfate HFA (VENTOLIN HFA) 108 (90 Base) MCG/ACT inhaler Inhale 2 puffs into the lungs every 6 hours as needed for Wheezing or Shortness of Breath 8/21/17   Loretta Sarabia MD   fluticasone-salmeterol (ADVAIR) 250-50 MCG/DOSE AEPB Inhale 1 puff into the lungs every 12 hours 1/17/17   CAITLYN Qiu CNP   ketoconazole (NIZORAL) 2 % cream Apply topically daily. 11/4/16   Loretta Sarabia MD   Cinnamon 500 MG CAPS Take 1 capsule by mouth daily. Historical Provider, MD   vitamin E 400 UNIT capsule Take 400 Units by mouth daily. Historical Provider, MD   aspirin 325 MG tablet Take 325 mg by mouth daily. Historical Provider, MD   Ascorbic Acid (VITAMIN C) 500 MG tablet Take 500 mg by mouth daily. Historical Provider, MD   vitamin D (CHOLECALCIFEROL) 400 UNIT TABS tablet Take 400 Units by mouth daily.     Historical Provider, MD        Current Medications:  Current Facility-Administered Medications   Medication Dose Route Frequency Provider Last Rate Last Admin    cefTRIAXone (ROCEPHIN) 1000 mg IVPB in 50 mL D5W minibag  1,000 mg IntraVENous Q24H Rox Perez  mL/hr at 12/25/21 0637 1,000 mg at 12/25/21 5640    aspirin chewable tablet 324 mg  324 mg Oral Once Armando Rich MD        fentaNYL (SUBLIMAZE) injection 50 mcg  50 mcg IntraVENous Once Armando Rich MD        0.9 % sodium chloride infusion   IntraVENous PRN Armando Rich MD        ascorbic acid (VITAMIN C) tablet 500 mg  500 mg Oral Daily Diamond Jackson MD        vitamin D3 (CHOLECALCIFEROL) tablet 400 Units  400 Units Oral Daily Diamond Jackson MD        vitamin E capsule 400 Units  400 Units Oral Daily Diamond Jackson MD        albuterol (PROVENTIL) nebulizer solution 2.5 mg  2.5 mg Nebulization Q6H PRN Diamond Jackson MD        tiotropium (SPIRIVA RESPIMAT) 2.5 MCG/ACT inhaler 2 puff  2 puff Inhalation Daily Diamond Jackson MD        [facility] by provider] aspirin tablet 325 mg  325 mg Oral Daily Diamond Jackson MD        sodium chloride flush 0.9 % injection 5-40 mL  5-40 mL IntraVENous 2 times per day Diamond Jackson MD   10 mL at 12/24/21 8521    sodium chloride flush 0.9 % injection 5-40 mL  5-40 mL IntraVENous PRN Donna Lord MD        0.9 % sodium chloride infusion  25 mL IntraVENous PRN Donna Lord  mL/hr at 12/25/21 0631 25 mL at 12/25/21 0631    ondansetron (ZOFRAN-ODT) disintegrating tablet 4 mg  4 mg Oral Q8H PRN Donna Lord MD        Or    ondansetron [facility]) injection 4 mg  4 mg IntraVENous Q6H PRN Donna Lord MD        polyethylene glycol [facility]) packet 17 g  17 g Oral Daily PRN Donan Lord MD        acetaminophen (TYLENOL) tablet 650 mg  650 mg Oral Q6H PRN Donna Lord MD        Or    acetaminophen (TYLENOL) suppository 650 mg  650 mg Rectal Q6H PRN Donna Lord MD        pantoprazole (PROTONIX) injection 40 mg  40 mg IntraVENous BID Donna Lord MD   40 mg at 12/24/21 2234    budesonide (PULMICORT) nebulizer suspension 500 mcg  0.5 mg Nebulization BID Donna Lord MD        And    Arformoterol Tartrate [facility]) nebulizer solution 15 mcg  15 mcg Nebulization BID Donna Lord MD           Allergies:  Patient has no known allergies. Review of Systems:   · Constitutional: there has been no unanticipated weight loss. · Eyes: No visual changes or diplopia. No scleral icterus. · ENT: No Headaches, hearing loss or vertigo. No mouth sores or sore throat. · Cardiovascular: Reviewed in HPI  ·  Pulmonary:  no cough or sputum production. · Gastrointestinal: No abdominal pain, appetite loss, blood in stools. No change in bowel or bladder habits. · Genitourinary: No dysuria, trouble voiding, or hematuria. · Musculoskeletal:  No gait disturbance, weakness or joint complaints. · Integumentary: No rash or pruritis. Endocrine: No malaise, fatigue or temperature intolerance. Hematologic/Lymphatic: No abnormal bruising or bleeding, blood clots or swollen lymph nodes. · Allergic/Immunologic: No nasal congestion or hives. · All other ROS are reviewed and are unremarkable.     Physical Examination:    Vitals: 12/24/21 2303 12/24/21 2333 12/24/21 2356 12/25/21 0423   BP: 114/75  115/72 111/71   Pulse: 81  80 77   Resp: 18 18 18 18   Temp: 97.5 °F (36.4 °C)  98.9 °F (37.2 °C) 97.1 °F (36.2 °C)   TempSrc: Oral  Oral Oral   SpO2: 99% 98% 98% 99%   Weight:       Height:            EXAMl and General Appearance:  Healthy. And alert . HEENT: eyes and ears intact. No nasal masses  THYROID: not enlarged  LUNGS:  · Clear to auscultation and percussion  HEART and VASCULAR:  · The apical impulses not displaced  · Heart tones are crisp and normal  · Cervical veins are not engorged  · The carotid upstroke is normal in amplitude and contour without delay or bruit  · Peripheral pulses are symmetrical and full  · There is no clubbing, cyanosis of the extremities. · No peripheral edema  · Femoral Arteries: 2+ and equal  · Pedal Pulses:2+ and equal   ABDOMEN[de-identified]  · No masses or tenderness  · Liver/Spleen: No Abnormalities Noted  NEUROLOGICAL:  . Moves all extremities to command. Cranial nerves 2-12 are in tact.   PSYCHIATRIC: alert and lucid  and oriented and appropriate  SKIN: No lesions or rashes  LYMPH NODES: none enlarged    ·   ·   ·     CBC with Differential:    Lab Results   Component Value Date    WBC 6.9 12/25/2021    RBC 2.77 12/25/2021    HGB 7.3 12/25/2021    HCT 22.3 12/25/2021     12/25/2021    MCV 80.7 12/25/2021    MCH 26.2 12/25/2021    MCHC 32.5 12/25/2021    RDW 18.2 12/25/2021    SEGSPCT 45.8 01/03/2013    LYMPHOPCT 17.4 12/25/2021    MONOPCT 12.1 12/25/2021    EOSPCT 1.2 06/26/2010    BASOPCT 0.4 12/25/2021    MONOSABS 0.8 12/25/2021    LYMPHSABS 1.2 12/25/2021    EOSABS 0.0 12/25/2021    BASOSABS 0.0 12/25/2021    DIFFTYPE Auto-K 01/03/2013     BMP:    Lab Results   Component Value Date     12/25/2021    K 4.2 12/25/2021     12/25/2021    CO2 21 12/25/2021    BUN 23 12/25/2021    LABALBU 3.6 12/24/2021    CREATININE 0.8 12/25/2021    CALCIUM 8.1 12/25/2021    GFRAA >60 12/25/2021    GFRAA >60 01/03/2013    LABGLOM >60 12/25/2021     Uric Acid:  No components found for: URIC  PT/INR:    Lab Results   Component Value Date    PROTIME 14.7 12/24/2021    INR 1.29 12/24/2021     Last 3 Troponin:    Lab Results   Component Value Date    TROPONINI 0.25 12/24/2021    TROPONINI 0.34 12/24/2021     FLP:    Lab Results   Component Value Date    TRIG 199 03/26/2018    HDL 33 03/26/2018    HDL 39 06/26/2010    LDLCALC 100 03/26/2018    LABVLDL 40 03/26/2018       EKG: On 12/24/2021 sinus rhythm 98/min. Significant anterolateral ST and T wave changes with ST segment depression concerning for ischemia. Shirley Cardoso echocardiogram pending  Assessment/ Plan     Patient Active Problem List    Diagnosis Date Noted    Anemia 12/24/2021    Visit for wound check 06/06/2018    Basal cell carcinoma of right side of nose 05/08/2018    Visit for suture removal 05/08/2018    SCCS, mod diff  (squamous cell carcinoma), hand, left 04/24/2018    Mixed hyperlipidemia 04/18/2017    Nicotine use disorder 02/03/2017    COPD, mild ([facility] [address] 06/23/2016    Colon polyp     Basal cell carcinoma of shoulder 09/08/2015    Squamous cell carcinoma of skin of upper limb, including shoulder 09/08/2015    Actinic keratosis 09/08/2015    BPH with obstruction/lower urinary tract symptoms 08/03/2012    Hyperglycemia 12/15/2010    Smoker's respiratory syndrome 12/15/2010   Syncope of undetermined significance. This is in the face of severe anemia which seems to be likely iron deficiency. Evaluation is in process and will need a full GI evaluation. #1 GI evaluation  #2 continue telemetry monitoring  #3 continue serial enzymes  #4 repeat EKG  #5 echocardiogram  #6 ischemia evaluation once his anemia has been substantially corrected    Thanks for allowing me the opportunity  to participate in the evaluation and care of your patients.  Please call if we can assist further [phone]    This chart was likely completed using voice recognition technology and may contain unintended grammatical , phraseology,and/or punctuation errors  Ronda Pickard MD, M.D., [facility]  12/25/20218:34 AM 14-Apr-2022

## 2023-02-14 NOTE — PROGRESS NOTES
BP check Clinical Pharmacy Progress Note    Vancomycin - Management by Pharmacy    Consult Date(s): 12/26/21  Consulting Provider(s): Liv    Assessment / Plan  1) Staph aureus UTI - Vancomycin   Concurrent Antimicrobials: none   Day of Vanc Therapy: day #3   Current Dosing Method: Bayesian-Guided AUC Dosing  o Therapeutic Goal: AUC < 500 mg/L*hr  o Currently on 750 mg every 12 hours. Calculated AUC = 410 mg/L*hr with an associated trough of ~ 13 mg/L based on level drawn 12/27. Renal function stable - continue current regimen.  Will continue to monitor clinical condition and make adjustments to regimen as appropriate. Please call with questions--  Thanks--  Sera Segovia, PharmD, BCPS, BCGP  [id number] ([facility])   12/28/2021 10:48 AM      Interval update:  Having LH today. GI planning colonoscopy this admission. Subjective/Objective:  Jose Daniel Patton is a 70 y.o. male with a PMHx significant for COPD, HLD, BPH, and shingles who presented with syncope and chest pressure. Patient found to have NSTEMI, acute blood loss anemia likely 2/2 GI bleed, and staph aureus UTI. Pharmacy has been consulted to dose vancomycin. Ht Readings from Last 1 Encounters:   12/24/21 5' 7\" (1.702 m)       Wt Readings from Last 1 Encounters:   12/24/21 168 lb 10.4 oz (76.5 kg)       Current & Prior Antimicrobial Regimen(s):    (12/25-12/26)   Vancomycin 750 mg IV q12h (12/26 - present)     Vancomycin Level(s) / Doses  Date Time Dose Level / Type of Level Interpretation   12/27 11:39 750 mg IV q12h Random = 14.3 mg/L Predicted AUC = 410 with trough 13.1. Note: Serum levels collected for AUC-based dosing may be high if collected in close proximity to the dose administered. This is not necessarily an indicator of toxicity.     Cultures & Sensitivities:  Date Site Micro Susceptibility / Result   12/26 Urine Staph aureus  Sensitive to Cefazolin, Oxacillin, Bactrim   12/26 Rapid COVID-19 negative      Labs / Ancillary Data:    Estimated Creatinine Clearance: 90 mL/min (A) (based on SCr of 0.7 mg/dL (L)).     Recent Labs     12/26/21  0906 12/27/21  1139 12/28/21  0725   CREATININE 0.8 0.8 0.7*   BUN 24* 21* 19   WBC 8.3 6.3 7.1

## 2023-04-10 NOTE — PLAN OF CARE
Goal Outcome Evaluation:      Plan of Care Reviewed With: patient    Overall Patient Progress: improvingOverall Patient Progress: improving       [id number]. No complaints of pain. Did not endorse SI. Cooperative with staff. Got scheduled Meds and PRN melatonin. 1:1 Sitter at bedside, no contact with family.   Problem: Falls - Risk of:  Goal: Will remain free from falls  Description: Will remain free from falls  Note: He is a fall risk. Bed alarm on, and call light in reach. Will monitor. Problem: Nutrition  Goal: Optimal nutrition therapy  Note: NPO for EGD. Patient is hungry and asking for food. Problem: Skin Integrity:  Goal: Absence of new skin breakdown  Description: Absence of new skin breakdown  Note: Buttocks reddened with an open area on the left buttocks. Mepilex dressing in place over open area. Assisted to reposition every 2 hours. Will monitor. Problem: Cardiac:  Goal: Ability to maintain an adequate cardiac output will improve  Description: Ability to maintain an adequate cardiac output will improve  Note: H/H 7.1/21.9. No evidence of bleeding. No emesis, no stool out. EGD today. Will monitor. Goal: Ability to achieve and maintain adequate cardiopulmonary perfusion will improve  Description: Ability to achieve and maintain adequate cardiopulmonary perfusion will improve  Note: SR on the monitor. Skin warm and dry. Pulses palpable. Will monitor.

## 2024-05-20 NOTE — PROGRESS NOTES
Physical Therapy Attempt    Attempted PT session this pm, Pt resting in recliner with family visiting. Will attempt again as able.     Michael Velasquez, PT, DPT     Pt was turned to assess skin, pt has redness to his buttocks. Will continue to monitor.

## (undated) DEVICE — SUTURE NONABSORBABLE MONOFILAMENT 5-0 C-1 1X24 IN PROLENE 8725H

## (undated) DEVICE — GOWN,SIRUS,POLYRNF,BRTHSLV,LG,30/CS: Brand: MEDLINE

## (undated) DEVICE — FOGARTY SPRING CLIPS 6MM: Brand: FOGARTY SOFTJAW

## (undated) DEVICE — ROYALSILK SURGICAL GOWN, L: Brand: CONVERTORS

## (undated) DEVICE — CONNECTOR PERF W3 8XH3 8XL3 8IN BASE EQL Y SHP W O LUERLOCK

## (undated) DEVICE — E-Z CLEAN, NON-STICK, PTFE COATED, ELECTROSURGICAL BLADE ELECTRODE, 4 INCH (10.2 CM): Brand: MEGADYNE

## (undated) DEVICE — CANNULA PERF 7FR L5.5IN AORT ROOT RADPQ STD TIP W/ VENT LN

## (undated) DEVICE — STRAP SFTY W5XL72IN 1 PC

## (undated) DEVICE — SUTURE ABSORBABLE BRAIDED 2-0 CT-1 27 IN UD VICRYL J259H

## (undated) DEVICE — COUNTER NDL 40 COUNT HLD 70 NUM FOAM BLK SGL MAG W BLDE REMV

## (undated) DEVICE — SOLUTION IV SODIUM CHL 0.9% 500 ML INJ FLX CONTAINER

## (undated) DEVICE — GLOVE SURG SZ 75 L12IN FNGR THK94MIL TRNSLUC YEL LTX

## (undated) DEVICE — E-Z CLEAN, NON-STICK, PTFE COATED, ELECTROSURGICAL BLADE ELECTRODE, 2.5 INCH (6.35 CM): Brand: EZ CLEAN

## (undated) DEVICE — SOLUTION IV CARDPLG PLEGISOL

## (undated) DEVICE — ADAPTER,CATHETER/SYRINGE/LUER,STERILE: Brand: MEDLINE

## (undated) DEVICE — SUTURE PERMA-HAND 0 L18IN NONABSORBABLE BLK SILK BRAID W/O SA66G

## (undated) DEVICE — SOLUTION IRRIG 3000ML 0.9% SOD CHL USP UROMATIC PLAS CONT

## (undated) DEVICE — PROVE COVER: Brand: UNBRANDED

## (undated) DEVICE — SYSTEM VES HARV ENDOSCP VASOVIEW HEMOPRO

## (undated) DEVICE — SUTURE NONABSORBABLE MFIL TAPR PNT 3/8 CIR BLU 8.0M BV175-6 8734H

## (undated) DEVICE — [HIGH FLOW INSUFFLATOR,  DO NOT USE IF PACKAGE IS DAMAGED,  KEEP DRY,  KEEP AWAY FROM SUNLIGHT,  PROTECT FROM HEAT AND RADIOACTIVE SOURCES.]: Brand: PNEUMOSURE

## (undated) DEVICE — SUTURE PDS II SZ 0 L27IN ABSRB VLT L36MM CT-1 1/2 CIR Z340H

## (undated) DEVICE — AIR SHEET,LAT,COMFORT GLIDE, BLEND 40X80: Brand: MEDLINE

## (undated) DEVICE — SYSTEM SKIN CLSR 22CM DERMBND PRINEO

## (undated) DEVICE — SUTURE S STL SZ 6 L18IN NONABSORBABLE SIL L48MM V-40 1/2 M649G

## (undated) DEVICE — OPEN HRT CDS

## (undated) DEVICE — PUNCH AORT DIA4MM LNG HNDL

## (undated) DEVICE — CATHETERIZATION TRAY 16 FR 5 CC FOL STR TIP URIMTR BARDX IC

## (undated) DEVICE — BLOOD TRANSFUSION FILTER: Brand: HAEMONETICS

## (undated) DEVICE — SUTURE ETHBND 4-0 L30IN NONABSORBABLE GRN L17MM RB-1 1/2 X551H

## (undated) DEVICE — Device

## (undated) DEVICE — SYRINGE MED 10ML LUERLOCK TIP W/O SFTY DISP

## (undated) DEVICE — STOCKINETTE,DOUBLE PLY,6X48,STERILE: Brand: MEDLINE

## (undated) DEVICE — SYRINGE MED 30ML STD CLR PLAS LUERLOCK TIP N CTRL DISP

## (undated) DEVICE — SURE SET-DOUBLE BASIN-LF: Brand: MEDLINE INDUSTRIES, INC.

## (undated) DEVICE — FORCEPS BX L240CM JAW DIA2.8MM L CAP W/ NDL MIC MESH TOOTH

## (undated) DEVICE — CONNECTOR PERF 3/8X3/8X3/8IN EQL WYE

## (undated) DEVICE — CANNULA PERF 24FR 51CML 45DEG TIP 3 8IN CONN 20CML SUT RNG

## (undated) DEVICE — SOLUTION IV 1000ML 140MEQ/L SOD 5MEQ/L K 3MEQ/L MG 27MEQ/L

## (undated) DEVICE — SET PERF L15IN BLU CLMP MULT FEM LUER ON SGL INLET LEG DLP

## (undated) DEVICE — RESERVOIR AUTOTRANSFUSION 225/120 CC GS FILTERED XTRA

## (undated) DEVICE — SURGICAL PROCEDURE PACK PERF TBNG

## (undated) DEVICE — SUTURE ETHBND EXCEL 2-0 L30IN NONABSORBABLE GRN L26MM SH MX563

## (undated) DEVICE — 3M™ TEGADERM™ TRANSPARENT FILM DRESSING FRAME STYLE, 1628, 6 IN X 8 IN (15 CM X 20 CM), 10/CT 8CT/CASE: Brand: 3M™ TEGADERM™

## (undated) DEVICE — CONNECTOR PERF L5 IN OD1 2 IN SAT HCT ST FEATURING B CARE 5

## (undated) DEVICE — DUAL LUMEN STOMACH TUBE: Brand: SALEM SUMP

## (undated) DEVICE — CANNULA PERF L15IN DIA29FR VEN 3 STG THN WALL DSGN W  VENT

## (undated) DEVICE — CANNULA VES L25IN RADPQ BODY W  1 W VLV 3MM BLNT TIP DLP

## (undated) DEVICE — SUTURE PERMA-HAND SZ 4-0 L144IN NONABSORBABLE BLK LIGAPAK LA53G

## (undated) DEVICE — Z DISCONTINUED USE 2272117 DRAPE SURG 3 QTR N INVASIVE 2 LAYR DISP

## (undated) DEVICE — TUBING, SUCTION, 1/4" X 12', STRAIGHT: Brand: MEDLINE

## (undated) DEVICE — CANNULA PERF L125IN OD15FR POLYVI CHL VEN RG AUTO INFL SMOOT

## (undated) DEVICE — CATHETER,URETHRAL,REDRUBBER,STRL,10FR: Brand: MEDLINE INDUSTRIES, INC.

## (undated) DEVICE — STERNUM SAW BLADE, 9.4MM X 34MM X 1MM: Brand: MICROAIRE®

## (undated) DEVICE — MASK CAPNOGRAPHY AD W35IN DIA58IN SAMP LN L10FT O2 LN

## (undated) DEVICE — KIT COMPL CK0289R4  BB9L99R8

## (undated) DEVICE — SPONGE GZ W4XL4IN COT 12 PLY TYP VII WVN C FLD DSGN

## (undated) DEVICE — SUTURE GOR TX SZ 2-0 L36IN NONABSORBABLE L18MM TH-18 1/2 3N04B

## (undated) DEVICE — GUIDE SURG SELECTION FOR GILLINOV COSGROVE LAA EXCLUSION SYS

## (undated) DEVICE — SUTURE VCRL SZ 0 L18IN ABSRB VLT L36MM CT-1 1/2 CIR J740D

## (undated) DEVICE — FOGARTY - HYDRAGRIP SURGICAL - CLAMP INSERTS: Brand: FOGARTY SOFTJAW

## (undated) DEVICE — Device: Brand: TEMPORARY MYOCARDIAL HEARTWIRE

## (undated) DEVICE — SUTURE VCRL SZ 3-0 L27IN ABSRB UD L24MM FS-1 3/8 CIR REV J442H

## (undated) DEVICE — SUTURE PERMA-HAND SZ 2 L60IN NONABSORBABLE BLK SILK BRAID SA8H

## (undated) DEVICE — SOLUTION IV IRRIG POUR BRL 0.9% SODIUM CHL 2F7124

## (undated) DEVICE — CLIP SM RED INTERN HMOCLP TITAN LIGATING

## (undated) DEVICE — ELECTROSURGICAL PENCIL ROCKER SWITCH NON COATED BLADE ELECTRODE 10 FT (3 M) CORD HOLSTER: Brand: MEGADYNE

## (undated) DEVICE — LOOP,VESSEL,MAXI,BLUE,2/PK,STERILE: Brand: MEDLINE